# Patient Record
Sex: MALE | Race: WHITE
[De-identification: names, ages, dates, MRNs, and addresses within clinical notes are randomized per-mention and may not be internally consistent; named-entity substitution may affect disease eponyms.]

---

## 2017-03-11 ENCOUNTER — HOSPITAL ENCOUNTER (INPATIENT)
Dept: HOSPITAL 74 - JER | Age: 82
LOS: 4 days | Discharge: NURSING, REHAB OR LONG-TERM CARE | DRG: 558 | End: 2017-03-15
Attending: INTERNAL MEDICINE | Admitting: INTERNAL MEDICINE
Payer: COMMERCIAL

## 2017-03-11 VITALS — BODY MASS INDEX: 40.8 KG/M2

## 2017-03-11 DIAGNOSIS — M62.82: Primary | ICD-10-CM

## 2017-03-11 DIAGNOSIS — I48.91: ICD-10-CM

## 2017-03-11 DIAGNOSIS — I11.0: ICD-10-CM

## 2017-03-11 DIAGNOSIS — C61: ICD-10-CM

## 2017-03-11 DIAGNOSIS — N40.0: ICD-10-CM

## 2017-03-11 DIAGNOSIS — M16.12: ICD-10-CM

## 2017-03-11 DIAGNOSIS — R60.0: ICD-10-CM

## 2017-03-11 DIAGNOSIS — Z79.01: ICD-10-CM

## 2017-03-11 DIAGNOSIS — I50.30: ICD-10-CM

## 2017-03-11 DIAGNOSIS — Z91.81: ICD-10-CM

## 2017-03-11 DIAGNOSIS — R79.89: ICD-10-CM

## 2017-03-11 DIAGNOSIS — Z95.0: ICD-10-CM

## 2017-03-11 DIAGNOSIS — K21.9: ICD-10-CM

## 2017-03-11 DIAGNOSIS — E66.01: ICD-10-CM

## 2017-03-11 DIAGNOSIS — Z71.3: ICD-10-CM

## 2017-03-11 DIAGNOSIS — K76.0: ICD-10-CM

## 2017-03-11 DIAGNOSIS — R74.0: ICD-10-CM

## 2017-03-11 LAB
ALBUMIN SERPL-MCNC: 3.3 G/DL (ref 3.4–5)
ALP SERPL-CCNC: 89 U/L (ref 45–117)
ALT SERPL-CCNC: 57 U/L (ref 12–78)
ANION GAP SERPL CALC-SCNC: 10 MMOL/L (ref 8–16)
APPEARANCE UR: CLEAR
AST SERPL-CCNC: 184 U/L (ref 15–37)
BACTERIA #/AREA URNS HPF: (no result) /HPF
BASOPHILS # BLD: 1.3 % (ref 0–2)
BILIRUB SERPL-MCNC: 1.4 MG/DL (ref 0.2–1)
BILIRUB UR STRIP.AUTO-MCNC: NEGATIVE MG/DL
CALCIUM SERPL-MCNC: 8.8 MG/DL (ref 8.5–10.1)
CO2 SERPL-SCNC: 26 MMOL/L (ref 21–32)
COLOR UR: YELLOW
CREAT SERPL-MCNC: 0.9 MG/DL (ref 0.7–1.3)
DEPRECATED RDW RBC AUTO: 14.2 % (ref 11.9–15.9)
EOSINOPHIL # BLD: 2.3 % (ref 0–4.5)
GLUCOSE SERPL-MCNC: 103 MG/DL (ref 74–106)
HYALINE CASTS URNS QL MICRO: 1 /LPF
INR BLD: 1.2 (ref 0.82–1.09)
KETONES UR QL STRIP: (no result)
LEUKOCYTE ESTERASE UR QL STRIP.AUTO: (no result)
MCH RBC QN AUTO: 29.1 PG (ref 25.7–33.7)
MCHC RBC AUTO-ENTMCNC: 32.6 G/DL (ref 32–35.9)
MCV RBC: 89.1 FL (ref 80–96)
MUCOUS THREADS URNS QL MICRO: (no result)
NEUTROPHILS # BLD: 66.2 % (ref 42.8–82.8)
NITRITE UR QL STRIP: NEGATIVE
PH UR: 5 [PH] (ref 5–8)
PLATELET # BLD AUTO: 249 K/MM3 (ref 134–434)
PMV BLD: 7.7 FL (ref 7.5–11.1)
PROT SERPL-MCNC: 5.8 G/DL (ref 6.4–8.2)
PROT UR QL STRIP: NEGATIVE
PROT UR QL STRIP: NEGATIVE
PT PNL PPP: 13.2 SEC (ref 9.98–11.88)
RBC # BLD AUTO: 1 /HPF (ref 0–3)
RBC # UR STRIP: NEGATIVE /UL
SP GR UR: 1.02 (ref 1–1.03)
TROPONIN I SERPL-MCNC: 0.07 NG/ML (ref 0–0.05)
UROBILINOGEN UR STRIP-MCNC: NEGATIVE E.U./DL (ref 0.2–1)
WBC # BLD AUTO: 9.4 K/MM3 (ref 4–10)
WBC # UR AUTO: 3 /HPF (ref 3–5)

## 2017-03-11 PROCEDURE — G0378 HOSPITAL OBSERVATION PER HR: HCPCS

## 2017-03-11 RX ADMIN — SODIUM CHLORIDE SCH MLS/HR: 9 INJECTION, SOLUTION INTRAVENOUS at 17:39

## 2017-03-11 NOTE — PDOC
History of Present Illness





- General


History Source: Patient


Exam Limitations: No Limitations





- History of Present Illness


Initial Comments: 





03/11/17 10:52


The patient is an 81 year old male with a significant past medical history of 

hypertension and implantable pacemaker (for symptomatic bradycardia), who 

presents to the ER via EMS with progressively worsening left hip pain for six 

months. He states the pain is localized to the left hip and when he is walking 

it worsens it significantly. Patient relies on Tylenol 4 for the pain. He 

states he is taking 1-2 pills every 4-6 hours, and sometimes the pain does not 

go away with medication. Patient reports he has had difficulty ambulating for 

the past two days secondary to the pain. As per wife, patient was frozen 

yesterday and again because of the pain and needed assistance to lower himself 

from a standing position. Pt states that while his pain has been worsening, 

this has never happened before. Patient also reports he has had increased 

swelling in both legs, worse than usual, but notes that his lasix was cut down 

from two pills per day to one pill per day due to him feeling dizzy. Patient 

states his next orthopedic appointment is in April.





Denies back pain, headache, neck pain, fever/chills.


Denies chest pain, shortness of breath, diaphoresis


Denies nausea, vomiting, diarrhea


Denies blurry vision, paresthesia





<Shahida Escudero - Last Filed: 03/11/17 11:49>





<Justin Lara - Last Filed: 03/11/17 15:56>





- General


Chief Complaint: Pain


Stated Complaint: FALL


Time Seen by Provider: 03/11/17 08:48





Past History





<Shahida Escudero - Last Filed: 03/11/17 11:49>





- Past Medical History


Cardiac Disorders: Yes


CHF: Yes


GI Disorders: Yes (GERD.)


HTN: Yes


Hypercholesterolemia: Yes


Other medical history: BAD LT HIP (WAITING FOR REPLACMENT).





- Surgical History


Orthopedic Surgery: Yes (balateral knee sx 1965)





- Psycho/Social/Smoking Cessation Hx


Anxiety: No


Suicidal Ideation: No


Smoking History: Former smoker


Have you smoked in the past 12 months: No


If you are a former smoker, when did you quit?: 1970


Information on smoking cessation initiated: No


Hx Alcohol Use: No


Drug/Substance Use Hx: No


Substance Use Type: None





<Justin Lara - Last Filed: 03/11/17 15:56>





- Past Medical History


Allergies/Adverse Reactions: 


 Allergies











Allergy/AdvReac Type Severity Reaction Status Date / Time


 


No Known Allergies Allergy   Verified 11/06/16 07:40











Home Medications: 


Ambulatory Orders





Acetaminophen W/ Codeine #3 [Tylenol # 3 -] 1 tab PO PRN PRN 03/11/17 


Furosemide [Lasix -] 40 mg PO DAILY 03/11/17 


Losartan Potassium 25 mg PO DAILY 03/11/17 


Metoprolol Succinate [Toprol Xl] 50 mg PO DAILY 03/11/17 


Omeprazole 20 mg PO DAILY 03/11/17 


Oxycodone HCl/Acetaminophen [Percocet 5-325 mg Tablet] 1 tab PO PRN PRN 03/11/ 17 


Rivaroxaban [Xarelto -] 20 mg PO DAILY 03/11/17 


Tamsulosin HCl [Flomax] 0.4 mg PO DAILY 03/11/17 











**Review of Systems





- Review of Systems


Able to Perform ROS?: Yes


Comments:: 





03/11/17 10:53


CONSTITUTIONAL: 


No reported: Fever, Chills, Diaphoresis, Generalized Weakness, Malaise, Loss of 

Appetite 


HEENT: 


No reported: Rhinorrhea, Nasal Congestion, Throat Pain, Throat Swelling, 

Difficulty Swallowing, Mouth Swelling, Ear Pain, Eye Pain, Visual Changes 


CARDIOVASCULAR: 


No reported: Chest Pain, Syncope, Palpitations, Irregular Heart Rate, 

Lightheadedness, Peripheral Edema 


RESPIRATORY: 


No reported: Cough, Shortness of Breath, SOB with Exertion, Orthopnea, Wheezing

, Stridor, Hemoptysis 


GASTROINTESTINAL: 


No reported: Abdominal pain, Abdominal Distension, Nausea, Vomiting, Diarrhea, 

Constipation, Melena, Hematochezia 


GENITOURINARY: 


No reported: Dysuria, Frequency, Urgency, Hesitancy, Flank Pain, Genital Pain 


MUSCULOSKELETAL: Reported: Left hip pain, weakness in left leg, swelling in 

both legs


No reported: Myalgia, Arthralgia, Joint Swelling, Back pain, Neck Pain 


SKIN: 


No reported: Rash, Itching, Pallor 


HEMEATOLOGIC/IMMUNOLOGIC: 


No reported: Easy Bleeding, Easy Bruising, Lymphadenopathy, Frequent infections 


ENDOCRINE: 


No reported: Unexplained Weight Gain, Unexplained Weight Loss, Heat Intolerance

, Cold Intolerance 


NEUROLOGIC: 


No reported: Headache, Focal Weakness, Paresthesias, Vertigo, Lightheadedness, 

Unsteady Gait, Seizure, Mental Status Changes, Incontinence 


PSYCHIATRIC: 


No reported: Anxiety, Depression





<Uts,Shahida - Last Filed: 03/11/17 11:49>





*Physical Exam





- Vital Signs


 Last Vital Signs











Temp Pulse Resp BP Pulse Ox


 


 98.2 F   69   18   117/57   95 


 


 03/11/17 08:47  03/11/17 08:47  03/11/17 08:47  03/11/17 08:47  03/11/17 08:47














- Physical Exam


Comments: 





03/11/17 10:54


"GENERAL: The patient is awake, alert, and fully oriented, Nontoxic - in no 

acute distress, Obese.


HEAD: Normocephalic, atraumatic.


EYES: extraocular movements intact, sclera anicteric, conjunctiva clear.


ENT: Normal voice,  Moist mucous membranes.


NECK: Normal range of motion, supple


LUNGS: Breath sounds equal, clear to auscultation bilaterally.  No wheezes, no 

rhonchi, no rales.


HEART: PM in L chest, Regular rate and rhythm, normal S1 and S2 without murmur, 

rub or gallop.


ABDOMEN: Soft, nontender, normoactive bowel sounds.  No guarding, no rebound.  

. No CVA tenderness


EXTREMITIES: No significant discomfort on passive hip flexion/extension, +3 

pitting edema b/l (L>R) with mild erythgema over anterior foot w/o any warmth, 

large callous on the medial 1st metatarsal w/o any warmth, superficial skin 

avulsion on L heel without any active bleeding/erythema. Skin dibrided.


NEUROLOGICAL: No facial assymetry, Normal speech, moving all 4 extremities 

spontaneously and symmetrically, sensation intact throught to gross touch 


PSYCH: Normal mood, normal affect.


SKIN: Warm, Dry, normal turgor,"





<Uts,Shahida - Last Filed: 03/11/17 11:49>





- Vital Signs


 Last Vital Signs











Temp Pulse Resp BP Pulse Ox


 


 98.2 F   69   18   117/57   95 


 


 03/11/17 08:47  03/11/17 08:47  03/11/17 08:47  03/11/17 08:47  03/11/17 08:47














<Justin Lara - Last Filed: 03/11/17 15:56>





**Heart Score/ECG Review





- ECG Impressions


Comment:: 





03/11/17 10:55


Twelve-lead EKG was performed and reviewed by me. 


Ventricular rate paced rhythm


Rate of 69


Appropriate discordance, no signs of ischemia via Sgarbossas criteria





<Justin Lara - Last Filed: 03/11/17 15:56>





ED Treatment Course





- RADIOLOGY


Radiograph Interpretation: 





03/11/17 11:49


Hip XR impression reported by Dr. Prasad Brooke: Extensive left hip 

degenerative changes. 


Chest XR impression reported by Dr. Prasad Brooke: Large heart. Pacemaker. 

Right line removed. Mild congestive changes.


US/Duplex impression reported by Dr. Linares: No DVT identified in either leg. 





<Uts,Shahida - Last Filed: 03/11/17 11:49>





- LABORATORY


CBC & Chemistry Diagram: 


 03/11/17 11:37





 03/11/17 11:37





- RADIOLOGY


Radiology Studies Ordered: 














 Category Date Time Status


 


 CHEST X-RAY PORTABLE* [RAD] [facility]  03/11/17 09:51 Ordered


 


 HIP & PELVIS-LEFT [RAD] [facility]  03/11/17 09:51 Ordered














<Justin Lara - Last Filed: 03/11/17 15:56>





Medical Decision Making





- Medical Decision Making


03/11/17 09:59


81y F hx of symptomatic bradycardia, htn, ?chf on xeralto (unclera reason), 

presents with L hip pain - pt states he has been pending hip surgery but has 

been delayed for various reasons, but the pain has not been controlled with his 

typical medications.  Denies any numbness/tingling, recent falls or trauma. Pt 

did say that due to the pain he sometimes freezes up due to the pain.  pt also 

complaining of leg swelling that is worsening, was on lasix but decresaed due 

to feeling dizzy.  No associated cp, sob, dizziness, headacke, back pain, 

diarrrhea, dysuria. 





will ck labs to r/o renal failure, 


cxr to r/o congestion/chf


duplex to r/o dvt


will give morphine for pain contro


will obtain xray to evaluate for expected degenerative disease





A portion of this note was documented by scribe services under my direction. I 

have reviewed the details of the note, within reason, and agree with the 

documentation with the following case summary and management plan written by me





03/11/17 14:40


pts labs reviewed


noted for elevation of ck = will hydrate


pts hip/pelvis reveals significant degerative disease


pt still in pain after multiple doses of pain meds - will admit for observation 

for pain mangaement. and hydration for his rhabdomyolysis











03/11/17 14:54


case dw dr. robb


agreed with observation for hydration and further pain control


[facility] for med surg





Case discussed in detail with admitting physician including history, physical 

exam and ancillary studies.


Admitting physician has assumed care for the patient, will follow all pending 

diagnostics and will complete the evaluation and treatment. 








<Justin Lara - Last Filed: 03/11/17 15:56>





*DC/Admit/Observation/Transfer





- Attestations


Scribe Attestion: 





03/11/17 10:54





Documentation prepared by Shahida Escudero, acting as medical scribe for Justin Lara MD.





<Shahida Escudero - Last Filed: 03/11/17 11:49>





- Discharge Dispostion


Admit: Yes





<Justin Lara - Last Filed: 03/11/17 15:56>


Diagnosis at time of Disposition: 


 Hip pain, left





Rhabdomyolysis


Qualifiers:


 Rhabdomyolysis type: non-traumatic Qualified Code(s): M62.82 - Rhabdomyolysis





- Discharge Dispostion


Condition at time of disposition: Guarded





- Referrals


Referrals: 


STAFF,NOT ON [Primary Care Provider] -

## 2017-03-11 NOTE — HP
CHIEF COMPLAINT: Worsening Left hip pain 





PCP:Shanel 


Cardiologist Dr. Calvert in the [facility] 





HISTORY OF PRESENT ILLNESS:


81M with PMH of HTN HLD Possible CHF Morbid Obesity GERD fatty liver 

osteoarthritis Prostate Ca (being observed) presents to the ED with a chief 

complaint of worsening left hip pain. Per the patient he had had this pain for 

about 5 years. Lately the hip hads been bothering him so much that yesterday he 

"froze" from the pain while trying to ambulate. Per the patient he has had many 

attempts at trying to have a hip replacement but one time case cancelled due to 

uncontrolled hypertension then next time it was cancelled he says due to failed 

attempt at placing a needle (it is possible they couldn't get the spinal 

anesthesia) and he states he also had a heart attack in september. While 

looking over his chart in november he had a dual chamber pace maker inserted 

for heart block and symptomatic bradycardia and patient may have been referring 

to that event. Patient was also recently started on Xarelto by Dr. Calvert his 

cardiologist and patient is unable to tell me why. He denies nausea vomiting 

fevers chills chest pain or shortness of breath. he denies dysuria or hematuria 

he denies orthopnea. He does endorse having increased lower extremity edema 

especially since he recently had his lasix cut back to 40 daily from BID 

because it was making him dizzy. In the Ed he was found to have slightly 

elevated troponins at 0.7 and elevated CK. Being admitted for uncontrolled pain 

and rhabdomyolysis. 





ER course was notable for:


(1)Labs 


(2)CXR EKG 


(3)Pain Control IVF 





Recent Travel:Denies 





PAST MEDICAL HISTORY:As above 





PAST SURGICAL HISTORY: B/L knee surgery in 1965 





Social History:


Smoking:Fromer smoker quit in the 1970's 


Alcohol:Denies


Drugs: Denies





Family History:


Allergies





No Known Allergies Allergy (Verified 11/06/16 07:40)


 








HOME MEDICATIONS:


 Home Medications











 Medication  Instructions  Recorded


 


Acetaminophen W/ Codeine #3 1 tab PO PRN PRN 03/11/17





[Tylenol # 3 -]  


 


Furosemide [Lasix -] 40 mg PO DAILY 03/11/17


 


Losartan Potassium 25 mg PO DAILY 03/11/17


 


Metoprolol Succinate [Toprol Xl] 50 mg PO DAILY 03/11/17


 


Omeprazole 20 mg PO DAILY 03/11/17


 


Oxycodone HCl/Acetaminophen 1 tab PO PRN PRN 03/11/17





[Percocet 5-325 mg Tablet]  


 


Rivaroxaban [Xarelto -] 20 mg PO DAILY 03/11/17


 


Tamsulosin HCl [Flomax] 0.8 mg PO DAILY 03/11/17








REVIEW OF SYSTEMS


CONSTITUTIONAL: 


Absent:  fever, chills, diaphoresis, generalized weakness, malaise, loss of 

appetite, weight change


HEENT: 


Absent:  rhinorrhea, nasal congestion, throat pain, throat swelling, difficulty 

swallowing, mouth swelling, ear pain, eye pain, visual changes


CARDIOVASCULAR: 


Absent: chest pain, syncope, palpitations, irregular heart rate, lightheadedness

, Positive: peripheral edema


RESPIRATORY: 


Absent: cough, shortness of breath, dyspnea with exertion, orthopnea, wheezing, 

stridor, hemoptysis


GASTROINTESTINAL:


Absent: abdominal pain, abdominal distension, nausea, vomiting, diarrhea, 

constipation, melena, hematochezia


GENITOURINARY: 


Absent: dysuria, frequency, urgency, hesitancy, hematuria, flank pain, genital 

pain


MUSCULOSKELETAL: 


Absent: myalgia, arthralgia, joint swelling, back pain, neck pain Positive: +

left hip pain


SKIN: 


Absent: rash, itching, pallor


HEMATOLOGIC/IMMUNOLOGIC: 


Absent: easy bleeding, easy bruising, lymphadenopathy, frequent infections


ENDOCRINE:


Absent: unexplained weight gain, unexplained weight loss, heat intolerance, 

cold intolerance


NEUROLOGIC: 


Absent: headache, focal weakness or paresthesias, dizziness, unsteady gait, 

seizure, mental status changes, bladder or bowel incontinence


PSYCHIATRIC: 


Absent: anxiety, depression, suicidal or homicidal ideation, hallucinations.








PHYSICAL EXAMINATION


 Vital Signs - 24 hr











  03/11/17 03/11/17





  08:47 15:10


 


Temperature 98.2 F 


 


Pulse Rate 69 


 


Pulse Rate [  68





Apical]  


 


Respiratory 18 19





Rate  


 


Blood Pressure 117/57 


 


Blood Pressure  120/78





[Left Arm]  


 


O2 Sat by Pulse 95 95





Oximetry (%)  











GENERAL: Awake, alert, and fully oriented. tired appearing but arousable 


HEAD: Normal with no signs of trauma.


EYES: Pupils equal, round and reactive to light, extraocular movements intact


EARS, NOSE, THROAT: Moist mucous membranes.


NECK: Normal range of motion, supple 


LUNGS: Breath sounds equal, clear to auscultation bilaterally


HEART: Regular rate and rhythm, normal S1 and S2 without murmur


ABDOMEN: Soft, obese, nontender, not distended


MUSCULOSKELETAL: decreased range of motion at left hip from pain otherwise 

other joints are WNL no CVA tenderness.


LOWER EXTREMITIES: 1+ pitting edema of bilateral lower extremities 


NEUROLOGICAL:  Cranial nerves II-XII grossly intact. Normal speech.


SKIN: chronic venous stasis changes of bilateral lower estremities. Calluses of 

bilateral feet 


 Laboratory Results - last 24 hr











  03/11/17 03/11/17 03/11/17





  11:37 11:37 11:37


 


WBC  9.4  


 


RBC  4.90  


 


Hgb  14.3  


 


Hct  43.7  


 


MCV  89.1  


 


MCHC  32.6  


 


RDW  14.2  


 


Plt Count  249  D  


 


MPV  7.7  


 


Neutrophils %  66.2  


 


Lymphocytes %  18.1  


 


Monocytes %  12.1 H  


 


Eosinophils %  2.3  


 


Basophils %  1.3  


 


INR   1.20 H 


 


Sodium    137


 


Potassium    4.3


 


Chloride    101


 


Carbon Dioxide    26  D


 


Anion Gap    10


 


BUN    19 H D


 


Creatinine    0.9  D


 


Creat Clearance w eGFR    > 60


 


Random Glucose    103


 


Calcium    8.8


 


Total Bilirubin    1.4 H


 


AST    184 H D


 


ALT    57  D


 


Alkaline Phosphatase    89


 


Creatine Kinase    3542 H D


 


Creatine Kinase Index    0.8


 


CK-MB (CK-2)    27.212 H


 


CK-MB (CK-2) Rel Index   


 


Troponin I    0.07 H D


 


Total Protein    5.8 L


 


Albumin    3.3 L


 


Urine Color   


 


Urine Appearance   


 


Urine pH   


 


Ur Specific Gravity   


 


Urine Protein   


 


Urine Glucose (UA)   


 


Urine Ketones   


 


Urine Blood   


 


Urine Nitrite   


 


Urine Bilirubin   


 


Urine Urobilinogen   


 


Ur Leukocyte Esterase   


 


Urine RBC   


 


Urine WBC   


 


Ur Epithelial Cells   


 


Urine Bacteria   


 


Hyaline Casts   


 


Urine Mucus   














  03/11/17 03/11/17





  11:37 15:45


 


WBC  


 


RBC  


 


Hgb  


 


Hct  


 


MCV  


 


MCHC  


 


RDW  


 


Plt Count  


 


MPV  


 


Neutrophils %  


 


Lymphocytes %  


 


Monocytes %  


 


Eosinophils %  


 


Basophils %  


 


INR  


 


Sodium  


 


Potassium  


 


Chloride  


 


Carbon Dioxide  


 


Anion Gap  


 


BUN  


 


Creatinine  


 


Creat Clearance w eGFR  


 


Random Glucose  


 


Calcium  


 


Total Bilirubin  


 


AST  


 


ALT  


 


Alkaline Phosphatase  


 


Creatine Kinase  


 


Creatine Kinase Index  


 


CK-MB (CK-2)  


 


CK-MB (CK-2) Rel Index  Cancelled 


 


Troponin I  


 


Total Protein  


 


Albumin  


 


Urine Color   Yellow


 


Urine Appearance   Clear


 


Urine pH   5.0


 


Ur Specific Gravity   1.016


 


Urine Protein   Negative


 


Urine Glucose (UA)   Negative


 


Urine Ketones   1+ H


 


Urine Blood   Negative


 


Urine Nitrite   Negative


 


Urine Bilirubin   Negative


 


Urine Urobilinogen   Negative


 


Ur Leukocyte Esterase   Trace H D


 


Urine RBC   1


 


Urine WBC   3


 


Ur Epithelial Cells   Rare


 


Urine Bacteria   Rare


 


Hyaline Casts   1


 


Urine Mucus   Rare








CXR: reviewed-cardiomegaly pulmonary vascular congestion  


Echo from 11/2016 reviewed 


Left hip XR reviewed shows severe Osteoarthritis 


EKG: ventricular paced rhythm  


Duplex US: no DVT 





ASSESSMENT/PLAN:


81M with multiple medical problems presents to the ED for uncontrolled left hip 

pain found to be in rhabdomyolysis.





Rhabdomyolysis: unsure of the etiology patient was on a statin but has not been 

taking lipitor


Admit to inpatient Med/Surg floors


Vitals signs per protocol


NS @ 83ml/hr for gentle hydration


Trend CPK


Monitor Renal function 





Uncontrolled Left Hip Pain: secondary to osteoarthritis


Patient needs a Hip replacement and needs to be medically optimized 


has appointment for next month for with orthopedic surgeon 





Troponinemia: unlikely patient is having ACS 


trend troponins to document peak 


will consider Cardiology consult 





HTN:


Restart home meds:


Lasix 40mg po daily


Cozaar 25mg po daily 


Metoprolol 50mg XL po Daily 





CHF: unsure if patient does have a diagnosis of CHF


Will send a BNP


Will continue home dose lasix and titrate up PRN


will consider cardiology consult 


Will need to reach cardiologist Dr. Calvert in the [facility]-Office closed at this time 


patient is also onXarelto- will restart however patient unsure why he is being 

anticoagulated and will need to follow up with cardiologist Dr. Calvert 





HLD: 


outpt follow up


no statins at this time at patient is in rhabdomyolysis 





BPH/Prostate Ca:details unclear patient unsure


Restart home dose flomax 0.8mg daily 





GERD:


Protonix PO daily 





FEN:


NS @ 83ml/hr for 2 bags total


no electrolyte issues


low sodium diet





PPx:


on Xarelto 


Protonix


Ambulate as tolerated





patient seen and case discussed with Attending Dr. Cano 














Visit type





- Emergency Visit


Emergency Visit: Yes


ED Registration Date: 03/11/17


Care time: The patient presented to the Emergency Department on the above date 

and was hospitalized for further evaluation of their emergent condition.





- New Patient


This patient is new to me today: Yes


Date on this admission: 03/11/17





- Critical Care


Critical Care patient: No

## 2017-03-11 NOTE — PN
Teaching Attending Note


Name of Resident: Nasim Sanches


ATTENDING PHYSICIAN STATEMENT





I saw and evaluated the patient.


I reviewed the resident's note and discussed the case with the resident.


I agree with the resident's findings and plan as documented.








SUBJECTIVE: This is an 81-year-old man who comes to the ER complaining of pain 

in his left hip. He has osteoarthritis of the left hip and has been having pain 

for about 5 years. Hip replacement is planned. He says that yesterday and today

, while walking, he became "frozen". Initially he said that he fell when this 

happened, but later stated that he did not fall. CK was found to be 3542.








OBJECTIVE:


 Vital Signs











 Period  Temp  Pulse  Resp  BP Sys/Ross  Pulse Ox


 


 Last 24 Hr  98.2 F  68-69  18-19  117-120/57-78  95-95








HEART: S1 S2, RRR, (+) 2/6 systolic murmur


LUNGS: Clear


ABDOMEN: Obese, soft, non-tender, normal BS


EXTREMITIES: 2+ edema








ASSESSMENT AND PLAN:


This is an 81-year-old man with a history of left hip OA, HTN, chronic 

diastolic HF, hyperlipidemia, BPH, GERD, pacemaker who presented to the ER with 

severe left hip pain.





1. Rhabdomyolysis


   - Patient initially stated he fell today but later stated he has not fallen 

since last year


   - IV fluid


   - Monitor CK





2. Left hip pain secondary to osteoarthritis


   - Hip replacement is planned


   - Oxycodone as needed


   - Physical therapy





3. Elevated troponin


   - Monitor on telemetry


   - Serial troponins





4. Hypertension


   - Continue Lasix, Cozaar, Toprol XL  





5. Chronic diastolic heart failure


   - Continue Lasix, Cozaar, Toprol XL





6. Hyperlipidemia 





7. BPH


   - Continue Flomax 





8. GERD


   - Continue Prilosec





9. Patient is on Xarelto and does not know why. Will contact his cardiologist 

for further information.

## 2017-03-12 LAB
ALBUMIN SERPL-MCNC: 3.2 G/DL (ref 3.4–5)
ALP SERPL-CCNC: 78 U/L (ref 45–117)
ALT SERPL-CCNC: 57 U/L (ref 12–78)
ANION GAP SERPL CALC-SCNC: 9 MMOL/L (ref 8–16)
AST SERPL-CCNC: 149 U/L (ref 15–37)
BILIRUB SERPL-MCNC: 1.4 MG/DL (ref 0.2–1)
CALCIUM SERPL-MCNC: 8.9 MG/DL (ref 8.5–10.1)
CO2 SERPL-SCNC: 29 MMOL/L (ref 21–32)
CREAT SERPL-MCNC: 0.8 MG/DL (ref 0.7–1.3)
DEPRECATED RDW RBC AUTO: 14.3 % (ref 11.9–15.9)
GLUCOSE SERPL-MCNC: 103 MG/DL (ref 74–106)
MCH RBC QN AUTO: 29.3 PG (ref 25.7–33.7)
MCHC RBC AUTO-ENTMCNC: 33.1 G/DL (ref 32–35.9)
MCV RBC: 88.7 FL (ref 80–96)
PLATELET # BLD AUTO: 259 K/MM3 (ref 134–434)
PMV BLD: 7.5 FL (ref 7.5–11.1)
PROT SERPL-MCNC: 5.6 G/DL (ref 6.4–8.2)
TROPONIN I SERPL-MCNC: 0.05 NG/ML (ref 0–0.05)
WBC # BLD AUTO: 8.3 K/MM3 (ref 4–10)

## 2017-03-12 RX ADMIN — NYSTATIN CREAM SCH APPLIC: 100000 CREAM TOPICAL at 22:02

## 2017-03-12 NOTE — PN
Progress Note (short form)





- Note


Progress Note: 


Subjective:





no fever ro chills, L hip pain .  no CP or SOB , no apalpitations 





Objective:








Vital Signs:


 Last Vital Signs











Temp Pulse Resp BP Pulse Ox


 


 98.4 F   76   18   142/89   97 


 


 03/12/17 09:00  03/12/17 09:00  03/12/17 09:00  03/12/17 09:00  03/12/17 09:00











Physical Exam:


NAD , AAOx3


CV: RRR, 3/6 SM at [facility], and 3/6 DM at [facility]. no JVD 


Lungs : R base crackles 


Ext : trace edema 


Abd : obese ,soft, NT, ND , NL BS 











Labs:


 Laboratory Results - last 24 hr











  03/11/17 03/11/17 03/11/17





  11:37 11:37 11:37


 


WBC  9.4  


 


RBC  4.90  


 


Hgb  14.3  


 


Hct  43.7  


 


MCV  89.1  


 


MCHC  32.6  


 


RDW  14.2  


 


Plt Count  249  D  


 


MPV  7.7  


 


Neutrophils %  66.2  


 


Lymphocytes %  18.1  


 


Monocytes %  12.1 H  


 


Eosinophils %  2.3  


 


Basophils %  1.3  


 


INR   1.20 H 


 


Sodium    137


 


Potassium    4.3


 


Chloride    101


 


Carbon Dioxide    26  D


 


Anion Gap    10


 


BUN    19 H D


 


Creatinine    0.9  D


 


Creat Clearance w eGFR    > 60


 


Random Glucose    103


 


Calcium    8.8


 


Total Bilirubin    1.4 H


 


AST    184 H D


 


ALT    57  D


 


Alkaline Phosphatase    89


 


Creatine Kinase    3542 H D


 


Creatine Kinase Index    0.8


 


CK-MB (CK-2)    27.212 H


 


CK-MB (CK-2) Rel Index   


 


Troponin I    0.07 H D


 


B-Natriuretic Peptide   


 


Total Protein    5.8 L


 


Albumin    3.3 L


 


Urine Color   


 


Urine Appearance   


 


Urine pH   


 


Ur Specific Gravity   


 


Urine Protein   


 


Urine Glucose (UA)   


 


Urine Ketones   


 


Urine Blood   


 


Urine Nitrite   


 


Urine Bilirubin   


 


Urine Urobilinogen   


 


Ur Leukocyte Esterase   


 


Urine RBC   


 


Urine WBC   


 


Ur Epithelial Cells   


 


Urine Bacteria   


 


Hyaline Casts   


 


Urine Mucus   














  03/11/17 03/11/17 03/11/17





  11:37 15:45 18:30


 


WBC   


 


RBC   


 


Hgb   


 


Hct   


 


MCV   


 


MCHC   


 


RDW   


 


Plt Count   


 


MPV   


 


Neutrophils %   


 


Lymphocytes %   


 


Monocytes %   


 


Eosinophils %   


 


Basophils %   


 


INR   


 


Sodium   


 


Potassium   


 


Chloride   


 


Carbon Dioxide   


 


Anion Gap   


 


BUN   


 


Creatinine   


 


Creat Clearance w eGFR   


 


Random Glucose   


 


Calcium   


 


Total Bilirubin   


 


AST   


 


ALT   


 


Alkaline Phosphatase   


 


Creatine Kinase   


 


Creatine Kinase Index   


 


CK-MB (CK-2)   


 


CK-MB (CK-2) Rel Index  Cancelled  


 


Troponin I    0.06 H


 


B-Natriuretic Peptide   


 


Total Protein   


 


Albumin   


 


Urine Color   Yellow 


 


Urine Appearance   Clear 


 


Urine pH   5.0 


 


Ur Specific Gravity   1.016 


 


Urine Protein   Negative 


 


Urine Glucose (UA)   Negative 


 


Urine Ketones   1+ H 


 


Urine Blood   Negative 


 


Urine Nitrite   Negative 


 


Urine Bilirubin   Negative 


 


Urine Urobilinogen   Negative 


 


Ur Leukocyte Esterase   Trace H D 


 


Urine RBC   1 


 


Urine WBC   3 


 


Ur Epithelial Cells   Rare 


 


Urine Bacteria   Rare 


 


Hyaline Casts   1 


 


Urine Mucus   Rare 














  03/12/17 03/12/17 03/12/17





  06:00 06:00 06:00


 


WBC   


 


RBC   


 


Hgb   


 


Hct   


 


MCV   


 


MCHC   


 


RDW   


 


Plt Count   


 


MPV   


 


Neutrophils %   


 


Lymphocytes %   


 


Monocytes %   


 


Eosinophils %   


 


Basophils %   


 


INR   


 


Sodium   


 


Potassium   


 


Chloride   


 


Carbon Dioxide   


 


Anion Gap   


 


BUN   


 


Creatinine   


 


Creat Clearance w eGFR   


 


Random Glucose   


 


Calcium   


 


Total Bilirubin   


 


AST   


 


ALT   


 


Alkaline Phosphatase   


 


Creatine Kinase  2028 H D   2041 H


 


Creatine Kinase Index  0.6   0.5


 


CK-MB (CK-2)  11.302 H   10.695 H


 


CK-MB (CK-2) Rel Index   


 


Troponin I    0.05


 


B-Natriuretic Peptide   1604.81 H 


 


Total Protein   


 


Albumin   


 


Urine Color   


 


Urine Appearance   


 


Urine pH   


 


Ur Specific Gravity   


 


Urine Protein   


 


Urine Glucose (UA)   


 


Urine Ketones   


 


Urine Blood   


 


Urine Nitrite   


 


Urine Bilirubin   


 


Urine Urobilinogen   


 


Ur Leukocyte Esterase   


 


Urine RBC   


 


Urine WBC   


 


Ur Epithelial Cells   


 


Urine Bacteria   


 


Hyaline Casts   


 


Urine Mucus   














  03/12/17 03/12/17 03/12/17





  06:00 06:00 06:10


 


WBC    8.3


 


RBC    4.72


 


Hgb    13.9


 


Hct    41.9


 


MCV    88.7


 


MCHC    33.1


 


RDW    14.3


 


Plt Count    259


 


MPV    7.5


 


Neutrophils %   


 


Lymphocytes %   


 


Monocytes %   


 


Eosinophils %   


 


Basophils %   


 


INR   


 


Sodium   


 


Potassium   


 


Chloride   


 


Carbon Dioxide   


 


Anion Gap   


 


BUN   


 


Creatinine   


 


Creat Clearance w eGFR   


 


Random Glucose   


 


Calcium   


 


Total Bilirubin   


 


AST   


 


ALT   


 


Alkaline Phosphatase   


 


Creatine Kinase   


 


Creatine Kinase Index   


 


CK-MB (CK-2)   


 


CK-MB (CK-2) Rel Index  Cancelled  Cancelled 


 


Troponin I   


 


B-Natriuretic Peptide   


 


Total Protein   


 


Albumin   


 


Urine Color   


 


Urine Appearance   


 


Urine pH   


 


Ur Specific Gravity   


 


Urine Protein   


 


Urine Glucose (UA)   


 


Urine Ketones   


 


Urine Blood   


 


Urine Nitrite   


 


Urine Bilirubin   


 


Urine Urobilinogen   


 


Ur Leukocyte Esterase   


 


Urine RBC   


 


Urine WBC   


 


Ur Epithelial Cells   


 


Urine Bacteria   


 


Hyaline Casts   


 


Urine Mucus   














  03/12/17 03/12/17





  06:10 08:00


 


WBC  


 


RBC  


 


Hgb  


 


Hct  


 


MCV  


 


MCHC  


 


RDW  


 


Plt Count  


 


MPV  


 


Neutrophils %  


 


Lymphocytes %  


 


Monocytes %  


 


Eosinophils %  


 


Basophils %  


 


INR  


 


Sodium  141 


 


Potassium  4.3 


 


Chloride  103 


 


Carbon Dioxide  29 


 


Anion Gap  9 


 


BUN  15  D 


 


Creatinine  0.8 


 


Creat Clearance w eGFR  > 60 


 


Random Glucose  103 


 


Calcium  8.9 


 


Total Bilirubin  1.4 H 


 


AST  149 H 


 


ALT  57 


 


Alkaline Phosphatase  78 


 


Creatine Kinase   Cancelled


 


Creatine Kinase Index  


 


CK-MB (CK-2)  


 


CK-MB (CK-2) Rel Index  


 


Troponin I  


 


B-Natriuretic Peptide  


 


Total Protein  5.6 L 


 


Albumin  3.2 L 


 


Urine Color  


 


Urine Appearance  


 


Urine pH  


 


Ur Specific Gravity  


 


Urine Protein  


 


Urine Glucose (UA)  


 


Urine Ketones  


 


Urine Blood  


 


Urine Nitrite  


 


Urine Bilirubin  


 


Urine Urobilinogen  


 


Ur Leukocyte Esterase  


 


Urine RBC  


 


Urine WBC  


 


Ur Epithelial Cells  


 


Urine Bacteria  


 


Hyaline Casts  


 


Urine Mucus  














Imaging:


HIp xray reviewed. 





Assessment/Plan:





81  year-old man with a PMH of HTN, morbid obesity, nephrolithiasis , 

diverticultitis, osteoarthritis, fatty liver disease, BPH,  prostate cancer , 

and h/o Heart block s/p pace maker 11/16 . Admitted for fall , and L hip pain . 

he was found to have Rhabdo 





1-  Rhabdomyolysis:  CPK improved . no muscle pain , and no renal failure 





- cont hydration carefully due to h/o CHF 


- repeat CPK today to determine about hydration 


- hold lasix while hydrating 





2- Mechanical falls , due to hip pain 





- dc morphine in anticipatin for dc tomorrow 


-increase frequency of oxycodone 


- add tramadole 


- avoid NSAIDs in setting of Rhabdo and h/o Heart failure to protect kidneys 





3- h/o D CHF: last echo 11/16 with nl EF 





- currently doesnto seem to be  i decompensated heart failure 


- caution with IVF while holding lasix 


- will resume lasix in 1-2 days 


- cont BB , ARB 





4- On xarelto,  thinks he has irregular Rhythm . will cont 





5- minimal troponin leak. paced rhythm o n EKG, no cp or any indication of ACS 

. 


- no further w/u 





6- transaminitis : due to liver fatty infiltration and possibly chronic 

congestion with heart failure 


-lTS were elevated in past. 


-  monitor 





7- PT eval . dc planning for tomorrow ,he [refers to go home with PT 











Visit type





- Emergency Visit


Emergency Visit: Yes


ED Registration Date: 03/11/17


Care time: The patient presented to the Emergency Department on the above date 

and was hospitalized for further evaluation of their emergent condition.





- New Patient


This patient is new to me today: Yes


Date on this admission: 03/12/17





- Critical Care


Critical Care patient: No

## 2017-03-13 LAB
ALBUMIN SERPL-MCNC: 2.9 G/DL (ref 3.4–5)
ALP SERPL-CCNC: 77 U/L (ref 45–117)
ALT SERPL-CCNC: 48 U/L (ref 12–78)
ANION GAP SERPL CALC-SCNC: 6 MMOL/L (ref 8–16)
AST SERPL-CCNC: 82 U/L (ref 15–37)
BILIRUB CONJ SERPL-MCNC: 0.5 MG/DL (ref 0–0.2)
BILIRUB SERPL-MCNC: 1.5 MG/DL (ref 0.2–1)
CALCIUM SERPL-MCNC: 8.6 MG/DL (ref 8.5–10.1)
CO2 SERPL-SCNC: 30 MMOL/L (ref 21–32)
CREAT SERPL-MCNC: 0.8 MG/DL (ref 0.7–1.3)
GLUCOSE SERPL-MCNC: 113 MG/DL (ref 74–106)
PROT SERPL-MCNC: 5.4 G/DL (ref 6.4–8.2)

## 2017-03-13 RX ADMIN — SODIUM CHLORIDE SCH MLS/HR: 9 INJECTION, SOLUTION INTRAVENOUS at 05:39

## 2017-03-13 RX ADMIN — METOPROLOL SUCCINATE SCH MG: 50 TABLET, EXTENDED RELEASE ORAL at 10:15

## 2017-03-13 RX ADMIN — NYSTATIN CREAM SCH APPLIC: 100000 CREAM TOPICAL at 10:15

## 2017-03-13 RX ADMIN — NYSTATIN CREAM SCH APPLIC: 100000 CREAM TOPICAL at 23:48

## 2017-03-13 RX ADMIN — PANTOPRAZOLE SODIUM SCH MG: 40 TABLET, DELAYED RELEASE ORAL at 10:15

## 2017-03-13 RX ADMIN — RIVAROXABAN SCH MG: 20 TABLET, FILM COATED ORAL at 10:14

## 2017-03-13 RX ADMIN — TAMSULOSIN HYDROCHLORIDE SCH MG: 0.4 CAPSULE ORAL at 10:15

## 2017-03-13 RX ADMIN — LOSARTAN POTASSIUM SCH MG: 25 TABLET, FILM COATED ORAL at 10:15

## 2017-03-13 NOTE — PN
Physical Exam: 


SUBJECTIVE: Patient seen and examined at bedside


complains to left hip pain 








OBJECTIVE:





 Vital Signs











 Period  Temp  Pulse  Resp  BP Sys/Ross  Pulse Ox


 


 Last 24 Hr  97.8 F-98.5 F  69-72  20-20  100-156/60-87  91














GENERAL: Awake, alert, and fully oriented. tired appearing but arousable 


HEAD: Normal with no signs of trauma.


EYES: Pupils equal, round and reactive to light, extraocular movements intact


EARS, NOSE, THROAT: Moist mucous membranes.


NECK: Normal range of motion, supple 


LUNGS: Breath sounds equal, clear to auscultation bilaterally


HEART: Regular rate and rhythm, normal S1 and S2 without murmur


ABDOMEN: Soft, obese, nontender, not distended


MUSCULOSKELETAL: decreased range of motion at left hip from pain otherwise 

other joints are WNL no CVA tenderness.


LOWER EXTREMITIES: 1+ pitting edema of bilateral lower extremities 


NEUROLOGICAL:  Cranial nerves II-XII grossly intact. Normal speech.


SKIN: chronic venous stasis changes of bilateral lower estremities. Calluses of 

bilateral feet 














 Laboratory Results - last 24 hr











  03/12/17 03/12/17 03/13/17





  13:41 13:41 05:55


 


Sodium    142


 


Potassium    4.5


 


Chloride    106


 


Carbon Dioxide    30


 


Anion Gap    6 L


 


BUN    13


 


Creatinine    0.8


 


Random Glucose    113 H


 


Calcium    8.6


 


Total Bilirubin   


 


Direct Bilirubin   


 


AST   


 


ALT   


 


Alkaline Phosphatase   


 


Creatine Kinase    681 H D


 


Creatine Kinase Index  0.6   0.5


 


CK-MB (CK-2)  8.201 H   3.669 H


 


CK-MB (CK-2) Rel Index   Cancelled 


 


Total Protein   


 


Albumin   














  03/13/17 03/13/17





  05:55 05:55


 


Sodium  


 


Potassium  


 


Chloride  


 


Carbon Dioxide  


 


Anion Gap  


 


BUN  


 


Creatinine  


 


Random Glucose  


 


Calcium  


 


Total Bilirubin  1.5 H 


 


Direct Bilirubin  0.5 H 


 


AST  82 H D 


 


ALT  48 


 


Alkaline Phosphatase  77 


 


Creatine Kinase  


 


Creatine Kinase Index  


 


CK-MB (CK-2)  


 


CK-MB (CK-2) Rel Index   Cancelled


 


Total Protein  5.4 L 


 


Albumin  2.9 L 








Active Medications











Generic Name Dose Route Start Last Admin





  Trade Name Freq  PRN Reason Stop Dose Admin


 


Furosemide  40 mg 03/14/17 10:00  





  Lasix -  PO   





  DAILY KELVIN   


 


Losartan Potassium  25 mg 03/13/17 10:00 03/13/17 10:15





  Cozaar -  PO   25 mg





  DAILY KELVIN   Administration


 


Metoprolol Succinate  50 mg 03/13/17 10:00 03/13/17 10:15





  Toprol Xl -  PO   50 mg





  DAILY KELVIN   Administration


 


Nystatin  1 applic 03/12/17 22:00 03/13/17 10:15





  Mycostatin Cream -  TP   1 applic





  BID KELVIN   Administration


 


Ondansetron HCl  8 mg 03/12/17 19:33  





  Zofran Injection  IVPB   





  Q8H PRN   





  NAUSEA   


 


Oxycodone HCl  5 mg 03/12/17 12:35 03/13/17 05:36





  Roxicodone -  PO   5 mg





  Q4H PRN   Administration





  PAIN   


 


Pantoprazole Sodium  40 mg 03/13/17 10:00 03/13/17 10:15





  Protonix -  PO   40 mg





  DAILY KELVIN   Administration


 


Rivaroxaban  20 mg 03/13/17 10:00 03/13/17 10:14





  Xarelto -  PO   20 mg





  DAILY KELVIN   Administration


 


Tamsulosin HCl  0.8 mg 03/13/17 10:00 03/13/17 10:15





  Flomax -  PO   0.8 mg





  DAILY KELVIN   Administration


 


Tramadol HCl  50 mg 03/12/17 12:34 03/13/17 07:55





  Ultram -  PO   50 mg





  Q6H PRN   Administration





  PAIN   











ASSESSMENT/PLAN:


81M with multiple medical problems presents to the ED for uncontrolled left hip 

pain found to be in rhabdomyolysis.





Rhabdomyolysis: unsure of the etiology patient was on a statin but has not been 

taking lipitor


Stop IVF 


CPK significantly decreased 


improved 





Uncontrolled Left Hip Pain: secondary to osteoarthritis history of mechanical 

fall


Patient needs a Hip replacement and needs to be medically optimized 


has surgery scheduled for next month for with orthopedic surgeon 


Pain control with oxycodone and ultram 


physical therapy 





Troponinemia: unlikely patient is having ACS 


no further work up


outpatient follow up with cardiologist 





HTN:


continue home meds:


Cozaar 25mg po daily 


Metoprolol 50mg XL po Daily 


restart Lasix 40mg po daily starting tomorrow patient states he takes 40mg po 

q48h at home will give it to him daily while inpatient as he recieved IVF for 

the rhabdomyolysis 





CHF:


restart lasix tomorrow  


continue Xarelto  





History of heart block:


has PPM


outpatient follow up 





HLD: 


outpt follow up 





transaminitis:


likely from chronic liver congestion from CHF vs fatty liver infiltration


LFTs improving 


no further work up 





BPH/Prostate Ca:details unclear and patient unsure


Restart home dose flomax 0.8mg daily 





GERD:


Protonix PO daily 





FEN:


stop IVF


no electrolyte issues


low sodium diet





PPx:


on Xarelto 


Protonix


PT consult->subacute rehab discharge planning 





patient seen and case discussed with Attending Dr. Frank 














Visit type





- Emergency Visit


Emergency Visit: Yes


ED Registration Date: 03/11/17


Care time: The patient presented to the Emergency Department on the above date 

and was hospitalized for further evaluation of their emergent condition.





- New Patient


This patient is new to me today: No





- Critical Care


Critical Care patient: No

## 2017-03-13 NOTE — PN
Teaching Attending Note


Name of Resident: Nasim Sanches


ATTENDING PHYSICIAN STATEMENT





I saw and evaluated the patient.


I reviewed the resident's note and discussed the case with the resident.


I agree with the resident's findings and plan as documented.








SUBJECTIVE:


no fever  or chills , no abd pain. has L hip pain . 








OBJECTIVE:


NAD 


CV  ; RRR. no JVD 


Lungs; CTAB 


Ext: no edema 





ASSESSMENT AND PLAN:








81  year-old man with a PMH of HTN, morbid obesity, nephrolithiasis , 

diverticultitis, osteoarthritis, fatty liver disease, BPH,  prostate cancer , 

and h/o Heart block s/p pace maker 11/16 . Admitted for fall , and L hip pain . 

he was found to have Rhabdo 





1-  Rhabdomyolysis:  CPK improved. no muscle pain , and no renal failure 





- dc IVF . no need to repeat CPK 





2- Mechanical falls , due to hip  OA  





- cont pain control


- PT 





3- H/o D CHF: last echo 11/16 with nl EF 





- resume lasix tomorrow 


- cont BB , ARB 





4- cont xarelto. likely he has A fib 





5- Minimal troponin leak. paced rhythm on EKG, no cp or any indication of ACS . 


- no further w/u 





6- Transaminitis : due to liver fatty infiltration and possibly chronic 

congestion with heart failure 


-  monitor 





plan for dc to rehab. CM notified

## 2017-03-14 RX ADMIN — NYSTATIN CREAM SCH APPLIC: 100000 CREAM TOPICAL at 12:17

## 2017-03-14 RX ADMIN — NYSTATIN CREAM SCH APPLIC: 100000 CREAM TOPICAL at 23:16

## 2017-03-14 RX ADMIN — PANTOPRAZOLE SODIUM SCH MG: 40 TABLET, DELAYED RELEASE ORAL at 09:29

## 2017-03-14 RX ADMIN — FUROSEMIDE SCH MG: 40 TABLET ORAL at 09:28

## 2017-03-14 RX ADMIN — LOSARTAN POTASSIUM SCH MG: 25 TABLET, FILM COATED ORAL at 09:29

## 2017-03-14 RX ADMIN — TAMSULOSIN HYDROCHLORIDE SCH MG: 0.4 CAPSULE ORAL at 09:28

## 2017-03-14 RX ADMIN — METOPROLOL SUCCINATE SCH MG: 50 TABLET, EXTENDED RELEASE ORAL at 09:29

## 2017-03-14 RX ADMIN — RIVAROXABAN SCH MG: 20 TABLET, FILM COATED ORAL at 09:29

## 2017-03-14 NOTE — PN
Physical Exam: 


SUBJECTIVE: Patient seen and examined at bedside


cpmplains of left hip pain otherwise feels well 








OBJECTIVE:





 Vital Signs











 Period  Temp  Pulse  Resp  BP Sys/Ross  Pulse Ox


 


 Last 24 Hr  97.9 F-98.4 F  69-85  20-24  /43-83  94-96











GENERAL: Awake, alert, and fully oriented. tired appearing but arousable 


HEAD: Normal with no signs of trauma.


EYES: Pupils equal, round and reactive to light, extraocular movements intact


EARS, NOSE, THROAT: Moist mucous membranes.


NECK: Normal range of motion, supple 


LUNGS: Breath sounds equal, clear to auscultation bilaterally


HEART: Regular rate and rhythm, normal S1 and S2 without murmur


ABDOMEN: Soft, obese, nontender, not distended


MUSCULOSKELETAL: decreased range of motion at left hip from pain otherwise 

other joints are WNL no CVA tenderness.


LOWER EXTREMITIES: 1+ pitting edema of bilateral lower extremities Left leg it 

larger than right leg and this is chronic per patient 


NEUROLOGICAL:  Cranial nerves II-XII grossly intact. Normal speech.


SKIN: chronic venous stasis changes of bilateral lower extremities. Calluses of 

bilateral feet 

















Active Medications











Generic Name Dose Route Start Last Admin





  Trade Name Freq  PRN Reason Stop Dose Admin


 


Furosemide  40 mg 03/14/17 10:00 03/14/17 09:28





  Lasix -  PO   40 mg





  DAILY KELVIN   Administration


 


Losartan Potassium  25 mg 03/13/17 10:00 03/14/17 09:29





  Cozaar -  PO   25 mg





  DAILY KELVIN   Administration


 


Metoprolol Succinate  50 mg 03/13/17 10:00 03/14/17 09:29





  Toprol Xl -  PO   50 mg





  DAILY KELVIN   Administration


 


Nystatin  1 applic 03/12/17 22:00 03/14/17 12:17





  Mycostatin Cream -  TP   1 applic





  BID KELVIN   Administration


 


Ondansetron HCl  8 mg 03/12/17 19:33  





  Zofran Injection  IVPB   





  Q8H PRN   





  NAUSEA   


 


Oxycodone HCl  5 mg 03/12/17 12:35 03/14/17 09:27





  Roxicodone -  PO   5 mg





  Q4H PRN   Administration





  PAIN   


 


Pantoprazole Sodium  40 mg 03/13/17 10:00 03/14/17 09:29





  Protonix -  PO   40 mg





  DAILY KELVIN   Administration


 


Rivaroxaban  20 mg 03/13/17 10:00 03/14/17 09:29





  Xarelto -  PO   20 mg





  DAILY KELVIN   Administration


 


Tamsulosin HCl  0.8 mg 03/13/17 10:00 03/14/17 09:28





  Flomax -  PO   0.8 mg





  DAILY KELVIN   Administration


 


Tramadol HCl  50 mg 03/12/17 12:34 03/14/17 12:54





  Ultram -  PO   50 mg





  Q6H PRN   Administration





  PAIN   











ASSESSMENT/PLAN:


81M with multiple medical problems presents to the ED for uncontrolled left hip 

pain found to be in rhabdomyolysis.





Rhabdomyolysis:resolved- unsure of the etiology patient was on a statin but has 

not been taking lipitor


resolved 





Uncontrolled Left Hip Pain: secondary to osteoarthritis history of mechanical 

fall


Patient needs a Hip replacement and needs to be medically optimized 


has surgery scheduled for next month for with orthopedic surgeon Dr. Wilmer Puri 


Pain control with oxycodone and ultram 


physical therapy 





Troponinemia: unlikely patient is having ACS 


no further work up


outpatient follow up with cardiologist Dr. Ronnie Esposito 





HTN:


continue home meds:


Cozaar 25mg po daily 


Metoprolol 50mg XL po Daily 


lasix 40mg PO daily 





CHF:


lasix 40mg PO daily 





possible Afib:


continue Xarelto  





History of heart block:


has PPM


outpatient follow up 





HLD: 


outpt follow up 





transaminitis:


likely from chronic liver congestion from CHF vs fatty liver infiltration


no further work up 





BPH/Prostate Ca:details unclear and patient unsure


continue flomax 0.8mg daily 





GERD:


Protonix PO daily 





FEN:


stop IVF


no electrolyte issues


low sodium diet





PPx:


on Xarelto 


Protonix


PT consult->subacute rehab discharge planning 





For discharge to subacute rehab facility tomorrow morning 





patient seen and case discussed with Attending Dr. Frank 








Visit type





- Emergency Visit


Emergency Visit: Yes


ED Registration Date: 03/11/17


Care time: The patient presented to the Emergency Department on the above date 

and was hospitalized for further evaluation of their emergent condition.





- New Patient


This patient is new to me today: No





- Critical Care


Critical Care patient: No

## 2017-03-14 NOTE — PN
Teaching Attending Note


Name of Resident: Nasim Sanches


ATTENDING PHYSICIAN STATEMENT





I saw and evaluated the patient.


I reviewed the resident's note and discussed the case with the resident.


I agree with the resident's findings and plan as documented.








SUBJECTIVE:


no fever or chills . L hip pain is better , he could walk with PT with support 

today 





OBJECTIVE:


NAD 


CV ; RRR. no JVD 


Lungs; CTAB 


Ext: no edema 


L leg circumference > R , but this is chronic per pt 





ASSESSMENT AND PLAN:








81  year-old man with a PMH of HTN, morbid obesity, nephrolithiasis , 

diverticultitis, osteoarthritis, fatty liver disease, BPH,  prostate cancer , 

and h/o Heart block s/p pace maker 11/16 . Admitted for fall , and L hip pain . 

he was found to have Rhabdo 





1-  Rhabdomyolysis: resolved . 





2- Mechanical falls , due to hip  OA  





- cont pain control ,oxy and tramadol


- PT 





3- H/o D CHF: last echo 11/16 with nl EF 





- resume lasix 


- cont BB, ARB 





4- cont xarelto. likely he has A fib 





5- Minimal troponin leak. paced rhythm on EKG, no cp or any indication of ACS . 


- no further w/u 





6- Transaminitis : due to liver fatty infiltration and possibly chronic 

congestion with heart failure 


-  monitor 








Medically ready for dc. Rehab bed is pending

## 2017-03-15 VITALS — DIASTOLIC BLOOD PRESSURE: 69 MMHG | HEART RATE: 71 BPM | SYSTOLIC BLOOD PRESSURE: 142 MMHG | TEMPERATURE: 97.5 F

## 2017-03-15 RX ADMIN — LOSARTAN POTASSIUM SCH MG: 25 TABLET, FILM COATED ORAL at 10:26

## 2017-03-15 RX ADMIN — METOPROLOL SUCCINATE SCH MG: 50 TABLET, EXTENDED RELEASE ORAL at 10:27

## 2017-03-15 RX ADMIN — TAMSULOSIN HYDROCHLORIDE SCH MG: 0.4 CAPSULE ORAL at 10:26

## 2017-03-15 RX ADMIN — NYSTATIN CREAM SCH APPLIC: 100000 CREAM TOPICAL at 10:27

## 2017-03-15 RX ADMIN — FUROSEMIDE SCH MG: 40 TABLET ORAL at 10:26

## 2017-03-15 RX ADMIN — RIVAROXABAN SCH MG: 20 TABLET, FILM COATED ORAL at 10:27

## 2017-03-15 RX ADMIN — PANTOPRAZOLE SODIUM SCH MG: 40 TABLET, DELAYED RELEASE ORAL at 10:27

## 2017-03-15 NOTE — DS
Physical Exam: 


SUBJECTIVE: Patient seen and examined








OBJECTIVE:





 Vital Signs











 Period  Temp  Pulse  Resp  BP Sys/Ross  Pulse Ox


 


 Last 24 Hr  97.9 F-98.3 F  69-70  18-24  151-159/72-87  97








PHYSICAL EXAM


GENERAL: Awake, alert, and fully oriented


HEAD: Normal with no signs of trauma.


EYES: Pupils equal, round and reactive to light, extraocular movements intact


EARS, NOSE, THROAT: Moist mucous membranes.


NECK: Normal range of motion, supple 


LUNGS: Breath sounds equal, clear to auscultation bilaterally


HEART: Regular rate and rhythm, normal S1 and S2 without murmur


ABDOMEN: Soft, obese, nontender, not distended


MUSCULOSKELETAL: decreased range of motion at left hip from pain otherwise 

other joints are WNL no CVA tenderness.


LOWER EXTREMITIES: 1+ pitting edema of bilateral lower extremities Left leg it 

larger than right leg and this is chronic per patient 


NEUROLOGICAL:  Cranial nerves II-XII grossly intact. Normal speech.


SKIN: chronic venous stasis changes of bilateral lower extremities. Calluses of 

bilateral feet 





LABS





HOSPITAL COURSE:





Date of Admission:03/11/17





Date of Discharge: 03/15/17








81M history of HTN and osteoarthritis of the left hip presented to the hospital 

with acute on chronic left hip pain and patient was debilitated and could not 

ambulate becaue of the pain. He had attempts at surgery but they were always 

cancelled because of medical issues. On admission he was found to be in 

rhabdomyolysis and was hydrated and evetually this resolved. Patient seen by PT 

and was recommended to go to a SNF for rehab. He is stable for discharge. Will 

follow up with Dr. Esposito and Dr. Puri for cardiology and orthopedics 


Minutes to complete discharge: 35





Discharge Summary


Reason For Visit: L HIP PAIN, RHABDOMYOLYSIS


Current Active Problems





Rhabdomyolysis (Acute) 


Hip pain, left (Chronic) 


Hypertension (Chronic) 


Morbid obesity (Chronic) 


Osteoarthritis (Chronic) 








Condition: Improved





- Instructions


Diet, Activity, Other Instructions: 


eat a low sodium low fat diet 


you need to follow up with your primary care doctor 


if you do not have one you can come see me Dr. Sanches in the office on thursdays 

from 9-12 call the  and see if the office accepts your insurance and 

schedule an appointment with Dr. Sanches the office number is [phone]


You need to follow up with Dr. Wilmer Puri the orthopedic surgeon for your hip 

replacement as this is the definitive treatment that will alleviate your pain 


You need to follow up with Dr. Ronnie Esposito your cardiologist 


if your symtpoms worsen go to the nearest emergency room  


Referrals: 


STAFF,NOT ON [Primary Care Provider] - 2 Weeks


Disposition: SKILLED NURSING FACILITY





- Home Medications


Comprehensive Discharge Medication List: 


Ambulatory Orders





Furosemide [Lasix -] 40 mg PO Q48H 03/11/17 


Losartan Potassium 25 mg PO DAILY 03/11/17 


Metoprolol Succinate [Toprol Xl] 50 mg PO DAILY 03/11/17 


Omeprazole 20 mg PO DAILY 03/11/17 


Oxycodone HCl/Acetaminophen [Percocet 5-325 mg Tablet] 1 tab PO PRN PRN 03/11/ 17 


Rivaroxaban [Xarelto -] 20 mg PO DAILY 03/11/17 


Tamsulosin HCl [Flomax] 0.8 mg PO DAILY 03/11/17 


Sennosides [Senna -] 2 tab PO HS  tablet 03/15/17 








This patient is new to me today: No


Emergency Visit: Yes


ED Registration Date: 03/11/17


Care time: The patient presented to the Emergency Department on the above date 

and was hospitalized for further evaluation of their emergent condition.


Critical Care patient: No





- Discharge Referral


Referred to [facility] No

## 2017-03-15 NOTE — EKG
Test Reason : 

Blood Pressure : ***/*** mmHG

Vent. Rate : 069 BPM     Atrial Rate : 068 BPM

   P-R Int : 000 ms          QRS Dur : 206 ms

    QT Int : 490 ms       P-R-T Axes : 000 -67 099 degrees

   QTc Int : 525 ms

 

Ventricular-paced rhythm

ABNORMAL ECG

WHEN COMPARED WITH ECG OF 08-NOV-2016 11:00,

NO SIGNIFICANT CHANGE WAS FOUND

Confirmed by SAMINA VALLEJO MD ([id number]) on 3/15/2017 1:25:28 PM

 

Referred By:             Confirmed By:SAMINA VALLEJO MD

## 2017-03-15 NOTE — PN
Teaching Attending Note


Name of Resident: aNsim Sanches


ATTENDING PHYSICIAN STATEMENT





I saw and evaluated the patient.


I reviewed the resident's note and discussed the case with the resident.


I agree with the resident's findings and plan as documented.








 Vital Signs











Temperature  97.5 F L  03/15/17 10:00


 


Pulse Rate  71   03/15/17 10:00


 


Respiratory Rate  20   03/15/17 10:00


 


Blood Pressure  142/69   03/15/17 10:00


 


O2 Sat by Pulse Oximetry (%)  94 L  03/15/17 09:00








 CBCD











WBC  8.3 K/mm3 (4.0-10.0)   03/12/17  06:10    


 


RBC  4.72 M/mm3 (4.00-5.60)   03/12/17  06:10    


 


Hgb  13.9 GM/dL (11.7-16.9)   03/12/17  06:10    


 


Hct  41.9 % (35.4-49)   03/12/17  06:10    


 


MCV  88.7 fl (80-96)   03/12/17  06:10    


 


MCHC  33.1 g/dl (32.0-35.9)   03/12/17  06:10    


 


RDW  14.3 % (11.9-15.9)   03/12/17  06:10    


 


Plt Count  259 K/MM3 (134-434)   03/12/17  06:10    


 


MPV  7.5 fl (7.5-11.1)   03/12/17  06:10    








 CMP











Sodium  142 mmol/L (136-145)   03/13/17  05:55    


 


Potassium  4.5 mmol/L (3.5-5.1)   03/13/17  05:55    


 


Chloride  106 mmol/L ()   03/13/17  05:55    


 


Carbon Dioxide  30 mmol/L (21-32)   03/13/17  05:55    


 


Anion Gap  6  (8-16)  L  03/13/17  05:55    


 


BUN  13 mg/dL (7-18)   03/13/17  05:55    


 


Creatinine  0.8 mg/dL (0.7-1.3)   03/13/17  05:55    


 


Creat Clearance w eGFR  > 60  (>60)   03/12/17  06:10    


 


Random Glucose  113 mg/dL ()  H  03/13/17  05:55    


 


Calcium  8.6 mg/dL (8.5-10.1)   03/13/17  05:55    


 


Total Bilirubin  1.5 mg/dL (0.2-1.0)  H  03/13/17  05:55    


 


AST  82 U/L (15-37)  H D 03/13/17  05:55    


 


ALT  48 U/L (12-78)   03/13/17  05:55    


 


Alkaline Phosphatase  77 U/L ()   03/13/17  05:55    


 


Total Protein  5.4 g/dl (6.4-8.2)  L  03/13/17  05:55    


 


Albumin  2.9 g/dl (3.4-5.0)  L  03/13/17  05:55    








 CARDIAC ENZYMES











Creatine Kinase  681 IU/L ()  H D 03/13/17  05:55    


 


Troponin I  0.05 ng/ml (0.00-0.05)   03/12/17  06:00    








 Home Medications











 Medication  Instructions  Recorded


 


Furosemide [Lasix -] 40 mg PO Q48H 03/11/17


 


Losartan Potassium 25 mg PO DAILY 03/11/17


 


Metoprolol Succinate [Toprol Xl] 50 mg PO DAILY 03/11/17


 


Omeprazole 20 mg PO DAILY 03/11/17


 


Oxycodone HCl/Acetaminophen 1 tab PO PRN PRN 03/11/17





[Percocet 5-325 mg Tablet]  


 


Rivaroxaban [Xarelto -] 20 mg PO DAILY 03/11/17


 


Tamsulosin HCl [Flomax] 0.8 mg PO DAILY 03/11/17


 


Sennosides [Senna -] 2 tab PO HS  tablet 03/15/17








 Laboratory Tests











  03/11/17 03/12/17 03/12/17





  11:37 06:00 06:00


 


Total Bilirubin  1.4 H  


 


Direct Bilirubin   


 


AST  184 H D  


 


ALT  57  D  


 


Creatine Kinase  3542 H D  2028 H D  2041 H














  03/12/17 03/12/17 03/13/17





  06:10 13:41 05:55


 


Total Bilirubin  1.4 H  


 


Direct Bilirubin   


 


AST  149 H  


 


ALT   


 


Creatine Kinase   1356 H D  681 H D














  03/13/17





  05:55


 


Total Bilirubin  1.5 H


 


Direct Bilirubin  0.5 H


 


AST  82 H D


 


ALT 


 


Creatine Kinase 

















ASSESSMENT AND PLAN:


Patient is a 81  year-old man with  PMHx of HTN, morbid obesity, 

nephrolithiasis , diverticultitis, osteoarthritis, fatty liver disease, BPH,  

prostate cancer , and 


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 Admitted for fall , and L hip pain . he 

was found to have Rhabdo 





# S/p Acute  Rhabdomyolysis  resolved post IVF  . 





# s/p  Mechanical falls  cont pain control on percocet , PT 





# H/o Diastolic CHF: last echo 11/16 with nl EF , on  lasix continue ,  cont 

Toprol and Losartan





# Hx of Afib cont xarelto. 





# s/p Acute Transaminitis improving  








Patient is going to rehab.

## 2017-05-27 ENCOUNTER — HOSPITAL ENCOUNTER (INPATIENT)
Dept: HOSPITAL 74 - JER | Age: 82
LOS: 12 days | Discharge: NURSING, REHAB OR LONG-TERM CARE | DRG: 690 | End: 2017-06-08
Attending: INTERNAL MEDICINE | Admitting: EMERGENCY MEDICINE
Payer: COMMERCIAL

## 2017-05-27 VITALS — BODY MASS INDEX: 41.3 KG/M2

## 2017-05-27 DIAGNOSIS — I11.0: ICD-10-CM

## 2017-05-27 DIAGNOSIS — I89.0: ICD-10-CM

## 2017-05-27 DIAGNOSIS — N13.30: ICD-10-CM

## 2017-05-27 DIAGNOSIS — B96.4: ICD-10-CM

## 2017-05-27 DIAGNOSIS — I50.32: ICD-10-CM

## 2017-05-27 DIAGNOSIS — M16.12: ICD-10-CM

## 2017-05-27 DIAGNOSIS — Z79.01: ICD-10-CM

## 2017-05-27 DIAGNOSIS — L03.115: ICD-10-CM

## 2017-05-27 DIAGNOSIS — R33.9: ICD-10-CM

## 2017-05-27 DIAGNOSIS — R60.0: ICD-10-CM

## 2017-05-27 DIAGNOSIS — R31.0: ICD-10-CM

## 2017-05-27 DIAGNOSIS — Z95.0: ICD-10-CM

## 2017-05-27 DIAGNOSIS — N39.0: Primary | ICD-10-CM

## 2017-05-27 DIAGNOSIS — M77.32: ICD-10-CM

## 2017-05-27 DIAGNOSIS — N40.1: ICD-10-CM

## 2017-05-27 DIAGNOSIS — D62: ICD-10-CM

## 2017-05-27 DIAGNOSIS — Z71.3: ICD-10-CM

## 2017-05-27 DIAGNOSIS — N17.9: ICD-10-CM

## 2017-05-27 DIAGNOSIS — E66.01: ICD-10-CM

## 2017-05-27 DIAGNOSIS — C61: ICD-10-CM

## 2017-05-27 DIAGNOSIS — E78.00: ICD-10-CM

## 2017-05-27 DIAGNOSIS — L03.116: ICD-10-CM

## 2017-05-27 DIAGNOSIS — L50.0: ICD-10-CM

## 2017-05-27 DIAGNOSIS — R78.81: ICD-10-CM

## 2017-05-27 DIAGNOSIS — T36.1X5A: ICD-10-CM

## 2017-05-27 DIAGNOSIS — I87.8: ICD-10-CM

## 2017-05-27 DIAGNOSIS — K21.9: ICD-10-CM

## 2017-05-27 DIAGNOSIS — I48.91: ICD-10-CM

## 2017-05-27 DIAGNOSIS — M17.0: ICD-10-CM

## 2017-05-27 DIAGNOSIS — N13.8: ICD-10-CM

## 2017-05-27 LAB
ALBUMIN SERPL-MCNC: 3.3 G/DL (ref 3.4–5)
ALP SERPL-CCNC: 120 U/L (ref 45–117)
ALT SERPL-CCNC: 14 U/L (ref 12–78)
ANION GAP SERPL CALC-SCNC: 13 MMOL/L (ref 8–16)
APPEARANCE UR: (no result)
AST SERPL-CCNC: 13 U/L (ref 15–37)
BACTERIA #/AREA URNS HPF: (no result) /HPF
BASOPHILS # BLD: 0.9 % (ref 0–2)
BILIRUB SERPL-MCNC: 0.4 MG/DL (ref 0.2–1)
BILIRUB UR STRIP.AUTO-MCNC: NEGATIVE MG/DL
CALCIUM SERPL-MCNC: 8.8 MG/DL (ref 8.5–10.1)
CO2 SERPL-SCNC: 28 MMOL/L (ref 21–32)
COCKROFT - GAULT: 54.86
COLOR UR: (no result)
CREAT SERPL-MCNC: 2.1 MG/DL (ref 0.7–1.3)
DEPRECATED RDW RBC AUTO: 16.2 % (ref 11.9–15.9)
EOSINOPHIL # BLD: 4.2 % (ref 0–4.5)
GLUCOSE SERPL-MCNC: 106 MG/DL (ref 74–106)
HYALINE CASTS URNS QL MICRO: 1 /LPF
KETONES UR QL STRIP: NEGATIVE
LEUKOCYTE ESTERASE UR QL STRIP.AUTO: (no result)
MCH RBC QN AUTO: 27.6 PG (ref 25.7–33.7)
MCHC RBC AUTO-ENTMCNC: 32.2 G/DL (ref 32–35.9)
MCV RBC: 85.6 FL (ref 80–96)
NEUTROPHILS # BLD: 60.2 % (ref 42.8–82.8)
NITRITE UR QL STRIP: NEGATIVE
PH UR: 8 [PH] (ref 5–8)
PLATELET # BLD AUTO: 333 K/MM3 (ref 134–434)
PMV BLD: 7.6 FL (ref 7.5–11.1)
PROT SERPL-MCNC: 6.5 G/DL (ref 6.4–8.2)
PROT UR QL STRIP: NEGATIVE
PROT UR QL STRIP: NEGATIVE
RBC # BLD AUTO: 13 /HPF (ref 0–3)
RBC # UR STRIP: (no result) /UL
UROBILINOGEN UR STRIP-MCNC: NEGATIVE E.U./DL (ref 0.2–1)
WBC # BLD AUTO: 10.3 K/MM3 (ref 4–10)
WBC # UR AUTO: 249 /HPF (ref 3–5)

## 2017-05-27 RX ADMIN — SENNOSIDES SCH: 8.6 TABLET, FILM COATED ORAL at 22:13

## 2017-05-27 NOTE — HP
CHIEF COMPLAINT: Lower ext swelling and redness 





PCP: Dr. Ugarte





HISTORY OF PRESENT ILLNESS:


81 year old male from [facility] with significant pmh of hypertension, CHF, 

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

, nephrolithiasis, prostate CA recently discharged from [facility]  where he was 

admit for rhabdomyolysis in 3/2017 and left Hip pain now present to ED brought 

in by ambulance for bilateral lower edema and redness. The symptoms started 3 

weeks ago with swelling of b/l lower ext and it has worsening despite being on 

Lasix 40mg PO qd. Pt also complained of shaking chills but no fever, no chest 

pain, palpitation or shortness of breath, no dyspnea on exertion, no orthopnea. 

Pt complained of dysuria, urgency, frequency and occasional incontinence but no 

hematuria. Pt complained of chronic low back pain and left hip pain. Pt said he 

supposed to have left hip replacement surgery.  





ER course was notable for:


(1) WBC 10, Cr 2.1


(2) CXR, US lower ext 


(3) clindamycin 900mg IV 





Recent Travel:





PAST MEDICAL HISTORY:


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 

left hip arthritis, BPH, nephrolithiasis, diverticulitis, osteoarthritis, 

nephrolithiasis, fatty liver disease,  prostate cancer





PAST SURGICAL HISTORY:


 bilateral knee surgeries





Social History:


Smoking: former smoker quit in 1970


Alcohol: Denies  


Drugs: emily 





Family History:


non-contributory 





Allergies





No Known Allergies Allergy (Verified 11/06/16 07:40)


 








HOME MEDICATIONS:


 Home Medications











 Medication  Instructions  Recorded


 


Furosemide [Lasix -] 40 mg PO Q48H 03/11/17


 


Losartan Potassium 25 mg PO DAILY 03/11/17


 


Metoprolol Succinate [Toprol Xl] 50 mg PO DAILY 03/11/17


 


Omeprazole 20 mg PO DAILY 03/11/17


 


Oxycodone HCl/Acetaminophen 1 tab PO PRN PRN 03/11/17





[Percocet 5-325 mg Tablet]  


 


Rivaroxaban [Xarelto -] 20 mg PO DAILY 03/11/17


 


Tamsulosin HCl [Flomax] 0.8 mg PO DAILY 03/11/17


 


Sennosides [Senna -] 2 tab PO HS  tablet 03/15/17








REVIEW OF SYSTEMS


CONSTITUTIONAL: 


Absent:  fever, chills, diaphoresis, generalized weakness, malaise, loss of 

appetite, weight change


HEENT: 


Absent:  rhinorrhea, nasal congestion, throat pain, throat swelling, difficulty 

swallowing, mouth swelling, ear pain, eye pain, visual changes


CARDIOVASCULAR: 


Absent: chest pain, syncope, palpitations, irregular heart rate, lightheadedness

, peripheral edema


RESPIRATORY: 


Absent: cough, shortness of breath, dyspnea with exertion, orthopnea, wheezing, 

stridor, hemoptysis


GASTROINTESTINAL:


Absent: abdominal pain, abdominal distension, nausea, vomiting, diarrhea, 

constipation, melena, hematochezia


GENITOURINARY: dysuria,


Absent:  frequency, urgency, hesitancy, hematuria, flank pain, genital pain


MUSCULOSKELETAL:  back pain


Absent: myalgia, arthralgia, joint swelling, back pain, neck pain


SKIN: lower ext swelling and redness 


Absent: rash, itching, pallor


HEMATOLOGIC/IMMUNOLOGIC: 


Absent: easy bleeding, easy bruising, lymphadenopathy, frequent infections


ENDOCRINE:


Absent: unexplained weight gain, unexplained weight loss, heat intolerance, 

cold intolerance


NEUROLOGIC: 


Absent: headache, focal weakness or paresthesias, dizziness, unsteady gait, 

seizure, mental status changes, bladder or bowel incontinence


PSYCHIATRIC: anxiety


Absent: depression, suicidal or homicidal ideation, hallucinations.








PHYSICAL EXAMINATION





GENERAL: Awake, alert, and fully oriented, in no acute distress.


HEAD: Normal with no signs of trauma.


EYES: Pupils equal, round and reactive to light, extraocular movements intact, 

sclera anicteric, conjunctiva clear. No lid lag.


EARS, NOSE, THROAT: Ears normal, nares patent, oropharynx clear without 

exudates. Moist mucous membranes.


NECK: Normal range of motion, supple without lymphadenopathy, JVD, or masses.


LUNGS: Breath sounds equal, clear to auscultation bilaterally. No wheezes, and 

no crackles. No accessory muscle use.


HEART: Regular rate and rhythm, normal S1 and S2 with systolic 2/6 murmur, rub 

or gallop.


ABDOMEN: Soft, nontender, not distended, normoactive bowel sounds, no guarding, 

no rebound, no masses.  No hepatomegaly or  splenomegaly. 


MUSCULOSKELETAL: Normal range of motion at all joints. No bony deformities or 

tenderness. No CVA tenderness.


UPPER EXTREMITIES: 2+ pulses, warm, well-perfused. No cyanosis. No clubbing. No 

peripheral edema.


LOWER EXTREMITIES: 1+ pulses, warm, well-perfused. Mild erythema in b/l lower 

ext, tenderness, 2+ edema up to midtibia


NEUROLOGICAL:  Cranial nerves II-XII intact. Normal speech. gait not observed


PSYCHIATRIC: Cooperative. Good eye contact. Appropriate mood and affect.


SKIN: Warm, dry, normal turgor, no rashes or lesions noted, normal capillary 

refill. B/l lower ext swelling, 2+ edema, and mild redness, tenderness, no 

warmth. 


 CBC, BMP





 05/27/17 17:35 





 05/27/17 17:35 








ASSESSMENT/PLAN:


81 year old male from [facility] with b/l lower ext swelling and redness. 





b/l Lower ext cellulitis


Lower ext edema likely from venous stasis, less likely from CHF  


does not meet SIRS and QSOFA criteria 


received Clindamycin 900mg in ED 


start Clindamycin IV 600mg q8h 


US b/l lower ext 


f/u blood culture 


Elevated lower ext 


consider ID consult 





AGUILA 


Pt with h/o BPH and nephrolithiasis may be post obstructive 


Could also be pre-renal 


urine creatine 


urine electrolytes 


calculate FENA 


US renal 


US bladder


intake and output 


daily weight 


avoid nephrotoxins


consider renal consult 





Dysuria from UTI 


UA 


urine culture


blood culture 


consider ceftriaxone 1gm IV daily if positive 


consider ID consult 





H/o diastolic CHF 


not in exacerbation 


No sob, no Mckinley, no orthopnea


last echo showed normal EF, Normal LV size and function 


resume Lasix PO





BPH 


Resume Flomax 0.8mg po daily 





H/o AFIB 


On Xarelto 


ON Toprol XL





Hypertension


resume Toprol XL 





DVT prophylaxis : Xarelto 





FEN 


Fluid: NS at 75ml/h for 24 hours only 


Electrolytes: chemistry in am 


Nutrition: Low NA diet 





Disposition: admit to [facility] 





Visit type





- Emergency Visit


Emergency Visit: Yes


ED Registration Date: 05/27/17


Care time: The patient presented to the Emergency Department on the above date 

and was hospitalized for further evaluation of their emergent condition.





- New Patient


This patient is new to me today: Yes


Date on this admission: 05/28/17





- Critical Care


Critical Care patient: No

## 2017-05-27 NOTE — PDOC
History of Present Illness





- General


History Source: Patient, Family


Exam Limitations: No Limitations





- History of Present Illness


Initial Comments: 


05/27/17 17:57


The patient is an 81 year old male with a significant past medical history of 

hypertension, CHF, pacemaker (for bradycardia), Afib on Xarelto, left hip 

arthritis, recently admitted to the hospital and rehab facility for 

rhabdomyolysis (since March 15th), sent from rehab facility by ambulance to the 

Emergency Department with swelling and pain to the legs bilaterally. The patient

s wife reports that the patient was seen in the ED in March for left hip pain 

and was diagnosed with rhabdomyolysis. She reports that he was sent to rehab 

for the left hip and was scheduled for left hip surgery after rehab. She admits 

that the patients legs have become more swollen over the past weeks. The 

patient admits that his legs, ankles, and feet are very swollen bilaterally. He 

admits that his feet are red bilaterally. He reports pain with the swelling. 

The patients wife admits that he was on Lasix when entering rehab. The patient 

states that he was ambulating in rehab with a walker. 





The patient denies fever, or chills. Patient denies nausea, vomiting, and 

diarrhea. Patient denies chest pain, palpitations, and shortness of breath. 

Patient denies dysuria, or urinary retention. 





Past Medical Hx: diverticulitis, osteoarthritis, nephrolithiasis, fatty liver 

disease, BPH, prostate cancer


Surgical Hx: bilateral knee surgeries





PCP: Dr. Ugarte


Cardiologist: Dr. Esposito


Orthopedic: Dr. Puri





<Radha Higgins - Last Filed: 05/27/17 18:29>





<Jade Baker - Last Filed: 05/27/17 18:33>





- General


Stated Complaint: FLUIDS IN LEGS


Time Seen by Provider: 05/27/17 16:28





Past History





<Radha Higgins - Last Filed: 05/27/17 18:29>





- Past Medical History


Anemia: No


Asthma: No


Cancer: No


Cardiac Disorders: Yes


CVA: No


COPD: No


CHF: Yes


Dementia: No


Diabetes: No


GI Disorders: Yes


 Disorders: No


HTN: Yes


Hypercholesterolemia: Yes


Liver Disease: No


Seizures: No


Thyroid Disease: No


Other medical history: Severe Arthritis





- Surgical History


Abdominal Surgery: No


Appendectomy: No


Cardiac Surgery: Yes


Cholecystectomy: No


Lung Surgery: No


Neurologic Surgery: No


Orthopedic Surgery: Yes (balateral knee sx 1965)





- Immunization History


Immunization Up to Date: Yes





- Psycho/Social/Smoking Cessation Hx


Anxiety: No


Suicidal Ideation: No


Smoking History: Unknown if ever smoked


Have you smoked in the past 12 months: No


If you are a former smoker, when did you quit?: 1970


Information on smoking cessation initiated: No


Hx Alcohol Use: No


Drug/Substance Use Hx: No


Substance Use Type: None





<Jade Baker - Last Filed: 05/27/17 18:33>





- Past Medical History


Allergies/Adverse Reactions: 


 Allergies











Allergy/AdvReac Type Severity Reaction Status Date / Time


 


No Known Allergies Allergy   Verified 11/06/16 07:40











Home Medications: 


Ambulatory Orders





Furosemide [Lasix -] 40 mg PO Q48H 03/11/17 


Losartan Potassium 25 mg PO DAILY 03/11/17 


Metoprolol Succinate [Toprol Xl] 50 mg PO DAILY 03/11/17 


Omeprazole 20 mg PO DAILY 03/11/17 


Oxycodone HCl/Acetaminophen [Percocet 5-325 mg Tablet] 1 tab PO PRN PRN 03/11/ 17 


Rivaroxaban [Xarelto -] 20 mg PO DAILY 03/11/17 


Tamsulosin HCl [Flomax] 0.8 mg PO DAILY 03/11/17 


Sennosides [Senna -] 2 tab PO HS  tablet 03/15/17 











**Review of Systems





- Review of Systems


Able to Perform ROS?: Yes


Comments:: 


05/27/17 17:58


CONSTITUTIONAL: 


Absent: fever, chills, diaphoresis, generalized weakness, malaise, loss of 

appetite


HEENT: 


Absent: rhinorrhea, nasal congestion, throat pain, throat swelling, difficulty 

swallowing, mouth swelling, ear pain, eye pain, visual Changes


CARDIOVASCULAR: 


Absent: chest pain, syncope, palpitations, irregular heart rate, lightheadedness

, peripheral edema


RESPIRATORY: 


Absent: cough, shortness of breath, dyspnea with exertion, orthopnea, wheezing, 

stridor, hemoptysis


GASTROINTESTINAL:


Absent: abdominal pain, abdominal distension, nausea, vomiting, diarrhea, 

constipation, melena, hematochezia


GENITOURINARY: 


Absent: dysuria, frequency, urgency, hesitancy, hematuria, flank pain, genital 

pain


MUSCULOSKELETAL:


Present: + swelling and pain to the lower extremities, + redness to swollen feet

, + left hip pain (chronic)


Absent: myalgia


SKIN: 


Absent: rash, itching, pallor


HEMATOLOGIC/IMMUNOLOGIC: 


Absent: easy bleeding, easy bruising, lymphadenopathy, frequent infections


ENDOCRINE:


Absent: unexplained weight gain, unexplained weight loss, heat intolerance, 

cold intolerance


NEUROLOGIC: 


Absent: headache, focal weakness or paresthesias, dizziness, unsteady gait, 

seizure, mental status changes, bladder or bowel incontinence


PSYCHIATRIC: 


Absent: anxiety, depression, suicidal or homicidal ideation, hallucinations.








<Radha Higgins - Last Filed: 05/27/17 18:29>





*Physical Exam





- Vital Signs


 Last Vital Signs











Temp Pulse Resp BP Pulse Ox


 


 98.2 F   71   22   127/60   100 


 


 05/27/17 17:03  05/27/17 17:03  05/27/17 17:03  05/27/17 17:03  05/27/17 17:03














- Physical Exam


Comments: 


05/27/17 18:27


GENERAL:


Well developed, well nourished. Awake and alert. In no acute distress.


HEENT:


Normocephalic, atraumatic. PERRLA, EOMI. No conjunctival pallor. Sclera are non-

icteric. Moist mucous membranes. Oropharynx is clear.


NECK: Supple. Full ROM. No JVD. Carotid pulses 2+ and symmetric, without 

bruits. No thyromegaly. No lymphadenopathy.


CARDIOVASCULAR:


Regular rate and rhythm. No murmurs, rubs, or gallops. Distal pulses are 2+ and 

symmetric. 


PULMONARY: 


No evidence of respiratory distress. Lungs clear to auscultation bilaterally. 

No wheezing, rales or rhonchi.


ABDOMINAL:


Soft. Non-tender. Non-distended. No rebound or guarding. No organomegaly. 

Normoactive bowel sounds. 


MUSCULOSKELETAL 


Normal range of motion at all joints. No bony deformities or tenderness. No CVA 

tenderness.


EXTREMITIES: 


Bilateral pitting edema to legs bilaterally, erythema to mid shin, and 

blistering on left foot. No cyanosis. No clubbing.


SKIN: 


Warm and dry. Normal capillary refill. No rashes. No jaundice. 


NEUROLOGICAL: 


Alert, awake, appropriate. Cranial nerves 2-12 intact. No deficits to light 

touch and temperature in face, upper extremities and lower extremities. No 

motor deficits in the in face, upper extremities and lower extremities. 

Normoreflexic in the upper and lower extremities. Normal speech. Toes are 

downgoing bilaterally. 


PSYCHIATRIC: 


Cooperative. Good eye contact. Appropriate mood and affect.








<Radha Higgins - Last Filed: 05/27/17 18:29>





- Vital Signs


 Last Vital Signs











Temp Pulse Resp BP Pulse Ox


 


 98.2 F   71   22   127/60   100 


 


 05/27/17 17:03  05/27/17 17:03  05/27/17 17:03  05/27/17 17:03  05/27/17 17:03














<Jade Baker - Last Filed: 05/27/17 18:33>





**Heart Score/ECG Review


  ** #1


General ECG Interpretation: Normal Rate (paced EKG, wide QRS TWI I, AVL)


Compared to previous ECG there are: No significant change





<Jade Baker - Last Filed: 05/27/17 18:33>





ED Treatment Course





- LABORATORY


CBC & Chemistry Diagram: 


 05/27/17 17:35





 05/27/17 17:35





- ADDITIONAL ORDERS


Additional order review: 


 











  05/27/17





  17:35


 


RBC  4.20


 


MCV  85.6


 


MCHC  32.2


 


RDW  16.2 H D


 


MPV  7.6


 


Neutrophils %  60.2


 


Lymphocytes %  25.8  D


 


Monocytes %  8.9


 


Eosinophils %  4.2  D


 


Basophils %  0.9














<Radha Higgins - Last Filed: 05/27/17 18:29>





- LABORATORY


CBC & Chemistry Diagram: 


 05/27/17 17:35





 05/27/17 17:35





- ADDITIONAL ORDERS


Additional order review: 


 











  05/27/17





  17:35


 


RBC  4.20


 


MCV  85.6


 


MCHC  32.2


 


RDW  16.2 H D


 


MPV  7.6


 


Neutrophils %  60.2


 


Lymphocytes %  25.8  D


 


Monocytes %  8.9


 


Eosinophils %  4.2  D


 


Basophils %  0.9














- RADIOLOGY


Radiology Studies Ordered: 














 Category Date Time Status


 


 CHEST X-RAY PORTABLE* [RAD] Stat Radiology  05/27/17 17:40 Ordered


 


 DUPLEX VASCUL US-2LEGS [US] Stat Ultrasound  05/27/17 16:37 Ordered














<Jade Baker - Last Filed: 05/27/17 18:33>





Medical Decision Making





- Medical Decision Making





05/27/17 17:54


80 yo male with h/o HTN pacemaker chf Afib on xarelto, with chronic left hip 

pain from osteoarthritis recently admitted for rhabdmyolysis. here today from 

rehab with worsening leg swelling. no f/c no sob. no chest pain. leg swelling 

worse last week. now has erythema and redness to both feet. is ambulating with 

assistance and walker at rehab. no ho prior dvt or pe. 





on exam awake, alert lung clear bilaterally. heart RRR no mr/g/b abd soft NT. 

ext bilat pitting edema. bilat leg erythema to mid shines, weeping on left 

foot.  2+ dp/ pt pulses.





differential: dvt, edema from chf, renal failure, recurrent rhabdo, cellulitis, 

plan cpk labs cbc doppler lower ext ekg cxr likely admit for iv antiobiotics





<Jade Baker - Last Filed: 05/27/17 18:33>





*DC/Admit/Observation/Transfer





- Attestations


Scribe Attestion: 


05/27/17 18:01


Documentation prepared by Radha Higgins, acting as medical scribe for Jade Baker MD.





<Radha Higgins - Last Filed: 05/27/17 18:29>





- Discharge Dispostion


Admit: Yes





<Jade Baker - Last Filed: 05/27/17 18:33>


Diagnosis at time of Disposition: 


 Cellulitis

## 2017-05-27 NOTE — PN
<Sidney Fiore - Last Filed: 05/27/17 19:54>





Teaching Attending Note


Name of Resident: Mello Augustine





ATTENDING PHYSICIAN STATEMENT





I saw and evaluated the patient.


I reviewed the resident's note and discussed the case with the resident.


I agree with the resident's findings and plan as documented.








SUBJECTIVE:








OBJECTIVE:








ASSESSMENT AND PLAN:








<Narciso Torres - Last Filed: 05/27/17 22:21>





Teaching Attending Note


Name of Resident: Mello Augustine


ATTENDING PHYSICIAN STATEMENT





I saw and evaluated the patient.


I reviewed the resident's note and discussed the case with the resident.


I agree with the resident's findings and plan as documented.





 


SUBJECTIVE:





81 year old male, with past medical history of hypertension, CHF, pacemaker (

for bradycardia), renal stones, Afib on Xarelto, left hip arthritis, bilateral 

knee surgery and recent rhabdmyolysis (3/15/2017) for which he was admitted and 

sent to rehab at [facility]. He presents to the ED with bilateral 

lower extremity swelling, pain and redness over the past few weeks. He notes 

that the left lower extremity is worse than the right lower extremity. He also 

notes that this lower extremity swelling is double the size of his regular 

size. He reports that while at rehab, he was on 40 mg of Lasix and notes that 

currently he is able to ambulate with a walker. He reported a small cut to his 

3rd left toe after he bumped it against an object. 





He denies chest pain, shortness of breath, headache and dizziness. He denies 

any prior history of PE or DVT. He denies any current breathing complications. 

Denies fever, chills, nausea, vomit or diarrhea. 


 


OBJECTIVE:


GENERAL: (+)Obese. Awake, alert, and fully oriented, in no acute distress


HEENT: Atraumatic. PERRLA, EOMI. Moist mucosa. No JVD


LUNGS: No distress, speaks full sentences, clear to auscultation bilaterally 


HEART: Regular rate and rhythm, normal S1 and S2, no murmurs, rubs or gallops, 

peripheral pulses normal and equal bilaterally. 


ABDOMEN: Soft, nontender, normoactive bowel sounds.  No guarding, no rebound.  

No masses


EXTREMITIES: (+) Bilateral 2+ pitting edema up to mid shins. Slight erythema 

feet and ankles, left more than right. No tenderness to palpation. Normal range 

of motion. No clubbing or cyanosis. 


NEUROLOGICAL: Cranial nerves II through XII grossly intact.  Normal speech, no 

focal sensorimotor deficits 


SKIN: Warm, Dry, normal turgor, no rashes or lesions noted.





 


 CBCD











WBC  10.3 K/mm3 (4.0-10.0)  H  05/27/17  17:35    


 


RBC  4.20 M/mm3 (4.00-5.60)   05/27/17  17:35    


 


Hgb  11.6 GM/dL (11.7-16.9)  L D 05/27/17  17:35    


 


Hct  35.9 % (35.4-49)   05/27/17  17:35    


 


MCV  85.6 fl (80-96)   05/27/17  17:35    


 


MCHC  32.2 g/dl (32.0-35.9)   05/27/17  17:35    


 


RDW  16.2 % (11.9-15.9)  H D 05/27/17  17:35    


 


Plt Count  333 K/MM3 (134-434)  D 05/27/17  17:35    


 


MPV  7.6 fl (7.5-11.1)   05/27/17  17:35    








 CMP











Sodium  141 mmol/L (136-145)   05/27/17  17:35    


 


Potassium  4.0 mmol/L (3.5-5.1)   05/27/17  17:35    


 


Chloride  100 mmol/L ()   05/27/17  17:35    


 


Carbon Dioxide  28 mmol/L (21-32)   05/27/17  17:35    


 


Anion Gap  13  (8-16)   05/27/17  17:35    


 


BUN  33 mg/dL (7-18)  H D 05/27/17  17:35    


 


Creatinine  2.1 mg/dL (0.7-1.3)  H D 05/27/17  17:35    


 


Creat Clearance w eGFR  30.46  (>60)   05/27/17  17:35    


 


Calcium  8.8 mg/dL (8.5-10.1)   05/27/17  17:35    


 


Total Bilirubin  0.4 mg/dL (0.2-1.0)  D 05/27/17  17:35    


 


AST  13 U/L (15-37)  L D 05/27/17  17:35    


 


ALT  14 U/L (12-78)  D 05/27/17  17:35    


 


Alkaline Phosphatase  120 U/L ()  H D 05/27/17  17:35    


 


Total Protein  6.5 g/dl (6.4-8.2)  D 05/27/17  17:35    


 


Albumin  3.3 g/dl (3.4-5.0)  L  05/27/17  17:35    














 


ASSESSMENT AND PLAN 





1. Lower extremity edema with dsuperimpiosed cellulites. Edema is likely 

secondary to venous stasis and less likely to CHF exacerbation as patient has 

clear lungs and does not have shortness of breath, or decreased excersie 

tolerance or chest pain. Cellulites is mild and no signs of sepsis are evident. 

DVT was ruled out by duplex. 


- Send blood cultures x2 


- Clindomyacin 600 mg IV Q 8hrs.


- Bilateral lower extremity evalation


- Consider compressive elastic stockings 


- Resume normal Lasix dose. 





2. AGUILA, unknown etiology, possibly pre-renal but should rule out post 

obstructive given history of BPH and should evaluate for renal stones given 

prior history. 


- Gentle hydration with IV fluids 24 hours.


- Renal ultrasound


- Bladder ultrasound


- Urine Analysis 


- Urine lytes + creatine


- Avoid nephrotoxic medications


- Daily weight and Is & Os





3. BPH


- Resume Flomax 





4. Chronic medical problems


- Resume medications 


- Low sodium diet 





DVT propholasixe 


- Already on Xarelto 








Documentation prepared by Narciso Torres, acting as medical scribe for Sidney Fiore MD.

## 2017-05-28 LAB
ANION GAP SERPL CALC-SCNC: 10 MMOL/L (ref 8–16)
CALCIUM SERPL-MCNC: 8.9 MG/DL (ref 8.5–10.1)
CO2 SERPL-SCNC: 27 MMOL/L (ref 21–32)
COCKROFT - GAULT: 58.96
CREAT SERPL-MCNC: 2 MG/DL (ref 0.7–1.3)
DEPRECATED RDW RBC AUTO: 16.2 % (ref 11.9–15.9)
GLUCOSE SERPL-MCNC: 121 MG/DL (ref 74–106)
MCH RBC QN AUTO: 27.8 PG (ref 25.7–33.7)
MCHC RBC AUTO-ENTMCNC: 32.5 G/DL (ref 32–35.9)
MCV RBC: 85.7 FL (ref 80–96)
PLATELET # BLD AUTO: 302 K/MM3 (ref 134–434)
PMV BLD: 7.8 FL (ref 7.5–11.1)
SODIUM UR-SCNC: 42 MMOL/L
SP GR UR: 1.01 (ref 1–1.02)
WBC # BLD AUTO: 11.7 K/MM3 (ref 4–10)

## 2017-05-28 RX ADMIN — SODIUM CHLORIDE SCH MLS/HR: 9 INJECTION, SOLUTION INTRAVENOUS at 16:50

## 2017-05-28 RX ADMIN — TAMSULOSIN HYDROCHLORIDE SCH MG: 0.4 CAPSULE ORAL at 08:43

## 2017-05-28 RX ADMIN — METOPROLOL SUCCINATE SCH MG: 50 TABLET, EXTENDED RELEASE ORAL at 12:27

## 2017-05-28 RX ADMIN — RIVAROXABAN SCH MG: 20 TABLET, FILM COATED ORAL at 10:36

## 2017-05-28 RX ADMIN — SODIUM CHLORIDE SCH MLS/HR: 9 INJECTION, SOLUTION INTRAVENOUS at 00:44

## 2017-05-28 RX ADMIN — SENNOSIDES SCH: 8.6 TABLET, FILM COATED ORAL at 22:02

## 2017-05-28 RX ADMIN — CEFTRIAXONE SODIUM SCH GM: 2 INJECTION, POWDER, FOR SOLUTION INTRAMUSCULAR; INTRAVENOUS at 14:48

## 2017-05-28 RX ADMIN — FUROSEMIDE SCH MG: 40 TABLET ORAL at 12:27

## 2017-05-28 RX ADMIN — PANTOPRAZOLE SODIUM SCH MG: 20 TABLET, DELAYED RELEASE ORAL at 10:36

## 2017-05-28 NOTE — PN
Physical Exam: 


SUBJECTIVE: Patient seen and examined. He denies pain, chills. 








OBJECTIVE:





 Vital Signs











 Period  Temp  Pulse  Resp  BP Sys/Ross  Pulse Ox


 


 Last 24 Hr  98.3 F-98.7 F  67-83  20-22  100-152/46-75  99











GENERAL: The patient is awake, alert, and fully oriented, in no acute distress.


LUNGS: Breath sounds equal, clear to auscultation bilaterally, no wheezes, no 

crackles, no accessory muscle use. 


HEART: Regular rate and rhythm, S1, S2, (+) 2/6 systolic murmur.


ABDOMEN: Obese, soft, nontender, nondistended, normoactive bowel sounds, no 

guarding, no rebound, no hepatosplenomegaly, no masses.


EXTREMITIES: 3+ edema with slight erythema. 








 Laboratory Results - last 24 hr











  05/27/17 05/27/17 05/27/17





  23:00 23:00 23:00


 


WBC   


 


RBC   


 


Hgb   


 


Hct   


 


MCV   


 


MCHC   


 


RDW   


 


Plt Count   


 


MPV   


 


Sodium   


 


Potassium   


 


Chloride   


 


Carbon Dioxide   


 


Anion Gap   


 


BUN   


 


Creatinine   


 


Random Glucose   


 


Calcium   


 


Urine Color   Ltyellow 


 


Urine Appearance   Slcloudy 


 


Urine pH   8.0  D 


 


Urine Protein   Negative 


 


Urine Glucose (UA)   Negative 


 


Urine Ketones   Negative 


 


Urine Blood   1+ H 


 


Urine Nitrite   Negative 


 


Urine Bilirubin   Negative 


 


Urine Urobilinogen   Negative 


 


Ur Leukocyte Esterase   3+ H D 


 


Urine RBC   13 


 


Urine WBC   249 


 


Ur Epithelial Cells   Rare 


 


Urine Bacteria   Rare 


 


Hyaline Casts   1 


 


Ur Random Sodium    42


 


Ur Random Potassium    26.6


 


Ur Random Chloride    48


 


Ur Random Urea Nitrogn   


 


Urine Creatinine  37.4  














  05/27/17 05/28/17 05/28/17





  23:00 08:40 08:40


 


WBC   11.7 H 


 


RBC   4.05 


 


Hgb   11.3 L 


 


Hct   34.7 L 


 


MCV   85.7 


 


MCHC   32.5 


 


RDW   16.2 H 


 


Plt Count   302 


 


MPV   7.8 


 


Sodium    140


 


Potassium    3.8


 


Chloride    103


 


Carbon Dioxide    27


 


Anion Gap    10


 


BUN    36 H


 


Creatinine    2.0 H


 


Random Glucose    121 H


 


Calcium    8.9


 


Urine Color   


 


Urine Appearance   


 


Urine pH   


 


Urine Protein   


 


Urine Glucose (UA)   


 


Urine Ketones   


 


Urine Blood   


 


Urine Nitrite   


 


Urine Bilirubin   


 


Urine Urobilinogen   


 


Ur Leukocyte Esterase   


 


Urine RBC   


 


Urine WBC   


 


Ur Epithelial Cells   


 


Urine Bacteria   


 


Hyaline Casts   


 


Ur Random Sodium   


 


Ur Random Potassium   


 


Ur Random Chloride   


 


Ur Random Urea Nitrogn  273  


 


Urine Creatinine   








Active Medications











Generic Name Dose Route Start Last Admin





  Trade Name Freq  PRN Reason Stop Dose Admin


 


Acetaminophen  650 mg 05/27/17 20:11  





  Tylenol -  PO   





  Q4H PRN   





  FEVER OR PAIN   


 


Ceftriaxone Sodium  2 gm 05/28/17 15:00 05/28/17 14:48





  Rocephin 2gm Ivpb (Pre-Docked)  IVPB   2 gm





  DAILY KELVIN   Administration


 


Furosemide  40 mg 05/28/17 10:00 05/28/17 12:27





  Lasix -  PO   40 mg





  DAILY KELVIN   Administration


 


Sodium Chloride  1,000 mls @ 75 mls/hr 05/27/17 22:00 05/28/17 00:44





  Normal Saline -  IV 05/28/17 21:53  75 mls/hr





  ASDIR KELVIN   Administration


 


Losartan Potassium  25 mg 05/28/17 10:00 05/28/17 12:28





  Cozaar -  PO   25 mg





  DAILY KELVIN   Administration


 


Metoprolol Succinate  50 mg 05/28/17 10:00 05/28/17 12:27





  Toprol Xl -  PO   50 mg





  DAILY KELVIN   Administration


 


Oxycodone HCl  5 mg 05/27/17 20:11 05/28/17 05:42





  Roxicodone -  PO   5 mg





  Q4H PRN   Administration





  PAIN   


 


Pantoprazole Sodium  20 mg 05/28/17 10:00 05/28/17 10:36





  Protonix -  PO   20 mg





  DAILY KELVIN   Administration


 


Rivaroxaban  20 mg 05/28/17 10:00 05/28/17 10:36





  Xarelto -  PO   20 mg





  DAILY KELVIN   Administration


 


Senna  2 tab 05/27/17 22:00 05/27/17 22:13





  Senna -  PO   Not Given





  [facility]   


 


Tamsulosin HCl  0.8 mg 05/28/17 08:30 05/28/17 08:43





  Flomax -  PO   0.8 mg





  DAILY@0830 KELVIN   Administration











ASSESSMENT/PLAN:


This is an 81-year-old man from [place] with a history of left hip OA, HTN, 

chronic diastolic HF, hyperlipidemia, BPH, GERD, pacemaker who presented to the 

ER with redness and swelling of his legs.





1. Gram negative bacteremia with possible UTI, possible bilateral leg cellulitis


   - Ceftriaxone started


   - Follow up blood and urine cultures





2. Acute kidney injury


   - FENa and FEUrea consistent with intrinsic renal disease


   - Renal/bladder US pending


   - Hold Cozaar


   - Monitor creatinine


   - Nephrology consult





3. Probable chronic venous insufficiency of both legs


   - Continue Lasix for leg edema


   - Venous dopplers pending





4. Chronic diastolic heart failure 


   - Stable


   - Continue Lasix





5. BPH 


   - Continue Flomax 





6. Possible history of atrial fibrillation 


   - Continue Toprol XL, Xarelto





7. Symptomatic bradycardia


   - Has pacemaker





8. Hypertension


   - Continue Toprol XL


   - Hold Cozaar secondary to AGUILA





9. Hyperlipidemia





10. GERD


   - Continue Protonix








Visit type





- Emergency Visit


Emergency Visit: Yes


ED Registration Date: 05/27/17


Care time: The patient presented to the Emergency Department on the above date 

and was hospitalized for further evaluation of their emergent condition.





- New Patient


This patient is new to me today: Yes


Date on this admission: 05/28/17





- Critical Care


Critical Care patient: No





- Discharge Referral


Referred to [facility] No

## 2017-05-28 NOTE — PN
Progress Note, Physician


Chief Complaint: 


ID








ALert oriented eating his breakfast with great enthusiasm !!








NO fever chills





- Current Medication List


Current Medications: 


Active Medications





Acetaminophen (Tylenol -)  650 mg PO Q4H PRN


   PRN Reason: FEVER OR PAIN


Furosemide (Lasix -)  40 mg PO DAILY [facility]


Clindamycin Phosphate (Cleocin 600 Mg Premix Ivpb -)  50 mls @ 100 mls/hr IVPB 

Q8H-IV [facility]


   Last Admin: 05/28/17 01:18 Dose:  100 mls/hr


Sodium Chloride (Normal Saline -)  1,000 mls @ 75 mls/hr IV ASDIR [facility]


   Stop: 05/28/17 21:53


   Last Admin: 05/28/17 00:44 Dose:  75 mls/hr


Ceftriaxone Sodium (Rocephin 1gm Ivpb (Pre-Docked))  50 mls @ 100 mls/hr IVPB 

DAILY [facility]


   Last Admin: 05/28/17 00:29 Dose:  100 mls/hr


Losartan Potassium (Cozaar -)  25 mg PO DAILY [facility]


Metoprolol Succinate (Toprol Xl -)  50 mg PO DAILY [facility]


Oxycodone HCl (Roxicodone -)  5 mg PO Q4H PRN


   PRN Reason: PAIN


   Last Admin: 05/28/17 05:42 Dose:  5 mg


Pantoprazole Sodium (Protonix -)  20 mg PO DAILY [facility]


Rivaroxaban (Xarelto -)  20 mg PO DAILY [facility]


Senna (Senna -)  2 tab PO HS [facility]


   Last Admin: 05/27/17 22:13 Dose:  Not Given


Tamsulosin HCl (Flomax -)  0.8 mg PO DAILY@0830 [facility]











- Objective


Vital Signs: 


 Vital Signs











Temperature  98.3 F   05/28/17 06:00


 


Pulse Rate  70   05/28/17 06:00


 


Respiratory Rate  22   05/28/17 06:00


 


Blood Pressure  125/46   05/28/17 06:00


 


O2 Sat by Pulse Oximetry (%)  99   05/28/17 00:11











Constitutional: Yes: Obese


Cardiovascular: Yes: S1, S2


Respiratory: Yes: WNL, Regular, CTA Bilaterally


Edema: Yes (4+)





Problem List





- Problems


(1) Lymphedema


Code(s): I89.0 - LYMPHEDEMA, NOT ELSEWHERE CLASSIFIED





(2) Cellulitis and abscess of left leg


Code(s): L03.116 - CELLULITIS OF LEFT LOWER LIMB


L02.416 - CUTANEOUS ABSCESS OF LEFT LOWER LIMB








Assessment/Plan


Microbiology








 Laboratory Tests











  05/27/17 05/27/17 05/27/17





  17:35 17:35 23:00


 


WBC  10.3 H  


 


RBC  4.20  


 


Plt Count  333  D  


 


BUN   33 H D 


 


Creatinine   2.1 H D 


 


Ur Leukocyte Esterase    3+ H D


 


Urine RBC    13


 


Urine WBC    249








Assessment  Severe lymphedema LE Not sure cellulitis May have UTI incidental





Plan                Cefazolin 1 gram q 8 H  Elevate legs !!





Alirio RUST

## 2017-05-29 LAB
ANION GAP SERPL CALC-SCNC: 11 MMOL/L (ref 8–16)
BASOPHILS # BLD: 0.7 % (ref 0–2)
CALCIUM SERPL-MCNC: 8.8 MG/DL (ref 8.5–10.1)
CO2 SERPL-SCNC: 27 MMOL/L (ref 21–32)
COCKROFT - GAULT: 70.44
CREAT SERPL-MCNC: 1.7 MG/DL (ref 0.7–1.3)
DEPRECATED RDW RBC AUTO: 16.1 % (ref 11.9–15.9)
EOSINOPHIL # BLD: 3.5 % (ref 0–4.5)
GLUCOSE SERPL-MCNC: 107 MG/DL (ref 74–106)
MCH RBC QN AUTO: 28.2 PG (ref 25.7–33.7)
MCHC RBC AUTO-ENTMCNC: 32.8 G/DL (ref 32–35.9)
MCV RBC: 86.1 FL (ref 80–96)
NEUTROPHILS # BLD: 68.9 % (ref 42.8–82.8)
PLATELET # BLD AUTO: 288 K/MM3 (ref 134–434)
PMV BLD: 7.8 FL (ref 7.5–11.1)
WBC # BLD AUTO: 11 K/MM3 (ref 4–10)

## 2017-05-29 RX ADMIN — PANTOPRAZOLE SODIUM SCH MG: 20 TABLET, DELAYED RELEASE ORAL at 10:21

## 2017-05-29 RX ADMIN — TAMSULOSIN HYDROCHLORIDE SCH MG: 0.4 CAPSULE ORAL at 10:21

## 2017-05-29 RX ADMIN — FUROSEMIDE SCH MG: 40 TABLET ORAL at 10:21

## 2017-05-29 RX ADMIN — METOPROLOL SUCCINATE SCH MG: 50 TABLET, EXTENDED RELEASE ORAL at 10:21

## 2017-05-29 RX ADMIN — CEFTRIAXONE SODIUM SCH GM: 2 INJECTION, POWDER, FOR SOLUTION INTRAMUSCULAR; INTRAVENOUS at 10:21

## 2017-05-29 RX ADMIN — RIVAROXABAN SCH MG: 20 TABLET, FILM COATED ORAL at 10:21

## 2017-05-29 NOTE — EKG
Test Reason : 

Blood Pressure : ***/*** mmHG

Vent. Rate : 069 BPM     Atrial Rate : 068 BPM

   P-R Int : 000 ms          QRS Dur : 168 ms

    QT Int : 472 ms       P-R-T Axes : 000 -53 117 degrees

   QTc Int : 505 ms

 

*** POOR DATA QUALITY, INTERPRETATION MAY BE ADVERSELY AFFECTED

Ventricular-paced rhythm

ABNORMAL ECG

WHEN COMPARED WITH ECG OF 11-MAR-2017 08:55,

NO SIGNIFICANT CHANGE WAS FOUND

Confirmed by SALBADOR RUST, ARIELLE ([id number]) on 5/29/2017 9:53:50 AM

 

Referred By:             Confirmed By:ARIELLE SIU MD

## 2017-05-29 NOTE — PN
Physical Exam: 


SUBJECTIVE: Patient seen and examined. He has urinary incontinence. Last night, 

he was found to have 1000 cc of urine in his bladder by US. He has been 

refusing Berry.








OBJECTIVE:





 Vital Signs











 Period  Temp  Pulse  Resp  BP Sys/Ross  Pulse Ox


 


 Last 24 Hr  97.7 F-98.5 F  70-83  18-22  115-129/54-75  96











GENERAL: The patient is awake, alert, and fully oriented, in no acute distress.


LUNGS: Breath sounds equal, clear to auscultation bilaterally, no wheezes, no 

crackles, no accessory muscle use. 


HEART: Regular rate and rhythm, S1, S2, (+) 2/6 systolic murmur.


ABDOMEN: Obese, soft, nontender, nondistended, normoactive bowel sounds, no 

guarding, no rebound, no hepatosplenomegaly, no masses.


EXTREMITIES: 3+ edema with erythema of dorsum of left foot.














 Laboratory Results - last 24 hr











  05/27/17 05/29/17 05/29/17





  23:00 07:10 07:10


 


WBC   11.0 H 


 


RBC   3.93 L 


 


Hgb   11.1 L 


 


Hct   33.8 L 


 


MCV   86.1 


 


MCHC   32.8 


 


RDW   16.1 H 


 


Plt Count   288 


 


MPV   7.8 


 


Neutrophils %   68.9 


 


Lymphocytes %   16.5  D 


 


Monocytes %   10.4 H 


 


Eosinophils %   3.5 


 


Basophils %   0.7 


 


Sodium    140


 


Potassium    4.1


 


Chloride    102


 


Carbon Dioxide    27


 


Anion Gap    11


 


BUN    30 H


 


Creatinine    1.7 H


 


Random Glucose    107 H


 


Calcium    8.8


 


Ur Specific Gravity  1.010  








Active Medications











Generic Name Dose Route Start Last Admin





  Trade Name Dominickq  PRN Reason Stop Dose Admin


 


Acetaminophen  650 mg 05/27/17 20:11  





  Tylenol -  PO   





  Q4H PRN   





  FEVER OR PAIN   


 


Ceftriaxone Sodium  2 gm 05/28/17 15:00 05/28/17 14:48





  Rocephin 2gm Ivpb (Pre-Docked)  IVPB   2 gm





  DAILY KELVIN   Administration


 


Furosemide  40 mg 05/28/17 10:00 05/28/17 12:27





  Lasix -  PO   40 mg





  DAILY KELVIN   Administration


 


Metoprolol Succinate  50 mg 05/28/17 10:00 05/28/17 12:27





  Toprol Xl -  PO   50 mg





  DAILY KELVIN   Administration


 


Oxycodone HCl  5 mg 05/27/17 20:11 05/28/17 21:58





  Roxicodone -  PO   5 mg





  Q4H PRN   Administration





  PAIN   


 


Pantoprazole Sodium  20 mg 05/28/17 10:00 05/28/17 10:36





  Protonix -  PO   20 mg





  DAILY KELVIN   Administration


 


Rivaroxaban  20 mg 05/28/17 10:00 05/28/17 10:36





  Xarelto -  PO   20 mg





  DAILY KELVIN   Administration


 


Senna  2 tab 05/27/17 22:00 05/28/17 22:02





  Senna -  PO   Not Given





  HS KELVIN   


 


Tamsulosin HCl  0.8 mg 05/28/17 08:30 05/28/17 08:43





  Flomax -  PO   0.8 mg





  DAILY@0830 KELVIN   Administration











ASSESSMENT/PLAN:


This is an 81-year-old man from [place] with a history of left hip OA, HTN, 

chronic diastolic HF, hyperlipidemia, BPH, GERD, pacemaker who presented to the 

ER with redness and swelling of his legs.





1. Gram negative bacteremia secondary to UTI, possible left foot cellulitis


   - Continue Ceftriaxone


   - Follow up blood and urine cultures


2. Acute kidney injury secondary to bladder obstruction from BPH with urinary 

retention


   - FENa and FEUrea consistent with intrinsic renal disease


   - Renal/bladder shows mild to moderate bilateral hydronephrosis, urinary 

retention 1000 cc


   - Hold Cozaar


   - Continue Flomax


   - Refusing Berry


   - Creatinine improving


   - Nephrology, urology consults


3. Probable chronic venous insufficiency of both legs


   - Continue Lasix for leg edema


   - Venous dopplers negative for DVT


4. Chronic diastolic heart failure 


   - Stable


   - Continue Lasix


5. Possible history of atrial fibrillation 


   - Continue Toprol XL, Xarelto


6. Symptomatic bradycardia


   - Has pacemaker


7. Hypertension


   - Continue Toprol XL


   - Hold Cozaar secondary to AGUILA


8. Hyperlipidemia


9. GERD


   - Continue Protonix





Visit type





- Emergency Visit


Emergency Visit: Yes


ED Registration Date: 05/27/17


Care time: The patient presented to the Emergency Department on the above date 

and was hospitalized for further evaluation of their emergent condition.





- New Patient


This patient is new to me today: No





- Critical Care


Critical Care patient: No





- Discharge Referral


Referred to [facility] No

## 2017-05-29 NOTE — CON.NEP
Consult


Consult Specialty:: Nephrology


Reason for Consultation:: renal insufficiency





- History of Present Illness


Chief Complaint: fluid overeload


History of Present Illness: 


81 year old male from [facility] with significant pmh of hypertension, CHF, 

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

, nephrolithiasis, prostate CA recently discharged from [facility]  where he was 

admit for rhabdomyolysis in 3/2017 and left Hip pain now present to ED brought 

in by ambulance for bilateral lower edema and redness.  He has renal 

insufficiency but has no history of this.  He has edema which has been 

increasing.  Has some difficulty urinating.  Had a UTI before.  Says he was 

told he needed a catheter but had a problem in past and didnt want one.





- History Source


History Provided By: Patient, Medical Record


Limitations to Obtaining History: No Limitations





- Past Medical History


Cardio/Vascular: Yes: AFIB, CHF, HTN, Other (pacemaker)


Renal/: Yes: Renal Calculi





- Past Surgical History


Past Surgical History: Yes: Permanent Pacemaker





- Alcohol/Substance Use


Hx Alcohol Use: No





- Smoking History


Smoking history: Former smoker


Have you smoked in the past 12 months: No


If you are a former smoker, when did you quit?: 1970





Home Medications





- Allergies


Allergies/Adverse Reactions: 


 Allergies











Allergy/AdvReac Type Severity Reaction Status Date / Time


 


No Known Allergies Allergy   Verified 11/06/16 07:40














- Home Medications


Home Medications: 


Ambulatory Orders





Furosemide [Lasix -] 40 mg PO Q48H 03/11/17 


Losartan Potassium 25 mg PO DAILY 03/11/17 


Metoprolol Succinate [Toprol Xl] 50 mg PO DAILY 03/11/17 


Omeprazole 20 mg PO DAILY 03/11/17 


Oxycodone HCl/Acetaminophen [Percocet 5-325 mg Tablet] 1 tab PO PRN PRN 03/11/ 17 


Rivaroxaban [Xarelto -] 20 mg PO DAILY 03/11/17 


Tamsulosin HCl [Flomax] 0.8 mg PO DAILY 03/11/17 


Sennosides [Senna -] 2 tab PO HS  tablet 03/15/17 











Review of Systems





- Review of Systems


Constitutional: reports: No Symptoms


Eyes: reports: No Symptoms


HENT: reports: No Symptoms


Neck: reports: No Symptoms


Cardiovascular: reports: Edema


Respiratory: reports: No Symptoms


Gastrointestinal: reports: No Symptoms


Genitourinary: reports: Dysuria, Urgency


Breasts: reports: No Symptoms Reported


Musculoskeletal: reports: No Symptoms


Integumentary: reports: No Symptoms


Neurological: reports: No Symptoms


Endocrine: reports: No Symptoms


Hematology/Lymphatic: reports: No Symptoms


Psychiatric: reports: No Symptoms





Nephrology Consult





- Height


Height: 6 ft 2 in





- Weight


Weight: 322 lb 3.2 oz





- BMI


Body Mass Index (BMI): 41.3





- Lab Results


CBC,BMP: 


 CBC, BMP





 05/29/17 07:10 





 05/29/17 07:10 








Anion Gap: 


 Anion Gap











Anion Gap  11  (8-16)   05/29/17  07:10    














- Imaging


Ultrasound: Report Reviewed (1000 cc  in bladder moderate hydro)





- Physical Examination


Vital Signs: 


 Vital Signs











Temperature  97.7 F   05/29/17 06:00


 


Pulse Rate  70   05/29/17 06:00


 


Respiratory Rate  18   05/29/17 06:00


 


Blood Pressure  129/56   05/29/17 06:00


 


O2 Sat by Pulse Oximetry (%)  96   05/28/17 22:00











Constitutional: Yes: Well Nourished, No Distress


Eyes: Yes: WNL, Conjunctiva Clear


HENT: Yes: WNL, Atraumatic, Normocephalic


Neck: Yes: Supple, Trachea Midline


Cardiovascular: Yes: Pulse Irregular


Respiratory: Yes: Diminished


Gastrointestinal: Yes: Normal Bowel Sounds


Renal/: No: CVA Tenderness - Left, CVA Tenderness - Right


Musculoskeletal: Yes: WNL


Edema: LLE: 3+, RLE: 3+


Neurological: Yes: Alert, Oriented


Psychiatric: Yes: Alert, Oriented





Assessment/Plan


IMPRESSION


AGUILA due to retention which is also likely causing his fluid overload


has no proteinuria








PLAN


needs a catheter.  Can ask urology to insert if its a difficult insertion


keep on flomax


would not give urecholine since he may have a mechanical obstruction at the 

bladder neck level


add finasteride








MV

## 2017-05-29 NOTE — PN
Progress Note (short form)





- Note


Progress Note: 


has dysuria


no fevers, chills at snf


chronic leg swellling for last 3 weeks


urinary retention on sono- refusing gerber- aware may worsen infection





 Vital Signs











 Period  Temp  Pulse  Resp  BP Sys/Ross  Pulse Ox


 


 Last 24 Hr  97.7 F-98.5 F  70-83  18-22  115-129/54-75  96








cor-rrr


llungs clear


abd soft,nt


ext bilateral TKR


+EDEMA





 CBC, BMP





 05/29/17 07:10 





 05/29/17 07:10 





 Microbiology





05/27/17 17:35   Blood - Peripheral Venous   Blood Culture - Preliminary


                            Pending Organism


05/27/17 17:35   Blood - Peripheral Venous   Blood Culture - Preliminary


                            Pending Organism








urine culture pending





a/p


gram negative bacteremia- pyuria- suspect urinary source


urology consulted, has hydronephrosis and would benefit from gerber- refusing


continue ceftriaxone





bilateral lower extremity swelling





history PPM





h

## 2017-05-30 LAB
ANION GAP SERPL CALC-SCNC: 9 MMOL/L (ref 8–16)
BASOPHILS # BLD: 1 % (ref 0–2)
CALCIUM SERPL-MCNC: 9.2 MG/DL (ref 8.5–10.1)
CO2 SERPL-SCNC: 28 MMOL/L (ref 21–32)
COCKROFT - GAULT: 75.1
CREAT SERPL-MCNC: 1.6 MG/DL (ref 0.7–1.3)
DEPRECATED RDW RBC AUTO: 16 % (ref 11.9–15.9)
EOSINOPHIL # BLD: 5.4 % (ref 0–4.5)
GLUCOSE SERPL-MCNC: 92 MG/DL (ref 74–106)
MCH RBC QN AUTO: 28.6 PG (ref 25.7–33.7)
MCHC RBC AUTO-ENTMCNC: 33.4 G/DL (ref 32–35.9)
MCV RBC: 85.6 FL (ref 80–96)
NEUTROPHILS # BLD: 57.6 % (ref 42.8–82.8)
PLATELET # BLD AUTO: 287 K/MM3 (ref 134–434)
PMV BLD: 7.8 FL (ref 7.5–11.1)
WBC # BLD AUTO: 8.6 K/MM3 (ref 4–10)

## 2017-05-30 PROCEDURE — 0T9B70Z DRAINAGE OF BLADDER WITH DRAINAGE DEVICE, VIA NATURAL OR ARTIFICIAL OPENING: ICD-10-PCS

## 2017-05-30 RX ADMIN — SENNOSIDES SCH TAB: 8.6 TABLET, FILM COATED ORAL at 22:27

## 2017-05-30 RX ADMIN — PANTOPRAZOLE SODIUM SCH MG: 20 TABLET, DELAYED RELEASE ORAL at 09:30

## 2017-05-30 RX ADMIN — FUROSEMIDE SCH MG: 40 TABLET ORAL at 09:30

## 2017-05-30 RX ADMIN — SENNOSIDES SCH: 8.6 TABLET, FILM COATED ORAL at 00:40

## 2017-05-30 RX ADMIN — TAMSULOSIN HYDROCHLORIDE SCH MG: 0.4 CAPSULE ORAL at 09:31

## 2017-05-30 RX ADMIN — RIVAROXABAN SCH MG: 20 TABLET, FILM COATED ORAL at 09:31

## 2017-05-30 RX ADMIN — METOPROLOL SUCCINATE SCH MG: 50 TABLET, EXTENDED RELEASE ORAL at 09:30

## 2017-05-30 RX ADMIN — CEFTRIAXONE SODIUM SCH GM: 2 INJECTION, POWDER, FOR SOLUTION INTRAMUSCULAR; INTRAVENOUS at 09:27

## 2017-05-30 NOTE — PN
Progress Note, Physician


History of Present Illness: 


Pt seen and examined at bedside. He is awake and alert. He complains of lower 

extremity edema. 





- Current Medication List


Current Medications: 


Active Medications





Acetaminophen (Tylenol -)  650 mg PO Q4H PRN


   PRN Reason: FEVER OR PAIN


Ceftriaxone Sodium (Rocephin 2gm Ivpb (Pre-Docked))  2 gm IVPB DAILY [facility]


   Last Admin: 05/30/17 09:27 Dose:  2 gm


Furosemide (Lasix -)  40 mg PO DAILY [facility]


   Last Admin: 05/30/17 09:30 Dose:  40 mg


Metoprolol Succinate (Toprol Xl -)  50 mg PO DAILY [facility]


   Last Admin: 05/30/17 09:30 Dose:  50 mg


Oxycodone HCl (Roxicodone -)  5 mg PO Q4H PRN


   PRN Reason: PAIN


   Last Admin: 05/30/17 09:23 Dose:  5 mg


Pantoprazole Sodium (Protonix -)  20 mg PO DAILY [facility]


   Last Admin: 05/30/17 09:30 Dose:  20 mg


Rivaroxaban (Xarelto -)  20 mg PO DAILY [facility]


   Last Admin: 05/30/17 09:31 Dose:  20 mg


Senna (Senna -)  2 tab PO HS [facility]


   Last Admin: 05/30/17 00:40 Dose:  Not Given


Tamsulosin HCl (Flomax -)  0.8 mg PO DAILY@0830 [facility]


   Last Admin: 05/30/17 09:31 Dose:  0.8 mg











- Objective


Vital Signs: 


 Vital Signs











Temperature  97.4 F L  05/30/17 06:01


 


Pulse Rate  72   05/30/17 10:00


 


Respiratory Rate  18   05/30/17 10:00


 


Blood Pressure  112/47   05/30/17 10:00


 


O2 Sat by Pulse Oximetry (%)  99   05/30/17 09:00











Constitutional: Yes: Calm


Eyes: Yes: Conjunctiva Clear


HENT: Yes: Atraumatic


Neck: Yes: Supple


Cardiovascular: Yes: S1, S2


Respiratory: Yes: CTA Bilaterally


Gastrointestinal: Yes: Soft, Abdomen, Obese


Genitourinary: Yes: WNL


Musculoskeletal: Yes: WNL


Edema: Yes


Edema: LLE: 3+, RLE: 3+


Neurological: Yes: Oriented


Psychiatric: Yes: Oriented


Labs: 


 CBC, BMP





 05/30/17 07:00 





 05/30/17 07:00 











- ....Imaging


Ultrasound: Report Reviewed





Problem List





- Problems


(1) Hypertension


Code(s): I10 - ESSENTIAL (PRIMARY) HYPERTENSION   





(2) Morbid obesity


Code(s): E66.01 - MORBID (SEVERE) OBESITY DUE TO EXCESS CALORIES   





(3) CHF (congestive heart failure)


Code(s): I50.9 - HEART FAILURE, UNSPECIFIED   





(4) AGUILA (acute kidney injury)


Code(s): N17.9 - ACUTE KIDNEY FAILURE, UNSPECIFIED





(5) Obstructive uropathy


Code(s): N13.9 - OBSTRUCTIVE AND REFLUX UROPATHY, UNSPECIFIED





(6) Hydronephrosis


Code(s): N13.30 - UNSPECIFIED HYDRONEPHROSIS   








Assessment/Plan


 Current Medications











Generic Name Dose Route Start Last Admin





  Trade Name Freq  PRN Reason Stop Dose Admin


 


Acetaminophen  650 mg 05/27/17 20:11  





  Tylenol -  PO   





  Q4H PRN   





  FEVER OR PAIN   


 


Ceftriaxone Sodium  2 gm 05/28/17 15:00 05/30/17 09:27





  Rocephin 2gm Ivpb (Pre-Docked)  IVPB   2 gm





  DAILY KELVIN   Administration


 


Furosemide  40 mg 05/28/17 10:00 05/30/17 09:30





  Lasix -  PO   40 mg





  DAILY KELVIN   Administration


 


Metoprolol Succinate  50 mg 05/28/17 10:00 05/30/17 09:30





  Toprol Xl -  PO   50 mg





  DAILY KELVIN   Administration


 


Oxycodone HCl  5 mg 05/27/17 20:11 05/30/17 09:23





  Roxicodone -  PO   5 mg





  Q4H PRN   Administration





  PAIN   


 


Pantoprazole Sodium  20 mg 05/28/17 10:00 05/30/17 09:30





  Protonix -  PO   20 mg





  DAILY KELVIN   Administration


 


Rivaroxaban  20 mg 05/28/17 10:00 05/30/17 09:31





  Xarelto -  PO   20 mg





  DAILY KELVIN   Administration


 


Senna  2 tab 05/27/17 22:00 05/30/17 00:40





  Senna -  PO   Not Given





  HS KELVIN   


 


Tamsulosin HCl  0.8 mg 05/28/17 08:30 05/30/17 09:31





  Flomax -  PO   0.8 mg





  DAILY@0830 KELVIN   Administration








Impression


1. AGUILA


2. hydrophepnrosis


3. obstructive uropathy


4. fluid overload


5. HTN


6. UTI





Plan


- urology evaluation for hydro


- place gerber catheter


- will not increase diuretics until the obstruction is resolved


- cont flomax


- case discussed with pt and family

## 2017-05-30 NOTE — PN
Teaching Attending Note


Name of Resident: Manuel Raines


ATTENDING PHYSICIAN STATEMENT





I saw and evaluated the patient.


I reviewed the resident's note and discussed the case with the resident.


I agree with the resident's findings and plan as documented.








SUBJECTIVE:


nad





OBJECTIVE:


 Vital Signs











 Period  Temp  Pulse  Resp  BP Sys/Ross  Pulse Ox


 


 Last 24 Hr  97.4 F-98.2 F  69-73  18-18  100-132/47-70  








cor-rrr


lungs clear


ext +3 pitting edema 





05/27/17 17:35   Blood - Peripheral Venous   Blood Culture - Final


                            Proteus Mirabilis


05/27/17 17:35   Blood - Peripheral Venous   Blood Culture - Final


                            Proteus Mirabilis


05/27/17 23:00   Urine - Urine Clean Catch   Urine Culture - Final


                            Proteus Mirabilis


05/29/17 11:21   Blood - Peripheral Venous   Blood Culture - Preliminary


                            NO GROWTH OBTAINED AFTER 24 HOURS, INCUBATION TO 

CONTINUE


                            FOR 4 DAYS.


05/29/17 11:15   Blood - Peripheral Venous   Blood Culture - Preliminary


                            NO GROWTH OBTAINED AFTER 24 HOURS, INCUBATION TO 

CONTINUE


                            FOR 4 DAYS.











ASSESSMENT AND PLAN:


proteus bacteremia


proteus uti


urinary retention 


awaiting urology for gerber


continue ceftriaxone

## 2017-05-30 NOTE — PN
Physical Exam: 


SUBJECTIVE: Patient seen and examined


80 y/o male admitted for progressively increasing swelling of b/o lower limbs 

despite on lasix 40 in morning and 20 in evening. 


In hospital found to have urinary retention and uti. 


Patient sates he was diagnosed with prostate enlargement couple of years ago.


states has increase in frequency of micturation, burning micturation, denies 

blood in urin, denies feeling of incomplete emptiness. 


states burning has improved since he is in hospital. 


denies fever and chills.











OBJECTIVE:





 Vital Signs











 Period  Temp  Pulse  Resp  BP Sys/Ross  Pulse Ox


 


 Last 24 Hr  97.4 F-98.2 F  69-73  18-18  100-132/47-70  











GENERAL: The patient is awake, alert, and fully oriented, in no acute distress.


HEAD: Normal with no signs of trauma.


NECK: Trachea midline, full range of motion, supple. 


LUNGS: Breath sounds equal, clear to auscultation bilaterally, no wheezes, no 

crackles, 


HEART:s1s2 n


ABDOMEN: Soft, nontender, nondistended, normoactive bowel sounds, no guarding, 

no rebound, 


EXTREMITIES: PE 3+, no erythema, non tender, temp equal in both legs 











 Laboratory Results - last 24 hr











  05/30/17 05/30/17





  07:00 07:00


 


WBC  8.6 


 


RBC  3.84 L 


 


Hgb  11.0 L 


 


Hct  32.9 L 


 


MCV  85.6 


 


MCHC  33.4 


 


RDW  16.0 H 


 


Plt Count  287 


 


MPV  7.8 


 


Neutrophils %  57.6 


 


Lymphocytes %  26.1  D 


 


Monocytes %  9.9 


 


Eosinophils %  5.4 H 


 


Basophils %  1.0 


 


Sodium   139


 


Potassium   4.1


 


Chloride   102


 


Carbon Dioxide   28


 


Anion Gap   9


 


BUN   29 H


 


Creatinine   1.6 H


 


Random Glucose   92


 


Calcium   9.2








Active Medications











Generic Name Dose Route Start Last Admin





  Trade Name Freq  PRN Reason Stop Dose Admin


 


Acetaminophen  650 mg 05/27/17 20:11  





  Tylenol -  PO   





  Q4H PRN   





  FEVER OR PAIN   


 


Ceftriaxone Sodium  2 gm 05/28/17 15:00 05/30/17 09:27





  Rocephin 2gm Ivpb (Pre-Docked)  IVPB   2 gm





  DAILY KELVIN   Administration


 


Furosemide  40 mg 05/28/17 10:00 05/30/17 09:30





  Lasix -  PO   40 mg





  DAILY KELVIN   Administration


 


Metoprolol Succinate  50 mg 05/28/17 10:00 05/30/17 09:30





  Toprol Xl -  PO   50 mg





  DAILY KELVIN   Administration


 


Oxycodone HCl  5 mg 05/27/17 20:11 05/30/17 09:23





  Roxicodone -  PO   5 mg





  Q4H PRN   Administration





  PAIN   


 


Pantoprazole Sodium  20 mg 05/28/17 10:00 05/30/17 09:30





  Protonix -  PO   20 mg





  DAILY KELVIN   Administration


 


Rivaroxaban  20 mg 05/28/17 10:00 05/30/17 09:31





  Xarelto -  PO   20 mg





  DAILY KELVIN   Administration


 


Senna  2 tab 05/27/17 22:00 05/30/17 00:40





  Senna -  PO   Not Given





  HS KELVIN   


 


Tamsulosin HCl  0.8 mg 05/28/17 08:30 05/30/17 09:31





  Flomax -  PO   0.8 mg





  DAILY@0830 KELVIN   Administration








 Microbiology





05/27/17 17:35   Blood - Peripheral Venous   Blood Culture - Final


                            Proteus Mirabilis


05/27/17 17:35   Blood - Peripheral Venous   Blood Culture - Final


                            Proteus Mirabilis


05/27/17 23:00   Urine - Urine Clean Catch   Urine Culture - Final


                            Proteus Mirabilis


05/29/17 11:21   Blood - Peripheral Venous   Blood Culture - Preliminary


                            NO GROWTH OBTAINED AFTER 24 HOURS, INCUBATION TO 

CONTINUE


                            FOR 4 DAYS.


05/29/17 11:15   Blood - Peripheral Venous   Blood Culture - Preliminary


                            NO GROWTH OBTAINED AFTER 24 HOURS, INCUBATION TO 

CONTINUE


                            FOR 4 DAYS.








ASSESSMENT/PLAN:


80 y/o male admitted for progressively increasing swelling of b/o lower limbs 

despite on lasix 40 in morning and 20 in evening. Found to have urinar retention

, uti and b/l hydronephrosis. 


gram negative bacteremia by proteus mirabilis


urine culture : proteus mirabilis 


patient is afebrile and wbc normal. 





Plan 


continue with ceftriaxone


would benefit from gerber. 


urology consult pending 


 








Visit type





- Emergency Visit


Emergency Visit: Yes


ED Registration Date: 05/27/17


Care time: The patient presented to the Emergency Department on the above date 

and was hospitalized for further evaluation of their emergent condition.





- New Patient


This patient is new to me today: Yes


Date on this admission: 05/30/17





- Critical Care


Critical Care patient: No

## 2017-05-30 NOTE — CONSULT
Consult





- Past Medical History


Cardio/Vascular: Yes: AFIB, CHF, HTN, Other (pacemaker)


Renal/: Yes: Renal Calculi





- Past Surgical History


Past Surgical History: Yes: Permanent Pacemaker





- Alcohol/Substance Use


Hx Alcohol Use: No





- Smoking History


Smoking history: Former smoker


Have you smoked in the past 12 months: No


If you are a former smoker, when did you quit?: 1970





Home Medications





- Allergies


Allergies/Adverse Reactions: 


 Allergies











Allergy/AdvReac Type Severity Reaction Status Date / Time


 


No Known Allergies Allergy   Verified 11/06/16 07:40














- Home Medications


Home Medications: 


Ambulatory Orders





Furosemide [Lasix -] 40 mg PO Q48H 03/11/17 


Losartan Potassium 25 mg PO DAILY 03/11/17 


Metoprolol Succinate [Toprol Xl] 50 mg PO DAILY 03/11/17 


Omeprazole 20 mg PO DAILY 03/11/17 


Oxycodone HCl/Acetaminophen [Percocet 5-325 mg Tablet] 1 tab PO PRN PRN 03/11/ 17 


Rivaroxaban [Xarelto -] 20 mg PO DAILY 03/11/17 


Tamsulosin HCl [Flomax] 0.8 mg PO DAILY 03/11/17 


Sennosides [Senna -] 2 tab PO HS  tablet 03/15/17 











Physical Exam


Vital Signs: 


 Vital Signs











Temperature  97.4 F L  05/30/17 06:01


 


Pulse Rate  72   05/30/17 10:00


 


Respiratory Rate  18   05/30/17 10:00


 


Blood Pressure  112/47   05/30/17 10:00


 


O2 Sat by Pulse Oximetry (%)  99   05/30/17 09:00











Labs: 


 CBC, BMP





 05/30/17 07:00 





 05/30/17 07:00 











Assessment/Plan


Vascular Surgery 





The patient is an 81 year old male with a significant past medical history of 

hypertension, CHF, pacemaker (for bradycardia), Afib on Xarelto, left hip 

arthritis, recently admitted to the hospital and rehab facility for 

rhabdomyolysis (since March 15th), sent from rehab facility by ambulance to the 

Emergency Department with swelling and pain to the legs bilaterally. The patient

s wife reports that the patient was seen in the ED in March for left hip pain 

and was diagnosed with rhabdomyolysis. She reports that he was sent to rehab 

for the left hip and was scheduled for left hip surgery after rehab. She admits 

that the patients legs have become more swollen over the past weeks. The 

patient admits that his legs, ankles, and feet are very swollen bilaterally. He 

admits that his feet are red bilaterally. He reports pain with the swelling. 

The patients wife admits that he was on Lasix when entering rehab. The patient 

states that he was ambulating in rehab with a walker. 





The patient denies fever, or chills. Patient denies nausea, vomiting, and 

diarrhea. Patient denies chest pain, palpitations, and shortness of breath. 

Patient denies dysuria, or urinary retention. 





Past Medical Hx: diverticulitis, osteoarthritis, nephrolithiasis, fatty liver 

disease, BPH, prostate cancer


Surgical Hx: bilateral knee surgeries





PE


Head - NC/AT


Lung - CTA


Heart - RRR


abd - soft,nt,nd


ext - warm, pink. Palpable pulses. 


Pitting edema. 





A/P


Bilateral lower extremity pitting edema probably secondary to CHF. 


Pt might have signs of lymphedema considering the swelling crosses the ankle 

joint. 


Main choice of treatment would be compression with ace bandages from the foot 

to the 


knees, including the foot.  Leg elevation if possible. 





Dionicio Keith DO

## 2017-05-30 NOTE — PN
Teaching Attending Note


Name of Resident: Sammy Paniagua


ATTENDING PHYSICIAN STATEMENT





I saw and evaluated the patient.


I reviewed the resident's note and discussed the case with the resident.


I agree with the resident's findings and plan as documented.








SUBJECTIVE:continues to have LLE pain, improved with elevation but then causes 

hip pain. denies Cp, SOB,fever, chills, N/V/C/D, no hematuria or straining on 

urination








OBJECTIVE:





 Last Vital Signs











Temp Pulse Resp BP Pulse Ox


 


 97.4 F L  72   18   112/47   99 


 


 05/30/17 06:01  05/30/17 10:00  05/30/17 10:00  05/30/17 10:00  05/30/17 09:00








 Intake & Output











 05/27/17 05/28/17 05/29/17 05/30/17





 23:59 23:59 23:59 23:59


 


Intake Total 625 2950 2020 120


 


Output Total 350 1400 2000 500


 


Balance 275 1550 20 -380


 


Weight 310 lb 317 lb 6.4 oz 322 lb 3.2 oz 323 lb 4.8 oz








General NAD


CV S1 S2 RRR no murmur/rub/gallop


Lungs CTA B/L no wheezing/rales/rhonchi


Abdomen soft NT/ND obese


Extremities non pitting edema RLE, 3+ pitting edema LLE no warmth or erythema 

or oozing or tenderness





ASSESSMENT AND PLAN:


80yo M with PMH left hip OA, HTN, chronic diastolic HF, hyperlipidemia, BPH, 

GERD, pacemaker who presented to the ER with redness and swelling of his legs.


1. Gram negative bacteremia secondary to UTI- afebrile. UCx and BCx pending 

organism. suspected urinary as source. on Ceftriaxone day 3. repeat BCx sent 

yesterday and pending. ID on board. 


2. AKIwith B/L hydro- due to obstruction- pt refusing gerber. repeat bladder 

scan this AM with 200cc residual. will continue to do daily bladder scans to 

assess. kidney function cont to imrpove. cont flomax/finasteride. nephro and 

urology on board. avoid nephrotoxic agents


3. Probable chronic venous insufficiency of both legs- concern for cellulitis 

on presentation. does not appear cellulitic on my exam however has been on abx. 

doppler negative for DVT. cont lasix. will need vascular surgeon evaluation. 

elevate extremity


4. Chronic diastolic heart failure- no signs of voluyme overload


5. Possible history of atrial fibrillation - Continue Toprol XL, Xarelto


6. 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


7. Hypertension-controlled. losartan on hold. Continue Toprol XL


8. Hyperlipidemia


9. DVT ppx- on xarelto

## 2017-05-30 NOTE — CONS
DATE OF CONSULTATION:

 

DATE OF DICTATION:  05/28/2017

 

This is an 81-year-old [facility] patient admitted now for severe

bilateral lower extremity edema.  The patient has morbid obesity, weighing over 
300

pounds, as well as a history of heart failure, hypertension, permanent pacemaker
,

atrial fibrillation, arthritis, nephrolithiasis, and prostate surgery.  He had 
had an

episode of rhabdomyolysis in March 2017.  He presents now for severe, painful 
lower

extremity edema.  He has no fever or chills and I am asked to see him for 
treatment

of cellulitis.  He was already given a dose of vancomycin and, in addition,

clindamycin.  He has no fever, chills, or other systemic complaints and, at the 
time

that I saw him, he was sitting up in a wheelchair, eating his breakfast, and

appearing quite comfortable.

 

PAST MEDICAL HISTORY:  As noted above.

 

MEDICATIONS:  Lasix, losartan, metoprolol, omeprazole, Xarelto, Flomax.

 

ALLERGIES:  None known.

 

SOCIAL HISTORY:  Former smoker.  No alcohol history.  Nursing home resident.

 

FAMILY HISTORY:  Noncontributory.

 

REVIEW OF SYSTEMS:  All systems reviewed and noncontributory.

 

PHYSICAL EXAMINATION:

Vital Signs:  He was a heavyset male, weighing 317 pounds.  Temperature 98.3, 
pulse

70, blood pressure 125/46, respirations 22.

Lungs:  Clear to P and A.

Heart:  S1, S2, regular rhythm.  No murmur

Abdomen:  Obese, soft, nontender, positive bowel sounds.

Extremities:  Four-plus peripheral edema bilaterally.

 

White count 10.3, hemoglobin 11.6, platelets 333.  BUN 33, creatinine 2.1.  
Alkaline

phosphatase 120, albumin 3.3.  Urinalysis:  Three-plus leukocyte esterase, 13 
RBCs,

250 WBCs.

 

ASSESSMENT:  An 81-year-old male with morbid obesity and congestive heart 
failure

presents with severe anasarca of the lower extremities.  No obvious cellulitis,

fever, chills, or other systemic complaints.  WBC normal.  Incident finding of 
UA

with many bacteria and will be treated for urinary tract infection pending c/s/
  He does not appear acutely ill.

 

We will treat him with cefazolin, more for the possible urinary tract infection 
than

cellulitis.  Blood and urine cultures have been sent.  Encouraged to elevate the

legs, which, apparently, he refuses to do.

 

 

MINERVA HOLGUIN M.D.

 

ROLY/[id number]

DD: 05/28/2017 08:59

DT: 05/28/2017 10:34

Job #:  [id number]

SHAHIDA

## 2017-05-30 NOTE — PN
Physical Exam: 


SUBJECTIVE: 








Patient stated that he's urinating fine, although some pain on urination. Legs 

are still swollen, pain in the LLE and can't bear weight, RLE swelling has 

improved and able to bear weight. Denies fever, chills, sob, chest pain, abd 

pain, n/v, focal weakness, hematuria, or dark stool.








OBJECTIVE:





 Vital Signs











 Period  Temp  Pulse  Resp  BP Sys/Ross  Pulse Ox


 


 Last 24 Hr  97.4 F-98.2 F  69-73  18-18  100-132/47-70  











GENERAL: AAOx3, in no acute distress.


NECK:  supple without lymphadenopathy, JVD, or masses.


LUNGS: CTAB


HEART: RRR, normal S1 and S2 with systolic murmur


ABDOMEN: Soft, nontender, not distended, normoactive bowel sounds, no guarding, 

no rebound, no masses.    


EXTREMITIES: Mild erythema in LLE, particularly toes, b/l 2+ edema up to thighs

, no warmth but tender to touch


NEUROLOGICAL:  Cranial nerves II-XII intact. Normal speech. gait not observed








 CBCD











WBC  8.6 K/mm3 (4.0-10.0)   05/30/17  07:00    


 


RBC  3.84 M/mm3 (4.00-5.60)  L  05/30/17  07:00    


 


Hgb  11.0 GM/dL (11.7-16.9)  L  05/30/17  07:00    


 


Hct  32.9 % (35.4-49)  L  05/30/17  07:00    


 


MCV  85.6 fl (80-96)   05/30/17  07:00    


 


MCHC  33.4 g/dl (32.0-35.9)   05/30/17  07:00    


 


RDW  16.0 % (11.9-15.9)  H  05/30/17  07:00    


 


Plt Count  287 K/MM3 (134-434)   05/30/17  07:00    


 


MPV  7.8 fl (7.5-11.1)   05/30/17  07:00    








 CMP











Sodium  139 mmol/L (136-145)   05/30/17  07:00    


 


Potassium  4.1 mmol/L (3.5-5.1)   05/30/17  07:00    


 


Chloride  102 mmol/L ()   05/30/17  07:00    


 


Carbon Dioxide  28 mmol/L (21-32)   05/30/17  07:00    


 


Anion Gap  9  (8-16)   05/30/17  07:00    


 


BUN  29 mg/dL (7-18)  H  05/30/17  07:00    


 


Creatinine  1.6 mg/dL (0.7-1.3)  H  05/30/17  07:00    


 


Creat Clearance w eGFR  30.46  (>60)   05/27/17  17:35    


 


Calcium  9.2 mg/dL (8.5-10.1)   05/30/17  07:00    


 


Total Bilirubin  0.4 mg/dL (0.2-1.0)  D 05/27/17  17:35    


 


AST  13 U/L (15-37)  L D 05/27/17  17:35    


 


ALT  14 U/L (12-78)  D 05/27/17  17:35    


 


Alkaline Phosphatase  120 U/L ()  H D 05/27/17  17:35    


 


Total Protein  6.5 g/dl (6.4-8.2)  D 05/27/17  17:35    


 


Albumin  3.3 g/dl (3.4-5.0)  L  05/27/17  17:35    








 Intake & Output











 05/27/17 05/28/17 05/29/17 05/30/17





 23:59 23:59 23:59 23:59


 


Intake Total 625 2950 2020 220


 


Output Total 350 1400 2000 1925


 


Balance 275 1550 20 -1705


 


Weight 140.614 kg 143.97 kg 146.147 kg 146.646 kg











Active Medications











Generic Name Dose Route Start Last Admin





  Trade Name Dominickq  PRN Reason Stop Dose Admin


 


Acetaminophen  650 mg 05/27/17 20:11  





  Tylenol -  PO   





  Q4H PRN   





  FEVER OR PAIN   


 


Ceftriaxone Sodium  2 gm 05/28/17 15:00 05/30/17 09:27





  Rocephin 2gm Ivpb (Pre-Docked)  IVPB   2 gm





  DAILY KELVIN   Administration


 


Furosemide  40 mg 05/28/17 10:00 05/30/17 09:30





  Lasix -  PO   40 mg





  DAILY KELVIN   Administration


 


Metoprolol Succinate  50 mg 05/28/17 10:00 05/30/17 09:30





  Toprol Xl -  PO   50 mg





  DAILY KELVIN   Administration


 


Oxycodone HCl  5 mg 05/27/17 20:11 05/30/17 18:31





  Roxicodone -  PO   5 mg





  Q4H PRN   Administration





  PAIN   


 


Pantoprazole Sodium  20 mg 05/28/17 10:00 05/30/17 09:30





  Protonix -  PO   20 mg





  DAILY KELVIN   Administration


 


Rivaroxaban  20 mg 05/28/17 10:00 05/30/17 09:31





  Xarelto -  PO   20 mg





  DAILY KELVIN   Administration


 


Senna  2 tab 05/27/17 22:00 05/30/17 00:40





  Senna -  PO   Not Given





  HS KELVIN   


 


Tamsulosin HCl  0.8 mg 05/28/17 08:30 05/30/17 09:31





  Flomax -  PO   0.8 mg





  DAILY@0830 KELVIN   Administration








 Microbiology











 05/27/17 17:35 Blood Culture - Final





 Blood - Peripheral Venous    Proteus Mirabilis


 


 05/27/17 17:35 Blood Culture - Final





 Blood - Peripheral Venous    Proteus Mirabilis


 


 05/27/17 23:00 Urine Culture - Final





 Urine - Urine Clean Catch    Proteus Mirabilis


 


 05/29/17 11:21 Blood Culture - Preliminary





 Blood - Peripheral Venous    NO GROWTH OBTAINED AFTER 24 HOURS, INCUBATION TO 

CONTINUE





    FOR 4 DAYS.


 


 05/29/17 11:15 Blood Culture - Preliminary





 Blood - Peripheral Venous    NO GROWTH OBTAINED AFTER 24 HOURS, INCUBATION TO 

CONTINUE





    FOR 4 DAYS.








IMAGING:





Renal U/S on 5/30: Moderate bilateral renal hydronephrosis, right more than 

left with slight worsening in the degree of right renal hydronephrosis since 

the prior exam 





CXR on 3/27: Since 3/11/2017, the congestive changes have diminished. There is 

still a large heart with pacemaker and degenerative findings as well as old 

trauma involving the left AC joint and clavicle. Correlation recommended 





LE doppler on 3/27:  no DVT





ASSESSMENT/PLAN:





80 yo M admitted to med-surg for worsening b/l lower ext swelling.





Gram negative bacteremia 2/2 complicated UTI


   - Complicated: male sex; obstructive uropathy from BPH


   - Afrebile, resolving leukocytosis, stable vitals


   - BX and UX grew proteus mirabilis


      * repeat BX negative


      * f/u UX


   - On ceftriaxone day 3





Hydronephrosis, R > L side


   - Worsening on U/S


   - Cr cont. to improve


   - Coude catheter inserted by urology


   - Maintain gerber and monitor I/O


   - Increased flomax to 0.8mg PO daily and d/c finasteride





Lower extremity edema, bilateral


   - Combination of lymphedema (extends above knees) and pitting edema 2/2 dCHF


   - Compression bandages


   - Leg elevation as tolerated





CHF, diastolic


   - Stable


   - EF 71% (2016)


   - No sign of volume overload


   - Cont. lasix 40mg PO daily





h/o A-fib


   - rate controlled


   - Cont. toprol XL and xarelto





Arrhythmia s/p dual chamber pacemaker


   - Last interragated 2 months ago


   - EKG shows ventricular paced rhythm 





GERD


   - Cont. PO protonix 20mg





Hip pain, chronic


   - Cont. roxicodone





HTN


   - Controlled


   - Cozaar held due to AGUILA





FEN


   - IVF not indicated


   - Normal lytes, Cont. to monitor 


   - Sodium controlled diet





Prophylaxis


   - DVT: on xarelto


   - GI: on PO protonix  





Disposition


   - Discharge planning





Code status


   - Full code








Visit type





- Emergency Visit


Emergency Visit: No





- New Patient


This patient is new to me today: Yes


Date on this admission: 05/30/17





- Critical Care


Critical Care patient: No





- Discharge Referral


Referred to [facility] No

## 2017-05-30 NOTE — CON.GU
Consult


Consult Specialty:: Urology


Reason for Consultation:: Urinary retention; urosepsis





- History of Present Illness


Chief Complaint: Bilateral severe lower leg edema. Urinary retention with 

bilateral mild to moderate hydronephrosis.





- History Source


History Provided By: Patient


Limitations to Obtaining History: No Limitations





- Past Medical History


Cardio/Vascular: Yes: AFIB, CHF, HTN, Other (pacemaker)


Renal/: Yes: BPH, Renal Calculi





- Past Surgical History


Past Surgical History: Yes: Joint Replacement (knee), Permanent Pacemaker, 

Prostatectomy





- Alcohol/Substance Use


Hx Alcohol Use: No


History of Substance Use: reports: None





- Smoking History


Smoking history: Former smoker


Have you smoked in the past 12 months: No


If you are a former smoker, when did you quit?: 1970





Home Medications





- Allergies


Allergies/Adverse Reactions: 


 Allergies











Allergy/AdvReac Type Severity Reaction Status Date / Time


 


No Known Allergies Allergy   Verified 11/06/16 07:40














- Home Medications


Home Medications: 


Ambulatory Orders





Furosemide [Lasix -] 40 mg PO Q48H 03/11/17 


Losartan Potassium 25 mg PO DAILY 03/11/17 


Metoprolol Succinate [Toprol Xl] 50 mg PO DAILY 03/11/17 


Omeprazole 20 mg PO DAILY 03/11/17 


Oxycodone HCl/Acetaminophen [Percocet 5-325 mg Tablet] 1 tab PO PRN PRN 03/11/ 17 


Rivaroxaban [Xarelto -] 20 mg PO DAILY 03/11/17 


Tamsulosin HCl [Flomax] 0.8 mg PO DAILY 03/11/17 


Sennosides [Senna -] 2 tab PO HS  tablet 03/15/17 











Family Disease History





- Family Disease History


Family History: Unremarkable





Review of Systems





- Review of Systems


Constitutional: reports: No Symptoms


Eyes: reports: No Symptoms


HENT: reports: No Symptoms


Neck: reports: Swollen Glands


Cardiovascular: reports: Shortness of Breath


Gastrointestinal: reports: No Symptoms


Genitourinary: reports: Frequency


Breasts: reports: No Symptoms Reported


Musculoskeletal: reports: Extremity Pain, Joint Pain, Joint Swelling


Hematology/Lymphatic: reports: Other (leg edema)


Psychiatric: reports: No Symptoms, Hallucinations





Physical Exam-


Vital Signs: 


 Vital Signs











Temperature  97.4 F L  05/30/17 06:01


 


Pulse Rate  72   05/30/17 10:00


 


Respiratory Rate  18   05/30/17 10:00


 


Blood Pressure  112/47   05/30/17 10:00


 


O2 Sat by Pulse Oximetry (%)  99   05/30/17 09:00











Constitutional: Yes: Well Nourished


Eyes: Yes: WNL


HENT: Yes: WNL


Neck: Yes: WNL


Cardiovascular: Yes: WNL


Respiratory: Yes: WNL


Gastrointestinal: Yes: WNL


Renal/: Yes: Bladder Distention


Pelvis: Yes: Bladder Palpable


Testicles: Yes: WNL


Scrotum: Yes: WNL


Penis: Yes: WNL


Prostate Exam: Yes: Deferred


Integumentary: Yes: WNL


Labs: 


 CBC, BMP





 05/30/17 07:00 





 05/30/17 07:00 











Imaging





- Results


Chest X-ray: Report Reviewed


Ultrasound: Report Reviewed





Problem List





- Problems


(1) Urinary retention


Code(s): R33.9 - RETENTION OF URINE, UNSPECIFIED





(2) Urinary retention due to benign prostatic hyperplasia


Code(s): N40.1 - BENIGN PROSTATIC HYPERPLASIA WITH LOWER URINARY TRACT SYMP


R33.8 - OTHER RETENTION OF URINE








Assessment/Plan


16 fr coude catheter inserted withouit difficulty.


Bladder drained>1000cc of clear yellow urien.


Maintain foely to gravity drainage to improve output monitoring with increased 

diuresis.


Increase flomax to 0.8 mg po daily.

## 2017-05-31 LAB
ANION GAP SERPL CALC-SCNC: 11 MMOL/L (ref 8–16)
CALCIUM SERPL-MCNC: 9.2 MG/DL (ref 8.5–10.1)
CO2 SERPL-SCNC: 28 MMOL/L (ref 21–32)
COCKROFT - GAULT: 84.71
CREAT SERPL-MCNC: 1.4 MG/DL (ref 0.7–1.3)
DEPRECATED RDW RBC AUTO: 15.8 % (ref 11.9–15.9)
DEPRECATED RDW RBC AUTO: 15.9 % (ref 11.9–15.9)
GLUCOSE SERPL-MCNC: 122 MG/DL (ref 74–106)
MCH RBC QN AUTO: 28 PG (ref 25.7–33.7)
MCH RBC QN AUTO: 28.8 PG (ref 25.7–33.7)
MCHC RBC AUTO-ENTMCNC: 32.8 G/DL (ref 32–35.9)
MCHC RBC AUTO-ENTMCNC: 33.5 G/DL (ref 32–35.9)
MCV RBC: 85.4 FL (ref 80–96)
MCV RBC: 85.9 FL (ref 80–96)
PLATELET # BLD AUTO: 298 K/MM3 (ref 134–434)
PLATELET # BLD AUTO: 308 K/MM3 (ref 134–434)
PMV BLD: 7.6 FL (ref 7.5–11.1)
PMV BLD: 7.9 FL (ref 7.5–11.1)
WBC # BLD AUTO: 10.1 K/MM3 (ref 4–10)
WBC # BLD AUTO: 10.6 K/MM3 (ref 4–10)

## 2017-05-31 RX ADMIN — CEFTRIAXONE SODIUM SCH GM: 2 INJECTION, POWDER, FOR SOLUTION INTRAMUSCULAR; INTRAVENOUS at 10:20

## 2017-05-31 RX ADMIN — PANTOPRAZOLE SODIUM SCH MG: 20 TABLET, DELAYED RELEASE ORAL at 10:19

## 2017-05-31 RX ADMIN — SENNOSIDES SCH TAB: 8.6 TABLET, FILM COATED ORAL at 21:37

## 2017-05-31 RX ADMIN — FUROSEMIDE SCH MG: 40 TABLET ORAL at 10:19

## 2017-05-31 RX ADMIN — TAMSULOSIN HYDROCHLORIDE SCH MG: 0.4 CAPSULE ORAL at 08:37

## 2017-05-31 RX ADMIN — RIVAROXABAN SCH: 20 TABLET, FILM COATED ORAL at 16:09

## 2017-05-31 RX ADMIN — METOPROLOL SUCCINATE SCH MG: 50 TABLET, EXTENDED RELEASE ORAL at 10:19

## 2017-05-31 NOTE — PN
Physical Exam: 


SUBJECTIVE: 





Patient seen and examined at bedside. He stated he's feeling better, especially 

the R foot in that it's pain free and able to better bear weight. His L foot is 

still in pain and unable to bear weight. Slight pain in the penis which he 

thinks it's from trauma from insertion. He said he's urinating blood into the 

gerber bag since cathether was inserted yesterday. No fever, chills, n/v, chest 

pain, sob, dizziness, headache.








OBJECTIVE:





 Vital Signs











 Period  Temp  Pulse  Resp  BP Sys/Ross  Pulse Ox


 


 Last 24 Hr  97.4 F-97.8 F  69-72  18-20  124-146/57-81  











GENERAL: AAOx3, in no acute distress.


NECK:  supple without lymphadenopathy, JVD, or masses.


LUNGS: CTAB


HEART: RRR, normal S1 and S2 with systolic murmur


ABDOMEN: Soft, nontender, not distended, normoactive bowel sounds, no guarding, 

no rebound, no masses.    


EXTREMITIES: ACE banages wrapped b/l legs


NEUROLOGICAL:  Cranial nerves II-XII intact. Normal speech. gait not observed


: gerber contains 300c gross bright red bloody urine





 CBCD











WBC  10.1 K/mm3 (4.0-10.0)  H  05/31/17  08:50    


 


RBC  4.01 M/mm3 (4.00-5.60)   05/31/17  08:50    


 


Hgb  11.5 GM/dL (11.7-16.9)  L  05/31/17  08:50    


 


Hct  34.5 % (35.4-49)  L  05/31/17  08:50    


 


MCV  85.9 fl (80-96)   05/31/17  08:50    


 


MCHC  33.5 g/dl (32.0-35.9)   05/31/17  08:50    


 


RDW  15.9 % (11.9-15.9)   05/31/17  08:50    


 


Plt Count  298 K/MM3 (134-434)   05/31/17  08:50    


 


MPV  7.6 fl (7.5-11.1)   05/31/17  08:50    








 CMP











Sodium  139 mmol/L (136-145)   05/31/17  08:50    


 


Potassium  3.9 mmol/L (3.5-5.1)   05/31/17  08:50    


 


Chloride  100 mmol/L ()   05/31/17  08:50    


 


Carbon Dioxide  28 mmol/L (21-32)   05/31/17  08:50    


 


Anion Gap  11  (8-16)   05/31/17  08:50    


 


BUN  24 mg/dL (7-18)  H  05/31/17  08:50    


 


Creatinine  1.4 mg/dL (0.7-1.3)  H  05/31/17  08:50    


 


Creat Clearance w eGFR  30.46  (>60)   05/27/17  17:35    


 


Calcium  9.2 mg/dL (8.5-10.1)   05/31/17  08:50    


 


Total Bilirubin  0.4 mg/dL (0.2-1.0)  D 05/27/17  17:35    


 


AST  13 U/L (15-37)  L D 05/27/17  17:35    


 


ALT  14 U/L (12-78)  D 05/27/17  17:35    


 


Alkaline Phosphatase  120 U/L ()  H D 05/27/17  17:35    


 


Total Protein  6.5 g/dl (6.4-8.2)  D 05/27/17  17:35    


 


Albumin  3.3 g/dl (3.4-5.0)  L  05/27/17  17:35    








 Intake & Output











 05/28/17 05/29/17 05/30/17 05/31/17





 23:59 23:59 23:59 23:59


 


Intake Total 2950 2020 460 910


 


Output Total 1400 2000 3225 1400


 


Balance 1876 27 -1391 -330


 


Weight 143.97 kg 146.147 kg 146.646 kg 144.741 kg











Active Medications











Generic Name Dose Route Start Last Admin





  Trade Name Freq  PRN Reason Stop Dose Admin


 


Acetaminophen  650 mg 05/27/17 20:11  





  Tylenol -  PO   





  Q4H PRN   





  FEVER OR PAIN   


 


Ceftriaxone Sodium  2 gm 05/28/17 15:00 05/31/17 10:20





  Rocephin 2gm Ivpb (Pre-Docked)  IVPB   2 gm





  DAILY KELVIN   Administration


 


Furosemide  40 mg 05/28/17 10:00 05/31/17 10:19





  Lasix -  PO   40 mg





  DAILY KELVIN   Administration


 


Metoprolol Succinate  50 mg 05/28/17 10:00 05/31/17 10:19





  Toprol Xl -  PO   50 mg





  DAILY KELVIN   Administration


 


Oxycodone HCl  5 mg 05/27/17 20:11 05/31/17 06:41





  Roxicodone -  PO   5 mg





  Q4H PRN   Administration





  PAIN   


 


Pantoprazole Sodium  20 mg 05/28/17 10:00 05/31/17 10:19





  Protonix -  PO   20 mg





  DAILY KELVIN   Administration


 


Rivaroxaban  20 mg 05/28/17 10:00 05/30/17 09:31





  Xarelto -  PO   20 mg





  DAILY KELVIN   Administration


 


Senna  2 tab 05/27/17 22:00 05/30/17 22:27





  Senna -  PO   2 tab





  HS KELVIN   Administration


 


Tamsulosin HCl  0.8 mg 05/28/17 08:30 05/31/17 08:37





  Flomax -  PO   0.8 mg





  DAILY@0830 KELVIN   Administration








Microbiology











 05/29/17 11:21 Blood Culture - Preliminary





 Blood - Peripheral Venous    NO GROWTH OBTAINED AFTER 48 HOURS, INCUBATION TO 

CONTINUE





    FOR 3 DAYS.


 


 05/29/17 11:15 Blood Culture - Preliminary





 Blood - Peripheral Venous    NO GROWTH OBTAINED AFTER 48 HOURS, INCUBATION TO 

CONTINUE





    FOR 3 DAYS.


 


 05/27/17 17:35 Blood Culture - Final





 Blood - Peripheral Venous    Proteus Mirabilis


 


 05/27/17 17:35 Blood Culture - Final





 Blood - Peripheral Venous    Proteus Mirabilis


 


 05/27/17 23:00 Urine Culture - Final





 Urine - Urine Clean Catch    Proteus Mirabilis








IMAGING:





Renal U/S on 5/30: Moderate bilateral renal hydronephrosis, right more than 

left with slight worsening in the degree of right renal hydronephrosis since 

the prior exam 





CXR on 3/27: Since 3/11/2017, the congestive changes have diminished. There is 

still a large heart with pacemaker and degenerative findings as well as old 

trauma involving the left AC joint and clavicle. Correlation recommended 





LE doppler on 3/27:  no DVT





ASSESSMENT/PLAN:





82 yo M admitted to med-surg for worsening b/l lower ext swelling.





Gross hematuria, s/p coude catheter insertion


   - Re-consult urology


   - Hold xarelto





Hydronephrosis, R > L side


   - Worsening on U/S


   - Cr cont. to improve


   - Maintain gerbre and monitor I/O


   - Cont. flomax to 0.8mg PO daily 





Gram negative bacteremia 2/2 complicated UTI


   - Complicated: male sex; obstructive uropathy from BPH


   - Afrebile, mild leukocytosis, stable vitals


   - BX and UX grew proteus mirabilis


      * repeat BX negative


      * f/u UX


   - On ceftriaxone day 4





Lower extremity edema, bilateral


   - Improving


   - Combination of lymphedema (extends above knees) and pitting edema 2/2 dCHF


   - Compression bandages


   - Leg elevation as tolerated





CHF, diastolic


   - Stable


   - EF 71% (2016)


   - No sign of volume overload


   - Cont. lasix 40mg PO daily





h/o A-fib


   - rate controlled


   - Cont. toprol XL and xarelto held





Arrhythmia s/p dual chamber pacemaker


   - Last interrogated 2 months ago


   - EKG shows ventricular paced rhythm 





GERD


   - Cont. PO protonix 20mg





Hip pain, chronic


   - Cont. roxicodone





HTN


   - Controlled


   - Cozaar held due to AGUILA





FEN


   - IVF not indicated


   - Normal lytes, Cont. to monitor 


   - Sodium controlled diet





Prophylaxis


   - DVT: hold xarelto due to hematuria


   - GI: on PO protonix  





Disposition


   - Discharge planning





Code status


   - Full code





Visit type





- Emergency Visit


Emergency Visit: No





- New Patient


This patient is new to me today: No





- Critical Care


Critical Care patient: No

## 2017-05-31 NOTE — PN
Physical Exam: 


SUBJECTIVE: Patient seen and examined


80 y/o male admitted for progressively increasing swelling of b/l lower limbs 

despite on lasix 40 in morning and 20 in evening. 


In hospital found to have urinary retention and uti. 


Patient sates he was diagnosed with prostate enlargement couple of years ago.





Patient was cathetrized yesterday, post cathetrization patient is  having a 

hematuria 


Patient also reports mild pain in lower abdomen. Denies fever and chills. 


afebrile, wbc 10.1








OBJECTIVE:





 Vital Signs











 Period  Temp  Pulse  Resp  BP Sys/Ross  Pulse Ox


 


 Last 24 Hr  97.4 F-97.8 F  69-72  18-20  112-146/47-81  97











GENERAL: The patient is awake, alert, and fully oriented, in no acute distress.


HEAD: Normal with no signs of trauma.


NECK: Trachea midline, full range of motion, supple. 


LUNGS: Breath sounds equal, clear to auscultation bilaterally, no wheezes, no 

crackles, 


HEART:s1s2 n


ABDOMEN: Soft, nontender, nondistended, no guarding,no rigidity. 


EXTREMITIES: PE 3+, no erythema, non tender, temp equal in both legs 




















 Laboratory Results - last 24 hr











  05/31/17 05/31/17





  08:50 08:50


 


WBC   10.1 H


 


RBC   4.01


 


Hgb   11.5 L


 


Hct   34.5 L


 


MCV   85.9


 


MCHC   33.5


 


RDW   15.9


 


Plt Count   298


 


MPV   7.6


 


Sodium  139 


 


Potassium  3.9 


 


Chloride  100 


 


Carbon Dioxide  28 


 


Anion Gap  11 


 


BUN  24 H 


 


Creatinine  1.4 H 


 


Random Glucose  122 H D 


 


Calcium  9.2 








Active Medications











Generic Name Dose Route Start Last Admin





  Trade Name Rossana  PRN Reason Stop Dose Admin


 


Acetaminophen  650 mg 05/27/17 20:11  





  Tylenol -  PO   





  Q4H PRN   





  FEVER OR PAIN   


 


Ceftriaxone Sodium  2 gm 05/28/17 15:00 05/30/17 09:27





  Rocephin 2gm Ivpb (Pre-Docked)  IVPB   2 gm





  DAILY KELVIN   Administration


 


Furosemide  40 mg 05/28/17 10:00 05/30/17 09:30





  Lasix -  PO   40 mg





  DAILY KELVIN   Administration


 


Metoprolol Succinate  50 mg 05/28/17 10:00 05/30/17 09:30





  Toprol Xl -  PO   50 mg





  DAILY KELVIN   Administration


 


Oxycodone HCl  5 mg 05/27/17 20:11 05/31/17 06:41





  Roxicodone -  PO   5 mg





  Q4H PRN   Administration





  PAIN   


 


Pantoprazole Sodium  20 mg 05/28/17 10:00 05/30/17 09:30





  Protonix -  PO   20 mg





  DAILY KELVIN   Administration


 


Rivaroxaban  20 mg 05/28/17 10:00 05/30/17 09:31





  Xarelto -  PO   20 mg





  DAILY KELVIN   Administration


 


Senna  2 tab 05/27/17 22:00 05/30/17 22:27





  Senna -  PO   2 tab





  HS KELVIN   Administration


 


Tamsulosin HCl  0.8 mg 05/28/17 08:30 05/31/17 08:37





  Flomax -  PO   0.8 mg





  DAILY@0830 KELVIN   Administration








 Microbiology





05/27/17 17:35   Blood - Peripheral Venous   Blood Culture - Final


                            Proteus Mirabilis


05/27/17 17:35   Blood - Peripheral Venous   Blood Culture - Final


                            Proteus Mirabilis


05/27/17 23:00   Urine - Urine Clean Catch   Urine Culture - Final


                            Proteus Mirabilis


05/29/17 11:21   Blood - Peripheral Venous   Blood Culture - Preliminary


                            NO GROWTH OBTAINED AFTER 24 HOURS, INCUBATION TO 

CONTINUE


                            FOR 4 DAYS.


05/29/17 11:15   Blood - Peripheral Venous   Blood Culture - Preliminary


                            NO GROWTH OBTAINED AFTER 24 HOURS, INCUBATION TO 

CONTINUE


                            FOR 4 DAYS.








ASSESSMENT/PLAN: 80 y/o male admitted for progressively increasing swelling of b

/o lower limbs despite on lasix 40 in morning and 20 in evening. Found to have 

urinar retention, uti and b/l hydronephrosis. 


gram negative bacteremia by proteus mirabilis


urine culture : proteus mirabilis 


patient is afebrile and wbc 10.1


Hematuria: patient is on xarelto 20mg daily . 


creatnine improving. 





Plan 


continue with ceftriaxone. IV therapy for at least 7 days than will change to 

po 











Visit type





- Emergency Visit


Emergency Visit: Yes


ED Registration Date: 05/27/17


Care time: The patient presented to the Emergency Department on the above date 

and was hospitalized for further evaluation of their emergent condition.





- New Patient


This patient is new to me today: No





- Critical Care


Critical Care patient: No

## 2017-05-31 NOTE — PN
Teaching Attending Note


Name of Resident: Manuel Raines


ATTENDING PHYSICIAN STATEMENT





I saw and evaluated the patient.


I reviewed the resident's note and discussed the case with the resident.


I agree with the resident's findings and plan as documented.








SUBJECTIVE:Ceftriaxone








OBJECTIVE:








ASSESSMENT AND PLAN:


 Selected Entries











  05/31/17





  08:20


 


Temperature 97.6 F


 


Pulse Rate 69


 


Respiratory 18





Rate 


 


Blood Pressure 124/81








Exam LE anasarca 4+ bilaterally


Microbiology





05/27/17 23:00   Urine - Urine Clean Catch   Urine Culture - Final


                              Proteus Mirabilis


05/27/17 17:35   Blood - Peripheral Venous   Blood Culture - Final


                              Proteus Mirabilis


05/27/17 17:35   Blood - Peripheral Venous   Blood Culture - Final


                              Proteus Mirabilis





 Laboratory Tests











  05/31/17





  08:50


 


WBC  10.1 H


 


Hgb  11.5 L


 


Hct  34.5 L


 


Plt Count  298








Assessment  Proteus bacteremia resistant to quinolone and Bactrim Urinary 

retension





Plan                 Continue current therapy at least 7 days IVPB then oral


Alirio RUST





Problem List





- Problems


(1) Lymphedema


Code(s): I89.0 - LYMPHEDEMA, NOT ELSEWHERE CLASSIFIED





(2) Cellulitis and abscess of left leg


Code(s): L03.116 - CELLULITIS OF LEFT LOWER LIMB


L02.416 - CUTANEOUS ABSCESS OF LEFT LOWER LIMB

## 2017-05-31 NOTE — PN
Progress Note (short form)





- Note


Progress Note: 


Urology Follow up:


Pt developed gross hematuria following insertion of Berry yesterday.


AVSS afebrile.


Catheter irrigated multiple times and is draining effectively.


Urine is tea colored no clots.


C/O mild 


 CBC











WBC  10.1 K/mm3 (4.0-10.0)  H  05/31/17  08:50    


 


RBC  4.01 M/mm3 (4.00-5.60)   05/31/17  08:50    


 


Hgb  11.5 GM/dL (11.7-16.9)  L  05/31/17  08:50    


 


Hct  34.5 % (35.4-49)  L  05/31/17  08:50    


 


MCV  85.9 fl (80-96)   05/31/17  08:50    


 


MCHC  33.5 g/dl (32.0-35.9)   05/31/17  08:50    


 


RDW  15.9 % (11.9-15.9)   05/31/17  08:50    


 


Plt Count  298 K/MM3 (134-434)   05/31/17  08:50    


 


MPV  7.6 fl (7.5-11.1)   05/31/17  08:50    


 


Neutrophils %  57.6 % (42.8-82.8)   05/30/17  07:00    


 


Lymphocytes %  26.1 % (8-40)  D 05/30/17  07:00    


 


Monocytes %  9.9 % (3.8-10.2)   05/30/17  07:00    


 


Eosinophils %  5.4 % (0-4.5)  H  05/30/17  07:00    


 


Basophils %  1.0 % (0-2.0)   05/30/17  07:00    








supra pubic discomfort.





Continue to manually irrigate Berry prn.


Labs stable.





Hold anticoagulation if possible until the hematuria resolves.





 CBCD











WBC  10.1 K/mm3 (4.0-10.0)  H  05/31/17  08:50    


 


RBC  4.01 M/mm3 (4.00-5.60)   05/31/17  08:50    


 


Hgb  11.5 GM/dL (11.7-16.9)  L  05/31/17  08:50    


 


Hct  34.5 % (35.4-49)  L  05/31/17  08:50    


 


MCV  85.9 fl (80-96)   05/31/17  08:50    


 


MCHC  33.5 g/dl (32.0-35.9)   05/31/17  08:50    


 


RDW  15.9 % (11.9-15.9)   05/31/17  08:50    


 


Plt Count  298 K/MM3 (134-434)   05/31/17  08:50    


 


MPV  7.6 fl (7.5-11.1)   05/31/17  08:50    








 CMP











Sodium  139 mmol/L (136-145)   05/31/17  08:50    


 


Potassium  3.9 mmol/L (3.5-5.1)   05/31/17  08:50    


 


Chloride  100 mmol/L ()   05/31/17  08:50    


 


Carbon Dioxide  28 mmol/L (21-32)   05/31/17  08:50    


 


Anion Gap  11  (8-16)   05/31/17  08:50    


 


BUN  24 mg/dL (7-18)  H  05/31/17  08:50    


 


Creatinine  1.4 mg/dL (0.7-1.3)  H  05/31/17  08:50    


 


Creat Clearance w eGFR  30.46  (>60)   05/27/17  17:35    


 


Calcium  9.2 mg/dL (8.5-10.1)   05/31/17  08:50    


 


Total Bilirubin  0.4 mg/dL (0.2-1.0)  D 05/27/17  17:35    


 


AST  13 U/L (15-37)  L D 05/27/17  17:35    


 


ALT  14 U/L (12-78)  D 05/27/17  17:35    


 


Alkaline Phosphatase  120 U/L ()  H D 05/27/17  17:35    


 


Total Protein  6.5 g/dl (6.4-8.2)  D 05/27/17  17:35    


 


Albumin  3.3 g/dl (3.4-5.0)  L  05/27/17  17:35    














Problem List





- Problems


(1) Urinary retention


Code(s): R33.9 - RETENTION OF URINE, UNSPECIFIED





(2) Urinary retention due to benign prostatic hyperplasia


Code(s): N40.1 - BENIGN PROSTATIC HYPERPLASIA WITH LOWER URINARY TRACT SYMP


R33.8 - OTHER RETENTION OF URINE

## 2017-05-31 NOTE — PN
Progress Note, Physician


History of Present Illness: 


Pt seen and examined at bedside. He is awake and alert. He had the gerber 

placed. He has blood in the urine. He feels his lower extremity edema is 

improved. 





- Current Medication List


Current Medications: 


Active Medications





Acetaminophen (Tylenol -)  650 mg PO Q4H PRN


   PRN Reason: FEVER OR PAIN


Ceftriaxone Sodium (Rocephin 2gm Ivpb (Pre-Docked))  2 gm IVPB DAILY [facility]


   Last Admin: 05/31/17 10:20 Dose:  2 gm


Furosemide (Lasix -)  40 mg PO DAILY [facility]


   Last Admin: 05/31/17 10:19 Dose:  40 mg


Metoprolol Succinate (Toprol Xl -)  50 mg PO DAILY [facility]


   Last Admin: 05/31/17 10:19 Dose:  50 mg


Oxycodone HCl (Roxicodone -)  5 mg PO Q4H PRN


   PRN Reason: PAIN


   Last Admin: 05/31/17 06:41 Dose:  5 mg


Pantoprazole Sodium (Protonix -)  20 mg PO DAILY [facility]


   Last Admin: 05/31/17 10:19 Dose:  20 mg


Rivaroxaban (Xarelto -)  20 mg PO DAILY [facility]


   Last Admin: 05/30/17 09:31 Dose:  20 mg


Senna (Senna -)  2 tab PO HS [facility]


   Last Admin: 05/30/17 22:27 Dose:  2 tab


Tamsulosin HCl (Flomax -)  0.8 mg PO DAILY@0830 [facility]


   Last Admin: 05/31/17 08:37 Dose:  0.8 mg











- Objective


Vital Signs: 


 Vital Signs











Temperature  97.6 F   05/31/17 08:20


 


Pulse Rate  69   05/31/17 08:20


 


Respiratory Rate  18   05/31/17 08:20


 


Blood Pressure  140/80   05/31/17 10:00


 


O2 Sat by Pulse Oximetry (%)  100   05/31/17 09:00











Constitutional: Yes: Calm


Eyes: Yes: Conjunctiva Clear


HENT: Yes: Atraumatic


Cardiovascular: Yes: S1, S2


Respiratory: Yes: CTA Bilaterally


Gastrointestinal: Yes: Soft, Abdomen, Obese


Genitourinary: Yes: Gerber Present, Hematuria


Musculoskeletal: Yes: WNL


Edema: Yes


Edema: LLE: 3+, RLE: 3+


Neurological: Yes: Oriented


Psychiatric: Yes: Oriented


Labs: 


 CBC, BMP





 05/31/17 08:50 





 05/31/17 08:50 











Problem List





- Problems


(1) Hypertension


Code(s): I10 - ESSENTIAL (PRIMARY) HYPERTENSION   





(2) Morbid obesity


Code(s): E66.01 - MORBID (SEVERE) OBESITY DUE TO EXCESS CALORIES   





(3) CHF (congestive heart failure)


Code(s): I50.9 - HEART FAILURE, UNSPECIFIED   





(4) AGUILA (acute kidney injury)


Code(s): N17.9 - ACUTE KIDNEY FAILURE, UNSPECIFIED





(5) Obstructive uropathy


Code(s): N13.9 - OBSTRUCTIVE AND REFLUX UROPATHY, UNSPECIFIED





(6) Hydronephrosis


Code(s): N13.30 - UNSPECIFIED HYDRONEPHROSIS   








Assessment/Plan


 Current Medications











Generic Name Dose Route Start Last Admin





  Trade Name Freq  PRN Reason Stop Dose Admin


 


Acetaminophen  650 mg 05/27/17 20:11  





  Tylenol -  PO   





  Q4H PRN   





  FEVER OR PAIN   


 


Ceftriaxone Sodium  2 gm 05/28/17 15:00 05/31/17 10:20





  Rocephin 2gm Ivpb (Pre-Docked)  IVPB   2 gm





  DAILY KELVIN   Administration


 


Furosemide  40 mg 05/28/17 10:00 05/31/17 10:19





  Lasix -  PO   40 mg





  DAILY KELVIN   Administration


 


Metoprolol Succinate  50 mg 05/28/17 10:00 05/31/17 10:19





  Toprol Xl -  PO   50 mg





  DAILY KELVIN   Administration


 


Oxycodone HCl  5 mg 05/27/17 20:11 05/31/17 06:41





  Roxicodone -  PO   5 mg





  Q4H PRN   Administration





  PAIN   


 


Pantoprazole Sodium  20 mg 05/28/17 10:00 05/31/17 10:19





  Protonix -  PO   20 mg





  DAILY KELVIN   Administration


 


Rivaroxaban  20 mg 05/28/17 10:00 05/30/17 09:31





  Xarelto -  PO   20 mg





  DAILY KELVIN   Administration


 


Senna  2 tab 05/27/17 22:00 05/30/17 22:27





  Senna -  PO   2 tab





  HS KELVIN   Administration


 


Tamsulosin HCl  0.8 mg 05/28/17 08:30 05/31/17 08:37





  Flomax -  PO   0.8 mg





  DAILY@0830 KELVIN   Administration











Impression


1. AGUILA


2. hydrophepnrosis


3. obstructive uropathy


4. fluid overload


5. HTN


6. UTI





Plan


- renal function is improving


- cont with PO lasix


- repeat labs in am


- monitor urine


- flush gerber if urine output decreased


- urology follow up


- will not increase diuretics until the obstruction is resolved


- cont flomax

## 2017-05-31 NOTE — PN
Teaching Attending Note


Name of Resident: Sammy Paniagua


ATTENDING PHYSICIAN STATEMENT





I saw and evaluated the patient.


I reviewed the resident's note and discussed the case with the resident.


I agree with the resident's findings and plan as documented.








SUBJECTIVE:c/o penile discomfort and gross hematuria. denies CP, SOB,fever, 

chills, N/V/C/D








OBJECTIVE:





 Last Vital Signs











Temp Pulse Resp BP Pulse Ox


 


 97.6 F   69   18   124/81   97 


 


 05/31/17 08:20  05/31/17 08:20  05/31/17 08:20  05/31/17 08:20  05/30/17 21:00








General NAD





Abdomen soft NT/ND obese


Extremities ace bandaged B/L LE





ASSESSMENT AND PLAN:


82yo M with PMH left hip OA, HTN, chronic diastolic HF, hyperlipidemia, BPH, 

GERD, pacemaker who presented to the ER with redness and swelling of his legs.


1. Gram negative bacteremia secondary to UTI- afebrile. Cx +proteus with 

sensitivity to Ceftriaxone. repeat BCx NGTD and UCx pending. ID on board. 

planned for total of 7 days IV abx at this time. Ceftriaxone day 5. f/uc Cx. 


2. AGUILA with B/L hydro- due to obstruction- Cr improved. gerber placed last night 

and as per pt with no difficulty. was flushed multiple times last night with 

resolution of hematuria but now currently gross hematuria.possibly induced from 

anticoagulation. Hgb remaining stable. repeat this afternoon. Urology aware and 

will come to re-evaluate. may need CBI. cont flomax/finasteride. nephro and 

urology on board. avoid nephrotoxic agents


3. Probable chronic venous insufficiency of both legs- evaluated by vascular 

surgery. ace bandages and elevation if possible. will consider increasing lasix 

but will wait until re-evaluation by urology. 


4. Chronic diastolic heart failure-stable. 


5. Possible history of atrial fibrillation - Continue Toprol XL, Xarelto


6. Symptomatic bradycardia-s/p PPM


7. Hypertension-controlled. losartan on hold. Continue Toprol XL


8. Hyperlipidemia


9. DVT ppx- on xarelto

## 2017-06-01 LAB
ANION GAP SERPL CALC-SCNC: 8 MMOL/L (ref 8–16)
CALCIUM SERPL-MCNC: 9.3 MG/DL (ref 8.5–10.1)
CO2 SERPL-SCNC: 29 MMOL/L (ref 21–32)
COCKROFT - GAULT: 92.09
CREAT SERPL-MCNC: 1.3 MG/DL (ref 0.7–1.3)
DEPRECATED RDW RBC AUTO: 15.8 % (ref 11.9–15.9)
GLUCOSE SERPL-MCNC: 105 MG/DL (ref 74–106)
MCH RBC QN AUTO: 28.8 PG (ref 25.7–33.7)
MCHC RBC AUTO-ENTMCNC: 33.3 G/DL (ref 32–35.9)
MCV RBC: 86.5 FL (ref 80–96)
PLATELET # BLD AUTO: 277 K/MM3 (ref 134–434)
PMV BLD: 7.8 FL (ref 7.5–11.1)
WBC # BLD AUTO: 10.4 K/MM3 (ref 4–10)

## 2017-06-01 RX ADMIN — METOPROLOL SUCCINATE SCH MG: 50 TABLET, EXTENDED RELEASE ORAL at 09:52

## 2017-06-01 RX ADMIN — FUROSEMIDE SCH MG: 40 TABLET ORAL at 09:52

## 2017-06-01 RX ADMIN — FUROSEMIDE SCH MG: 10 INJECTION, SOLUTION INTRAVENOUS at 15:05

## 2017-06-01 RX ADMIN — CEFTRIAXONE SODIUM SCH GM: 2 INJECTION, POWDER, FOR SOLUTION INTRAMUSCULAR; INTRAVENOUS at 09:53

## 2017-06-01 RX ADMIN — TAMSULOSIN HYDROCHLORIDE SCH MG: 0.4 CAPSULE ORAL at 08:51

## 2017-06-01 RX ADMIN — SENNOSIDES SCH TAB: 8.6 TABLET, FILM COATED ORAL at 22:24

## 2017-06-01 RX ADMIN — PANTOPRAZOLE SODIUM SCH MG: 20 TABLET, DELAYED RELEASE ORAL at 09:52

## 2017-06-01 NOTE — PN
Progress Note (short form)





- Note


Progress Note: 


ID Teaching attending note





Ceftriaxone for bacteremia





Hematuria


 Selected Entries











  06/01/17





  06:54


 


Temperature 98.3 F


 


Pulse Rate 69


 


Respiratory 18





Rate 


 


Blood Pressure 145/53








Tenderness testicles not swollen


Microbiology





05/27/17 23:00   Urine - Urine Clean Catch   Urine Culture - Final


                              Proteus Mirabilis


05/27/17 17:35   Blood - Peripheral Venous   Blood Culture - Final


                              Proteus Mirabilis


05/27/17 17:35   Blood - Peripheral Venous   Blood Culture - Final


                              Proteus Mirabilis





 Laboratory Tests











  06/01/17 06/01/17





  06:05 06:05


 


WBC   10.4 H


 


Hgb   10.8 L


 


Hct   32.5 L


 


Plt Count   277


 


BUN  23 H 


 


Creatinine  1.3 








Assessment


Urinary retension


Proteus bacteremia





Plan As outlined in Dr Raines's note regarding antibiotic management


Alirio RUST





Problem List





- Problems


(1) Lymphedema


Code(s): I89.0 - LYMPHEDEMA, NOT ELSEWHERE CLASSIFIED





(2) Cellulitis and abscess of left leg


Code(s): L03.116 - CELLULITIS OF LEFT LOWER LIMB


L02.416 - CUTANEOUS ABSCESS OF LEFT LOWER LIMB

## 2017-06-01 NOTE — PN
Physical Exam: 


SUBJECTIVE: Patient seen and examined





Patient was sitting comfortably in bed. 


Denies fever and chills. 


Haematuria persist but urine getting  clear since yesterday. 


Patient states that he feels pain in his testicle and also burning sensation at 

tip of his penis. 








OBJECTIVE:





 Vital Signs











 Period  Temp  Pulse  Resp  BP Sys/Ross  Pulse Ox


 


 Last 24 Hr  97.5 F-98.3 F  69-71  18-20  107-145/50-80  99











GENERAL: The patient is awake, alert, and fully oriented, in no acute distress.


HEAD: Normal with no signs of trauma.


NECK:, full range of motion, supple. 


LUNGS: Breath sounds equal, clear to auscultation bilaterally, no wheezes, no 

crackles, 


HEART:s1s2 n


ABDOMEN: Soft, nontender, nondistended, no guarding,no rigidity. mild erythema 

present on scrotal skin. 


EXTREMITIES: PE 3+, no erythema, non tender, temp equal in both legs 

















 Laboratory Results - last 24 hr











  05/31/17 05/31/17 06/01/17





  08:50 17:00 06:05


 


WBC   10.6 H 


 


RBC   3.88 L 


 


Hgb   10.9 L 


 


Hct   33.2 L 


 


MCV   85.4 


 


MCHC   32.8 


 


RDW   15.8 


 


Plt Count   308 


 


MPV   7.9 


 


Sodium  139   138


 


Potassium  3.9   3.9


 


Chloride  100   101


 


Carbon Dioxide  28   29


 


Anion Gap  11   8


 


BUN  24 H   23 H


 


Creatinine  1.4 H   1.3


 


Random Glucose  122 H D   105


 


Calcium  9.2   9.3














  06/01/17





  06:05


 


WBC  10.4 H


 


RBC  3.76 L


 


Hgb  10.8 L


 


Hct  32.5 L


 


MCV  86.5


 


MCHC  33.3


 


RDW  15.8


 


Plt Count  277


 


MPV  7.8


 


Sodium 


 


Potassium 


 


Chloride 


 


Carbon Dioxide 


 


Anion Gap 


 


BUN 


 


Creatinine 


 


Random Glucose 


 


Calcium 








Active Medications











Generic Name Dose Route Start Last Admin





  Trade Name Freq  PRN Reason Stop Dose Admin


 


Acetaminophen  650 mg 05/27/17 20:11  





  Tylenol -  PO   





  Q4H PRN   





  FEVER OR PAIN   


 


Acetaminophen  325 mg 06/01/17 09:23  





  Tylenol -  PO   





  Q6H PRN   





  PAIN   


 


Ceftriaxone Sodium  2 gm 05/28/17 15:00 05/31/17 10:20





  Rocephin 2gm Ivpb (Pre-Docked)  IVPB   2 gm





  DAILY KELVIN   Administration


 


Furosemide  40 mg 05/28/17 10:00 05/31/17 10:19





  Lasix -  PO   40 mg





  DAILY KELVIN   Administration


 


Metoprolol Succinate  50 mg 05/28/17 10:00 05/31/17 10:19





  Toprol Xl -  PO   50 mg





  DAILY KELVIN   Administration


 


Oxycodone HCl  5 mg 06/01/17 09:23  





  Roxicodone -  PO   





  Q6H PRN   





  PAIN   


 


Pantoprazole Sodium  20 mg 05/28/17 10:00 05/31/17 10:19





  Protonix -  PO   20 mg





  DAILY KELVIN   Administration


 


Senna  2 tab 05/27/17 22:00 05/31/17 21:37





  Senna -  PO   2 tab





  HS KELVIN   Administration


 


Tamsulosin HCl  0.8 mg 05/28/17 08:30 06/01/17 08:51





  Flomax -  PO   0.8 mg





  DAILY@0830 KELVIN   Administration








 Microbiology





05/27/17 17:35   Blood - Peripheral Venous   Blood Culture - Final


                            Proteus Mirabilis


05/29/17 11:21   Blood - Peripheral Venous   Blood Culture - Preliminary


                            NO GROWTH OBTAINED AFTER 48 HOURS, INCUBATION TO 

CONTINUE


                            FOR 3 DAYS.


05/29/17 11:15   Blood - Peripheral Venous   Blood Culture - Preliminary


                            NO GROWTH OBTAINED AFTER 48 HOURS, INCUBATION TO 

CONTINUE


                            FOR 3 DAYS.


05/27/17 17:35   Blood - Peripheral Venous   Blood Culture - Final


                            Proteus Mirabilis


05/27/17 23:00   Urine - Urine Clean Catch   Urine Culture - Final


                            Proteus Mirabilis








ASSESSMENT/PLAN:





Gram negative bacteremia by proteus mirabilis


UTI  by proteus mirabilis. 


Urine retention 


Hematuria 


Chadd 


? epididimo orchitis. 





Plan 


continue with antibiotic  ceftriaxone ( day 6). IV therapy for at least 7 days 

than will change to po 











Visit type





- Emergency Visit


Emergency Visit: Yes


ED Registration Date: 05/27/17


Care time: The patient presented to the Emergency Department on the above date 

and was hospitalized for further evaluation of their emergent condition.





- New Patient


This patient is new to me today: No





- Critical Care


Critical Care patient: No

## 2017-06-01 NOTE — PN
Teaching Attending Note


Name of Resident: Sammy Paniagua


ATTENDING PHYSICIAN STATEMENT





I saw and evaluated the patient.


I reviewed the resident's note and discussed the case with the resident.


I agree with the resident's findings and plan as documented.








SUBJECTIVE:states he continues to have some discomfort since gerber placement, 

exacerbated when drinking liquids. denies CP, SOB,fever, chills, N/V/C/D


hematuria has improved. tea colored at present time.





OBJECTIVE:





 Last Vital Signs











Temp Pulse Resp BP Pulse Ox


 


 98.3 F   69   18   145/53   99 


 


 06/01/17 06:54  06/01/17 06:54  06/01/17 06:54  06/01/17 06:54  05/31/17 21:00








 Intake & Output











 05/29/17 05/30/17 05/31/17 06/01/17





 23:59 23:59 23:59 23:59


 


Intake Total 2020 460 2610 240


 


Output Total 2000 3225 3000 750


 


Balance 20 -2765 -390 -510


 


Weight 322 lb 3.2 oz 323 lb 4.8 oz 319 lb 1.6 oz 322 lb 1.6 oz








General NAD


Abdomen soft NT/ND obese


Extremities ace bandaged B/L LE





ASSESSMENT AND PLAN:


80yo M with PMH left hip OA, HTN, chronic diastolic HF, hyperlipidemia, BPH, 

GERD, pacemaker who presented to the ER with redness and swelling of his legs.


1. Gram negative bacteremia secondary to UTI- afebrile. Cx +proteus with 

sensitivity to Ceftriaxone. repeat BCx NGTD and UCx negative. ID on board. 

planned for total of 7 days IV abx at this time. Ceftriaxone day 6. f/u Cx. 


2. AGUILA with B/L hydro- due to obstruction- Cr continues to improve. will start 

more aggressive diuresis with gerber in place. hematuria has improved and 

lightly tea colored at this time. cont to monitor output. Hgb now stable. will 

likely require to maintain gerber for some time and further workup as outpatient 

by urology. cont flomax. nephro and urology on board. avoid nephrotoxic agents


3. Probable chronic venous insufficiency of both legs- evaluated by vascular 

surgery. ace bandages and elevation if possible.  increase lasix. 


4. Acute blood loss anemia- due to hematuria. Hgb dropped in the evening due to 

gross hematuria. now stable. no indication for txn at this time. hematuria 

resolving. trend hgb


5. Possible history of atrial fibrillation - Continue Toprol XL. will cont to 

hold xarelto in setting of bleeding. once resolves will re-start


6. Symptomatic bradycardia-s/p PPM


7. Hypertension-controlled. losartan on hold. Continue Toprol XL


8. Hyperlipidemia


9. DVT ppx- on hold due to hematuria

## 2017-06-01 NOTE — PN
Progress Note, Physician


History of Present Illness: 


Pt seen and examined at bedside. He is awake and alert. He complains of lower 

extremity edema. 





- Current Medication List


Current Medications: 


Active Medications





Acetaminophen (Tylenol -)  650 mg PO Q4H PRN


   PRN Reason: FEVER OR PAIN


Acetaminophen (Tylenol -)  325 mg PO Q6H PRN


   PRN Reason: PAIN


Ceftriaxone Sodium (Rocephin 2gm Ivpb (Pre-Docked))  2 gm IVPB DAILY [facility]


   Last Admin: 06/01/17 09:53 Dose:  2 gm


Furosemide (Lasix Injection -)  40 mg IVPUSH BID@0600,1400 [facility]


Metoprolol Succinate (Toprol Xl -)  50 mg PO DAILY [facility]


   Last Admin: 06/01/17 09:52 Dose:  50 mg


Oxycodone HCl (Roxicodone -)  5 mg PO Q6H PRN


   PRN Reason: PAIN


Pantoprazole Sodium (Protonix -)  20 mg PO DAILY [facility]


   Last Admin: 06/01/17 09:52 Dose:  20 mg


Senna (Senna -)  2 tab PO HS [facility]


   Last Admin: 05/31/17 21:37 Dose:  2 tab


Tamsulosin HCl (Flomax -)  0.8 mg PO DAILY@0830 [facility]


   Last Admin: 06/01/17 08:51 Dose:  0.8 mg











- Objective


Vital Signs: 


 Vital Signs











Temperature  98.3 F   06/01/17 06:54


 


Pulse Rate  69   06/01/17 06:54


 


Respiratory Rate  18   06/01/17 06:54


 


Blood Pressure  145/53   06/01/17 06:54


 


O2 Sat by Pulse Oximetry (%)  99   05/31/17 21:00











Constitutional: Yes: Calm


Eyes: Yes: Conjunctiva Clear


HENT: Yes: Atraumatic


Neck: Yes: Supple


Cardiovascular: Yes: S1, S2


Respiratory: Yes: CTA Bilaterally


Gastrointestinal: Yes: Soft, Abdomen, Obese


Genitourinary: Yes: Gerber Present, Hematuria


Edema: Yes


Edema: LLE: 3+, RLE: 3+


Neurological: Yes: Oriented


Psychiatric: Yes: Oriented


Labs: 


 CBC, BMP





 06/01/17 06:05 





 06/01/17 06:05 











Problem List





- Problems


(1) Hypertension


Code(s): I10 - ESSENTIAL (PRIMARY) HYPERTENSION   





(2) Morbid obesity


Code(s): E66.01 - MORBID (SEVERE) OBESITY DUE TO EXCESS CALORIES   





(3) CHF (congestive heart failure)


Code(s): I50.9 - HEART FAILURE, UNSPECIFIED   





(4) AGUILA (acute kidney injury)


Code(s): N17.9 - ACUTE KIDNEY FAILURE, UNSPECIFIED





(5) Obstructive uropathy


Code(s): N13.9 - OBSTRUCTIVE AND REFLUX UROPATHY, UNSPECIFIED





(6) Hydronephrosis


Code(s): N13.30 - UNSPECIFIED HYDRONEPHROSIS   








Assessment/Plan


 Current Medications











Generic Name Dose Route Start Last Admin





  Trade Name Freq  PRN Reason Stop Dose Admin


 


Acetaminophen  650 mg 05/27/17 20:11  





  Tylenol -  PO   





  Q4H PRN   





  FEVER OR PAIN   


 


Acetaminophen  325 mg 06/01/17 09:23  





  Tylenol -  PO   





  Q6H PRN   





  PAIN   


 


Ceftriaxone Sodium  2 gm 05/28/17 15:00 06/01/17 09:53





  Rocephin 2gm Ivpb (Pre-Docked)  IVPB   2 gm





  DAILY KELVIN   Administration


 


Furosemide  40 mg 06/01/17 14:00  





  Lasix Injection -  IVPUSH   





  BID@0600,1400 KELVIN   


 


Metoprolol Succinate  50 mg 05/28/17 10:00 06/01/17 09:52





  Toprol Xl -  PO   50 mg





  DAILY KELVIN   Administration


 


Oxycodone HCl  5 mg 06/01/17 09:23  





  Roxicodone -  PO   





  Q6H PRN   





  PAIN   


 


Pantoprazole Sodium  20 mg 05/28/17 10:00 06/01/17 09:52





  Protonix -  PO   20 mg





  DAILY KELVIN   Administration


 


Senna  2 tab 05/27/17 22:00 05/31/17 21:37





  Senna -  PO   2 tab





  HS KELVIN   Administration


 


Tamsulosin HCl  0.8 mg 05/28/17 08:30 06/01/17 08:51





  Flomax -  PO   0.8 mg





  DAILY@0830 KELVIN   Administration














Impression


1. AGUILA


2. hydrophepnrosis


3. obstructive uropathy


4. fluid overload


5. HTN


6. UTI





Plan


- renal function continues to improve


- changed lasix to IV today, will get a dose at 2 pm


- repeat labs in am


- monitor urine


- flush gerber if urine output decreased


- cont flomax


Dr Teran

## 2017-06-01 NOTE — PN
Physical Exam: 


SUBJECTIVE: 





Patient seen and examined at bedside. He stated he didn't sleep well last night 

because of L foot pain and pain at gerber site. Nurse irrigated the gerber 4 

times. No fever, chills, n/v, chest pain, sob, dizziness, headache.








OBJECTIVE:





 Vital Signs











 Period  Temp  Pulse  Resp  BP Sys/Ross  Pulse Ox


 


 Last 24 Hr  97.5 F-98.3 F  69-71  18-20  107-145/50-57  99











GENERAL: AAOx3, in no acute distress.


NECK:  supple without lymphadenopathy, JVD, or masses.


LUNGS: CTAB


HEART: RRR, normal S1 and S2 with systolic murmur


ABDOMEN: Soft, nontender, not distended, normoactive bowel sounds, no guarding, 

no rebound, no masses.    


EXTREMITIES: ACE banages wrapped b/l legs


NEUROLOGICAL:  Cranial nerves II-XII intact. Normal speech. gait not observed


: gerber contains 100c madiha color urine











 CBCD











WBC  10.4 K/mm3 (4.0-10.0)  H  06/01/17  06:05    


 


RBC  3.76 M/mm3 (4.00-5.60)  L  06/01/17  06:05    


 


Hgb  10.8 GM/dL (11.7-16.9)  L  06/01/17  06:05    


 


Hct  32.5 % (35.4-49)  L  06/01/17  06:05    


 


MCV  86.5 fl (80-96)   06/01/17  06:05    


 


MCHC  33.3 g/dl (32.0-35.9)   06/01/17  06:05    


 


RDW  15.8 % (11.9-15.9)   06/01/17  06:05    


 


Plt Count  277 K/MM3 (134-434)   06/01/17  06:05    


 


MPV  7.8 fl (7.5-11.1)   06/01/17  06:05    








 CMP











Sodium  138 mmol/L (136-145)   06/01/17  06:05    


 


Potassium  3.9 mmol/L (3.5-5.1)   06/01/17  06:05    


 


Chloride  101 mmol/L ()   06/01/17  06:05    


 


Carbon Dioxide  29 mmol/L (21-32)   06/01/17  06:05    


 


Anion Gap  8  (8-16)   06/01/17  06:05    


 


BUN  23 mg/dL (7-18)  H  06/01/17  06:05    


 


Creatinine  1.3 mg/dL (0.7-1.3)   06/01/17  06:05    


 


Creat Clearance w eGFR  30.46  (>60)   05/27/17  17:35    


 


Calcium  9.3 mg/dL (8.5-10.1)   06/01/17  06:05    


 


Total Bilirubin  0.4 mg/dL (0.2-1.0)  D 05/27/17  17:35    


 


AST  13 U/L (15-37)  L D 05/27/17  17:35    


 


ALT  14 U/L (12-78)  D 05/27/17  17:35    


 


Alkaline Phosphatase  120 U/L ()  H D 05/27/17  17:35    


 


Total Protein  6.5 g/dl (6.4-8.2)  D 05/27/17  17:35    


 


Albumin  3.3 g/dl (3.4-5.0)  L  05/27/17  17:35    








 Intake & Output











 05/29/17 05/30/17 05/31/17 06/01/17





 23:59 23:59 23:59 23:59


 


Intake Total 2020 166 2610 240


 


Output Total 2000 2197 9037 706


 


Balance 69 -8801 -029 -965


 


Weight 146.147 kg 146.646 kg 144.741 kg 146.102 kg











Active Medications











Generic Name Dose Route Start Last Admin





  Trade Name Freq  PRN Reason Stop Dose Admin


 


Acetaminophen  650 mg 05/27/17 20:11  





  Tylenol -  PO   





  Q4H PRN   





  FEVER OR PAIN   


 


Acetaminophen  325 mg 06/01/17 09:23  





  Tylenol -  PO   





  Q6H PRN   





  PAIN   


 


Ceftriaxone Sodium  2 gm 05/28/17 15:00 06/01/17 09:53





  Rocephin 2gm Ivpb (Pre-Docked)  IVPB   2 gm





  DAILY KELVIN   Administration


 


Furosemide  40 mg 06/01/17 14:00  





  Lasix Injection -  IVPUSH   





  BID@0600,1400 KELVIN   


 


Metoprolol Succinate  50 mg 05/28/17 10:00 06/01/17 09:52





  Toprol Xl -  PO   50 mg





  DAILY KELVIN   Administration


 


Oxycodone HCl  5 mg 06/01/17 09:23  





  Roxicodone -  PO   





  Q6H PRN   





  PAIN   


 


Pantoprazole Sodium  20 mg 05/28/17 10:00 06/01/17 09:52





  Protonix -  PO   20 mg





  DAILY KELVIN   Administration


 


Senna  2 tab 05/27/17 22:00 05/31/17 21:37





  Senna -  PO   2 tab





  HS KELVIN   Administration


 


Tamsulosin HCl  0.8 mg 05/28/17 08:30 06/01/17 08:51





  Flomax -  PO   0.8 mg





  DAILY@0830 KELVIN   Administration








Microbiology











 05/29/17 11:21 Blood Culture - Preliminary





 Blood - Peripheral Venous    NO GROWTH OBTAINED AFTER 72 HOURS, INCUBATION TO 

CONTINUE





    FOR 2 DAYS.


 


 05/29/17 11:15 Blood Culture - Preliminary





 Blood - Peripheral Venous    NO GROWTH OBTAINED AFTER 72 HOURS, INCUBATION TO 

CONTINUE





    FOR 2 DAYS.


 


 05/30/17 14:45 Urine Culture - Final





 Urine - Urine Clean Catch 


 


 05/27/17 17:35 Blood Culture - Final





 Blood - Peripheral Venous    Proteus Mirabilis











IMAGING:





Renal U/S on 5/30: Moderate bilateral renal hydronephrosis, right more than 

left with slight worsening in the degree of right renal hydronephrosis since 

the prior exam 





CXR on 3/27: Since 3/11/2017, the congestive changes have diminished. There is 

still a large heart with pacemaker and degenerative findings as well as old 

trauma involving the left AC joint and clavicle. Correlation recommended 





LE doppler on 3/27:  no DVT





ASSESSMENT/PLAN:





82 yo M admitted to med-surg for worsening b/l lower ext swelling.





Gross hematuria, s/p coude catheter insertion


   - Cont. gerber irrigation


   - Hold xarelto





Hydronephrosis, R > L side


   - Cr normalized


   - Maintain gerber and monitor I/O


   - Cont. flomax to 0.8mg PO daily 





Gram negative bacteremia 2/2 complicated UTI


   - Complicated: male sex; obstructive uropathy from BPH


   - Afrebile, mild leukocytosis, stable vitals


   - BX and UX grew proteus mirabilis


      * repeat BX and UX negative


   - On ceftriaxone day 5


      * Switch to PO after day 7





Lower extremity edema, bilateral


   - Improving


   - Combination of lymphedema (extends above knees) and pitting edema 2/2 dCHF


   - Compression bandages


   - Leg elevation as tolerated


      * pt refuses it due to hip pain





CHF, diastolic


   - Stable


   - EF 71% (2016)


   - Transitioned PO lasix 40mg to IV BID





h/o A-fib


   - rate controlled


   - Cont. toprol XL and xarelto held





Arrhythmia s/p dual chamber pacemaker


   - Last interrogated 2 months ago


   - EKG shows ventricular paced rhythm 





GERD


   - Cont. PO protonix 20mg





Hip pain, chronic


   - Cont. roxicodone





HTN


   - Controlled


   - Cozaar held due to AGUILA





FEN


   - IVF not indicated


   - Normal lytes, Cont. to monitor 


   - Sodium controlled diet





Prophylaxis


   - DVT: hold xarelto due to hematuria


   - GI: on PO protonix  





Disposition


   - Discharge planning





Code status


   - Full code





Visit type





- Emergency Visit


Emergency Visit: No





- New Patient


This patient is new to me today: No





- Critical Care


Critical Care patient: No

## 2017-06-02 LAB
ANION GAP SERPL CALC-SCNC: 9 MMOL/L (ref 8–16)
CALCIUM SERPL-MCNC: 9.1 MG/DL (ref 8.5–10.1)
CO2 SERPL-SCNC: 30 MMOL/L (ref 21–32)
COCKROFT - GAULT: 79.44
CREAT SERPL-MCNC: 1.5 MG/DL (ref 0.7–1.3)
DEPRECATED RDW RBC AUTO: 16.1 % (ref 11.9–15.9)
GLUCOSE SERPL-MCNC: 101 MG/DL (ref 74–106)
MCH RBC QN AUTO: 28 PG (ref 25.7–33.7)
MCHC RBC AUTO-ENTMCNC: 32.5 G/DL (ref 32–35.9)
MCV RBC: 86.3 FL (ref 80–96)
PLATELET # BLD AUTO: 260 K/MM3 (ref 134–434)
PMV BLD: 7.6 FL (ref 7.5–11.1)
WBC # BLD AUTO: 9.1 K/MM3 (ref 4–10)

## 2017-06-02 RX ADMIN — METOPROLOL SUCCINATE SCH MG: 50 TABLET, EXTENDED RELEASE ORAL at 10:27

## 2017-06-02 RX ADMIN — FUROSEMIDE SCH MG: 10 INJECTION, SOLUTION INTRAVENOUS at 14:59

## 2017-06-02 RX ADMIN — TAMSULOSIN HYDROCHLORIDE SCH MG: 0.4 CAPSULE ORAL at 09:00

## 2017-06-02 RX ADMIN — ACETAMINOPHEN PRN MG: 325 TABLET ORAL at 06:14

## 2017-06-02 RX ADMIN — ACETAMINOPHEN PRN MG: 325 TABLET ORAL at 00:39

## 2017-06-02 RX ADMIN — CEFTRIAXONE SODIUM SCH GM: 2 INJECTION, POWDER, FOR SOLUTION INTRAMUSCULAR; INTRAVENOUS at 10:27

## 2017-06-02 RX ADMIN — PANTOPRAZOLE SODIUM SCH MG: 20 TABLET, DELAYED RELEASE ORAL at 10:26

## 2017-06-02 RX ADMIN — SENNOSIDES SCH TAB: 8.6 TABLET, FILM COATED ORAL at 22:26

## 2017-06-02 RX ADMIN — FUROSEMIDE SCH MG: 10 INJECTION, SOLUTION INTRAVENOUS at 06:07

## 2017-06-02 RX ADMIN — ACETAMINOPHEN PRN MG: 325 TABLET ORAL at 22:26

## 2017-06-02 NOTE — PN
Progress Note, Physician


History of Present Illness: 


Pt seen and examined at bedside. he is awake and alert. He denies shortness of 

breath. 





- Current Medication List


Current Medications: 


Active Medications





Acetaminophen (Tylenol -)  650 mg PO Q4H PRN


   PRN Reason: FEVER OR PAIN


Acetaminophen (Tylenol -)  325 mg PO Q6H PRN


   PRN Reason: PAIN


   Last Admin: 06/02/17 06:14 Dose:  325 mg


Ceftriaxone Sodium (Rocephin 2gm Ivpb (Pre-Docked))  2 gm IVPB DAILY [facility]


   Last Admin: 06/02/17 10:27 Dose:  2 gm


Furosemide (Lasix Injection -)  40 mg IVPUSH BID@0600,1400 [facility]


   Last Admin: 06/02/17 14:59 Dose:  40 mg


Metoprolol Succinate (Toprol Xl -)  50 mg PO DAILY [facility]


   Last Admin: 06/02/17 10:27 Dose:  50 mg


Oxycodone HCl (Roxicodone -)  5 mg PO Q6H PRN


   PRN Reason: PAIN


   Last Admin: 06/02/17 06:14 Dose:  5 mg


Pantoprazole Sodium (Protonix -)  20 mg PO DAILY [facility]


   Last Admin: 06/02/17 10:26 Dose:  20 mg


Senna (Senna -)  2 tab PO HS [facility]


   Last Admin: 06/01/17 22:24 Dose:  2 tab


Tamsulosin HCl (Flomax -)  0.8 mg PO DAILY@0830 [facility]


   Last Admin: 06/02/17 09:00 Dose:  0.8 mg











- Objective


Vital Signs: 


 Vital Signs











Temperature  97.9 F   06/02/17 06:00


 


Pulse Rate  70   06/02/17 06:00


 


Respiratory Rate  18   06/02/17 06:00


 


Blood Pressure  149/54   06/02/17 06:00


 


O2 Sat by Pulse Oximetry (%)  96   06/01/17 21:00











Constitutional: Yes: Calm


Eyes: Yes: Conjunctiva Clear


HENT: Yes: Atraumatic


Neck: Yes: Supple


Cardiovascular: Yes: S1, S2


Respiratory: Yes: CTA Bilaterally


Gastrointestinal: Yes: Soft, Abdomen, Obese


Genitourinary: Yes: Gerber Present


Musculoskeletal: Yes: Muscle Weakness


Edema: Yes


Neurological: Yes: Oriented


Psychiatric: Yes: Oriented


Labs: 


 CBC, BMP





 06/02/17 09:35 





 06/02/17 09:35 











Problem List





- Problems


(1) Hypertension


Code(s): I10 - ESSENTIAL (PRIMARY) HYPERTENSION   





(2) Morbid obesity


Code(s): E66.01 - MORBID (SEVERE) OBESITY DUE TO EXCESS CALORIES   





(3) CHF (congestive heart failure)


Code(s): I50.9 - HEART FAILURE, UNSPECIFIED   





(4) AGUILA (acute kidney injury)


Code(s): N17.9 - ACUTE KIDNEY FAILURE, UNSPECIFIED





(5) Obstructive uropathy


Code(s): N13.9 - OBSTRUCTIVE AND REFLUX UROPATHY, UNSPECIFIED





(6) Hydronephrosis


Code(s): N13.30 - UNSPECIFIED HYDRONEPHROSIS   








Assessment/Plan


 Current Medications











Generic Name Dose Route Start Last Admin





  Trade Name Freq  PRN Reason Stop Dose Admin


 


Acetaminophen  650 mg 05/27/17 20:11  





  Tylenol -  PO   





  Q4H PRN   





  FEVER OR PAIN   


 


Acetaminophen  325 mg 06/01/17 09:23 06/02/17 06:14





  Tylenol -  PO   325 mg





  Q6H PRN   Administration





  PAIN   


 


Ceftriaxone Sodium  2 gm 05/28/17 15:00 06/02/17 10:27





  Rocephin 2gm Ivpb (Pre-Docked)  IVPB   2 gm





  DAILY KELVIN   Administration


 


Furosemide  40 mg 06/01/17 14:00 06/02/17 14:59





  Lasix Injection -  IVPUSH   40 mg





  BID@0600,1400 KELVIN   Administration


 


Metoprolol Succinate  50 mg 05/28/17 10:00 06/02/17 10:27





  Toprol Xl -  PO   50 mg





  DAILY KELVIN   Administration


 


Oxycodone HCl  5 mg 06/01/17 09:23 06/02/17 06:14





  Roxicodone -  PO   5 mg





  Q6H PRN   Administration





  PAIN   


 


Pantoprazole Sodium  20 mg 05/28/17 10:00 06/02/17 10:26





  Protonix -  PO   20 mg





  DAILY KELVIN   Administration


 


Senna  2 tab 05/27/17 22:00 06/01/17 22:24





  Senna -  PO   2 tab





  HS KELVIN   Administration


 


Tamsulosin HCl  0.8 mg 05/28/17 08:30 06/02/17 09:00





  Flomax -  PO   0.8 mg





  DAILY@0830 KELVIN   Administration














Impression


1. AGUILA


2. hydrophepnrosis


3. obstructive uropathy


4. fluid overload


5. HTN


6. UTI





Plan


- labs reviewed, crt is starting to rise


- start lasix 40 po twice per day from tomorrow


- check labs daily


- gerber is in place and urine is clearing up


- cont flomax


Dr Teran

## 2017-06-02 NOTE — PN
Progress Note (short form)





- Note


Progress Note: 


ID








Ceftriaxone continues for GNB bacteremia


 Selected Entries











  06/02/17





  06:00


 


Temperature 97.9 F


 


Pulse Rate 70


 


Respiratory 18





Rate 


 


Blood Pressure 149/54








 gerber





Ext Anasarca dressing bilaterally


Microbiology





05/27/17 23:00   Urine - Urine Clean Catch   Urine Culture - Final


                              Proteus Mirabilis


05/27/17 17:35   Blood - Peripheral Venous   Blood Culture - Final


                              Proteus Mirabilis


05/27/17 17:35   Blood - Peripheral Venous   Blood Culture - Final


                              Proteus Mirabilis





 Laboratory Tests











  06/02/17 06/02/17





  09:35 09:35


 


WBC  9.1 


 


Hgb  10.3 L 


 


Plt Count  260 


 


BUN   24 H


 


Creatinine   1.5 H








Assessment Urinary retension with obstruction and complicating Klebiella 

bacteremia





Plan                Tomorrow could switch to po Keflex or wait until monday  

while on IV therapy





Alirio RUST 





Problem List





- Problems


(1) Lymphedema


Code(s): I89.0 - LYMPHEDEMA, NOT ELSEWHERE CLASSIFIED





(2) Cellulitis and abscess of left leg


Code(s): L03.116 - CELLULITIS OF LEFT LOWER LIMB


L02.416 - CUTANEOUS ABSCESS OF LEFT LOWER LIMB

## 2017-06-02 NOTE — PN
Physical Exam: 


SUBJECTIVE: 





Patient seen and examined at bedside. He stated L foot pain still persists but 

pain at gerber site has improved. Nurse irrigated the gerber 2 times. No fever, 

chills, n/v, chest pain, sob, dizziness, headache.








OBJECTIVE:





 Vital Signs











 Period  Temp  Pulse  Resp  BP Sys/Ross  Pulse Ox


 


 Last 24 Hr  97.9 F-98.7 F  69-75  18-18  122-149/53-59  96








GENERAL: AAOx3, in no acute distress.


NECK:  supple without lymphadenopathy, JVD, or masses.


LUNGS: CTAB


HEART: RRR, normal S1 and S2 with systolic murmur


ABDOMEN: Soft, nontender, not distended, normoactive bowel sounds, no guarding, 

no rebound, no masses.    


EXTREMITIES: ACE banages wrapped b/l legs


NEUROLOGICAL:  Cranial nerves II-XII intact. Normal speech. gait not observed


: gerber contains 300c diluted bloody urine but much less grossly 








 CBC, BMP





 06/02/17 09:35 





 06/02/17 09:35 





 Intake & Output











 05/30/17 05/31/17 06/01/17 06/02/17





 23:59 23:59 23:59 23:59


 


Intake Total 460 2610 240 


 


Output Total 3225 3000 3100 550


 


Balance -6687 -343 -2085 -550


 


Weight 146.646 kg 144.741 kg 146.102 kg 145.422 kg














                  Active Medications











Generic Name Dose Route Start Last Admin





  Trade Name Freq  PRN Reason Stop Dose Admin


 


Acetaminophen  650 mg 05/27/17 20:11  





  Tylenol -  PO   





  Q4H PRN   





  FEVER OR PAIN   


 


Acetaminophen  325 mg 06/01/17 09:23 06/02/17 06:14





  Tylenol -  PO   325 mg





  Q6H PRN   Administration





  PAIN   


 


Ceftriaxone Sodium  2 gm 05/28/17 15:00 06/02/17 10:27





  Rocephin 2gm Ivpb (Pre-Docked)  IVPB   2 gm





  DAILY KELVIN   Administration


 


Furosemide  40 mg 06/01/17 14:00 06/02/17 06:07





  Lasix Injection -  IVPUSH   40 mg





  BID@0600,1400 KELVIN   Administration


 


Metoprolol Succinate  50 mg 05/28/17 10:00 06/02/17 10:27





  Toprol Xl -  PO   50 mg





  DAILY KELVIN   Administration


 


Oxycodone HCl  5 mg 06/01/17 09:23 06/02/17 06:14





  Roxicodone -  PO   5 mg





  Q6H PRN   Administration





  PAIN   


 


Pantoprazole Sodium  20 mg 05/28/17 10:00 06/02/17 10:26





  Protonix -  PO   20 mg





  DAILY KELVIN   Administration


 


Senna  2 tab 05/27/17 22:00 06/01/17 22:24





  Senna -  PO   2 tab





  HS KELVIN   Administration


 


Tamsulosin HCl  0.8 mg 05/28/17 08:30 06/02/17 09:00





  Flomax -  PO   0.8 mg





  DAILY@0830 KELVIN   Administration








 Microbiology











 05/29/17 11:21 Blood Culture - Preliminary





 Blood - Peripheral Venous    NO GROWTH OBTAINED AFTER 96 HOURS, INCUBATION TO 

CONTINUE





    FOR 1 DAYS.


 


 05/29/17 11:15 Blood Culture - Preliminary





 Blood - Peripheral Venous    NO GROWTH OBTAINED AFTER 96 HOURS, INCUBATION TO 

CONTINUE





    FOR 1 DAYS.


 


 05/30/17 14:45 Urine Culture - Final





 Urine - Urine Clean Catch 











IMAGING:





Renal U/S on 5/30: Moderate bilateral renal hydronephrosis, right more than 

left with slight worsening in the degree of right renal hydronephrosis since 

the prior exam 





CXR on 3/27: Since 3/11/2017, the congestive changes have diminished. There is 

still a large heart with pacemaker and degenerative findings as well as old 

trauma involving the left AC joint and clavicle. Correlation recommended 





LE doppler on 3/27:  no DVT





ASSESSMENT/PLAN:





80 yo M admitted to med-surg for worsening b/l lower ext swelling.





Gross hematuria, s/p coude catheter insertion


   - Improving


   - Cont. gerber irrigation


   - Hold xarelto





Hydronephrosis, R > L side


   - Cr worse today likely 2/2 lasix


   - Maintain gerber and monitor I/O


   - Cont. flomax to 0.8mg PO daily 





Gram negative bacteremia 2/2 complicated UTI


   - Complicated: male sex; obstructive uropathy from BPH


   - Afrebile, leukocytosis resolved, stable vitals


   - Repeat BX and UX negative


   - On ceftriaxone day 6


      * Switch to PO after day 7





Lower extremity edema, bilateral


   - Improving


   - Combination of lymphedema (extends above knees) and pitting edema 2/2 dCHF


   - Compression bandages


   - Leg elevation as tolerated





CHF, diastolic


   - Stable


   - EF 71% (2016)


   - Transitioned PO lasix 40mg to IV BID





h/o A-fib


   - rate controlled


   - Cont. toprol XL and xarelto held





Arrhythmia s/p dual chamber pacemaker


   - Last interrogated 2 months ago


   - EKG shows ventricular paced rhythm 





GERD


   - Cont. PO protonix 20mg





Hip pain, chronic


   - Cont. roxicodone





HTN


   - Controlled


   - Cozaar held due to AGUILA





FEN


   - IVF not indicated


   - Normal lytes, Cont. to monitor 


   - Sodium controlled diet





Prophylaxis


   - DVT: hold xarelto due to hematuria


   - GI: on PO protonix  





Disposition


   - Discharge planning tomorrow





Code status


   - Full code





Visit type





- Emergency Visit


Emergency Visit: No





- New Patient


This patient is new to me today: No





- Critical Care


Critical Care patient: No

## 2017-06-02 NOTE — PN
Teaching Attending Note


Name of Resident: Sammy Paniagua


ATTENDING PHYSICIAN STATEMENT





I saw and evaluated the patient.


I reviewed the resident's note and discussed the case with the resident.


I agree with the resident's findings and plan as documented.








SUBJECTIVE:currently asymptomatic. hematuria has cleared. denies CP, SOB,fever, 

chills, N/V/C/D








OBJECTIVE:


 Last Vital Signs











Temp Pulse Resp BP Pulse Ox


 


 97.9 F   70   18   149/54   96 


 


 06/02/17 06:00  06/02/17 06:00  06/02/17 06:00  06/02/17 06:00  06/01/17 21:00








 Intake & Output











 05/30/17 05/31/17 06/01/17 06/02/17





 23:59 23:59 23:59 23:59


 


Intake Total 460 2610 240 


 


Output Total 3225 3000 3100 550


 


Balance -3174 -390 -2860 -550


 


Weight 323 lb 4.8 oz 319 lb 1.6 oz 322 lb 1.6 oz 320 lb 9.6 oz











General NAD


Abdomen soft NT/ND obese


Extremities ace bandaged B/L LE 2+ pitting edema





ASSESSMENT AND PLAN:


82yo M with PMH left hip OA, HTN, chronic diastolic HF, hyperlipidemia, BPH, 

GERD, pacemaker who presented to the ER with redness and swelling of his legs.


1. Gram negative bacteremia secondary to UTI- afebrile. Cx +proteus with 

sensitivity to Ceftriaxone. repeat BCx NGTD and UCx negative. ID on board. on 

Ceftriaxone day 7. can switch to po tomorrow. f/u Cx. 


2. AGUILA with B/L hydro- due to obstruction-diuresed 3L. uptrend in cr likely due 

to lasix. will convert to po tomorrow. will likely require gerber to evaluated 

with urology as outpatient. cont flomax. nephro and urology on board. avoid 

nephrotoxic agents


3. Probable chronic venous insufficiency of both legs- evaluated by vascular 

surgery. ace bandages and elevation if possible.  cautious diuresis with kidney 

function. 


4. Acute blood loss anemia- due to hematuria. now resolved. Hgb stable. trend 

hgb


5. Possible history of atrial fibrillation - Continue Toprol XL. will re-start 

xarelto


6. Symptomatic bradycardia-s/p PPM


7. Hypertension-controlled. losartan on hold. Continue Toprol XL


8. Hyperlipidemia


9. DVT ppx- xarelto

## 2017-06-03 RX ADMIN — RIVAROXABAN SCH MG: 20 TABLET, FILM COATED ORAL at 09:08

## 2017-06-03 RX ADMIN — METOPROLOL SUCCINATE SCH MG: 50 TABLET, EXTENDED RELEASE ORAL at 09:08

## 2017-06-03 RX ADMIN — PANTOPRAZOLE SODIUM SCH MG: 20 TABLET, DELAYED RELEASE ORAL at 09:08

## 2017-06-03 RX ADMIN — FUROSEMIDE SCH MG: 40 TABLET ORAL at 13:20

## 2017-06-03 RX ADMIN — FUROSEMIDE SCH MG: 40 TABLET ORAL at 06:12

## 2017-06-03 RX ADMIN — SENNOSIDES SCH TAB: 8.6 TABLET, FILM COATED ORAL at 21:25

## 2017-06-03 RX ADMIN — TAMSULOSIN HYDROCHLORIDE SCH MG: 0.4 CAPSULE ORAL at 09:07

## 2017-06-03 RX ADMIN — CEFTRIAXONE SODIUM SCH GM: 2 INJECTION, POWDER, FOR SOLUTION INTRAMUSCULAR; INTRAVENOUS at 09:08

## 2017-06-03 NOTE — PROC
Procedure Note


Procedure: 


Urology:


Pt awake alert and sitting in chair.


Urine flow clear yellow.


Lower legs appear slight less edematous.


Continue gerber drianage and 2 flomax per day.


Consider trial to void once pt able to stand/ambulate.

## 2017-06-03 NOTE — PN
Progress Note, Physician


History of Present Illness: 


Renal f/U


Pt is OOB in chair and in no distress


Today's labs still to be drawn


Lasix was changed to PO BID


He denies any c/o chest pain or dyspnea 


No orthopnea either


Berry in place 





- Current Medication List


Current Medications: 


Active Medications





Acetaminophen (Tylenol -)  650 mg PO Q4H PRN


   PRN Reason: FEVER OR PAIN


Acetaminophen (Tylenol -)  325 mg PO Q6H PRN


   PRN Reason: PAIN


   Last Admin: 06/02/17 22:26 Dose:  325 mg


Ceftriaxone Sodium (Rocephin 2gm Ivpb (Pre-Docked))  2 gm IVPB DAILY [facility]


   Last Admin: 06/03/17 09:08 Dose:  2 gm


Furosemide (Lasix -)  40 mg PO BID@0600,1400 [facility]


   Last Admin: 06/03/17 06:12 Dose:  40 mg


Metoprolol Succinate (Toprol Xl -)  50 mg PO DAILY [facility]


   Last Admin: 06/03/17 09:08 Dose:  50 mg


Oxycodone HCl (Roxicodone -)  5 mg PO Q6H PRN


   PRN Reason: PAIN


   Last Admin: 06/02/17 22:26 Dose:  5 mg


Pantoprazole Sodium (Protonix -)  20 mg PO DAILY [facility]


   Last Admin: 06/03/17 09:08 Dose:  20 mg


Rivaroxaban (Xarelto -)  20 mg PO DAILY [facility]


   Last Admin: 06/03/17 09:08 Dose:  20 mg


Senna (Senna -)  2 tab PO HS [facility]


   Last Admin: 06/02/17 22:26 Dose:  2 tab


Tamsulosin HCl (Flomax -)  0.8 mg PO DAILY@0830 [facility]


   Last Admin: 06/03/17 09:07 Dose:  0.8 mg











- Objective


Vital Signs: 


 Vital Signs











Temperature  98.4 F   06/03/17 06:00


 


Pulse Rate  69   06/03/17 06:00


 


Respiratory Rate  18   06/03/17 06:00


 


Blood Pressure  137/73   06/03/17 06:00


 


O2 Sat by Pulse Oximetry (%)  95   06/02/17 21:00











Constitutional: Yes: No Distress


Cardiovascular: Yes: S1, S2


Respiratory: Yes: CTA Bilaterally


Gastrointestinal: Yes: Soft.  No: Tenderness, Rebound


Edema: Yes (Bilateral LE wraps )


Neurological: Yes: Alert, Oriented


Labs: 


 CBC, BMP





 06/02/17 09:35 





 06/02/17 09:35 











Assessment/Plan


Impression


1. AGUILA


2. Obstructive uropathy with B/L hydronephrosis


3. UTI


4. Fluid overload


5. HTN








Plan


Monitor the azotemia on the oral lasix


Berry to drainage


Flomax 


Rpt labs in am 


Dr Vila

## 2017-06-03 NOTE — PN
Progress Note (short form)





- Note


Progress Note: 


c/o LLE pain. states he does not need labs drawn daily. denies CP, SOB,fever, 

chills, N/V/C/D. hematuria resolved. 





 Current Medications











Generic Name Dose Route Start Last Admin





  Trade Name Freq  PRN Reason Stop Dose Admin


 


Acetaminophen  650 mg 05/27/17 20:11  





  Tylenol -  PO   





  Q4H PRN   





  FEVER OR PAIN   


 


Acetaminophen  325 mg 06/01/17 09:23 06/02/17 22:26





  Tylenol -  PO   325 mg





  Q6H PRN   Administration





  PAIN   


 


Ceftriaxone Sodium  2 gm 05/28/17 15:00 06/03/17 09:08





  Rocephin 2gm Ivpb (Pre-Docked)  IVPB   2 gm





  DAILY KELVIN   Administration


 


Furosemide  40 mg 06/03/17 06:00 06/03/17 06:12





  Lasix -  PO   40 mg





  BID@0600,1400 KELVIN   Administration


 


Metoprolol Succinate  50 mg 05/28/17 10:00 06/03/17 09:08





  Toprol Xl -  PO   50 mg





  DAILY KELVIN   Administration


 


Oxycodone HCl  5 mg 06/01/17 09:23 06/02/17 22:26





  Roxicodone -  PO   5 mg





  Q6H PRN   Administration





  PAIN   


 


Pantoprazole Sodium  20 mg 05/28/17 10:00 06/03/17 09:08





  Protonix -  PO   20 mg





  DAILY KELVIN   Administration


 


Rivaroxaban  20 mg 06/03/17 10:00 06/03/17 09:08





  Xarelto -  PO   20 mg





  DAILY KELVIN   Administration


 


Senna  2 tab 05/27/17 22:00 06/02/17 22:26





  Senna -  PO   2 tab





  HS KELVIN   Administration


 


Tamsulosin HCl  0.8 mg 05/28/17 08:30 06/03/17 09:07





  Flomax -  PO   0.8 mg





  DAILY@0830 KELVIN   Administration








 Last Vital Signs











Temp Pulse Resp BP Pulse Ox


 


 98.4 F   69   18   137/73   95 


 


 06/03/17 06:00  06/03/17 06:00  06/03/17 06:00  06/03/17 06:00  06/02/17 21:00








 Intake & Output











 05/31/17 06/01/17 06/02/17 06/03/17





 23:59 23:59 23:59 23:59


 


Intake Total 2610 240 550 


 


Output Total 3000 3100 3250 650


 


Balance -390 -2860 -2700 -650


 


Weight 319 lb 1.6 oz 322 lb 1.6 oz 320 lb 9.6 oz 321 lb 11.2 oz








General NAD


Abdomen soft NT/ND obese


Extremities ace bandaged B/L LE 2+ pitting edema





ASSESSMENT AND PLAN:


80yo M with PMH left hip OA, HTN, chronic diastolic HF, hyperlipidemia, BPH, 

GERD, pacemaker who presented to the ER with redness and swelling of his legs.


1. Gram negative bacteremia secondary to UTI- afebrile. Cx +proteus with 

sensitivity to Ceftriaxone. repeat BCx NGTD and UCx negative. ID on board. on 

Ceftriaxone day 8.  will switch to po on discharge


2. AGUILA with B/L hydro- due to obstruction-diuresed 3L. cont to have large 

volume diuresis. lasix switched to po due to increased Cr. pt refused labs 

today. encouraged need to monitor electrolytes and kidney function. states 

maybe he will allow tomorrow. maintain gerber. will likely require gerber to 

evaluated with urology as outpatient. cont flomax. nephro and urology on board. 

avoid nephrotoxic agents


3. Probable chronic venous insufficiency of both legs- evaluated by vascular 

surgery. only modest improvement in pedal edema since administration of lasix. 

will release bandages for a few hours and then re-place.  cautious diuresis 

with kidney function. 


4. Acute blood loss anemia- due to hematuria. now resolved. Hgb stable. trend 

hgb


5. Possible history of atrial fibrillation - Continue Toprol XL. xarelto re-

started with no recurrent hematuria.


6. Symptomatic bradycardia-s/p PPM


7. Hypertension-controlled. losartan on hold. Continue Toprol XL


8. Hyperlipidemia


9. DVT ppx- xarelto





Visit type





- Emergency Visit


Emergency Visit: Yes


ED Registration Date: 05/27/17


Care time: The patient presented to the Emergency Department on the above date 

and was hospitalized for further evaluation of their emergent condition.





- New Patient


This patient is new to me today: No





- Critical Care


Critical Care patient: No





- Discharge Referral


Referred to [facility] No

## 2017-06-04 LAB
ANION GAP SERPL CALC-SCNC: 12 MMOL/L (ref 8–16)
BASOPHILS # BLD: 0.9 % (ref 0–2)
CALCIUM SERPL-MCNC: 8.9 MG/DL (ref 8.5–10.1)
CO2 SERPL-SCNC: 27 MMOL/L (ref 21–32)
COCKROFT - GAULT: 100.35
CREAT SERPL-MCNC: 1.2 MG/DL (ref 0.7–1.3)
DEPRECATED RDW RBC AUTO: 16.1 % (ref 11.9–15.9)
EOSINOPHIL # BLD: 7.6 % (ref 0–4.5)
GLUCOSE SERPL-MCNC: 100 MG/DL (ref 74–106)
MCH RBC QN AUTO: 28.5 PG (ref 25.7–33.7)
MCHC RBC AUTO-ENTMCNC: 33.1 G/DL (ref 32–35.9)
MCV RBC: 86.2 FL (ref 80–96)
NEUTROPHILS # BLD: 61.1 % (ref 42.8–82.8)
PLATELET # BLD AUTO: 301 K/MM3 (ref 134–434)
PMV BLD: 7.8 FL (ref 7.5–11.1)
WBC # BLD AUTO: 10.6 K/MM3 (ref 4–10)

## 2017-06-04 RX ADMIN — FUROSEMIDE SCH MG: 40 TABLET ORAL at 14:10

## 2017-06-04 RX ADMIN — CEFTRIAXONE SODIUM SCH GM: 2 INJECTION, POWDER, FOR SOLUTION INTRAMUSCULAR; INTRAVENOUS at 11:01

## 2017-06-04 RX ADMIN — TAMSULOSIN HYDROCHLORIDE SCH MG: 0.4 CAPSULE ORAL at 08:13

## 2017-06-04 RX ADMIN — RIVAROXABAN SCH MG: 20 TABLET, FILM COATED ORAL at 11:01

## 2017-06-04 RX ADMIN — METOPROLOL SUCCINATE SCH MG: 50 TABLET, EXTENDED RELEASE ORAL at 11:01

## 2017-06-04 RX ADMIN — ACETAMINOPHEN PRN MG: 325 TABLET ORAL at 14:16

## 2017-06-04 RX ADMIN — SENNOSIDES SCH TAB: 8.6 TABLET, FILM COATED ORAL at 21:39

## 2017-06-04 RX ADMIN — FUROSEMIDE SCH MG: 40 TABLET ORAL at 06:52

## 2017-06-04 RX ADMIN — PANTOPRAZOLE SODIUM SCH MG: 20 TABLET, DELAYED RELEASE ORAL at 11:01

## 2017-06-04 NOTE — PN
Teaching Attending Note


Name of Resident: Sammy Paniagua


ATTENDING PHYSICIAN STATEMENT





I saw and evaluated the patient.


I reviewed the resident's note and discussed the case with the resident.


I agree with the resident's findings and plan as documented.








SUBJECTIVE:currently asymptomatic. denies CP, SOB,fever, chills, N/V/C/D








OBJECTIVE:


 Last Vital Signs











Temp Pulse Resp BP Pulse Ox


 


 98.8 F   71   18   136/58   99 


 


 06/04/17 06:00  06/04/17 06:00  06/04/17 06:00  06/04/17 06:00  06/03/17 20:53








General NAD


Abdomen soft NT/ND obese


Extremities ace bandaged B/L LE 2+ pitting edema





ASSESSMENT AND PLAN:


82yo M with PMH left hip OA, HTN, chronic diastolic HF, hyperlipidemia, BPH, 

GERD, pacemaker who presented to the ER with redness and swelling of his legs.


1. Gram negative bacteremia secondary to UTI- afebrile. Cx +proteus with 

sensitivity to Ceftriaxone. repeat BCx NGTD and UCx negative. ID on board. on 

Ceftriaxone day 9.  will switch to po on discharge


2. AGUILA with B/L hydro- due to obstruction-diuresed 2.5L. cont to have large 

volume diuresis on po lasix. kidney function improved. will need to maintain 

gerber on discharge. cont flomax. nephro and urology on board. avoid nephrotoxic 

agents


3. Probable chronic venous insufficiency of both legs- evaluated by vascular 

surgery. only modest improvement in pedal edema since administration of lasix. 

will release bandages for a few hours and then re-place.  cautious diuresis 

with kidney function. 


4. Acute blood loss anemia- due to hematuria. now resolved. Hgb stable. trend 

hgb


5. Possible history of atrial fibrillation - Continue Toprol XL. xarelto re-

started with no recurrent hematuria.


6. Symptomatic bradycardia-s/p PPM


7. Hypertension-controlled. losartan on hold. Continue Toprol XL


8. Hyperlipidemia


9. DVT ppx- xarelto


10. pt is only able to stand at present time. will have PT eval. may benefit 

from NADYA

## 2017-06-04 NOTE — PN
Physical Exam: 


SUBJECTIVE: 





Patient seen and examined at bedside. He stated L foot pain cont. to bother 

him. Urination has improved significantly. No fever, chills, n/v, chest pain, 

sob, dizziness, headache.








OBJECTIVE:





 Vital Signs











 Period  Temp  Pulse  Resp  BP Sys/Ross  Pulse Ox


 


 Last 24 Hr  96.5 F-98.8 F  68-72  18-20  116-136/50-86  99











GENERAL: AAOx3, in no acute distress.


NECK:  supple without lymphadenopathy, JVD, or masses.


LUNGS: CTAB


HEART: RRR, normal S1 and S2 with systolic murmur


ABDOMEN: Soft, nontender, not distended, normoactive bowel sounds, no guarding, 

no rebound, no masses.    


EXTREMITIES: ACE banages wrapped b/l legs


NEUROLOGICAL:  Cranial nerves II-XII intact. Normal speech. gait not observed


: gerber contains 300c yellow urine  











 CBCD











WBC  10.6 K/mm3 (4.0-10.0)  H  06/04/17  06:30    


 


RBC  3.67 M/mm3 (4.00-5.60)  L  06/04/17  06:30    


 


Hgb  10.5 GM/dL (11.7-16.9)  L  06/04/17  06:30    


 


Hct  31.6 % (35.4-49)  L  06/04/17  06:30    


 


MCV  86.2 fl (80-96)   06/04/17  06:30    


 


MCHC  33.1 g/dl (32.0-35.9)   06/04/17  06:30    


 


RDW  16.1 % (11.9-15.9)  H  06/04/17  06:30    


 


Plt Count  301 K/MM3 (134-434)   06/04/17  06:30    


 


MPV  7.8 fl (7.5-11.1)   06/04/17  06:30    








 CMP











Sodium  139 mmol/L (136-145)   06/04/17  06:30    


 


Potassium  3.8 mmol/L (3.5-5.1)   06/04/17  06:30    


 


Chloride  100 mmol/L ()   06/04/17  06:30    


 


Carbon Dioxide  27 mmol/L (21-32)   06/04/17  06:30    


 


Anion Gap  12  (8-16)   06/04/17  06:30    


 


BUN  21 mg/dL (7-18)  H  06/04/17  06:30    


 


Creatinine  1.2 mg/dL (0.7-1.3)   06/04/17  06:30    


 


Creat Clearance w eGFR  30.46  (>60)   05/27/17  17:35    


 


Calcium  8.9 mg/dL (8.5-10.1)   06/04/17  06:30    


 


Total Bilirubin  0.4 mg/dL (0.2-1.0)  D 05/27/17  17:35    


 


AST  13 U/L (15-37)  L D 05/27/17  17:35    


 


ALT  14 U/L (12-78)  D 05/27/17  17:35    


 


Alkaline Phosphatase  120 U/L ()  H D 05/27/17  17:35    


 


Total Protein  6.5 g/dl (6.4-8.2)  D 05/27/17  17:35    


 


Albumin  3.3 g/dl (3.4-5.0)  L  05/27/17  17:35    








 Intake & Output











 06/01/17 06/02/17 06/03/17 06/04/17





 23:59 23:59 23:59 23:59


 


Intake Total  


 


Output Total 3100 3250 2550 400


 


Balance -2860 -2700 -1210 -400


 


Weight 146.102 kg 145.422 kg 145.921 kg 146.964 kg











Active Medications











Generic Name Dose Route Start Last Admin





  Trade Name Freq  PRN Reason Stop Dose Admin


 


Acetaminophen  650 mg 05/27/17 20:11  





  Tylenol -  PO   





  Q4H PRN   





  FEVER OR PAIN   


 


Acetaminophen  325 mg 06/01/17 09:23 06/02/17 22:26





  Tylenol -  PO   325 mg





  Q6H PRN   Administration





  PAIN   


 


Ceftriaxone Sodium  2 gm 05/28/17 15:00 06/03/17 09:08





  Rocephin 2gm Ivpb (Pre-Docked)  IVPB   2 gm





  DAILY KELVIN   Administration


 


Furosemide  40 mg 06/03/17 06:00 06/04/17 06:52





  Lasix -  PO   40 mg





  BID@0600,1400 KELVIN   Administration


 


Metoprolol Succinate  50 mg 05/28/17 10:00 06/03/17 09:08





  Toprol Xl -  PO   50 mg





  DAILY KELVIN   Administration


 


Oxycodone HCl  5 mg 06/01/17 09:23 06/03/17 22:57





  Roxicodone -  PO   5 mg





  Q6H PRN   Administration





  PAIN   


 


Pantoprazole Sodium  20 mg 05/28/17 10:00 06/03/17 09:08





  Protonix -  PO   20 mg





  DAILY KELVIN   Administration


 


Rivaroxaban  20 mg 06/03/17 10:00 06/03/17 09:08





  Xarelto -  PO   20 mg





  DAILY KELVIN   Administration


 


Senna  2 tab 05/27/17 22:00 06/03/17 21:25





  Senna -  PO   2 tab





  HS KELVIN   Administration


 


Tamsulosin HCl  0.8 mg 05/28/17 08:30 06/04/17 08:13





  Flomax -  PO   0.8 mg





  DAILY@0830 KELVIN   Administration








IMAGING:





Renal U/S on 5/30: Moderate bilateral renal hydronephrosis, right more than 

left with slight worsening in the degree of right renal hydronephrosis since 

the prior exam 





CXR on 3/27: Since 3/11/2017, the congestive changes have diminished. There is 

still a large heart with pacemaker and degenerative findings as well as old 

trauma involving the left AC joint and clavicle. Correlation recommended 





LE doppler on 3/27:  no DVT





ASSESSMENT/PLAN:





80 yo M admitted to med-surg for worsening b/l lower ext swelling.





Gross hematuria, s/p coude catheter insertion


   - Resolved


   - Cont. gerber





Hydronephrosis, R > L side


   - Cr normalized


   - Maintain gerber and monitor I/O


   - Cont. flomax to 0.8mg PO daily 





Gram negative bacteremia 2/2 complicated UTI


   - Complicated: male sex; obstructive uropathy from BPH


   - Afrebile, leukocytosis resolved, stable vitals


   - Repeat BX and UX negative


   - Completed rocephin 7 day course


   - Switch to PO keflex tomorrow





Lower extremity edema, bilateral


   - Improving


   - Combination of lymphedema (extends above knees) and pitting edema 2/2 dCHF


   - Compression bandages


   - Leg elevation as tolerated





CHF, diastolic


   - Stable


   - EF 71% (2016)


   - Cont. PO lasix 40mg BID





h/o A-fib


   - rate controlled


   - Cont. toprol XL and xarelto resumed





Arrhythmia s/p dual chamber pacemaker


   - Last interrogated 2 months ago


   - EKG shows ventricular paced rhythm 





GERD


   - Cont. PO protonix 20mg





Hip pain, chronic


   - Cont. roxicodone





HTN


   - Controlled


   - Cozaar held due to AGUILA





FEN


   - IVF not indicated


   - Normal lytes, Cont. to monitor 


   - Sodium controlled diet





Prophylaxis


   - DVT: on xarelto


   - GI: on PO protonix  





Disposition


   - Discharge tomorrow to subacute rehab





Code status


   - Full code








Visit type





- Emergency Visit


Emergency Visit: No





- New Patient


This patient is new to me today: No





- Critical Care


Critical Care patient: No

## 2017-06-04 NOTE — PN
Progress Note, Physician


History of Present Illness: 


Renal f/U


Pt is OOB in chair and in no distress


Azotemia improving 


He remains without any c/o chest pain , dyspnea or othopnea 


Gerber in place 





- Current Medication List


Current Medications: 


Active Medications





Acetaminophen (Tylenol -)  650 mg PO Q4H PRN


   PRN Reason: FEVER OR PAIN


Acetaminophen (Tylenol -)  325 mg PO Q6H PRN


   PRN Reason: PAIN


   Last Admin: 06/02/17 22:26 Dose:  325 mg


Cephalexin HCl (Keflex -)  500 mg PO BID [facility]


Furosemide (Lasix -)  40 mg PO BID@0600,1400 [facility]


   Last Admin: 06/04/17 06:52 Dose:  40 mg


Metoprolol Succinate (Toprol Xl -)  50 mg PO DAILY [facility]


   Last Admin: 06/04/17 11:01 Dose:  50 mg


Oxycodone HCl (Roxicodone -)  5 mg PO Q6H PRN


   PRN Reason: PAIN


   Last Admin: 06/03/17 22:57 Dose:  5 mg


Pantoprazole Sodium (Protonix -)  20 mg PO DAILY [facility]


   Last Admin: 06/04/17 11:01 Dose:  20 mg


Rivaroxaban (Xarelto -)  20 mg PO DAILY [facility]


   Last Admin: 06/04/17 11:01 Dose:  20 mg


Senna (Senna -)  2 tab PO HS [facility]


   Last Admin: 06/03/17 21:25 Dose:  2 tab


Tamsulosin HCl (Flomax -)  0.8 mg PO DAILY@0830 [facility]


   Last Admin: 06/04/17 08:13 Dose:  0.8 mg











- Objective


Vital Signs: 


 Vital Signs











Temperature  98.8 F   06/04/17 06:00


 


Pulse Rate  71   06/04/17 06:00


 


Respiratory Rate  18   06/04/17 06:00


 


Blood Pressure  136/58   06/04/17 06:00


 


O2 Sat by Pulse Oximetry (%)  99   06/03/17 20:53











Constitutional: Yes: No Distress


Cardiovascular: Yes: S1, S2


Respiratory: Yes: CTA Bilaterally


Gastrointestinal: Yes: Soft.  No: Tenderness, Rebound


Edema: Yes (Up to thighs )


Labs: 


 CBC, BMP





 06/04/17 06:30 





 06/04/17 06:30 











Assessment/Plan


Impression


1. AGUILA improving with gerber and use of lasix 


2. Obstructive uropathy with B/L hydronephrosis


3. UTI


4. Fluid overload


5. HTN








Plan


Gerber to drainage


Flomax 


Rpt labs in am 


Dr Vila

## 2017-06-05 LAB
ANION GAP SERPL CALC-SCNC: 9 MMOL/L (ref 8–16)
BASOPHILS # BLD: 0.9 % (ref 0–2)
CALCIUM SERPL-MCNC: 8.9 MG/DL (ref 8.5–10.1)
CO2 SERPL-SCNC: 31 MMOL/L (ref 21–32)
COCKROFT - GAULT: 92.63
CREAT SERPL-MCNC: 1.3 MG/DL (ref 0.7–1.3)
DEPRECATED RDW RBC AUTO: 15.9 % (ref 11.9–15.9)
EOSINOPHIL # BLD: 7 % (ref 0–4.5)
GLUCOSE SERPL-MCNC: 105 MG/DL (ref 74–106)
MCH RBC QN AUTO: 29 PG (ref 25.7–33.7)
MCHC RBC AUTO-ENTMCNC: 33.6 G/DL (ref 32–35.9)
MCV RBC: 86.2 FL (ref 80–96)
NEUTROPHILS # BLD: 62 % (ref 42.8–82.8)
PLATELET # BLD AUTO: 297 K/MM3 (ref 134–434)
PMV BLD: 7.4 FL (ref 7.5–11.1)
WBC # BLD AUTO: 9.7 K/MM3 (ref 4–10)

## 2017-06-05 RX ADMIN — RIVAROXABAN SCH MG: 20 TABLET, FILM COATED ORAL at 10:02

## 2017-06-05 RX ADMIN — METOPROLOL SUCCINATE SCH MG: 50 TABLET, EXTENDED RELEASE ORAL at 10:02

## 2017-06-05 RX ADMIN — SENNOSIDES SCH TAB: 8.6 TABLET, FILM COATED ORAL at 22:48

## 2017-06-05 RX ADMIN — TAMSULOSIN HYDROCHLORIDE SCH MG: 0.4 CAPSULE ORAL at 09:07

## 2017-06-05 RX ADMIN — ACETAMINOPHEN PRN MG: 325 TABLET ORAL at 22:49

## 2017-06-05 RX ADMIN — CEPHALEXIN SCH MG: 500 CAPSULE ORAL at 22:48

## 2017-06-05 RX ADMIN — CEPHALEXIN SCH MG: 500 CAPSULE ORAL at 10:02

## 2017-06-05 RX ADMIN — FUROSEMIDE SCH MG: 40 TABLET ORAL at 05:49

## 2017-06-05 RX ADMIN — FUROSEMIDE SCH MG: 40 TABLET ORAL at 15:00

## 2017-06-05 RX ADMIN — PANTOPRAZOLE SODIUM SCH MG: 20 TABLET, DELAYED RELEASE ORAL at 10:02

## 2017-06-05 NOTE — PN
Physical Exam: 


SUBJECTIVE: 





Patient seen and examined at bedside. He said he feels better today. Though his 

L heel still bothers him. No fever, chills, n/v, chest pain, sob, dizziness, 

headache.





OBJECTIVE:





 Vital Signs











 Period  Temp  Pulse  Resp  BP Sys/Ross  Pulse Ox


 


 Last 24 Hr  97.6 F-98.3 F  68-70  18-22  122-148/51-80  99











GENERAL: AAOx3, in no acute distress.


NECK:  supple without lymphadenopathy, JVD, or masses.


LUNGS: CTAB


HEART: RRR, normal S1 and S2 with systolic murmur


ABDOMEN: Soft, nontender, not distended, normoactive bowel sounds, no guarding, 

no rebound, no masses.    


EXTREMITIES: ACE banages wrapped b/l legs


NEUROLOGICAL:  Cranial nerves II-XII intact. Normal speech. gait not observed


: gerber contains 300c yellow urine  














 CBCD











WBC  9.7 K/mm3 (4.0-10.0)   06/05/17  06:40    


 


RBC  3.66 M/mm3 (4.00-5.60)  L  06/05/17  06:40    


 


Hgb  10.6 GM/dL (11.7-16.9)  L  06/05/17  06:40    


 


Hct  31.6 % (35.4-49)  L  06/05/17  06:40    


 


MCV  86.2 fl (80-96)   06/05/17  06:40    


 


MCHC  33.6 g/dl (32.0-35.9)   06/05/17  06:40    


 


RDW  15.9 % (11.9-15.9)   06/05/17  06:40    


 


Plt Count  297 K/MM3 (134-434)   06/05/17  06:40    


 


MPV  7.4 fl (7.5-11.1)  L  06/05/17  06:40    








 CMP











Sodium  139 mmol/L (136-145)   06/05/17  06:40    


 


Potassium  4.2 mmol/L (3.5-5.1)   06/05/17  06:40    


 


Chloride  99 mmol/L ()   06/05/17  06:40    


 


Carbon Dioxide  31 mmol/L (21-32)   06/05/17  06:40    


 


Anion Gap  9  (8-16)   06/05/17  06:40    


 


BUN  20 mg/dL (7-18)  H  06/05/17  06:40    


 


Creatinine  1.3 mg/dL (0.7-1.3)   06/05/17  06:40    


 


Creat Clearance w eGFR  30.46  (>60)   05/27/17  17:35    


 


Calcium  8.9 mg/dL (8.5-10.1)   06/05/17  06:40    


 


Total Bilirubin  0.4 mg/dL (0.2-1.0)  D 05/27/17  17:35    


 


AST  13 U/L (15-37)  L D 05/27/17  17:35    


 


ALT  14 U/L (12-78)  D 05/27/17  17:35    


 


Alkaline Phosphatase  120 U/L ()  H D 05/27/17  17:35    


 


Total Protein  6.5 g/dl (6.4-8.2)  D 05/27/17  17:35    


 


Albumin  3.3 g/dl (3.4-5.0)  L  05/27/17  17:35    








 Intake & Output











 06/02/17 06/03/17 06/04/17 06/05/17





 23:59 23:59 23:59 23:59


 


Intake Total 550 1340 1120 180


 


Output Total 3250 2550 1550 


 


Balance -2700 -1210 -430 180


 


Weight 145.422 kg 145.921 kg 146.964 kg 











Active Medications











Generic Name Dose Route Start Last Admin





  Trade Name Freq  PRN Reason Stop Dose Admin


 


Acetaminophen  650 mg 05/27/17 20:11  





  Tylenol -  PO   





  Q4H PRN   





  FEVER OR PAIN   


 


Acetaminophen  325 mg 06/01/17 09:23 06/04/17 14:16





  Tylenol -  PO   325 mg





  Q6H PRN   Administration





  PAIN   


 


Cephalexin HCl  500 mg 06/05/17 10:00 06/05/17 10:02





  Keflex -  PO   500 mg





  BID KELVIN   Administration


 


Furosemide  40 mg 06/03/17 06:00 06/05/17 05:49





  Lasix -  PO   40 mg





  BID@0600,1400 KELVIN   Administration


 


Metoprolol Succinate  50 mg 05/28/17 10:00 06/05/17 10:02





  Toprol Xl -  PO   50 mg





  DAILY KELVIN   Administration


 


Oxycodone HCl  5 mg 06/01/17 09:23 06/05/17 10:14





  Roxicodone -  PO   5 mg





  Q6H PRN   Administration





  PAIN   


 


Pantoprazole Sodium  20 mg 05/28/17 10:00 06/05/17 10:02





  Protonix -  PO   20 mg





  DAILY KELVIN   Administration


 


Rivaroxaban  20 mg 06/03/17 10:00 06/05/17 10:02





  Xarelto -  PO   20 mg





  DAILY KELVIN   Administration


 


Senna  2 tab 05/27/17 22:00 06/04/17 21:39





  Senna -  PO   2 tab





  HS KELVIN   Administration


 


Tamsulosin HCl  0.8 mg 05/28/17 08:30 06/05/17 09:07





  Flomax -  PO   0.8 mg





  DAILY@0830 KELVIN   Administration





IMAGING:





Renal U/S on 5/30: Moderate bilateral renal hydronephrosis, right more than 

left with slight worsening in the degree of right renal hydronephrosis since 

the prior exam 





CXR on 3/27: Since 3/11/2017, the congestive changes have diminished. There is 

still a large heart with pacemaker and degenerative findings as well as old 

trauma involving the left AC joint and clavicle. Correlation recommended 





LE doppler on 3/27:  no DVT





ASSESSMENT/PLAN:





80 yo M admitted to med-surg for worsening b/l lower ext swelling.





Lower extremity edema, bilateral


   - Improving


   - Combination of lymphedema (extends above knees) and pitting edema 2/2 dCHF


   - Cont. compression bandages


   - Leg elevation as tolerated


   - f/u on X-ray of L heel





Gram negative bacteremia 2/2 complicated UTI


   - Complicated: male sex; obstructive uropathy from BPH


   - Afrebile, leukocytosis resolved, stable vitals


   - Repeat BX and UX negative


   - Completed rocephin 7 day course


   - Keflex 500mg BID x 2 weeks (day 1)





Gross hematuria, s/p coude catheter insertion


   - Resolved


   - Cont. gerber





Hydronephrosis, R > L side


   - Cr normalized


   - Maintain gerber and monitor I/O


   - Cont. flomax to 0.8mg PO daily 





CHF, diastolic


   - Stable


   - EF 71% (2016)


   - Cont. PO lasix 40mg BID





h/o A-fib


   - rate controlled


   - Cont. toprol XL and xarelto resumed





Arrhythmia s/p dual chamber pacemaker


   - Last interrogated 2 months ago


   - EKG shows ventricular paced rhythm 





GERD


   - Cont. PO protonix 20mg





Hip pain, chronic


   - Cont. roxicodone





HTN


   - Controlled


   - Cozaar held due to AGUILA





FEN


   - IVF not indicated


   - Normal lytes, Cont. to monitor 


   - Sodium controlled diet





Prophylaxis


   - DVT: on xarelto


   - GI: on PO protonix  





Disposition


   - Social issues that delaying the discharge: 1. patient has exhausted his 

medicare days at rehab therefore he can't be discharged to subacute rehab 

unless he pays out of his own pocket which not likely going to happen; 2. 

patient's girlfriend insists on waiting for patient's conditions to fully 

resolve. She's explained at length that patient can't stay in hospital 

indefinitely after his acute condition has resolved.





Code status


   - Full code





Visit type





- Emergency Visit


Emergency Visit: No





- New Patient


This patient is new to me today: No





- Critical Care


Critical Care patient: No

## 2017-06-05 NOTE — PN
Progress Note, Physician


History of Present Illness: 


Pt seen and examined at bedside. He is awake and alert. He denies shortness of 

breath. 





- Current Medication List


Current Medications: 


Active Medications





Acetaminophen (Tylenol -)  650 mg PO Q4H PRN


   PRN Reason: FEVER OR PAIN


Acetaminophen (Tylenol -)  325 mg PO Q6H PRN


   PRN Reason: PAIN


   Last Admin: 06/04/17 14:16 Dose:  325 mg


Cephalexin HCl (Keflex -)  500 mg PO BID [facility]


   Last Admin: 06/05/17 10:02 Dose:  500 mg


Furosemide (Lasix -)  40 mg PO BID@0600,1400 [facility]


   Last Admin: 06/05/17 05:49 Dose:  40 mg


Metoprolol Succinate (Toprol Xl -)  50 mg PO DAILY [facility]


   Last Admin: 06/05/17 10:02 Dose:  50 mg


Oxycodone HCl (Roxicodone -)  5 mg PO Q6H PRN


   PRN Reason: PAIN


   Last Admin: 06/05/17 10:14 Dose:  5 mg


Pantoprazole Sodium (Protonix -)  20 mg PO DAILY [facility]


   Last Admin: 06/05/17 10:02 Dose:  20 mg


Rivaroxaban (Xarelto -)  20 mg PO DAILY [facility]


   Last Admin: 06/05/17 10:02 Dose:  20 mg


Senna (Senna -)  2 tab PO HS [facility]


   Last Admin: 06/04/17 21:39 Dose:  2 tab


Tamsulosin HCl (Flomax -)  0.8 mg PO DAILY@0830 [facility]


   Last Admin: 06/05/17 09:07 Dose:  0.8 mg











- Objective


Vital Signs: 


 Vital Signs











Temperature  97.6 F   06/05/17 06:00


 


Pulse Rate  70   06/05/17 06:00


 


Respiratory Rate  22   06/05/17 06:00


 


Blood Pressure  148/66   06/05/17 06:00


 


O2 Sat by Pulse Oximetry (%)  99   06/04/17 21:00











Constitutional: Yes: Calm


Eyes: Yes: Conjunctiva Clear


HENT: Yes: Atraumatic


Neck: Yes: Supple


Cardiovascular: Yes: S1, S2


Respiratory: Yes: CTA Bilaterally


Gastrointestinal: Yes: Soft, Abdomen, Obese


Genitourinary: Yes: Berry Present


Edema: Yes


Edema: LLE: 2+, RLE: 2+


Neurological: Yes: Oriented


Psychiatric: Yes: Oriented


Labs: 


 CBC, BMP





 06/05/17 06:40 





 06/05/17 06:40 











Problem List





- Problems


(1) Hypertension


Code(s): I10 - ESSENTIAL (PRIMARY) HYPERTENSION   





(2) Morbid obesity


Code(s): E66.01 - MORBID (SEVERE) OBESITY DUE TO EXCESS CALORIES   





(3) CHF (congestive heart failure)


Code(s): I50.9 - HEART FAILURE, UNSPECIFIED   





(4) AGUILA (acute kidney injury)


Code(s): N17.9 - ACUTE KIDNEY FAILURE, UNSPECIFIED





(5) Obstructive uropathy


Code(s): N13.9 - OBSTRUCTIVE AND REFLUX UROPATHY, UNSPECIFIED





(6) Hydronephrosis


Code(s): N13.30 - UNSPECIFIED HYDRONEPHROSIS   








Assessment/Plan


 Current Medications











Generic Name Dose Route Start Last Admin





  Trade Name Freq  PRN Reason Stop Dose Admin


 


Acetaminophen  650 mg 05/27/17 20:11  





  Tylenol -  PO   





  Q4H PRN   





  FEVER OR PAIN   


 


Acetaminophen  325 mg 06/01/17 09:23 06/04/17 14:16





  Tylenol -  PO   325 mg





  Q6H PRN   Administration





  PAIN   


 


Cephalexin HCl  500 mg 06/05/17 10:00 06/05/17 10:02





  Keflex -  PO   500 mg





  BID KELVIN   Administration


 


Furosemide  40 mg 06/03/17 06:00 06/05/17 05:49





  Lasix -  PO   40 mg





  BID@0600,1400 KELVIN   Administration


 


Metoprolol Succinate  50 mg 05/28/17 10:00 06/05/17 10:02





  Toprol Xl -  PO   50 mg





  DAILY KELVIN   Administration


 


Oxycodone HCl  5 mg 06/01/17 09:23 06/05/17 10:14





  Roxicodone -  PO   5 mg





  Q6H PRN   Administration





  PAIN   


 


Pantoprazole Sodium  20 mg 05/28/17 10:00 06/05/17 10:02





  Protonix -  PO   20 mg





  DAILY KELVIN   Administration


 


Rivaroxaban  20 mg 06/03/17 10:00 06/05/17 10:02





  Xarelto -  PO   20 mg





  DAILY KELVIN   Administration


 


Senna  2 tab 05/27/17 22:00 06/04/17 21:39





  Senna -  PO   2 tab





  HS KELVIN   Administration


 


Tamsulosin HCl  0.8 mg 05/28/17 08:30 06/05/17 09:07





  Flomax -  PO   0.8 mg





  DAILY@0830 KELVIN   Administration














Impression


1. AGUILA


2. hydronephrosis


3. obstructive uropathy


4. fluid overload


5. HTN


6. UTI





Plan


- renal function is stabilizing


- cont with lasix


- discussed fluid restriction and low salt diet at length with pt and family


- monitor urine output


- can follow as outpt


- will need to have volume status evaluated in rehab


- cont pt/rehab


- cont flomax


Dr Teran

## 2017-06-05 NOTE — PN
Teaching Attending Note


Name of Resident: Sammy Paniagua


ATTENDING PHYSICIAN STATEMENT





I saw and evaluated the patient.


I reviewed the resident's note and discussed the case with the resident.


I agree with the resident's findings and plan as documented.








SUBJECTIVE:continues to have L heel pain. denies CP, SOB,fever, chills, N/V/C/D








OBJECTIVE:


 Last Vital Signs











Temp Pulse Resp BP Pulse Ox


 


 97.6 F   70   22   148/66   99 


 


 06/05/17 06:00  06/05/17 06:00  06/05/17 06:00  06/05/17 06:00  06/04/17 21:00








 Intake & Output











 06/02/17 06/03/17 06/04/17 06/05/17





 23:59 23:59 23:59 23:59


 


Intake Total 550 1340 1120 180


 


Output Total 3250 2550 1550 


 


Balance -2700 -1210 -430 180


 


Weight 320 lb 9.6 oz 321 lb 11.2 oz 324 lb 








General NAD


Abdomen soft NT/ND obese


Extremities ace bandaged B/L LE 2+ pitting edema, no point tenderness of the 

heel





ASSESSMENT AND PLAN:


80yo M with PMH left hip OA, HTN, chronic diastolic HF, hyperlipidemia, BPH, 

GERD, pacemaker who presented to the ER with redness and swelling of his legs.


1. Gram negative bacteremia secondary to UTI- afebrile. Cx +proteus with 

sensitivity to Ceftriaxone. repeat BCx NGTD and UCx negative. ID on board. 

completed 8 days of ceftriaxone and switched to keflex this AM. confirm with ID 

about duration of therapy. 


2. AGUILA with B/L hydro- due to obstruction-diuresed 1.5L. kidney function 

stable. will maintain gerber for now and can do voiding trials as 

outpatient.cont flomax. nephro and urology on board. avoid nephrotoxic agents


3. Probable chronic venous insufficiency of both legs- evaluated by vascular 

surgery. only modest improvement in pedal edema since administration of lasix. 

check L heel XR due to persistent pain. request re-evaluation by vascular. cont 

diuresis. 


4. Acute blood loss anemia- due to hematuria. now resolved. Hgb stable. 


5. Possible history of atrial fibrillation - Continue Toprol XL & xarelto 


6. Symptomatic bradycardia-s/p PPM


7. Hypertension-controlled. losartan on hold. can re-start once kidney function 

stablized. possibly on discharge. Continue Toprol XL


8. Hyperlipidemia


9. DVT ppx- xarelto


10. PT eval. will likely benefit from NADYA. spoke with girlfriend, Shital, 

present at bedside in detail regarding medical course and plan on discharge. 

expressed concern of leaving with gerber. encouraged that its common to leave 

with gerber in place and that it is needed at this time due to obstruction and 

needs to follow up with urology. informed her that discharge planning is for 

tomorrow.

## 2017-06-06 LAB
ANION GAP SERPL CALC-SCNC: 7 MMOL/L (ref 8–16)
CALCIUM SERPL-MCNC: 9.1 MG/DL (ref 8.5–10.1)
CO2 SERPL-SCNC: 34 MMOL/L (ref 21–32)
COCKROFT - GAULT: 109.48
CREAT SERPL-MCNC: 1.1 MG/DL (ref 0.7–1.3)
GLUCOSE SERPL-MCNC: 99 MG/DL (ref 74–106)

## 2017-06-06 RX ADMIN — METOPROLOL SUCCINATE SCH MG: 50 TABLET, EXTENDED RELEASE ORAL at 10:31

## 2017-06-06 RX ADMIN — FUROSEMIDE SCH MG: 40 TABLET ORAL at 15:02

## 2017-06-06 RX ADMIN — CEPHALEXIN SCH MG: 500 CAPSULE ORAL at 10:31

## 2017-06-06 RX ADMIN — SENNOSIDES SCH TAB: 8.6 TABLET, FILM COATED ORAL at 21:52

## 2017-06-06 RX ADMIN — TAMSULOSIN HYDROCHLORIDE SCH MG: 0.4 CAPSULE ORAL at 08:56

## 2017-06-06 RX ADMIN — CEPHALEXIN SCH MG: 500 CAPSULE ORAL at 21:52

## 2017-06-06 RX ADMIN — PANTOPRAZOLE SODIUM SCH MG: 20 TABLET, DELAYED RELEASE ORAL at 10:31

## 2017-06-06 RX ADMIN — RIVAROXABAN SCH MG: 20 TABLET, FILM COATED ORAL at 10:31

## 2017-06-06 RX ADMIN — FUROSEMIDE SCH MG: 40 TABLET ORAL at 06:00

## 2017-06-06 NOTE — PN
Physical Exam: 


SUBJECTIVE: 





Patient seen and examined at bedside. Stated that nurse noted rashes across his 

chest, lower back, neck and shoulder. No other new complaint except for b/l 

foot pain which has been his daily complaint.  No fever, chills, n/v, chest pain

, sob, dizziness, headache.








OBJECTIVE:





 Vital Signs











 Period  Temp  Pulse  Resp  BP Sys/Ross  Pulse Ox


 


 Last 24 Hr  97.6 F-98.8 F  70-77  18-20  125-136/48-60  99











GENERAL: AAOx3, in no acute distress.


NECK:  supple without lymphadenopathy, JVD, or masses.


LUNGS: CTAB


HEART: RRR, normal S1 and S2 with systolic murmur


ABDOMEN: Soft, nontender, not distended, normoactive bowel sounds, no guarding, 

no rebound, no masses.    


EXTREMITIES: ACE banages wrapped b/l legs


NEUROLOGICAL:  Cranial nerves II-XII intact. Normal speech. gait not observed


: gerber contains 100c yellow urine 


SKIN: 5 x 6 cm raised wheal on lower left back. urticaria in clusters across 

his L chest, and neck/shoulder areas. 








 CMP











Sodium  140 mmol/L (136-145)   06/06/17  06:20    


 


Potassium  3.9 mmol/L (3.5-5.1)   06/06/17  06:20    


 


Chloride  99 mmol/L ()   06/06/17  06:20    


 


Carbon Dioxide  34 mmol/L (21-32)  H  06/06/17  06:20    


 


Anion Gap  7  (8-16)  L  06/06/17  06:20    


 


BUN  23 mg/dL (7-18)  H  06/06/17  06:20    


 


Creatinine  1.1 mg/dL (0.7-1.3)   06/06/17  06:20    


 


Creat Clearance w eGFR  30.46  (>60)   05/27/17  17:35    


 


Calcium  9.1 mg/dL (8.5-10.1)   06/06/17  06:20    


 


Total Bilirubin  0.4 mg/dL (0.2-1.0)  D 05/27/17  17:35    


 


AST  13 U/L (15-37)  L D 05/27/17  17:35    


 


ALT  14 U/L (12-78)  D 05/27/17  17:35    


 


Alkaline Phosphatase  120 U/L ()  H D 05/27/17  17:35    


 


Total Protein  6.5 g/dl (6.4-8.2)  D 05/27/17  17:35    


 


Albumin  3.3 g/dl (3.4-5.0)  L  05/27/17  17:35    














 Intake & Output











 06/03/17 06/04/17 06/05/17 06/06/17





 23:59 23:59 23:59 23:59


 


Intake Total 1340 1120 420 


 


Output Total 2550 1550 1400 700


 


Balance -1210 -430 -980 -700


 


Weight 145.921 kg 146.964 kg  146.737 kg











Active Medications











Generic Name Dose Route Start Last Admin





  Trade Name Freq  PRN Reason Stop Dose Admin


 


Acetaminophen  650 mg 05/27/17 20:11  





  Tylenol -  PO   





  Q4H PRN   





  FEVER OR PAIN   


 


Acetaminophen  325 mg 06/01/17 09:23 06/05/17 22:49





  Tylenol -  PO   325 mg





  Q6H PRN   Administration





  PAIN   


 


Cephalexin HCl  500 mg 06/05/17 10:00 06/06/17 10:31





  Keflex -  PO   500 mg





  BID KELVIN   Administration


 


Diphenhydramine HCl  25 mg 06/06/17 14:18  





  Benadryl -  PO 06/06/17 14:19  





  ONCE ONE   


 


Furosemide  40 mg 06/03/17 06:00 06/06/17 06:00





  Lasix -  PO   40 mg





  BID@0600,1400 KELVIN   Administration


 


Metoprolol Succinate  50 mg 05/28/17 10:00 06/06/17 10:31





  Toprol Xl -  PO   50 mg





  DAILY KELVIN   Administration


 


Oxycodone HCl  5 mg 06/01/17 09:23 06/05/17 22:48





  Roxicodone -  PO   5 mg





  Q6H PRN   Administration





  PAIN   


 


Pantoprazole Sodium  20 mg 05/28/17 10:00 06/06/17 10:31





  Protonix -  PO   20 mg





  DAILY KELVIN   Administration


 


Rivaroxaban  20 mg 06/03/17 10:00 06/06/17 10:31





  Xarelto -  PO   20 mg





  DAILY KELVIN   Administration


 


Senna  2 tab 05/27/17 22:00 06/05/17 22:48





  Senna -  PO   2 tab





  HS KELVIN   Administration


 


Tamsulosin HCl  0.8 mg 05/28/17 08:30 06/06/17 08:56





  Flomax -  PO   0.8 mg





  DAILY@0830 KELVIN   Administration








IMAGING:





X-ray of L foot on 6/5: possible L 1st metatarsal gout, calcaneal spur





Renal U/S on 5/30: Moderate bilateral renal hydronephrosis, right more than 

left with slight worsening in the degree of right renal hydronephrosis since 

the prior exam 





CXR on 3/27: Since 3/11/2017, the congestive changes have diminished. There is 

still a large heart with pacemaker and degenerative findings as well as old 

trauma involving the left AC joint and clavicle. Correlation recommended 





LE doppler on 3/27:  no DVT





ASSESSMENT/PLAN:





80 yo M admitted to med-surg for worsening b/l lower ext swelling.





Urticaria


   - Suspected beta-lactam allergy


   - d/c keflex


   - Benadryl 25mg PO once and observe





L foot pain


   - Calcaneal spur and possible gout


   - f/u with podiatrist as outpatient





Lower extremity edema, bilateral


   - Improving


   - Combination of lymphedema (extends above knees) and pitting edema 2/2 dCHF


   - Cont. compression bandages


   - Leg elevation as tolerated





Gram negative bacteremia 2/2 complicated UTI


   - Complicated: male sex; obstructive uropathy from BPH


   - Afrebile, leukocytosis resolved, stable vitals


   - Repeat BX and UX negative


   - Completed rocephin 7 day course


   - Keflex d/c due to possible drug rash





Gross hematuria, s/p coude catheter insertion


   - Resolved


   - Cont. gerber





Hydronephrosis, R > L side


   - Cr normalized


   - Maintain gerber and monitor I/O


   - Cont. flomax to 0.8mg PO daily 





CHF, diastolic


   - Stable


   - EF 71% (2016)


   - Cont. PO lasix 40mg BID





h/o A-fib


   - rate controlled


   - Cont. toprol XL and xarelto 





Arrhythmia s/p dual chamber pacemaker


   - Last interrogated 2 months ago


   - EKG shows ventricular paced rhythm 





GERD


   - Cont. PO protonix 20mg





Hip pain, chronic


   - Cont. roxicodone





HTN


   - Controlled


   - Cozaar held due to AGUILA





FEN


   - IVF not indicated


   - Normal lytes, Cont. to monitor 


   - Sodium controlled diet





Prophylaxis


   - DVT: on xarelto


   - GI: on PO protonix  





Disposition


   - Discharge to [facility] held due to drug rash. If rash resolves, pt will be 

discharged tomorrow.





Code status


   - Full code








Visit type





- Emergency Visit


Emergency Visit: No





- New Patient


This patient is new to me today: No





- Critical Care


Critical Care patient: No

## 2017-06-06 NOTE — PN
Teaching Attending Note


Name of Resident: Sammy Paniagua


ATTENDING PHYSICIAN STATEMENT





I saw and evaluated the patient.


I reviewed the resident's note and discussed the case with the resident.


I agree with the resident's findings and plan as documented.








SUBJECTIVE: Patient complains of left heel pain.








OBJECTIVE:


 Vital Signs











 Period  Temp  Pulse  Resp  BP Sys/Ross  Pulse Ox


 


 Last 24 Hr  97.6 F-98.8 F  70-77  18-20  125-136/48-60  99








HEART: S1 S2, RRR


LUNGS: Clear


ABDOMEN: Obese, soft, non-tender, non-distended, normal BS


EXTREMITIES: Both legs wrapped, (+) edema of both feet








ASSESSMENT AND PLAN:


This is an 81-year-old man from [place] with a history of left hip OA, HTN, 

chronic diastolic HF, hyperlipidemia, BPH, GERD, pacemaker who presented to the 

ER with redness and swelling of his legs.





1. Proteus bacteremia secondary to UTI


   - Continue Keflex


   - Completed 8 days of Rocephin


2. Acute kidney injury secondary to obstructive uropathy from BPH with urinary 

retention and bilateral hydronephrosis


   - Renal function improved with Berry catheter


   - Continue Flomax


   - Cozaar discontinued


   - Maintain Berry


   - Outpatient urology follow-up


3. Probable chronic venous insufficiency with lymphedema of both legs


   - Continue Lasix for leg edema


   - Venous dopplers negative for DVT


4. Left calcaneus spur


5. Chronic diastolic heart failure 


   - Stable


   - Continue Lasix, fluid restriction


6. Possible history of atrial fibrillation 


   - Continue Toprol XL, Xarelto


7. Symptomatic bradycardia, history of pacemaker


8. Hypertension


   - Continue Toprol XL


   - Cozaar discontinued secondary to AGUILA


9. Hyperlipidemia


10. GERD


   - Continue Protonix


11. Ok for discharge back to [place] today

## 2017-06-06 NOTE — PN
Progress Note, Physician


History of Present Illness: 


Pt seen and examined at bedside. He denies shortness of breath. He was not 

compliant with fluid restriction last night. 





- Current Medication List


Current Medications: 


Active Medications





Acetaminophen (Tylenol -)  650 mg PO Q4H PRN


   PRN Reason: FEVER OR PAIN


Acetaminophen (Tylenol -)  325 mg PO Q6H PRN


   PRN Reason: PAIN


   Last Admin: 06/05/17 22:49 Dose:  325 mg


Cephalexin HCl (Keflex -)  500 mg PO BID [facility]


   Last Admin: 06/06/17 10:31 Dose:  500 mg


Furosemide (Lasix -)  40 mg PO BID@0600,1400 [facility]


   Last Admin: 06/06/17 06:00 Dose:  40 mg


Metoprolol Succinate (Toprol Xl -)  50 mg PO DAILY [facility]


   Last Admin: 06/06/17 10:31 Dose:  50 mg


Oxycodone HCl (Roxicodone -)  5 mg PO Q6H PRN


   PRN Reason: PAIN


   Last Admin: 06/05/17 22:48 Dose:  5 mg


Pantoprazole Sodium (Protonix -)  20 mg PO DAILY [facility]


   Last Admin: 06/06/17 10:31 Dose:  20 mg


Rivaroxaban (Xarelto -)  20 mg PO DAILY [facility]


   Last Admin: 06/06/17 10:31 Dose:  20 mg


Senna (Senna -)  2 tab PO HS [facility]


   Last Admin: 06/05/17 22:48 Dose:  2 tab


Tamsulosin HCl (Flomax -)  0.8 mg PO DAILY@0830 [facility]


   Last Admin: 06/06/17 08:56 Dose:  0.8 mg











- Objective


Vital Signs: 


 Vital Signs











Temperature  97.8 F   06/06/17 10:00


 


Pulse Rate  73   06/06/17 10:00


 


Respiratory Rate  20   06/06/17 10:00


 


Blood Pressure  130/52   06/06/17 10:00


 


O2 Sat by Pulse Oximetry (%)  99   06/05/17 21:00











Constitutional: Yes: Calm


Eyes: Yes: Conjunctiva Clear


HENT: Yes: Atraumatic


Neck: Yes: Supple


Cardiovascular: Yes: S1, S2


Respiratory: Yes: CTA Bilaterally


Gastrointestinal: Yes: Soft


Genitourinary: Yes: WNL


Musculoskeletal: Yes: WNL


Edema: Yes


Edema: LLE: 2+, RLE: 2+


Wound/Incision: Yes: Dressing Dry and Intact


Neurological: Yes: Oriented


Psychiatric: Yes: Oriented


Labs: 


 CBC, BMP





 06/05/17 06:40 





 06/06/17 06:20 











Problem List





- Problems


(1) Hypertension


Code(s): I10 - ESSENTIAL (PRIMARY) HYPERTENSION   





(2) Morbid obesity


Code(s): E66.01 - MORBID (SEVERE) OBESITY DUE TO EXCESS CALORIES   





(3) CHF (congestive heart failure)


Code(s): I50.9 - HEART FAILURE, UNSPECIFIED   





(4) AGUILA (acute kidney injury)


Code(s): N17.9 - ACUTE KIDNEY FAILURE, UNSPECIFIED





(5) Obstructive uropathy


Code(s): N13.9 - OBSTRUCTIVE AND REFLUX UROPATHY, UNSPECIFIED





(6) Hydronephrosis


Code(s): N13.30 - UNSPECIFIED HYDRONEPHROSIS   








Assessment/Plan


 Current Medications











Generic Name Dose Route Start Last Admin





  Trade Name Freq  PRN Reason Stop Dose Admin


 


Acetaminophen  650 mg 05/27/17 20:11  





  Tylenol -  PO   





  Q4H PRN   





  FEVER OR PAIN   


 


Acetaminophen  325 mg 06/01/17 09:23 06/05/17 22:49





  Tylenol -  PO   325 mg





  Q6H PRN   Administration





  PAIN   


 


Cephalexin HCl  500 mg 06/05/17 10:00 06/06/17 10:31





  Keflex -  PO   500 mg





  BID KELVIN   Administration


 


Furosemide  40 mg 06/03/17 06:00 06/06/17 06:00





  Lasix -  PO   40 mg





  BID@0600,1400 KELVIN   Administration


 


Metoprolol Succinate  50 mg 05/28/17 10:00 06/06/17 10:31





  Toprol Xl -  PO   50 mg





  DAILY KELVIN   Administration


 


Oxycodone HCl  5 mg 06/01/17 09:23 06/05/17 22:48





  Roxicodone -  PO   5 mg





  Q6H PRN   Administration





  PAIN   


 


Pantoprazole Sodium  20 mg 05/28/17 10:00 06/06/17 10:31





  Protonix -  PO   20 mg





  DAILY KELVIN   Administration


 


Rivaroxaban  20 mg 06/03/17 10:00 06/06/17 10:31





  Xarelto -  PO   20 mg





  DAILY KELVIN   Administration


 


Senna  2 tab 05/27/17 22:00 06/05/17 22:48





  Senna -  PO   2 tab





  HS KELVIN   Administration


 


Tamsulosin HCl  0.8 mg 05/28/17 08:30 06/06/17 08:56





  Flomax -  PO   0.8 mg





  DAILY@0830 KELVIN   Administration














Impression


1. AGUILA


2. hydronephrosis


3. obstructive uropathy


4. fluid overload


5. HTN


6. UTI





Plan


- cont with lasix


- cont with fluid restriction


- renal function is improving


- discussed diet again with pt and his wife


- monitor urine output


- can follow as outpt


- cont pt/rehab


- cont flomax


Dr Teran

## 2017-06-06 NOTE — PN
Progress Note (short form)





- Note


Progress Note: 


itchy raised macular rash


?hives on left buttock





day #10 antibiotics





 Vital Signs











 Period  Temp  Pulse  Resp  BP Sys/Ross  Pulse Ox


 


 Last 24 Hr  97.6 F-98.8 F  70-77  18-20  125-136/48-60  99








cor-rrr


lungs clear


abd soft,nt


ext bandaged +edema





skin- raised macular rash on back, neck upper arms, ?hives on left buttock





 CBC, BMP





 06/05/17 06:40 





 06/06/17 06:20 





 Microbiology





05/29/17 11:21   Blood - Peripheral Venous   Blood Culture - Final


                            NO GROWTH AFTER 5 DAYS INCUBATION


05/29/17 11:15   Blood - Peripheral Venous   Blood Culture - Final


                            NO GROWTH AFTER 5 DAYS INCUBATION


05/30/17 14:45   Urine - Urine Clean Catch   Urine Culture - Final


05/27/17 17:35   Blood - Peripheral Venous   Blood Culture - Final


                            Proteus Mirabilis


05/27/17 17:35   Blood - Peripheral Venous   Blood Culture - Final


                            Proteus Mirabilis


05/27/17 23:00   Urine - Urine Clean Catch   Urine Culture - Final


                            Proteus Mirabilis





a/p


suspect drug rash


day #10 antibiotics for proteus bacteremia secondary to UTI


suspect betalactam rash


suggest benadryl prn


d/c antibiotics


has completed 10 days





would observe overnight in hospital to make sure rash does not worsen


d/w Dr Paniagua

## 2017-06-07 RX ADMIN — TRIAMCINOLONE ACETONIDE SCH: 1 CREAM TOPICAL at 14:30

## 2017-06-07 RX ADMIN — PANTOPRAZOLE SODIUM SCH MG: 20 TABLET, DELAYED RELEASE ORAL at 09:51

## 2017-06-07 RX ADMIN — FUROSEMIDE SCH MG: 40 TABLET ORAL at 14:54

## 2017-06-07 RX ADMIN — RIVAROXABAN SCH MG: 20 TABLET, FILM COATED ORAL at 09:51

## 2017-06-07 RX ADMIN — TRIAMCINOLONE ACETONIDE SCH APPLIC: 1 CREAM TOPICAL at 22:23

## 2017-06-07 RX ADMIN — DIPHENHYDRAMINE HCL PRN MG: 25 CAPSULE ORAL at 10:49

## 2017-06-07 RX ADMIN — METOPROLOL SUCCINATE SCH MG: 50 TABLET, EXTENDED RELEASE ORAL at 09:51

## 2017-06-07 RX ADMIN — TAMSULOSIN HYDROCHLORIDE SCH MG: 0.4 CAPSULE ORAL at 08:31

## 2017-06-07 RX ADMIN — TRIAMCINOLONE ACETONIDE SCH APPLIC: 1 CREAM TOPICAL at 23:08

## 2017-06-07 RX ADMIN — HYDROCORTISONE SCH: 1 CREAM TOPICAL at 20:54

## 2017-06-07 RX ADMIN — FUROSEMIDE SCH MG: 40 TABLET ORAL at 06:34

## 2017-06-07 RX ADMIN — HYDROCORTISONE SCH APPLIC: 1 CREAM TOPICAL at 09:35

## 2017-06-07 RX ADMIN — SENNOSIDES SCH TAB: 8.6 TABLET, FILM COATED ORAL at 22:22

## 2017-06-07 NOTE — PN
Teaching Attending Note


Name of Resident: Sammy Paniagua


ATTENDING PHYSICIAN STATEMENT





I saw and evaluated the patient.


I reviewed the resident's note and discussed the case with the resident.


I agree with the resident's findings and plan as documented.








SUBJECTIVE: Patient has developed an itchy rash.








OBJECTIVE:


 Vital Signs











 Period  Temp  Pulse  Resp  BP Sys/Ross  Pulse Ox


 


 Last 24 Hr  97.4 F-98.4 F  69-70  20-20  125-145/52-72  98








HEART: S1 S2, RRR


LUNGS: Clear


ABDOMEN: Obese, soft, non-tender, non-distended, normal BS


EXTREMITIES: 2+ edema


SKIN: Urticaria on left lower back/upper buttock, both upper arms, upper left 

chest








ASSESSMENT AND PLAN:


This is an 81-year-old man from [place] with a history of left hip OA, HTN, 

chronic diastolic HF, hyperlipidemia, BPH, GERD, pacemaker who presented to the 

ER with redness and swelling of his legs.





1. Proteus bacteremia secondary to UTI


   - Keflex discontinued secondary to rash


   - Completed 8 days of Rocephin


2. Skin rash, possible drug reaction


   - Keflex discontinued


   - Change hydrocortisone to triamcinolone


   - Continue Benadryl as needed


3. Acute kidney injury secondary to obstructive uropathy from BPH with urinary 

retention and bilateral hydronephrosis


   - Renal function improved with Berry catheter


   - Continue Flomax


   - Cozaar discontinued


   - Maintain Berry


   - Outpatient urology follow-up


4. Probable chronic venous insufficiency with lymphedema of both legs


   - Continue Lasix for leg edema


   - Venous dopplers negative for DVT


5. Left calcaneus spur


6. Chronic diastolic heart failure 


   - Stable


   - Continue Lasix, fluid restriction


7. Possible history of atrial fibrillation 


   - Continue Toprol XL, Xarelto


8. Symptomatic bradycardia, history of pacemaker


9. Hypertension


   - Continue Toprol XL


   - Cozaar discontinued secondary to AGUILA


10. Hyperlipidemia


11. GERD


   - Continue Protonix

## 2017-06-07 NOTE — PN
Progress Note (short form)





- Note


Progress Note: 


icontinues to have rash- notes it has worsened


+pruritis





no fevers





 Vital Signs











 Period  Temp  Pulse  Resp  BP Sys/Ross  Pulse Ox


 


 Last 24 Hr  97.4 F-98.4 F  69-70  20-20  125-145/52-72  98








cor-rrr


lungs decreased bs at bases


abd soft, nt


ext +edema of legs and hands


bandages both legs





 CBC, BMP





 06/05/17 06:40 


7%eos


 06/06/17 06:20 








                            





a/p


suspect drug rash-eosinophilia, last dose cephlexin yesterday, benaryl/steroid 

cream, monitor





s/p day #10 antibiotics for proteus bacteremia secondary to UTI

## 2017-06-07 NOTE — PN
Progress Note, Physician


History of Present Illness: 


Pt seen and examined at bedside. He complains of a generalized rash that 

started last night. He denies shortness of breath. 





- Current Medication List


Current Medications: 


Active Medications





Acetaminophen (Tylenol -)  650 mg PO Q4H PRN


   PRN Reason: FEVER OR PAIN


Acetaminophen (Tylenol -)  325 mg PO Q6H PRN


   PRN Reason: PAIN


   Last Admin: 06/05/17 22:49 Dose:  325 mg


Diphenhydramine HCl (Benadryl -)  25 mg PO Q6H PRN


   PRN Reason: FOR ITCHING


   Last Admin: 06/07/17 10:49 Dose:  25 mg


Furosemide (Lasix -)  40 mg PO BID@0600,1400 [facility]


   Last Admin: 06/07/17 06:34 Dose:  40 mg


Hydrocortisone (Hytone 1% Cream -)  1 applic TP TID Count includes the [facility]


   Last Admin: 06/07/17 09:35 Dose:  1 applic


Metoprolol Succinate (Toprol Xl -)  50 mg PO DAILY [facility]


   Last Admin: 06/07/17 09:51 Dose:  50 mg


Oxycodone HCl (Roxicodone -)  5 mg PO Q6H PRN


   PRN Reason: PAIN


   Last Admin: 06/06/17 21:54 Dose:  5 mg


Pantoprazole Sodium (Protonix -)  20 mg PO DAILY [facility]


   Last Admin: 06/07/17 09:51 Dose:  20 mg


Rivaroxaban (Xarelto -)  20 mg PO DAILY [facility]


   Last Admin: 06/07/17 09:51 Dose:  20 mg


Senna (Senna -)  2 tab PO HS [facility]


   Last Admin: 06/06/17 21:52 Dose:  2 tab


Tamsulosin HCl (Flomax -)  0.8 mg PO DAILY@0830 [facility] includes [facility]


   Last Admin: 06/07/17 08:31 Dose:  0.8 mg











- Objective


Vital Signs: 


 Vital Signs











Temperature  97.4 F L  06/07/17 09:36


 


Pulse Rate  69   06/07/17 09:36


 


Respiratory Rate  20   06/07/17 09:36


 


Blood Pressure  125/56   06/07/17 09:36


 


O2 Sat by Pulse Oximetry (%)  98   06/06/17 21:00











Constitutional: Yes: Calm


Eyes: Yes: Conjunctiva Clear


HENT: Yes: Atraumatic


Cardiovascular: Yes: S1, S2


Respiratory: Yes: CTA Bilaterally


Gastrointestinal: Yes: Soft, Abdomen, Obese


Genitourinary: Yes: Gerber Present


Musculoskeletal: Yes: Muscle Weakness


Edema: Yes


Edema: LLE: 3+, RLE: 3+


Integumentary: Yes: Rash


Neurological: Yes: Oriented


Psychiatric: Yes: Oriented


Labs: 


 CBC, BMP





 06/05/17 06:40 





 06/06/17 06:20 











Problem List





- Problems


(1) Hypertension


Code(s): I10 - ESSENTIAL (PRIMARY) HYPERTENSION   





(2) Morbid obesity


Code(s): E66.01 - MORBID (SEVERE) OBESITY DUE TO EXCESS CALORIES   





(3) CHF (congestive heart failure)


Code(s): I50.9 - HEART FAILURE, UNSPECIFIED   





(4) AGUILA (acute kidney injury)


Code(s): N17.9 - ACUTE KIDNEY FAILURE, UNSPECIFIED





(5) Obstructive uropathy


Code(s): N13.9 - OBSTRUCTIVE AND REFLUX UROPATHY, UNSPECIFIED





(6) Hydronephrosis


Code(s): N13.30 - UNSPECIFIED HYDRONEPHROSIS   








Assessment/Plan


 Current Medications











Generic Name Dose Route Start Last Admin





  Trade Name Freq  PRN Reason Stop Dose Admin


 


Acetaminophen  650 mg 05/27/17 20:11  





  Tylenol -  PO   





  Q4H PRN   





  FEVER OR PAIN   


 


Acetaminophen  325 mg 06/01/17 09:23 06/05/17 22:49





  Tylenol -  PO   325 mg





  Q6H PRN   Administration





  PAIN   


 


Diphenhydramine HCl  25 mg 06/07/17 10:08 06/07/17 10:49





  Benadryl -  PO   25 mg





  Q6H PRN   Administration





  FOR ITCHING   


 


Furosemide  40 mg 06/03/17 06:00 06/07/17 06:34





  Lasix -  PO   40 mg





  BID@0600,1400 KELVIN   Administration


 


Hydrocortisone  1 applic 06/07/17 08:30 06/07/17 09:35





  Hytone 1% Cream -  TP   1 applic





  TID KELVIN   Administration


 


Metoprolol Succinate  50 mg 05/28/17 10:00 06/07/17 09:51





  Toprol Xl -  PO   50 mg





  DAILY KELVIN   Administration


 


Oxycodone HCl  5 mg 06/01/17 09:23 06/06/17 21:54





  Roxicodone -  PO   5 mg





  Q6H PRN   Administration





  PAIN   


 


Pantoprazole Sodium  20 mg 05/28/17 10:00 06/07/17 09:51





  Protonix -  PO   20 mg





  DAILY KELVIN   Administration


 


Rivaroxaban  20 mg 06/03/17 10:00 06/07/17 09:51





  Xarelto -  PO   20 mg





  DAILY KELVIN   Administration


 


Senna  2 tab 05/27/17 22:00 06/06/17 21:52





  Senna -  PO   2 tab





  [facility] KELVIN   Administration


 


Tamsulosin HCl  0.8 mg 05/28/17 08:30 06/07/17 08:31





  Flomax -  PO   0.8 mg





  DAILY@0830 KELVIN   Administration














Impression


1. AGUILA


2. hydronephrosis


3. obstructive uropathy


4. fluid overload


5. HTN


6. UTI


7. rash





Plan


- check bmp


- cont with diuretics


- fluids restrict and control sodium intake, discussed with pt


- will follow


- cont with flomax


- keep gerber in place until evaluated by urology





Dr Teran

## 2017-06-07 NOTE — PN
Physical Exam: 


SUBJECTIVE: 





Patient seen and examined at bedside. Stated that his rash is getting worse.  

No fever, chills, n/v, chest pain, sob, dizziness, headache.





OBJECTIVE:





 Vital Signs











 Period  Temp  Pulse  Resp  BP Sys/Ross  Pulse Ox


 


 Last 24 Hr  97.4 F-98.4 F  69-70  20-20  125-145/52-72  98











GENERAL: AAOx3, in no acute distress.


NECK:  supple without lymphadenopathy, JVD, or masses.


LUNGS: CTAB


HEART: RRR, normal S1 and S2 with systolic murmur


ABDOMEN: Soft, nontender, not distended, normoactive bowel sounds, no guarding, 

no rebound, no masses.    


EXTREMITIES: ACE banages wrapped b/l legs


NEUROLOGICAL:  Cranial nerves II-XII intact. Normal speech. gait not observed


: gerber contains 100c yellow urine 


SKIN: more pronounced urticaria in clusters across his L chest, and neck/

shoulder areas. 











Active Medications











Generic Name Dose Route Start Last Admin





  Trade Name Freq  PRN Reason Stop Dose Admin


 


Acetaminophen  650 mg 05/27/17 20:11  





  Tylenol -  PO   





  Q4H PRN   





  FEVER OR PAIN   


 


Acetaminophen  325 mg 06/01/17 09:23 06/05/17 22:49





  Tylenol -  PO   325 mg





  Q6H PRN   Administration





  PAIN   


 


Diphenhydramine HCl  25 mg 06/07/17 10:08 06/07/17 10:49





  Benadryl -  PO   25 mg





  Q6H PRN   Administration





  FOR ITCHING   


 


Furosemide  40 mg 06/03/17 06:00 06/07/17 06:34





  Lasix -  PO   40 mg





  BID@0600,1400 KELVIN   Administration


 


Metoprolol Succinate  50 mg 05/28/17 10:00 06/07/17 09:51





  Toprol Xl -  PO   50 mg





  DAILY KELVIN   Administration


 


Oxycodone HCl  5 mg 06/01/17 09:23 06/06/17 21:54





  Roxicodone -  PO   5 mg





  Q6H PRN   Administration





  PAIN   


 


Pantoprazole Sodium  20 mg 05/28/17 10:00 06/07/17 09:51





  Protonix -  PO   20 mg





  DAILY KELVIN   Administration


 


Rivaroxaban  20 mg 06/03/17 10:00 06/07/17 09:51





  Xarelto -  PO   20 mg





  DAILY KELVIN   Administration


 


Senna  2 tab 05/27/17 22:00 06/06/17 21:52





  Senna -  PO   2 tab





  HS KELVIN   Administration


 


Tamsulosin HCl  0.8 mg 05/28/17 08:30 06/07/17 08:31





  Flomax -  PO   0.8 mg





  DAILY@0830 KELVIN   Administration


 


Triamcinolone Acetonide  1 applic 06/07/17 14:00  





  Aristocort 0.1% Cream -  TP   





  QID KELVIN   








IMAGING:





X-ray of L foot on 6/5: possible L 1st metatarsal gout, calcaneal spur





Renal U/S on 5/30: Moderate bilateral renal hydronephrosis, right more than 

left with slight worsening in the degree of right renal hydronephrosis since 

the prior exam 





CXR on 3/27: Since 3/11/2017, the congestive changes have diminished. There is 

still a large heart with pacemaker and degenerative findings as well as old 

trauma involving the left AC joint and clavicle. Correlation recommended 





LE doppler on 3/27:  no DVT





ASSESSMENT/PLAN:





80 yo M admitted to med-surg for worsening b/l lower ext swelling.





Urticaria


   - Suspected beta-lactam allergy


   - Benadryl 25mg PO Q6H PRN


   - Changed hydrocortisone 1% to triamcinolone 0.1% TID 





L foot pain


   - Calcaneal spur and possible gout


   - Podiatry consult





Lower extremity edema, bilateral


   - Improving


   - Combination of lymphedema (extends above knees) and pitting edema 2/2 dCHF


   - Cont. compression bandages


   - Leg elevation as tolerated





Gram negative bacteremia 2/2 complicated UTI


   - Complicated: male sex; obstructive uropathy from BPH


   - Afrebile, leukocytosis resolved, stable vitals


   - Repeat BX and UX negative


   - Completed rocephin course


   - Keflex d/c due to possible drug rash





Gross hematuria, s/p coude catheter insertion


   - Resolved


   - Cont. gerber





Hydronephrosis, R > L side


   - Cr normalized


   - Maintain gerber and monitor I/O


   - Cont. flomax to 0.8mg PO daily 





CHF, diastolic


   - Stable


   - EF 71% (2016)


   - Cont. PO lasix 40mg BID





h/o A-fib


   - rate controlled


   - Cont. toprol XL and xarelto 





Arrhythmia s/p dual chamber pacemaker


   - Last interrogated 2 months ago


   - EKG shows ventricular paced rhythm 





GERD


   - Cont. PO protonix 20mg





Hip pain, chronic


   - Cont. roxicodone





HTN


   - Controlled


   - Cozaar held due to AGUILA





FEN


   - IVF not indicated


   - Normal lytes, Cont. to monitor 


   - Sodium controlled diet





Prophylaxis


   - DVT: on xarelto


   - GI: on PO protonix  





Disposition


   - Discharge to [facility] held due to drug rash. May discharge tomorrow on 

topical cream and benadryl if rash improves.





Code status


   - Full code








Visit type





- Emergency Visit


Emergency Visit: No





- New Patient


This patient is new to me today: No





- Critical Care


Critical Care patient: No

## 2017-06-07 NOTE — HOSP
Subjective





- Review of Symptoms


Events since last encounter: 


Paged regarding pt's rash.


Pt states he feels itchy and rash seems to be spreading at this time. He states 

rash started 3-4 days ago, began at his waist and is now on his waist, upper 

extremities, head, neck and upper back.


He denies any allergic reactions in the past.





PE:


Skin: Macular rash on waist, proximal upper extremities, L upper back, neck.





A/P:


D/C keflex


Benadryl 25 mg PO given, if no improvement will consider IV benadryl


hydrocortisone 1% cream to affected regions





Physical Examination


Vital Signs: 


 Vital Signs











Temperature  98.4 F   06/06/17 18:00


 


Pulse Rate  70   06/06/17 18:00


 


Respiratory Rate  20   06/06/17 18:00


 


Blood Pressure  141/52   06/06/17 18:00


 


O2 Sat by Pulse Oximetry (%)  98   06/06/17 09:00











Labs: 


 CBC, BMP





 06/05/17 06:40 





 06/06/17 06:20 











Visit type





- Emergency Visit


Emergency Visit: Yes


ED Registration Date: 05/27/17


Care time: The patient presented to the Emergency Department on the above date 

and was hospitalized for further evaluation of their emergent condition.





- New Patient


This patient is new to me today: Yes


Date on this admission: 06/12/17





- Critical Care


Critical Care patient: No

## 2017-06-08 VITALS — HEART RATE: 72 BPM | TEMPERATURE: 98 F | DIASTOLIC BLOOD PRESSURE: 56 MMHG | SYSTOLIC BLOOD PRESSURE: 121 MMHG

## 2017-06-08 LAB
ANION GAP SERPL CALC-SCNC: 11 MMOL/L (ref 8–16)
CALCIUM SERPL-MCNC: 9 MG/DL (ref 8.5–10.1)
CO2 SERPL-SCNC: 27 MMOL/L (ref 21–32)
COCKROFT - GAULT: 102.21
CREAT SERPL-MCNC: 1.2 MG/DL (ref 0.7–1.3)
GLUCOSE SERPL-MCNC: 93 MG/DL (ref 74–106)

## 2017-06-08 RX ADMIN — PANTOPRAZOLE SODIUM SCH MG: 20 TABLET, DELAYED RELEASE ORAL at 09:02

## 2017-06-08 RX ADMIN — TAMSULOSIN HYDROCHLORIDE SCH MG: 0.4 CAPSULE ORAL at 09:01

## 2017-06-08 RX ADMIN — TRIAMCINOLONE ACETONIDE SCH APPLIC: 1 CREAM TOPICAL at 09:02

## 2017-06-08 RX ADMIN — METOPROLOL SUCCINATE SCH MG: 50 TABLET, EXTENDED RELEASE ORAL at 09:02

## 2017-06-08 RX ADMIN — RIVAROXABAN SCH MG: 20 TABLET, FILM COATED ORAL at 09:02

## 2017-06-08 RX ADMIN — FUROSEMIDE SCH MG: 40 TABLET ORAL at 07:13

## 2017-06-08 RX ADMIN — DIPHENHYDRAMINE HCL PRN MG: 25 CAPSULE ORAL at 00:05

## 2017-06-08 RX ADMIN — DIPHENHYDRAMINE HCL PRN MG: 25 CAPSULE ORAL at 09:02

## 2017-06-08 NOTE — DS
Physical Exam: 


SUBJECTIVE: 





Patient seen and examined at bedside. Stated that his rash is itchy.  No other 

complaint. 








OBJECTIVE:





 Vital Signs











 Period  Temp  Pulse  Resp  BP Sys/Ross  Pulse Ox


 


 Last 24 Hr  97.4 F-98.9 F  69-85  20-20  121-140/48-59  96-96








PHYSICAL EXAM





GENERAL: AAOx3, in no acute distress.


NECK:  supple without lymphadenopathy, JVD, or masses.


LUNGS: CTAB


HEART: RRR, normal S1 and S2 with systolic murmur


ABDOMEN: Soft, nontender, not distended, normoactive bowel sounds, no guarding, 

no rebound, no masses.    


EXTREMITIES: ACE banages wrapped b/l legs


NEUROLOGICAL:  Cranial nerves II-XII intact. Normal speech. gait not observed


: gerber contains 100c yellow urine 


SKIN: salmon color rashes in clusters across his L chest, and neck/shoulder 

areas. 





LABS


 


 


 CMP











Sodium  139 mmol/L (136-145)   06/08/17  07:30    


 


Potassium  4.0 mmol/L (3.5-5.1)   06/08/17  07:30    


 


Chloride  101 mmol/L ()   06/08/17  07:30    


 


Carbon Dioxide  27 mmol/L (21-32)  D 06/08/17  07:30    


 


Anion Gap  11  (8-16)   06/08/17  07:30    


 


BUN  21 mg/dL (7-18)  H  06/08/17  07:30    


 


Creatinine  1.2 mg/dL (0.7-1.3)   06/08/17  07:30    


 


Creat Clearance w eGFR  30.46  (>60)   05/27/17  17:35    


 


Calcium  9.0 mg/dL (8.5-10.1)   06/08/17  07:30    


 


Total Bilirubin  0.4 mg/dL (0.2-1.0)  D 05/27/17  17:35    


 


AST  13 U/L (15-37)  L D 05/27/17  17:35    


 


ALT  14 U/L (12-78)  D 05/27/17  17:35    


 


Alkaline Phosphatase  120 U/L ()  H D 05/27/17  17:35    


 


Total Protein  6.5 g/dl (6.4-8.2)  D 05/27/17  17:35    


 


Albumin  3.3 g/dl (3.4-5.0)  L  05/27/17  17:35    














HOSPITAL COURSE:





Date of Admission:05/27/17





81 year old male from [facility] with significant pmh of hypertension, CHF, 

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

, nephrolithiasis, prostate CA recently discharged from [facility]  where he was 

admit for rhabdomyolysis in 3/2017 and left Hip pain admittedd to the hospital 

for bilateral lower edema and redness. It's a combination of lymphedema (

extends above knees) and pitting edema 2/2 dCHF. Vascular is consulted and he's 

treated compression bandages with leg elevation as tolerated. Patient was also 

found to have gram negative bacteremia secondary to complicated urinary tract 

infection with positive urine culture that grew proteous. Moreover, he's also 

found to have gross hematuria upon inserting gerber but it had resolved within 

days likely secondary to obstructive nephropathy, for which his flomax dose is 

increased to 0.8 mg daily. He also finished full course of rocephine and 

switched to PO keflex but he later developed drug induced rashes and now is on 

benadryl and triamcinolone cream. His rashes continue to improve and he's now 

in stable condition to be discharged back to nursing home. 





He's instructed to follow up with Dr. Riojas, Dr. Keith, Dr. Teran and 

Dr. Donaldson within 2 weeks. He will be discharged with gerber due to his chronic 

obstructive nephropathy and he will see Dr. Donaldson to see when it's appropriate 

to remove the gerber. 





Date of Discharge: 06/08/17











Minutes to complete discharge: 35





Discharge Summary


Reason For Visit: CELLULITIS


Current Active Problems





Hip pain, left (Chronic) 


Hypertension (Chronic) 


Lymphedema (Chronic) 


Morbid obesity (Chronic) 


Osteoarthritis (Chronic) 


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 








Condition: Stable





- Instructions


Diet, Activity, Other Instructions: 


Instruction for continuing care:





You were admitted to the hospital for urinary tract infection, bloody urine and 

obstructive nephropathy due to your enlarged prostate. You were also found to 

have lymphedema and both legs. After inserting the gerber catheter, the 

obstruction and bloody urine have resolved and now you are urinating normal. 

You need to apply ace bandages on both legs everyday, keep them elevated and 

try to walk as tolerated.





You need to see a podiatrist regarding the bone spur in your L heel. Continue 

to follow up with your kidney doctor, urologist, pulmonologist, cardiologist 

for your chronic underlying medication conditions.





Your lasix dose has changed to 40mg PO twice a day instead of once every 2 days.


Referrals: 


Max Ugarte MD [Non Staff, Medical] - 


Dionicio Keith MD [Staff Physician] - 


Marine Teran MD [Staff Physician] - 


Shad Bronson MD [Staff Physician] - 


Disposition: SKILLED NURSING FACILITY





- Home Medications


Comprehensive Discharge Medication List: 


Ambulatory Orders





Losartan Potassium 25 mg PO DAILY 03/11/17 


Metoprolol Succinate [Toprol Xl] 50 mg PO DAILY 03/11/17 


Omeprazole 20 mg PO DAILY 03/11/17 


Oxycodone HCl/Acetaminophen [Percocet 5-325 mg Tablet] 1 tab PO PRN PRN 03/11/ 17 


Rivaroxaban [Xarelto -] 20 mg PO DAILY 03/11/17 


Tamsulosin HCl [Flomax] 0.8 mg PO DAILY 03/11/17 


Sennosides [Senna -] 2 tab PO HS  tablet 03/15/17 


Cephalexin Monohydrate [Keflex -] 500 mg PO BID #26 tab 06/06/17 


Furosemide [Lasix -] 40 mg PO BID #0 tab 06/06/17 








This patient is new to me today: No


Emergency Visit: No


Critical Care patient: No





- Discharge Referral


Referred to [facility] No

## 2017-06-08 NOTE — PN
Teaching Attending Note


Name of Resident: Sammy Paniagua


ATTENDING PHYSICIAN STATEMENT





I saw and evaluated the patient.


I reviewed the resident's note and discussed the case with the resident.


I agree with the resident's findings and plan as documented.








SUBJECTIVE: Rash is improving. No new complaints.








OBJECTIVE:


 Vital Signs











 Period  Temp  Pulse  Resp  BP Sys/Ross  Pulse Ox


 


 Last 24 Hr  97.4 F-98.9 F  69-85  20-20  121-140/48-59  96-96








HEART: S1 S2, RRR


LUNGS: Clear


ABDOMEN: Obese, soft, non-tender, non-distended, normal BS


EXTREMITIES: 2+ edema


SKIN: Urticarial rash on bilateral arms, left upper chest/shoulder, left upper 

buttock/lower back and bilateral groin less raised and less erythematous








ASSESSMENT AND PLAN:


This is an 81-year-old man from [place] with a history of left hip OA, HTN, 

chronic diastolic HF, hyperlipidemia, BPH, GERD, pacemaker who presented to the 

ER with redness and swelling of his legs.





1. Proteus bacteremia secondary to UTI


   - Keflex discontinued secondary to rash


   - Completed 8 days of Rocephin


2. Skin rash, possible drug reaction


   - Keflex discontinued


   - Continue triamcinolone


   - Continue Benadryl as needed


3. Acute kidney injury secondary to obstructive uropathy from BPH with urinary 

retention and bilateral hydronephrosis


   - Renal function improved with Berry catheter


   - Continue Flomax


   - Cozaar discontinued


   - Maintain Berry


   - Outpatient urology follow-up


4. Probable chronic venous insufficiency with lymphedema of both legs


   - Continue Lasix for leg edema


   - Venous dopplers negative for DVT


5. Left calcaneus spur


6. Chronic diastolic heart failure 


   - Stable


   - Continue Lasix, fluid restriction


7. Possible history of atrial fibrillation 


   - Continue Toprol XL, Xarelto


8. Symptomatic bradycardia, history of pacemaker


9. Hypertension


   - Continue Toprol XL


   - Cozaar discontinued secondary to AGUILA


10. Hyperlipidemia


11. GERD


   - Continue Protonix


12. Ok for discharge to [place] today

## 2017-12-12 ENCOUNTER — HOSPITAL ENCOUNTER (INPATIENT)
Dept: HOSPITAL 74 - JER | Age: 82
LOS: 11 days | Discharge: NURSING, REHAB OR LONG-TERM CARE | DRG: 603 | End: 2017-12-23
Attending: FAMILY MEDICINE | Admitting: FAMILY MEDICINE
Payer: COMMERCIAL

## 2017-12-12 VITALS — BODY MASS INDEX: 35.2 KG/M2

## 2017-12-12 DIAGNOSIS — E66.01: ICD-10-CM

## 2017-12-12 DIAGNOSIS — B35.1: ICD-10-CM

## 2017-12-12 DIAGNOSIS — L97.919: ICD-10-CM

## 2017-12-12 DIAGNOSIS — I48.91: ICD-10-CM

## 2017-12-12 DIAGNOSIS — E78.5: ICD-10-CM

## 2017-12-12 DIAGNOSIS — I89.0: ICD-10-CM

## 2017-12-12 DIAGNOSIS — I50.9: ICD-10-CM

## 2017-12-12 DIAGNOSIS — L03.116: Primary | ICD-10-CM

## 2017-12-12 DIAGNOSIS — I11.0: ICD-10-CM

## 2017-12-12 DIAGNOSIS — Z87.891: ICD-10-CM

## 2017-12-12 LAB
ALBUMIN SERPL-MCNC: 3.5 G/DL (ref 3.4–5)
ALP SERPL-CCNC: 133 U/L (ref 45–117)
ALT SERPL-CCNC: 19 U/L (ref 12–78)
ANION GAP SERPL CALC-SCNC: 8 MMOL/L (ref 8–16)
AST SERPL-CCNC: 12 U/L (ref 15–37)
BASOPHILS # BLD: 0.9 % (ref 0–2)
BILIRUB SERPL-MCNC: 0.8 MG/DL (ref 0.2–1)
CALCIUM SERPL-MCNC: 9.1 MG/DL (ref 8.5–10.1)
CO2 SERPL-SCNC: 27 MMOL/L (ref 21–32)
CREAT SERPL-MCNC: 1.3 MG/DL (ref 0.7–1.3)
CRP SERPL-MCNC: 0.6 MG/DL (ref 0–0.3)
DEPRECATED RDW RBC AUTO: 15.3 % (ref 11.9–15.9)
EOSINOPHIL # BLD: 3.5 % (ref 0–4.5)
GLUCOSE SERPL-MCNC: 102 MG/DL (ref 74–106)
MCH RBC QN AUTO: 28.8 PG (ref 25.7–33.7)
MCHC RBC AUTO-ENTMCNC: 33.2 G/DL (ref 32–35.9)
MCV RBC: 86.9 FL (ref 80–96)
NEUTROPHILS # BLD: 65.6 % (ref 42.8–82.8)
PLATELET # BLD AUTO: 323 K/MM3 (ref 134–434)
PMV BLD: 7.3 FL (ref 7.5–11.1)
PROT SERPL-MCNC: 6.7 G/DL (ref 6.4–8.2)
WBC # BLD AUTO: 8.7 K/MM3 (ref 4–10)

## 2017-12-12 PROCEDURE — G0480 DRUG TEST DEF 1-7 CLASSES: HCPCS

## 2017-12-12 NOTE — PDOC
Attending Attestation





- Resident


Resident Name: Dinah So





- ED Attending Attestation


I have performed the following: I have examined & evaluated the patient, The 

case was reviewed & discussed with the resident, I agree w/resident's findings 

& plan, Exceptions are as noted





- HPI


HPI: 





12/12/17 16:19


82M with h/o HTN, CHF, afib on xarelto, PPM, prostate CA, rhabdo, presenting 

with LLE redness and pain. Pt states that it started as a small wound several 

months ago. He has been seeing a wound care nurse, who has been changing his 

dressings. However, over the past week or so, he developed severe redness, 

swelling, and pain around his wound. He notes clear drainage but no pus. No F/

C. No CP/SOB.





- Physicial Exam


PE: 





12/12/17 16:21


"GENERAL: Awake, alert, and fully oriented, in no acute distress


HEAD: No signs of trauma


EYES: PERRLA, EOMI, sclera anicteric, conjunctiva clear


ENT: Auricles normal inspection, hearing grossly normal, nares patent, 

oropharynx clear without exudates. Moist mucosa


NECK: Nontender, no stepoffs, Normal ROM, supple, no lymphadenopathy, JVD, or 

masses


LUNGS: Breath sounds equal, clear to auscultation bilaterally.  No wheezes, and 

no crackles


HEART: Regular rate and rhythm, normal S1 and S2, no murmurs, rubs or gallops


ABDOMEN: Soft, nontender, normoactive bowel sounds.  No guarding, no rebound.  

No masses


EXTREMITIES: 2+ PE BLE, RLE with 4cm shallow ulceration draining serosanguinous 

fluid, no crepitus, no fluctuance, erythema from foot to knee, LLE with 

punctate ulcer with mild surrounding erythema


NEUROLOGICAL: Cranial nerves II through XII intact. 5/5 strength and sensation 

in all extremities, Normal speech, normal gait


SKIN: Warm, Dry, normal turgor, no rashes or lesions noted.


"





- Medical Decision Making





12/12/17 16:26


82 M with likely infected ulcer.


- Labs, cultures


- Venous/arterial dopplers


- XR


- abx


- vascular consult


- Admit

## 2017-12-12 NOTE — PN
Progress Note (short form)





- Note


Progress Note: 





ID consult dictated


Cellulitis R LE


Cephalosporin allergy


Pending c/s, empiric vancomycin

## 2017-12-12 NOTE — PDOC
History of Present Illness





- General


Stated Complaint: WOUND CARE


Time Seen by Provider: 12/12/17 14:24


History Source: Patient





- History of Present Illness


Initial Comments: 





12/12/17 14:53


Patient is an 82 y.o. 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 

and Cellulitis who presents at the behest of his wound care NP for a concern of 

LLE cellulitis.  Patient initially developed LLE Stage 2 ulcer while @ [facility] 4-5 month previous.  Since discharge from [facility], 

patient has been recieving bi-weekly wound dressing changes.  Patient denies 

any fevers, chills, chest pain, shortness of breath.  





NKDA


Social: denies cigarettes (previously 1 ppd > 20 years), denies alcohol, denies 

recreational drugs


Surgical: Pacemaker placement (November 2016)











Past History





- Past Medical History


Allergies/Adverse Reactions: 


 Allergies











Allergy/AdvReac Type Severity Reaction Status Date / Time


 


cephalexin monohydrate Allergy Mild Hives Verified 12/12/17 14:55





[facility]     











Home Medications: 


Ambulatory Orders





Metoprolol Succinate [Toprol Xl] 50 mg PO DAILY 03/11/17 


Omeprazole 20 mg PO DAILY 03/11/17 


Oxycodone HCl/Acetaminophen [Percocet 5-325 mg Tablet] 1 tab PO PRN PRN 03/11/ 17 


Rivaroxaban [Xarelto -] 20 mg PO DAILY 03/11/17 


Tamsulosin HCl [Flomax] 0.8 mg PO DAILY 03/11/17 


Sennosides [Senna -] 2 tab PO HS  tablet 03/15/17 


Furosemide [Lasix -] 40 mg PO BID #0 tab 06/06/17 


Diphenhydramine HCl [Benadryl Capsule -] 25 mg PO Q6H PRN #0 tab 06/08/17 


Triamcinolone 0.1% Cream [Aristocort 0.1% Cream -] 1 applic TP QID  applic 06/08 /17 








Anemia: No


Asthma: No


Cancer: No


Cardiac Disorders: Yes


CVA: No


COPD: No


CHF: Yes


Dementia: No


Diabetes: No


GI Disorders: Yes


 Disorders: No


HTN: Yes


Hypercholesterolemia: Yes


Liver Disease: No


Seizures: No


Thyroid Disease: No





- Surgical History


Abdominal Surgery: No


Appendectomy: No


Cardiac Surgery: Yes


Cholecystectomy: No


Lung Surgery: No


Neurologic Surgery: No


Orthopedic Surgery: Yes (bilateral knee sx 1965)





- Immunization History


Immunization Up to Date: Yes





- Suicide/Smoking/Psychosocial Hx


Smoking History: Former smoker


Have you smoked in the past 12 months: No


If you are a former smoker, when did you quit?: 1970


Hx Alcohol Use: No


Drug/Substance Use Hx: No


Substance Use Type: None





**Review of Systems





- Review of Systems


Constitutional: No: Chills, Fever


Respiratory: No: Shortness of Breath


Cardiac (ROS): No: Chest Pain





*Physical Exam





- Physical Exam


General Appearance: Yes: Nourished, Obese


Neck: positive: Trachea midline, Supple


Respiratory/Chest: positive: Lungs Clear


Cardiovascular: positive: S1, S2, Edema (B/L LE edema (chronic))


Gastrointestinal/Abdominal: positive: Soft, Protuberent.  negative: Distended, 

Guarding, Rebound, Tenderness, Hernia, Mass


Extremity: positive: Other (Circumferential RLE erythema from foot to mid calf, 

Stage II RLE ulcer @ anterior RLE over tibial surface; R plantar surface white 

area between toes 1-5)


Neurologic: positive: Fully Oriented, Alert





ED Treatment Course





- LABORATORY


CBC & Chemistry Diagram: 


 12/13/17 06:00





 12/13/17 06:00





Medical Decision Making





- Medical Decision Making





12/12/17 16:56


Patient is an 82 y.o. male who presents with concern for LLE cellulitis.  On PE

, patient has area of possible non-vascularization (white) on R plantar surface 

of his foot as well as RLE erythema, warmth and Stage 2 ulcer with 

serosanginous discharge.


PLAN:


1. RLE Doppler, Arterial + Venous


2. CBC, CMP, ESR, CRP


3. Blood cultures


4. RLE XR to evaluate for possible osteomyelitis. 





Patient to be started on IV Vancomycin for broad spectrum coverage, ID  consult 

(Dr. Amezquita evaluated on previous admission) pending.  XR negative for 

osteomyelitis, shows soft tissue swelling c/w working diagnosis of cellulitis. 





12/12/17 17:14


CBC shows no leukocytosis.  Elevated CRP, ESR.  Case d/w Dr. Fabian - accepted 

for inpatient admission. 








*DC/Admit/Observation/Transfer


Diagnosis at time of Disposition: 


 Cellulitis








- Referrals





- Patient Instructions





- Post Discharge Activity

## 2017-12-13 LAB
ALBUMIN SERPL-MCNC: 3.2 G/DL (ref 3.4–5)
ALP SERPL-CCNC: 105 U/L (ref 45–117)
ALT SERPL-CCNC: 18 U/L (ref 12–78)
ANION GAP SERPL CALC-SCNC: 7 MMOL/L (ref 8–16)
AST SERPL-CCNC: 13 U/L (ref 15–37)
BASOPHILS # BLD: 0.8 % (ref 0–2)
BILIRUB SERPL-MCNC: 1.4 MG/DL (ref 0.2–1)
CALCIUM SERPL-MCNC: 8.6 MG/DL (ref 8.5–10.1)
CO2 SERPL-SCNC: 27 MMOL/L (ref 21–32)
CREAT SERPL-MCNC: 1.3 MG/DL (ref 0.7–1.3)
CRP SERPL-MCNC: 3 MG/DL (ref 0–0.3)
DEPRECATED RDW RBC AUTO: 15 % (ref 11.9–15.9)
EOSINOPHIL # BLD: 1.9 % (ref 0–4.5)
GLUCOSE SERPL-MCNC: 115 MG/DL (ref 74–106)
MAGNESIUM SERPL-MCNC: 2.3 MG/DL (ref 1.8–2.4)
MCH RBC QN AUTO: 28.6 PG (ref 25.7–33.7)
MCHC RBC AUTO-ENTMCNC: 32.8 G/DL (ref 32–35.9)
MCV RBC: 87.1 FL (ref 80–96)
NEUTROPHILS # BLD: 70.4 % (ref 42.8–82.8)
PLATELET # BLD AUTO: 314 K/MM3 (ref 134–434)
PMV BLD: 7.3 FL (ref 7.5–11.1)
PROT SERPL-MCNC: 5.9 G/DL (ref 6.4–8.2)
TSH SERPL-ACNC: 1.78 UIU/ML (ref 0.36–3.74)
WBC # BLD AUTO: 9.1 K/MM3 (ref 4–10)

## 2017-12-13 RX ADMIN — NYSTATIN SCH APPLIC: 100000 OINTMENT TOPICAL at 22:59

## 2017-12-13 RX ADMIN — VANCOMYCIN HYDROCHLORIDE SCH: 1 INJECTION, POWDER, LYOPHILIZED, FOR SOLUTION INTRAVENOUS at 21:09

## 2017-12-13 RX ADMIN — SENNOSIDES SCH: 8.6 TABLET, FILM COATED ORAL at 22:59

## 2017-12-13 RX ADMIN — PANTOPRAZOLE SODIUM SCH MG: 20 TABLET, DELAYED RELEASE ORAL at 09:21

## 2017-12-13 RX ADMIN — TAMSULOSIN HYDROCHLORIDE SCH MG: 0.4 CAPSULE ORAL at 09:21

## 2017-12-13 RX ADMIN — NYSTATIN SCH APPLIC: 100000 OINTMENT TOPICAL at 11:51

## 2017-12-13 RX ADMIN — SENNOSIDES SCH TAB: 8.6 TABLET, FILM COATED ORAL at 00:21

## 2017-12-13 RX ADMIN — VANCOMYCIN HYDROCHLORIDE SCH MLS/HR: 1 INJECTION, POWDER, LYOPHILIZED, FOR SOLUTION INTRAVENOUS at 07:23

## 2017-12-13 RX ADMIN — METOPROLOL SUCCINATE SCH MG: 50 TABLET, EXTENDED RELEASE ORAL at 09:21

## 2017-12-13 RX ADMIN — RIVAROXABAN SCH MG: 20 TABLET, FILM COATED ORAL at 09:21

## 2017-12-13 RX ADMIN — FUROSEMIDE SCH MG: 10 INJECTION, SOLUTION INTRAVENOUS at 13:33

## 2017-12-13 NOTE — PN
Progress Note (short form)





- Note


Progress Note: 





doing well


oob in chair


no fevers


no complaints





 Vital Signs











 Period  Temp  Pulse  Resp  BP Sys/Ross  Pulse Ox


 


 Last 24 Hr  96.9 F-98.5 F    18-20  /47-76  








cor-rrr


lungs clear


abd soft,nt


ext +erythema LLE to below knee


+serous drainage


open abrasion onthe dosum of left foot


+swelling








 CBC, BMP





 12/13/17 06:00 





 12/13/17 06:00 








duplex no dvt


cultures pending





 Current Medications





Diphenhydramine HCl (Benadryl -)  25 mg PO Q6H PRN


   PRN Reason: FOR ITCHING


Furosemide (Lasix Injection -)  40 mg IVPUSH BID@0600,1400 [facility]


Vancomycin HCl 1,250 mg/ (Dextrose)  250 mls @ 166.667 mls/hr IVPB Q12H [facility]


   Last Admin: 12/13/17 07:23 Dose:  166.667 mls/hr


Metoprolol Succinate (Toprol Xl -)  50 mg PO DAILY [facility]


   Last Admin: 12/13/17 09:21 Dose:  50 mg


Nystatin (Mycostatin Ointment -)  1 applic TP BID [facility]


   Last Admin: 12/13/17 11:51 Dose:  1 applic


Oxycodone HCl (Roxicodone -)  5 mg PO Q4H PRN


   PRN Reason: PAIN


   Last Admin: 12/13/17 11:12 Dose:  5 mg


Pantoprazole Sodium (Protonix -)  20 mg PO DAILY [facility]


   Last Admin: 12/13/17 09:21 Dose:  20 mg


Rivaroxaban (Xarelto -)  20 mg PO DAILY [facility]


   Last Admin: 12/13/17 09:21 Dose:  20 mg


Senna (Senna -)  2 tab PO HS [facility]


   Last Admin: 12/13/17 00:21 Dose:  2 tab


Tamsulosin HCl (Flomax -)  0.8 mg PO DAILY@0830 [facility]


   Last Admin: 12/13/17 09:21 Dose:  0.8 mg





a/p


cellulitis RLE


cephalosporin allergy


continue vancomycin


f/u


cultures


check trough

## 2017-12-13 NOTE — HP
Admitting History and Physical





- Past Medical History


Cardiovascular: Yes: AFIB, CHF, HTN, Other (pacemaker)


Renal/: Yes: BPH, Renal Calculi





- Past Surgical History


Past Surgical History: Yes: Joint Replacement (knee), Permanent Pacemaker, 

Prostatectomy





- Advance Directives


Advance Directives: Yes: Living Will, Health Care Proxy





- Smoking History


Smoking history: Former smoker


Have you smoked in the past 12 months: No


If you are a former smoker, when did you quit?: 1970





- Alcohol/Substance Use


Hx Alcohol Use: No


History of Substance Use: reports: None





Home Medications





- Allergies


Allergies/Adverse Reactions: 


 Allergies











Allergy/AdvReac Type Severity Reaction Status Date / Time


 


cephalexin monohydrate Allergy Mild Hives Verified 12/12/17 14:55





[From Keflex]     














- Home Medications


Home Medications: 


Ambulatory Orders





Metoprolol Succinate [Toprol Xl] 50 mg PO DAILY 03/11/17 


Omeprazole 20 mg PO DAILY 03/11/17 


Oxycodone HCl/Acetaminophen [Percocet 5-325 mg Tablet] 1 tab PO PRN PRN 03/11/ 17 


Rivaroxaban [Xarelto -] 20 mg PO DAILY 03/11/17 


Tamsulosin HCl [Flomax] 0.8 mg PO DAILY 03/11/17 


Sennosides [Senna -] 2 tab PO HS  tablet 03/15/17 


Furosemide [Lasix -] 40 mg PO BID #0 tab 06/06/17 


Diphenhydramine HCl [Benadryl Capsule -] 25 mg PO Q6H PRN #0 tab 06/08/17 


Triamcinolone 0.1% Cream [Aristocort 0.1% Cream -] 1 applic TP QID  applic 06/08 /17 











Physical Examination


Vital Signs: 


 Vital Signs











Temperature  97.6 F   12/13/17 06:00


 


Pulse Rate  87   12/13/17 06:00


 


Respiratory Rate  20   12/13/17 06:00


 


Blood Pressure  137/59   12/13/17 06:00


 


O2 Sat by Pulse Oximetry (%)  100   12/12/17 23:00











Cardiovascular: Yes: S1, S2


Respiratory: Yes: Regular, CTA Bilaterally


Gastrointestinal: Yes: Normal Bowel Sounds, Soft


Extremities: Yes: Erythema (RT)


Edema: Yes


Edema: LLE: 3+, RLE: 3+


Integumentary: Yes: Erythema, Rash, Venous Stasis Changes, Other (ULCER ON 

RIGHT LEG)


Labs: 


 CBC, BMP





 12/13/17 06:00 





 12/13/17 06:00 











Problem List





- Problems


(1) Cellulitis


Assessment/Plan: 


IV ABX


ID CONSULT--ON BOARD


Code(s): L03.90 - CELLULITIS, UNSPECIFIED   





(2) CHF (congestive heart failure)


Assessment/Plan: 


IV LASIX


ECHO


CARDIO


Code(s): I50.9 - HEART FAILURE, UNSPECIFIED   





(3) Hypertension


Assessment/Plan: 


MONITOR ON CURRENT MEDS


Code(s): I10 - ESSENTIAL (PRIMARY) HYPERTENSION   





(4) Lymphedema


Assessment/Plan: 


ELEVATION


Code(s): I89.0 - LYMPHEDEMA, NOT ELSEWHERE CLASSIFIED   





(5) Morbid obesity


Code(s): E66.01 - MORBID (SEVERE) OBESITY DUE TO EXCESS CALORIES

## 2017-12-13 NOTE — CON.CARD
Consult


Consult Specialty:: cardiology


Referred by:: Caren


Reason for Consultation:: Bilateral leg edema





- History of Present Illness


Chief Complaint: Leg swelling


History of Present Illness: 





The patient is an 83-year-old man, minimally ambulatory, we've a history of 

hypertension, hyperlipidemia, atrial fibrillation on Xarelto, prostate cancer, 

now presenting with lower extremity swelling and erythema, possibly cellulitis. 

The patient denies chest pains, shortness of breath, palpitations. Is currently 

quite comfortable.





- History Source


History Provided By: Patient, Family Member, Medical Record


Limitations to Obtaining History: No Limitations





- Past Medical History


Cardio/Vascular: Yes: AFIB, CHF, HTN, Other (pacemaker)


Renal/: Yes: BPH, Renal Calculi





- Past Surgical History


Past Surgical History: Yes: Joint Replacement (knee), Permanent Pacemaker, 

Prostatectomy





- Alcohol/Substance Use


Hx Alcohol Use: No


History of Substance Use: reports: None





- Smoking History


Smoking history: Former smoker


Have you smoked in the past 12 months: No


If you are a former smoker, when did you quit?: 1970





Home Medications





- Allergies


Allergies/Adverse Reactions: 


 Allergies











Allergy/AdvReac Type Severity Reaction Status Date / Time


 


cephalexin monohydrate Allergy Mild Hives Verified 12/12/17 14:55





[From Keflex]     














- Home Medications


Home Medications: 


Ambulatory Orders





Metoprolol Succinate [Toprol Xl] 50 mg PO DAILY 03/11/17 


Omeprazole 20 mg PO DAILY 03/11/17 


Oxycodone HCl/Acetaminophen [Percocet 5-325 mg Tablet] 1 tab PO PRN PRN 03/11/ 17 


Rivaroxaban [Xarelto -] 20 mg PO DAILY 03/11/17 


Tamsulosin HCl [Flomax] 0.8 mg PO DAILY 03/11/17 


Sennosides [Senna -] 2 tab PO HS  tablet 03/15/17 


Furosemide [Lasix -] 40 mg PO BID #0 tab 06/06/17 


Diphenhydramine HCl [Benadryl Capsule -] 25 mg PO Q6H PRN #0 tab 06/08/17 


Triamcinolone 0.1% Cream [Aristocort 0.1% Cream -] 1 applic TP QID  applic 06/08 /17 











Review of Systems





- Review of Systems


Constitutional: reports: No Symptoms


Eyes: reports: No Symptoms


HENT: reports: No Symptoms


Neck: reports: No Symptoms


Cardiovascular: reports: No Symptoms


Respiratory: reports: No Symptoms


Gastrointestinal: reports: No Symptoms


Genitourinary: reports: No Symptoms


Breasts: reports: No Symptoms Reported


Musculoskeletal: reports: No Symptoms


Integumentary: reports: No Symptoms


Neurological: reports: No Symptoms


Endocrine: reports: No Symptoms


Hematology/Lymphatic: reports: No Symptoms


Psychiatric: reports: No Symptoms


Vital Signs: 


 Vital Signs











Temperature  96.9 F L  12/13/17 10:00


 


Pulse Rate  83   12/13/17 10:00


 


Respiratory Rate  20   12/13/17 10:00


 


Blood Pressure  110/50   12/13/17 10:00


 


O2 Sat by Pulse Oximetry (%)  100   12/13/17 10:00











Constitutional: Yes: No Distress, Calm, Obese


Eyes: Yes: WNL, Conjunctiva Clear


HENT: Yes: WNL, Atraumatic, Normocephalic


Neck: Yes: WNL, Supple, Trachea Midline


Respiratory: Yes: WNL, Regular, CTA Bilaterally


Gastrointestinal: Yes: WNL, Normal Bowel Sounds, Soft


Renal/: Yes: WNL


Cardiovascular: Yes: WNL, Regular Rate and Rhythm


JVD: No


Carotid Bruit: No


PMI: Non-Displaced


Heart Sounds: Yes: S1, S2


Murmur: Yes: Systolic Murmur, Grade 2


Musculoskeletal: Yes: WNL


Extremities: Yes: Erythema, Other (Bilateral leg edema)


Edema: LLE: 1+, RLE: 1+


Peripheral Pulses WNL: Yes


Integumentary: Yes: WNL


Neurological: Yes: WNL





- Other Data


Labs, Other Data: 


 CBC, BMP





 12/13/17 06:00 





 12/13/17 06:00 





 Troponin, BNP











  12/12/17





  16:00


 


B-Natriuretic Peptide  1082.09 H








 Troponin, BNP











  12/12/17





  16:00


 


B-Natriuretic Peptide  1082.09 H














Assessment/Plan





82-year-old morbidly obese man, minimally ambulatory, now presenting with 

cellulitis of the legs and marked edema. There was no evidence of DVTs on 

Dopplers. There is no CHF. The patient is breathing comfortably. Denies chest 

pains.





Continue Xarelto as currently.


Continue Lasix 40 mg IV twice daily.


Salt and fluid restrictions.


Continue the other medications as currently.


Please arrange for an echocardiogram.


No need for further cardiac workup at this point.


The patient is stable.

## 2017-12-13 NOTE — CONS
DATE OF CONSULTATION:  12/12/2017

 

The patient is an 82-year-old male evaluated for cellulitis of the right lower

extremity.  The patient was admitted to the hospital with a 1-week history of

worsening pain, swelling, and erythema of the right lower extremity.  According to

the notes, he was recently at a skilled nursing facility where he developed a stage 2

ulceration of the right lower extremity.  It became progressively worse to the point

where he was followed in the wound care center.  Over the past 1 week, he has had

increasing pain, swelling, and erythema with clear discharge.  He was sent from the

wound care center to the emergency room with the diagnosis of cellulitis.  A Doppler

examination was performed and was negative for DVT. The patient denies any traumatic

injury to his right lower extremity.  Denies any associated fever or chills.

 

PAST MEDICAL HISTORY:  Positive for hypertension, congestive heart failure, atrial

fibrillation, BPH, prostate cancer, nephrolithiasis.  History of proteus urosepsis in

May of 2017.  

 

PAST SURGICAL HISTORY:  Status post permanent pacemaker, bilateral total knee

replacements.

 

ALLERGIES:  CEPHALEXIN (HIVES).

 

MEDICATIONS:  Toprol, omeprazole, Xarelto, Flomax, Lasix.

 

SOCIAL HISTORY:  Lives at home with his significant other.  Former smoker.  

 

REVIEW OF SYSTEMS:Neurologic:  No loss of consciousness, seizure activity, or focal

weakness.

Cardiac:  Negative for chest pain or palpitations.  

Respiratory:  Negative for cough or sputum production. 

Gastrointestinal:  Negative for vomiting or diarrhea.

Genitourinary:  Negative for urinary tract infection.  

 

LABORATORY DATA:  White count 8.7, hematocrit 39.0, platelet count 323, BUN 32,

creatinine 1.3, sedimentation rate of 13, C-reactive protein is 0.6.  Blood and wound

cultures are pending.  

 

PHYSICAL EXAMINATION:   

General:  He is awake and alert.  He is not acutely toxic appearing.  

Vital signs:  Temperature 97.4, blood pressure 111/6, pulse 108 and regular,

respirations 20 per minute.

HEENT:  Sclerae are anicteric.  

Heart:  S1, S2.  

Lungs:  Clear. 

Abdomen:  Obese, soft, nontender.  

Extremities:  Examination of the lower extremities shows bilateral lower extremity

edema 3+.  There are superficial ulcerations present over the right pretibial area. 

There is confluent erythema, warmth, and swelling extending from the foot to below

the knee.  No crepitus, fluctuance, or lymphangitic streaking.  

 

IMPRESSION:  

1.  Cellulitis of the right lower extremity.

2.  Nonhealing right lower extremity ulcer.

3.  CEPHALOSPORIN ALLERGY.

4.  Morbid obesity.

 

PLAN:  Await culture results.  Empiric antibiotic coverage against skin zaira in this

patient with CEPHALOSPORIN ALLERGY with vancomycin 1250 mg IV piggy back every 12

hours.  Elevation.  Analgesics with local wound care.  I will follow.  Thank  you for

the kind referral.

 

 

ELAYNE AMAYA M.D.

 

[id number]

DD: 12/13/2017 12:37

DT: 12/13/2017 18:51

Job #:  [id number]

## 2017-12-14 LAB
BASOPHILS # BLD: 0.8 % (ref 0–2)
DEPRECATED RDW RBC AUTO: 15.3 % (ref 11.9–15.9)
EOSINOPHIL # BLD: 6.3 % (ref 0–4.5)
MCH RBC QN AUTO: 28.6 PG (ref 25.7–33.7)
MCHC RBC AUTO-ENTMCNC: 32.6 G/DL (ref 32–35.9)
MCV RBC: 87.6 FL (ref 80–96)
NEUTROPHILS # BLD: 57.4 % (ref 42.8–82.8)
PLATELET # BLD AUTO: 302 K/MM3 (ref 134–434)
PMV BLD: 7.4 FL (ref 7.5–11.1)
WBC # BLD AUTO: 6.6 K/MM3 (ref 4–10)

## 2017-12-14 RX ADMIN — PANTOPRAZOLE SODIUM SCH MG: 20 TABLET, DELAYED RELEASE ORAL at 10:08

## 2017-12-14 RX ADMIN — NYSTATIN SCH: 100000 OINTMENT TOPICAL at 13:32

## 2017-12-14 RX ADMIN — RIVAROXABAN SCH MG: 20 TABLET, FILM COATED ORAL at 10:08

## 2017-12-14 RX ADMIN — METOPROLOL SUCCINATE SCH MG: 50 TABLET, EXTENDED RELEASE ORAL at 10:08

## 2017-12-14 RX ADMIN — FUROSEMIDE SCH MG: 10 INJECTION, SOLUTION INTRAVENOUS at 06:29

## 2017-12-14 RX ADMIN — VANCOMYCIN HYDROCHLORIDE SCH: 1 INJECTION, POWDER, LYOPHILIZED, FOR SOLUTION INTRAVENOUS at 06:11

## 2017-12-14 RX ADMIN — TAMSULOSIN HYDROCHLORIDE SCH MG: 0.4 CAPSULE ORAL at 10:07

## 2017-12-14 RX ADMIN — FUROSEMIDE SCH: 10 INJECTION, SOLUTION INTRAVENOUS at 18:37

## 2017-12-14 RX ADMIN — TIGECYCLINE SCH MLS/HR: 50 INJECTION, POWDER, LYOPHILIZED, FOR SOLUTION INTRAVENOUS at 21:47

## 2017-12-14 RX ADMIN — SENNOSIDES SCH TAB: 8.6 TABLET, FILM COATED ORAL at 21:47

## 2017-12-14 RX ADMIN — NYSTATIN SCH: 100000 OINTMENT TOPICAL at 21:27

## 2017-12-14 NOTE — PN
Progress Note, Physician


History of Present Illness: 





OOB in chair


No c/o leg pain


No fever/chills


Wound c/s GNR x2





- Current Medication List


Current Medications: 


Active Medications





Diphenhydramine HCl (Benadryl -)  25 mg PO Q6H PRN


   PRN Reason: FOR ITCHING


Furosemide (Lasix -)  60 mg PO BID@0600,1400 [facility]


Vancomycin HCl 1,250 mg/ (Dextrose)  250 mls @ 166.667 mls/hr IVPB Q12H [facility]


   Last Admin: 12/14/17 06:11 Dose:  Not Given


Metoprolol Succinate (Toprol Xl -)  50 mg PO DAILY [facility]


   Last Admin: 12/14/17 10:08 Dose:  50 mg


Nystatin (Mycostatin Ointment -)  1 applic TP BID [facility]


   Last Admin: 12/14/17 13:32 Dose:  Not Given


Oxycodone HCl (Roxicodone -)  5 mg PO Q4H PRN


   PRN Reason: PAIN


   Last Admin: 12/14/17 06:30 Dose:  5 mg


Pantoprazole Sodium (Protonix -)  20 mg PO DAILY [facility]


   Last Admin: 12/14/17 10:08 Dose:  20 mg


Rivaroxaban (Xarelto -)  20 mg PO DAILY [facility]


   Last Admin: 12/14/17 10:08 Dose:  20 mg


Senna (Senna -)  2 tab PO HS [facility]


   Last Admin: 12/13/17 22:59 Dose:  Not Given


Tamsulosin HCl (Flomax -)  0.8 mg PO DAILY@0830 [facility]


   Last Admin: 12/14/17 10:07 Dose:  0.8 mg











- Objective


Vital Signs: 


 Vital Signs











Temperature  97.4 F L  12/14/17 14:02


 


Pulse Rate  81   12/14/17 14:02


 


Respiratory Rate  18   12/14/17 14:02


 


Blood Pressure  96/52   12/14/17 14:02


 


O2 Sat by Pulse Oximetry (%)  100   12/13/17 21:00











Constitutional: Yes: No Distress, Obese


Eyes: Yes: Conjunctiva Clear


Cardiovascular: Yes: Regular Rate and Rhythm, S1, S2


Respiratory: Yes: CTA Bilaterally


Gastrointestinal: Yes: Normal Bowel Sounds, Soft.  No: Tenderness


Extremities: Yes: Other (decreased erythema R LE + serous weepage)


Edema: Yes


Edema: LLE: 3+, RLE: 3+


Labs: 


 CBC, BMP





 12/14/17 06:00 





 12/13/17 06:00 











Assessment/Plan


Cellulitis/ non-healing ulcer R LE


Cephalosporin allergy


+ Wound c/s GNR





Substitute Tygacil


Await wound c/s


Elevation

## 2017-12-14 NOTE — PN
Progress Note, Physician


Chief Complaint: 





Swelling of the legs


History of Present Illness: 





The patient is sitting comfortably in the chair. He offers no new complaints. 

Denies chest pains and shortness of breath.





- Current Medication List


Current Medications: 


Active Medications





Diphenhydramine HCl (Benadryl -)  25 mg PO Q6H PRN


   PRN Reason: FOR ITCHING


Furosemide (Lasix Injection -)  40 mg IVPUSH BID@0600,1400 [facility]


   Last Admin: 12/14/17 06:29 Dose:  40 mg


Vancomycin HCl 1,250 mg/ (Dextrose)  250 mls @ 166.667 mls/hr IVPB Q12H [facility]


   Last Admin: 12/14/17 06:11 Dose:  Not Given


Metoprolol Succinate (Toprol Xl -)  50 mg PO DAILY [facility]


   Last Admin: 12/14/17 10:08 Dose:  50 mg


Nystatin (Mycostatin Ointment -)  1 applic TP BID [facility]


   Last Admin: 12/14/17 13:32 Dose:  Not Given


Oxycodone HCl (Roxicodone -)  5 mg PO Q4H PRN


   PRN Reason: PAIN


   Last Admin: 12/14/17 06:30 Dose:  5 mg


Pantoprazole Sodium (Protonix -)  20 mg PO DAILY [facility]


   Last Admin: 12/14/17 10:08 Dose:  20 mg


Rivaroxaban (Xarelto -)  20 mg PO DAILY [facility]


   Last Admin: 12/14/17 10:08 Dose:  20 mg


Senna (Senna -)  2 tab PO HS [facility]


   Last Admin: 12/13/17 22:59 Dose:  Not Given


Tamsulosin HCl (Flomax -)  0.8 mg PO DAILY@0830 [facility]


   Last Admin: 12/14/17 10:07 Dose:  0.8 mg











- Objective


Vital Signs: 


 Vital Signs











Temperature  98 F   12/14/17 10:18


 


Pulse Rate  83   12/14/17 10:18


 


Respiratory Rate  18   12/14/17 10:18


 


Blood Pressure  125/76   12/14/17 10:18


 


O2 Sat by Pulse Oximetry (%)  100   12/13/17 21:00











Constitutional: Yes: No Distress, Calm, Obese


Eyes: Yes: WNL, Conjunctiva Clear


HENT: Yes: WNL, Atraumatic, Normocephalic


Neck: Yes: WNL, Supple, Trachea Midline


Cardiovascular: Yes: WNL, Pulse Irregular, S1, S2


Respiratory: Yes: WNL, Regular, CTA Bilaterally


Gastrointestinal: Yes: WNL, Normal Bowel Sounds, Soft


...Rectal Exam: Yes: Deferred


Genitourinary: Yes: WNL


Breast(s): Yes: WNL


Musculoskeletal: Yes: WNL


Extremities: Yes: Erythema, Other (Draped)


Edema: Yes


Edema: LLE: 1+, RLE: 1+


Peripheral Pulses WNL: Yes


Peripheral Pulses: Left Radial: 1+, Right Radial: 1+, Left Doralis Pedis: 1+, 

Right Dorsalis Pedis: 1+, Left Femoral: 1+, Right Femoral: 1+


Integumentary: Yes: WNL


Wound/Incision: Yes: Clean/Dry


Neurological: Yes: WNL, Alert, Oriented


Labs: 


 CBC, BMP





 12/14/17 06:00 





 12/13/17 06:00 











Assessment/Plan





The patient continues to recover quite well. He is comfortable and offers no 

new complaints. Denies chest pains and shortness of breath.





The echocardiogram showed that both ventricles are functioning normally. Both 

atria are dilated. There is mild aortic valve stenosis. No other clinically 

important findings noted on that study.





Please switch Lasix to 60 mg by mouth twice daily.


Continue the other medications as currently.


Low-sodium diet.


May stop telemetry.


There is no need for further cardiac workup nor testing at this point.


Please do not hesitate to call us PRN

## 2017-12-14 NOTE — EKG
Test Reason : 

Blood Pressure : ***/*** mmHG

Vent. Rate : 075 BPM     Atrial Rate : 091 BPM

   P-R Int : 000 ms          QRS Dur : 138 ms

    QT Int : 418 ms       P-R-T Axes : 000 -70 090 degrees

   QTc Int : 466 ms

 

ventricular-paced complexes AND WITH OCCASIONAL PREMATURE VENTRICULAR

COMPLEXES

LEFT AXIS DEVIATION

RIGHT BUNDLE BRANCH BLOCK

ABNORMAL ECG

 

Confirmed by CHINO RUST, MENG ([id number]) on 12/14/2017 11:37:09 AM

 

Referred By: PHUC SHARPE DR           Confirmed By:MENG MAGANA MD

## 2017-12-14 NOTE — PN
Progress Note (short form)





- Note


Progress Note: 





appreiciate vascular note patient will need appointment at wound care center 

prior to discharge home


once he is ready will make appointment





will have ID follow up on patient to adjust abx based on cultures


vancomycin  trough ordered for today as yesterday he didnot get iv abx given 

elevated vanco level





Problem List





- Problems


(1) Cellulitis


Code(s): L03.90 - CELLULITIS, UNSPECIFIED   





(2) Lymphedema


Code(s): I89.0 - LYMPHEDEMA, NOT ELSEWHERE CLASSIFIED   





(3) Atrial fibrillation


Code(s): I48.91 - UNSPECIFIED ATRIAL FIBRILLATION

## 2017-12-14 NOTE — PN
Progress Note (short form)





- Note


Progress Note: 





Vascular surgery 





Pt seen and examined. 


Dressings changed on both legs. 


Pt's celllulitis is getting better. 


Pt has venous stasis with drainage from right leg. 


4x4, kerlex, ACE applied for compression daily 


Pt needs leg elevation. 


Pt needs to follow in a wound care clinic. 


Pt needs reflux studies and laser vein therapy to stop drainage. 


Please make appt prior to DC -- [phone]





Dionicio hassan DO

## 2017-12-14 NOTE — PN
Progress Note, Physician


Chief Complaint: 





awake alert sitting in chair


no distress 


no fever





- Current Medication List


Current Medications: 


Active Medications





Diphenhydramine HCl (Benadryl -)  25 mg PO Q6H PRN


   PRN Reason: FOR ITCHING


Furosemide (Lasix Injection -)  40 mg IVPUSH BID@0600,1400 [facility]


   Last Admin: 12/14/17 06:29 Dose:  40 mg


Vancomycin HCl 1,250 mg/ (Dextrose)  250 mls @ 166.667 mls/hr IVPB Q12H [facility]


   Last Admin: 12/14/17 06:11 Dose:  Not Given


Metoprolol Succinate (Toprol Xl -)  50 mg PO DAILY [facility]


   Last Admin: 12/14/17 10:08 Dose:  50 mg


Nystatin (Mycostatin Ointment -)  1 applic TP BID [facility]


   Last Admin: 12/13/17 22:59 Dose:  1 applic


Oxycodone HCl (Roxicodone -)  5 mg PO Q4H PRN


   PRN Reason: PAIN


   Last Admin: 12/14/17 06:30 Dose:  5 mg


Pantoprazole Sodium (Protonix -)  20 mg PO DAILY [facility]


   Last Admin: 12/14/17 10:08 Dose:  20 mg


Rivaroxaban (Xarelto -)  20 mg PO DAILY [facility]


   Last Admin: 12/14/17 10:08 Dose:  20 mg


Senna (Senna -)  2 tab PO HS [facility]


   Last Admin: 12/13/17 22:59 Dose:  Not Given


Tamsulosin HCl (Flomax -)  0.8 mg PO DAILY@0830 [facility]


   Last Admin: 12/14/17 10:07 Dose:  0.8 mg











- Objective


Vital Signs: 


 Vital Signs











Temperature  98 F   12/14/17 10:18


 


Pulse Rate  83   12/14/17 10:18


 


Respiratory Rate  18   12/14/17 10:18


 


Blood Pressure  125/76   12/14/17 10:18


 


O2 Sat by Pulse Oximetry (%)  100   12/13/17 21:00











Constitutional: Yes: Calm


Neck: Yes: Trachea Midline


Cardiovascular: Yes: Regular Rate and Rhythm, S1, S2


Respiratory: Yes: CTA Bilaterally


Gastrointestinal: Yes: Normal Bowel Sounds, Soft, Abdomen, Obese


Extremities: Yes: Erythema (of RLE)


Edema: Yes


Neurological: Yes: Alert, Oriented


Labs: 


 CBC, BMP





 12/14/17 06:00 





 12/13/17 06:00 











Problem List





- Problems


(1) Cellulitis


Assessment/Plan: 


on iv vancomycin


trough ordered for tmw





Code(s): L03.90 - CELLULITIS, UNSPECIFIED   





(2) Lymphedema


Assessment/Plan: 


iv lasix


monitor lytes


Code(s): I89.0 - LYMPHEDEMA, NOT ELSEWHERE CLASSIFIED   





(3) Atrial fibrillation


Assessment/Plan: 


on xarelto


metoprolol


echo noted 


Code(s): I48.91 - UNSPECIFIED ATRIAL FIBRILLATION

## 2017-12-15 LAB
ALBUMIN SERPL-MCNC: 3.4 G/DL (ref 3.4–5)
ALP SERPL-CCNC: 104 U/L (ref 45–117)
ALT SERPL-CCNC: 19 U/L (ref 12–78)
ANION GAP SERPL CALC-SCNC: 11 MMOL/L (ref 8–16)
AST SERPL-CCNC: 16 U/L (ref 15–37)
BILIRUB SERPL-MCNC: 0.8 MG/DL (ref 0.2–1)
CALCIUM SERPL-MCNC: 9.4 MG/DL (ref 8.5–10.1)
CO2 SERPL-SCNC: 25 MMOL/L (ref 21–32)
CREAT SERPL-MCNC: 1.3 MG/DL (ref 0.7–1.3)
GLUCOSE SERPL-MCNC: 95 MG/DL (ref 74–106)
PROT SERPL-MCNC: 6.5 G/DL (ref 6.4–8.2)

## 2017-12-15 RX ADMIN — TIGECYCLINE SCH MLS/HR: 50 INJECTION, POWDER, LYOPHILIZED, FOR SOLUTION INTRAVENOUS at 21:49

## 2017-12-15 RX ADMIN — FUROSEMIDE SCH MG: 40 TABLET ORAL at 13:58

## 2017-12-15 RX ADMIN — NYSTATIN SCH: 100000 OINTMENT TOPICAL at 11:42

## 2017-12-15 RX ADMIN — PANTOPRAZOLE SODIUM SCH MG: 20 TABLET, DELAYED RELEASE ORAL at 10:07

## 2017-12-15 RX ADMIN — SENNOSIDES SCH TAB: 8.6 TABLET, FILM COATED ORAL at 21:49

## 2017-12-15 RX ADMIN — TAMSULOSIN HYDROCHLORIDE SCH MG: 0.4 CAPSULE ORAL at 08:58

## 2017-12-15 RX ADMIN — METOPROLOL SUCCINATE SCH MG: 50 TABLET, EXTENDED RELEASE ORAL at 10:07

## 2017-12-15 RX ADMIN — TIGECYCLINE SCH MLS/HR: 50 INJECTION, POWDER, LYOPHILIZED, FOR SOLUTION INTRAVENOUS at 11:42

## 2017-12-15 RX ADMIN — FUROSEMIDE SCH MG: 40 TABLET ORAL at 06:05

## 2017-12-15 RX ADMIN — RIVAROXABAN SCH MG: 20 TABLET, FILM COATED ORAL at 10:07

## 2017-12-15 NOTE — DS
Physical Examination


Vital Signs: 


 Vital Signs











Temperature  98.1 F   12/15/17 09:09


 


Pulse Rate  72   12/15/17 09:09


 


Respiratory Rate  22   12/15/17 09:09


 


Blood Pressure  127/62   12/15/17 09:09


 


O2 Sat by Pulse Oximetry (%)  99   12/14/17 21:00











Constitutional: Yes: Calm


Neck: Yes: Trachea Midline


Cardiovascular: Yes: Regular Rate and Rhythm, S1, S2


Respiratory: Yes: CTA Bilaterally


Gastrointestinal: Yes: Soft, Abdomen, Obese


Extremities: Yes: Other (decreased erythema of RLE, wounds draining serous 

discharge)


Edema: Yes


Neurological: Yes: Alert, Oriented


Labs: 


 CBC, BMP





 12/14/17 06:00 





 12/15/17 06:30 











Discharge Summary


Reason For Visit: CELLULITIS


Current Active Problems





Atrial fibrillation (Acute)


Cellulitis (Acute)








Hospital Course: 





Patient is an 82 y.o. 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 

and Cellulitis who presents at the behest of his wound care NP for a concern of 

LLE cellulitis.  Patient initially developed LLE Stage 2 ulcer while @ [facility] 4-5 month previous.  Since discharge from [place], 

patient has been recieving bi-weekly wound dressing changes.  Patient denies 

any fevers, chills, chest pain, shortness of breath.  





NKDA


Social: denies cigarettes (previously 1 ppd > 20 years), denies alcohol, denies 

recreational drugs


Surgical: Pacemaker placement (November 2016)








admitted for RLE cellulitis and draining wounds 


venous stasis drainage seen by vascular will need laser therapy and reflux 

studies at wound care center


iv lasix changed to po lasix


had echo done as well





- Instructions


Disposition: SKILLED NURSING FACILITY





- Home Medications


Comprehensive Discharge Medication List: 


Ambulatory Orders





Metoprolol Succinate [Toprol Xl] 50 mg PO DAILY 03/11/17 


Omeprazole 20 mg PO DAILY 03/11/17 


Oxycodone HCl/Acetaminophen [Percocet 5-325 mg Tablet] 1 tab PO PRN PRN 03/11/ 17 


Rivaroxaban [Xarelto -] 20 mg PO DAILY 03/11/17 


Tamsulosin HCl [Flomax] 0.8 mg PO DAILY 03/11/17 


Sennosides [Senna -] 2 tab PO HS  tablet 03/15/17 


Furosemide [Lasix -] 40 mg PO BID #0 tab 06/06/17 


Diphenhydramine HCl [Benadryl Capsule -] 25 mg PO Q6H PRN #0 tab 06/08/17 


Triamcinolone 0.1% Cream [Aristocort 0.1% Cream -] 1 applic TP QID  applic 06/08 /17

## 2017-12-15 NOTE — PN
Progress Note, Physician


Chief Complaint: 





nhung sitting in chair


legs wrapped


no fever


says his right leg wounds is draining 





- Current Medication List


Current Medications: 


Active Medications





Acetaminophen (Tylenol -)  650 mg PO Q6H PRN


   PRN Reason: FEVER OR PAIN


Diphenhydramine HCl (Benadryl -)  25 mg PO Q6H PRN


   PRN Reason: FOR ITCHING


Furosemide (Lasix -)  60 mg PO BID@0600,1400 [facility]


   Last Admin: 12/15/17 06:05 Dose:  60 mg


Tigecycline 50 mg/ Dextrose  100 mls @ 100 mls/hr IVPB BID [facility]


   PRN Reason: Protocol


   Last Admin: 12/14/17 21:47 Dose:  100 mls/hr


Metoprolol Succinate (Toprol Xl -)  50 mg PO DAILY [facility]


   Last Admin: 12/15/17 10:07 Dose:  50 mg


Nystatin (Mycostatin Ointment -)  1 applic TP BID [facility]


   Last Admin: 12/14/17 21:27 Dose:  Not Given


Oxycodone HCl (Roxicodone -)  5 mg PO Q4H PRN


   PRN Reason: PAIN


   Last Admin: 12/15/17 09:05 Dose:  5 mg


Pantoprazole Sodium (Protonix -)  20 mg PO DAILY [facility]


   Last Admin: 12/15/17 10:07 Dose:  20 mg


Rivaroxaban (Xarelto -)  20 mg PO DAILY [facility]


   Last Admin: 12/15/17 10:07 Dose:  20 mg


Senna (Senna -)  2 tab PO HS [facility]


   Last Admin: 12/14/17 21:47 Dose:  2 tab


Tamsulosin HCl (Flomax -)  0.8 mg PO DAILY@0830 [facility]


   Last Admin: 12/15/17 08:58 Dose:  0.8 mg











- Objective


Vital Signs: 


 Vital Signs











Temperature  98.1 F   12/15/17 09:09


 


Pulse Rate  72   12/15/17 09:09


 


Respiratory Rate  22   12/15/17 09:09


 


Blood Pressure  127/62   12/15/17 09:09


 


O2 Sat by Pulse Oximetry (%)  99   12/14/17 21:00











Constitutional: Yes: Calm


Cardiovascular: Yes: Regular Rate and Rhythm, S1, S2


Respiratory: Yes: CTA Bilaterally


Gastrointestinal: Yes: Normal Bowel Sounds, Soft


Edema: Yes


Wound/Incision: Yes: Other (erythema of right leg is decreased)


Labs: 


 CBC, BMP





 12/14/17 06:00 





 12/15/17 06:30 











Problem List





- Problems


(1) Cellulitis


Assessment/Plan: 


Microbiology





12/12/17 23:40   Leg - Left Lower   Gram Stain - Final


12/12/17 23:40   Leg - Left Lower   Wound Culture - Preliminary


                              Lactose Fermenting Neg Bacilli


                              Non Lactose Fermenting Gnb





on tigacil awaiting final cultures


Code(s): L03.90 - CELLULITIS, UNSPECIFIED   





(2) Lymphedema


Assessment/Plan: 


po lasix


wound care consult noted will need to follow up at [facility] upon discharge


needs reflux studies and laser vein therapy


elevated leg  and ACE wrap


Code(s): I89.0 - LYMPHEDEMA, NOT ELSEWHERE CLASSIFIED   





(3) Atrial fibrillation


Assessment/Plan: 


on xarelto


metoprolol


echo noted 


Code(s): I48.91 - UNSPECIFIED ATRIAL FIBRILLATION   





Assessment/Plan





will need rehab called the care taker to talk to her about it


she also wants patient to go to rehab

## 2017-12-15 NOTE — PN
Progress Note, Physician


History of Present Illness: 





OOB in chair


No c/o leg pain


No fever/chills


Wound c/s  Klebsiella, serratia


Tolerating Tygacil





- Current Medication List


Current Medications: 


Active Medications





Acetaminophen (Tylenol -)  650 mg PO Q6H PRN


   PRN Reason: FEVER OR PAIN


Diphenhydramine HCl (Benadryl -)  25 mg PO Q6H PRN


   PRN Reason: FOR ITCHING


Furosemide (Lasix -)  60 mg PO BID@0600,1400 [facility]


   Last Admin: 12/15/17 13:58 Dose:  60 mg


Tigecycline 50 mg/ Dextrose  100 mls @ 100 mls/hr IVPB BID [facility]


   PRN Reason: Protocol


   Last Admin: 12/15/17 11:42 Dose:  100 mls/hr


Metoprolol Succinate (Toprol Xl -)  50 mg PO DAILY [facility]


   Last Admin: 12/15/17 10:07 Dose:  50 mg


Oxycodone HCl (Roxicodone -)  5 mg PO Q4H PRN


   PRN Reason: PAIN


   Last Admin: 12/15/17 09:05 Dose:  5 mg


Pantoprazole Sodium (Protonix -)  20 mg PO DAILY [facility]


   Last Admin: 12/15/17 10:07 Dose:  20 mg


Rivaroxaban (Xarelto -)  20 mg PO DAILY [facility]


   Last Admin: 12/15/17 10:07 Dose:  20 mg


Senna (Senna -)  2 tab PO HS [facility]


   Last Admin: 12/14/17 21:47 Dose:  2 tab


Tamsulosin HCl (Flomax -)  0.8 mg PO DAILY@0830 [facility]


   Last Admin: 12/15/17 08:58 Dose:  0.8 mg











- Objective


Vital Signs: 


 Vital Signs











Temperature  98.1 F   12/15/17 09:09


 


Pulse Rate  72   12/15/17 09:09


 


Respiratory Rate  22   12/15/17 09:09


 


Blood Pressure  127/62   12/15/17 09:09


 


O2 Sat by Pulse Oximetry (%)  98   12/15/17 09:00











Constitutional: Yes: No Distress


Eyes: Yes: Conjunctiva Clear


Cardiovascular: Yes: Regular Rate and Rhythm, S1, S2


Respiratory: Yes: CTA Bilaterally


Gastrointestinal: Yes: Normal Bowel Sounds, Soft.  No: Tenderness


Extremities: Yes: Other (+ LE erythema/ warmth improved + serous weepage)


Edema: Yes


Labs: 


 CBC, BMP





 12/14/17 06:00 





 12/15/17 06:30 











Assessment/Plan


Cellulitis/ non-healing ulcer R LE


Cephalosporin allergy


+ Wound c/s  klebsiella/ serratia





Continue  Tygacil


Elevation

## 2017-12-16 LAB
ALBUMIN SERPL-MCNC: 3.3 G/DL (ref 3.4–5)
ALP SERPL-CCNC: 113 U/L (ref 45–117)
ALT SERPL-CCNC: 19 U/L (ref 12–78)
ANION GAP SERPL CALC-SCNC: 9 MMOL/L (ref 8–16)
AST SERPL-CCNC: 14 U/L (ref 15–37)
BASOPHILS # BLD: 1.2 % (ref 0–2)
BILIRUB SERPL-MCNC: 0.8 MG/DL (ref 0.2–1)
CALCIUM SERPL-MCNC: 8.6 MG/DL (ref 8.5–10.1)
CO2 SERPL-SCNC: 28 MMOL/L (ref 21–32)
CREAT SERPL-MCNC: 1.4 MG/DL (ref 0.7–1.3)
DEPRECATED RDW RBC AUTO: 14.7 % (ref 11.9–15.9)
EOSINOPHIL # BLD: 6.1 % (ref 0–4.5)
GLUCOSE SERPL-MCNC: 98 MG/DL (ref 74–106)
MCH RBC QN AUTO: 28.3 PG (ref 25.7–33.7)
MCHC RBC AUTO-ENTMCNC: 32.5 G/DL (ref 32–35.9)
MCV RBC: 87.2 FL (ref 80–96)
NEUTROPHILS # BLD: 54.9 % (ref 42.8–82.8)
PLATELET # BLD AUTO: 320 K/MM3 (ref 134–434)
PMV BLD: 7.5 FL (ref 7.5–11.1)
PROT SERPL-MCNC: 6.1 G/DL (ref 6.4–8.2)
WBC # BLD AUTO: 7.1 K/MM3 (ref 4–10)

## 2017-12-16 RX ADMIN — RIVAROXABAN SCH MG: 20 TABLET, FILM COATED ORAL at 09:35

## 2017-12-16 RX ADMIN — TIGECYCLINE SCH MLS/HR: 50 INJECTION, POWDER, LYOPHILIZED, FOR SOLUTION INTRAVENOUS at 09:34

## 2017-12-16 RX ADMIN — FUROSEMIDE SCH MG: 40 TABLET ORAL at 05:39

## 2017-12-16 RX ADMIN — FUROSEMIDE SCH MG: 40 TABLET ORAL at 13:37

## 2017-12-16 RX ADMIN — TAMSULOSIN HYDROCHLORIDE SCH MG: 0.4 CAPSULE ORAL at 08:15

## 2017-12-16 RX ADMIN — SENNOSIDES SCH TAB: 8.6 TABLET, FILM COATED ORAL at 22:01

## 2017-12-16 RX ADMIN — METOPROLOL SUCCINATE SCH MG: 50 TABLET, EXTENDED RELEASE ORAL at 09:35

## 2017-12-16 RX ADMIN — ACETAMINOPHEN PRN MG: 325 TABLET ORAL at 23:10

## 2017-12-16 RX ADMIN — ACETAMINOPHEN PRN MG: 325 TABLET ORAL at 15:02

## 2017-12-16 RX ADMIN — PANTOPRAZOLE SODIUM SCH MG: 20 TABLET, DELAYED RELEASE ORAL at 09:35

## 2017-12-16 RX ADMIN — TIGECYCLINE SCH MLS/HR: 50 INJECTION, POWDER, LYOPHILIZED, FOR SOLUTION INTRAVENOUS at 22:01

## 2017-12-16 NOTE — PN
Progress Note, Physician





- Current Medication List


Current Medications: 


Active Medications





Acetaminophen (Tylenol -)  650 mg PO Q6H PRN


   PRN Reason: FEVER OR PAIN


Diphenhydramine HCl (Benadryl -)  25 mg PO Q6H PRN


   PRN Reason: FOR ITCHING


Furosemide (Lasix -)  60 mg PO BID@0600,1400 [facility]


   Last Admin: 12/16/17 13:37 Dose:  60 mg


Tigecycline 50 mg/ Dextrose  100 mls @ 100 mls/hr IVPB BID [facility]


   PRN Reason: Protocol


   Last Admin: 12/16/17 09:34 Dose:  100 mls/hr


Metoprolol Succinate (Toprol Xl -)  50 mg PO DAILY [facility]


   Last Admin: 12/16/17 09:35 Dose:  50 mg


Pantoprazole Sodium (Protonix -)  20 mg PO DAILY [facility]


   Last Admin: 12/16/17 09:35 Dose:  20 mg


Rivaroxaban (Xarelto -)  20 mg PO DAILY [facility]


   Last Admin: 12/16/17 09:35 Dose:  20 mg


Senna (Senna -)  2 tab PO HS [facility]


   Last Admin: 12/15/17 21:49 Dose:  2 tab


Tamsulosin HCl (Flomax -)  0.8 mg PO DAILY@0830 [facility]


   Last Admin: 12/16/17 08:15 Dose:  0.8 mg











- Objective


Vital Signs: 


 Vital Signs











Temperature  97.6 F   12/16/17 09:00


 


Pulse Rate  70   12/16/17 09:00


 


Respiratory Rate  20   12/16/17 09:00


 


Blood Pressure  123/54   12/16/17 09:00


 


O2 Sat by Pulse Oximetry (%)  97   12/16/17 09:00











Cardiovascular: Yes: S1, S2


Respiratory: Yes: Regular, CTA Bilaterally


Edema: Yes


Labs: 


 CBC, BMP





 12/16/17 06:00 





 12/16/17 06:00 











Problem List





- Problems


(1) Cellulitis


Code(s): L03.90 - CELLULITIS, UNSPECIFIED   





(2) CHF (congestive heart failure)


Code(s): I50.9 - HEART FAILURE, UNSPECIFIED   





(3) Hypertension


Code(s): I10 - ESSENTIAL (PRIMARY) HYPERTENSION   





(4) Lymphedema


Code(s): I89.0 - LYMPHEDEMA, NOT ELSEWHERE CLASSIFIED   





(5) Morbid obesity


Code(s): E66.01 - MORBID (SEVERE) OBESITY DUE TO EXCESS CALORIES   





Assessment/Plan








- Problems


(1) Cellulitis


Assessment/Plan: 


Microbiology





12/12/17 23:40   Leg - Left Lower   Gram Stain - Final


12/12/17 23:40   Leg - Left Lower   Wound Culture - Preliminary


                              Lactose Fermenting Neg Bacilli


                              Non Lactose Fermenting Gnb





on tigacil awaiting final cultures


Code(s): L03.90 - CELLULITIS, UNSPECIFIED   





(2) Lymphedema


Assessment/Plan: 


po lasix


wound care consult noted will need to follow up at [facility] upon discharge


needs reflux studies and laser vein therapy


elevated leg  and ACE wrap


Code(s): I89.0 - LYMPHEDEMA, NOT ELSEWHERE CLASSIFIED   





(3) Atrial fibrillation


Assessment/Plan: 


on xarelto


metoprolol


echo noted 


Code(s): I48.91 - UNSPECIFIED ATRIAL FIBRILLATION

## 2017-12-17 LAB
ALBUMIN SERPL-MCNC: 3.2 G/DL (ref 3.4–5)
ALP SERPL-CCNC: 122 U/L (ref 45–117)
ALT SERPL-CCNC: 20 U/L (ref 12–78)
ANION GAP SERPL CALC-SCNC: 10 MMOL/L (ref 8–16)
AST SERPL-CCNC: 14 U/L (ref 15–37)
BILIRUB SERPL-MCNC: 0.5 MG/DL (ref 0.2–1)
CALCIUM SERPL-MCNC: 8.6 MG/DL (ref 8.5–10.1)
CO2 SERPL-SCNC: 29 MMOL/L (ref 21–32)
CREAT SERPL-MCNC: 1.4 MG/DL (ref 0.7–1.3)
GLUCOSE SERPL-MCNC: 119 MG/DL (ref 74–106)
PROT SERPL-MCNC: 5.9 G/DL (ref 6.4–8.2)

## 2017-12-17 RX ADMIN — SENNOSIDES SCH TAB: 8.6 TABLET, FILM COATED ORAL at 22:25

## 2017-12-17 RX ADMIN — TIGECYCLINE SCH MLS/HR: 50 INJECTION, POWDER, LYOPHILIZED, FOR SOLUTION INTRAVENOUS at 09:37

## 2017-12-17 RX ADMIN — RIVAROXABAN SCH MG: 20 TABLET, FILM COATED ORAL at 09:37

## 2017-12-17 RX ADMIN — PANTOPRAZOLE SODIUM SCH MG: 20 TABLET, DELAYED RELEASE ORAL at 09:37

## 2017-12-17 RX ADMIN — FUROSEMIDE SCH: 40 TABLET ORAL at 13:24

## 2017-12-17 RX ADMIN — TIGECYCLINE SCH MLS/HR: 50 INJECTION, POWDER, LYOPHILIZED, FOR SOLUTION INTRAVENOUS at 22:25

## 2017-12-17 RX ADMIN — METOPROLOL SUCCINATE SCH MG: 50 TABLET, EXTENDED RELEASE ORAL at 09:37

## 2017-12-17 RX ADMIN — FUROSEMIDE SCH MG: 40 TABLET ORAL at 05:32

## 2017-12-17 RX ADMIN — TAMSULOSIN HYDROCHLORIDE SCH MG: 0.4 CAPSULE ORAL at 07:58

## 2017-12-17 RX ADMIN — ACETAMINOPHEN PRN MG: 325 TABLET ORAL at 06:40

## 2017-12-17 NOTE — PN
Progress Note, Physician





- Current Medication List


Current Medications: 


Active Medications





Acetaminophen (Tylenol -)  650 mg PO Q6H PRN


   PRN Reason: FEVER OR PAIN


   Last Admin: 12/17/17 06:40 Dose:  650 mg


Diphenhydramine HCl (Benadryl -)  25 mg PO Q6H PRN


   PRN Reason: FOR ITCHING


Furosemide (Lasix -)  60 mg PO BID@0600,1400 [facility]


   Last Admin: 12/17/17 13:24 Dose:  Not Given


Tigecycline 50 mg/ Dextrose  100 mls @ 100 mls/hr IVPB BID [facility]


   PRN Reason: Protocol


   Last Admin: 12/17/17 09:37 Dose:  100 mls/hr


Melatonin (Melatonin)  5 mg PO HS PRN


   PRN Reason: INSOMNIA


Metoprolol Succinate (Toprol Xl -)  50 mg PO DAILY [facility]


   Last Admin: 12/17/17 09:37 Dose:  50 mg


Pantoprazole Sodium (Protonix -)  20 mg PO DAILY [facility]


   Last Admin: 12/17/17 09:37 Dose:  20 mg


Rivaroxaban (Xarelto -)  20 mg PO DAILY [facility]


   Last Admin: 12/17/17 09:37 Dose:  20 mg


Senna (Senna -)  2 tab PO HS [facility]


   Last Admin: 12/16/17 22:01 Dose:  2 tab


Tamsulosin HCl (Flomax -)  0.8 mg PO DAILY@0830 [facility]


   Last Admin: 12/17/17 07:58 Dose:  0.8 mg











- Objective


Vital Signs: 


 Vital Signs











Temperature  97.8 F   12/17/17 14:04


 


Pulse Rate  70   12/17/17 14:04


 


Respiratory Rate  20   12/17/17 14:04


 


Blood Pressure  119/53   12/17/17 14:04


 


O2 Sat by Pulse Oximetry (%)  99   12/17/17 09:00











Cardiovascular: Yes: S1, S2


Respiratory: Yes: Regular, CTA Bilaterally


Gastrointestinal: Yes: Normal Bowel Sounds, Soft


Edema: Yes


Labs: 


 CBC, BMP





 12/16/17 06:00 





 12/17/17 13:56 











Problem List





- Problems


(1) Cellulitis


Code(s): L03.90 - CELLULITIS, UNSPECIFIED   





(2) CHF (congestive heart failure)


Code(s): I50.9 - HEART FAILURE, UNSPECIFIED   





(3) Hypertension


Code(s): I10 - ESSENTIAL (PRIMARY) HYPERTENSION   





(4) Lymphedema


Code(s): I89.0 - LYMPHEDEMA, NOT ELSEWHERE CLASSIFIED   





(5) Morbid obesity


Code(s): E66.01 - MORBID (SEVERE) OBESITY DUE TO EXCESS CALORIES   





Assessment/Plan








- Problems


(1) Cellulitis


Assessment/Plan: 


Microbiology





12/12/17 23:40   Leg - Left Lower   Gram Stain - Final


12/12/17 23:40   Leg - Left Lower   Wound Culture - Preliminary


                              Lactose Fermenting Neg Bacilli


                              Non Lactose Fermenting Gnb





on tigacil awaiting final cultures


Code(s): L03.90 - CELLULITIS, UNSPECIFIED   





(2) Lymphedema


Assessment/Plan: 


po lasix


wound care consult noted will need to follow up at [facility] upon discharge


needs reflux studies and laser vein therapy


elevated leg  and ACE wrap


Code(s): I89.0 - LYMPHEDEMA, NOT ELSEWHERE CLASSIFIED   





(3) Atrial fibrillation


Assessment/Plan: 


on xarelto


metoprolol


echo noted 


Code(s): I48.91 - UNSPECIFIED ATRIAL FIBRILLATION

## 2017-12-18 LAB
ANION GAP SERPL CALC-SCNC: 10 MMOL/L (ref 8–16)
CALCIUM SERPL-MCNC: 8.3 MG/DL (ref 8.5–10.1)
CO2 SERPL-SCNC: 26 MMOL/L (ref 21–32)
CREAT SERPL-MCNC: 1.3 MG/DL (ref 0.7–1.3)
GLUCOSE SERPL-MCNC: 91 MG/DL (ref 74–106)

## 2017-12-18 RX ADMIN — METOPROLOL SUCCINATE SCH MG: 50 TABLET, EXTENDED RELEASE ORAL at 09:47

## 2017-12-18 RX ADMIN — ACETAMINOPHEN PRN MG: 325 TABLET ORAL at 05:34

## 2017-12-18 RX ADMIN — TIGECYCLINE SCH MLS/HR: 50 INJECTION, POWDER, LYOPHILIZED, FOR SOLUTION INTRAVENOUS at 20:59

## 2017-12-18 RX ADMIN — TIGECYCLINE SCH MLS/HR: 50 INJECTION, POWDER, LYOPHILIZED, FOR SOLUTION INTRAVENOUS at 09:47

## 2017-12-18 RX ADMIN — PANTOPRAZOLE SODIUM SCH MG: 20 TABLET, DELAYED RELEASE ORAL at 09:47

## 2017-12-18 RX ADMIN — FUROSEMIDE SCH MG: 40 TABLET ORAL at 13:06

## 2017-12-18 RX ADMIN — ACETAMINOPHEN PRN MG: 325 TABLET ORAL at 13:05

## 2017-12-18 RX ADMIN — SENNOSIDES SCH TAB: 8.6 TABLET, FILM COATED ORAL at 20:59

## 2017-12-18 RX ADMIN — FUROSEMIDE SCH MG: 40 TABLET ORAL at 05:34

## 2017-12-18 RX ADMIN — ACETAMINOPHEN PRN MG: 325 TABLET ORAL at 00:32

## 2017-12-18 RX ADMIN — TAMSULOSIN HYDROCHLORIDE SCH MG: 0.4 CAPSULE ORAL at 09:46

## 2017-12-18 RX ADMIN — RIVAROXABAN SCH MG: 20 TABLET, FILM COATED ORAL at 09:47

## 2017-12-18 RX ADMIN — ACETAMINOPHEN PRN MG: 325 TABLET ORAL at 20:59

## 2017-12-18 NOTE — PN
Progress Note, Physician





- Current Medication List


Current Medications: 


Active Medications





Acetaminophen (Tylenol -)  650 mg PO Q6H PRN


   PRN Reason: FEVER OR PAIN


   Last Admin: 12/18/17 05:34 Dose:  650 mg


Diphenhydramine HCl (Benadryl -)  25 mg PO Q6H PRN


   PRN Reason: FOR ITCHING


Furosemide (Lasix -)  40 mg PO BID[facility]06[facility]


   Last Admin: 12/18/17 05:34 Dose:  40 mg


Tigecycline 50 mg/ Dextrose  100 mls @ 100 mls/hr IVPB BID [facility]


   PRN Reason: Protocol


   Last Admin: 12/18/17 09:47 Dose:  100 mls/hr


Melatonin (Melatonin)  5 mg PO HS PRN


   PRN Reason: INSOMNIA


   Last Admin: 12/17/17 22:25 Dose:  5 mg


Metoprolol Succinate (Toprol Xl -)  [facility] mg PO [facility]ILY [facility]


   Last Admin: 12/18/17 09:47 Dose:  50 mg


Pantoprazole Sodium (Protonix -)  20 mg PO DAILY [facility]


   Last Admin: 12/18/17 09:47 Dose:  20 mg


Rivaroxaban (Xarelto -)  20 mg PO DAILY [facility]


   Last Admin: 12/18/17 09:47 Dose:  20 mg


Senna (Senna -)  2 tab PO HS [facility]


   Last Admin: 12/17/17 22:25 Dose:  2 tab


Tamsulosin HCl (Flomax -)  0.8 mg PO DAILY@0830 [facility]


   Last Admin: 12/18/17 09:46 Dose:  0.8 mg











- Objective


Vital Signs: 


 Vital Signs











Temperature  97.8 F   12/18/17 08:53


 


Pulse Rate  68   12/18/17 08:53


 


Respiratory Rate  20   12/18/17 08:53


 


Blood Pressure  120/56   12/18/17 08:53


 


O2 Sat by Pulse Oximetry (%)  99   12/17/17 21:00











Cardiovascular: Yes: Regular Rate and Rhythm


Respiratory: Yes: Regular, CTA Bilaterally


Gastrointestinal: Yes: Normal Bowel Sounds, Soft


Labs: 


 CBC, BMP





 12/16/17 06:00 





 12/18/17 06:30 











Problem List





- Problems


(1) Cellulitis


Assessment/Plan: 


IV ABX


ID CONSULT--ON BOARD--FOLLOW FOR DURATION


Code(s): L03.90 - CELLULITIS, UNSPECIFIED   





(2) CHF (congestive heart failure)


Assessment/Plan: 


IV LASIX


ECHO


CARDIO


Code(s): I50.9 - HEART FAILURE, UNSPECIFIED   





(3) Hypertension


Assessment/Plan: 


MONITOR ON CURRENT MEDS


Code(s): I10 - ESSENTIAL (PRIMARY) HYPERTENSION   





(4) Lymphedema


Assessment/Plan: 


ELEVATION


Code(s): I89.0 - LYMPHEDEMA, NOT ELSEWHERE CLASSIFIED   





(5) Morbid obesity


Code(s): E66.01 - MORBID (SEVERE) OBESITY DUE TO EXCESS CALORIES

## 2017-12-19 LAB
ALBUMIN SERPL-MCNC: 3.3 G/DL (ref 3.4–5)
ALP SERPL-CCNC: 132 U/L (ref 45–117)
ALT SERPL-CCNC: 20 U/L (ref 12–78)
ANION GAP SERPL CALC-SCNC: 8 MMOL/L (ref 8–16)
AST SERPL-CCNC: 16 U/L (ref 15–37)
BASOPHILS # BLD: 1.2 % (ref 0–2)
BILIRUB SERPL-MCNC: 0.7 MG/DL (ref 0.2–1)
CALCIUM SERPL-MCNC: 8.5 MG/DL (ref 8.5–10.1)
CO2 SERPL-SCNC: 28 MMOL/L (ref 21–32)
CREAT SERPL-MCNC: 1.2 MG/DL (ref 0.7–1.3)
DEPRECATED RDW RBC AUTO: 14.9 % (ref 11.9–15.9)
EOSINOPHIL # BLD: 5.7 % (ref 0–4.5)
GLUCOSE SERPL-MCNC: 96 MG/DL (ref 74–106)
MCH RBC QN AUTO: 28.4 PG (ref 25.7–33.7)
MCHC RBC AUTO-ENTMCNC: 32.9 G/DL (ref 32–35.9)
MCV RBC: 86.5 FL (ref 80–96)
NEUTROPHILS # BLD: 51.1 % (ref 42.8–82.8)
PLATELET # BLD AUTO: 321 K/MM3 (ref 134–434)
PMV BLD: 7.6 FL (ref 7.5–11.1)
PROT SERPL-MCNC: 6.1 G/DL (ref 6.4–8.2)
WBC # BLD AUTO: 6.9 K/MM3 (ref 4–10)

## 2017-12-19 PROCEDURE — 0HDRXZZ EXTRACTION OF TOE NAIL, EXTERNAL APPROACH: ICD-10-PCS | Performed by: PODIATRIST

## 2017-12-19 RX ADMIN — RIVAROXABAN SCH MG: 20 TABLET, FILM COATED ORAL at 09:06

## 2017-12-19 RX ADMIN — FUROSEMIDE SCH MG: 40 TABLET ORAL at 13:19

## 2017-12-19 RX ADMIN — LEVOFLOXACIN SCH MG: 500 TABLET, FILM COATED ORAL at 13:20

## 2017-12-19 RX ADMIN — TAMSULOSIN HYDROCHLORIDE SCH MG: 0.4 CAPSULE ORAL at 08:42

## 2017-12-19 RX ADMIN — ACETAMINOPHEN PRN MG: 325 TABLET ORAL at 22:21

## 2017-12-19 RX ADMIN — METOPROLOL SUCCINATE SCH MG: 50 TABLET, EXTENDED RELEASE ORAL at 09:06

## 2017-12-19 RX ADMIN — PANTOPRAZOLE SODIUM SCH MG: 20 TABLET, DELAYED RELEASE ORAL at 09:06

## 2017-12-19 RX ADMIN — FUROSEMIDE SCH MG: 40 TABLET ORAL at 06:23

## 2017-12-19 RX ADMIN — SENNOSIDES SCH TAB: 8.6 TABLET, FILM COATED ORAL at 22:22

## 2017-12-19 RX ADMIN — TIGECYCLINE SCH MLS/HR: 50 INJECTION, POWDER, LYOPHILIZED, FOR SOLUTION INTRAVENOUS at 09:06

## 2017-12-19 NOTE — CONSULT
Consult - text type





- Consultation


Consultation Note: 





Podiatry Consultation:





82 year old M presents for admission with RLE cellulitis.  Patient has history 

of edema with venous stasis.  Podiatry consultation requested for trimming of 

elongated toe nails.  Afebrile, VSS.





PMHx: CAD s/p pacemaker, AFib, HTN, CHF, BPH


Meds: noted


ALL: cephalexin





SUSHIL:





Pedal pulses 1/4, TG wnl, CFT brisk to all toes.  Nails are elongated, 

discolored, thickened with subungual debris, tender x 10.  No nail bed 

ulcerations, no signs of active infection.  Venous stasis ulcers B/L LEs.





Imp: 82 year old M with onychomycosis 


1. Manual debridement of mycotic nails x 10 using nail nipper.  Pt tolerated 

the procedure well.


2. Appropriate foot hygiene discussed with the patient.


3. Management of venous stasis by Dr. Keith as outpatient.


4. Can f/u with me in wound healing center for routine care in 3-4 months.  

Thank you for the courtesy of this consultation.





KALIN Riojas DPM

## 2017-12-19 NOTE — PN
Progress Note, Physician


History of Present Illness: 





OOB in chair


No c/o leg pain


No fever/chills


Wound c/s  Klebsiella, serratia  (s) levaquin





 





- Current Medication List


Current Medications: 


Active Medications





Acetaminophen (Tylenol -)  650 mg PO Q6H PRN


   PRN Reason: FEVER OR PAIN


   Last Admin: 12/18/17 20:59 Dose:  650 mg


Diphenhydramine HCl (Benadryl -)  25 mg PO Q6H PRN


   PRN Reason: FOR ITCHING


Furosemide (Lasix -)  40 mg PO BID@0600,[facility]


   Last Admin: 12/19/17 06:23 Dose:  40 mg


Levofloxacin (Levaquin -)  500 mg PO DAILY [facility]


Melatonin (Melatonin)  5 mg PO HS PRN


   PRN Reason: INSOMNIA


   Last Admin: 12/17/17 22:25 Dose:  5 mg


Metoprolol Succinate (Toprol Xl -)  50 mg PO DAILY [facility]


   Last Admin: 12/19/17 09:06 Dose:  50 mg


Oxycodone HCl (Roxicodone -)  5 mg PO Q6H PRN


   PRN Reason: PAIN


   Last Admin: 12/18/17 23:28 Dose:  5 mg


Pantoprazole Sodium (Protonix -)  20 mg PO DAILY [facility]


   Last Admin: 12/19/17 09:06 Dose:  20 mg


Rivaroxaban (Xarelto -)  20 mg PO DAILY [facility]


   Last Admin: 12/19/17 09:06 Dose:  20 mg


Senna (Senna -)  2 tab PO HS [facility]


   Last Admin: 12/18/17 20:59 Dose:  2 tab


Tamsulosin HCl (Flomax -)  0.8 mg PO DAILY@0830 [facility]


   Last Admin: 12/19/17 08:42 Dose:  0.8 mg











- Objective


Vital Signs: 


 Vital Signs











Temperature  97.4 F L  12/19/17 10:00


 


Pulse Rate  72   12/19/17 10:00


 


Respiratory Rate  20   12/19/17 10:00


 


Blood Pressure  115/70   12/19/17 10:00


 


O2 Sat by Pulse Oximetry (%)  100   12/19/17 10:00











Constitutional: Yes: No Distress


Eyes: Yes: Conjunctiva Clear


Cardiovascular: Yes: Regular Rate and Rhythm, S1, S2


Respiratory: Yes: CTA Bilaterally


Gastrointestinal: Yes: Normal Bowel Sounds, Soft, Abdomen, Obese


Edema: Yes


Integumentary: Yes: Other (erythema R LE nearly all resolved. No erythema)


Labs: 


 CBC, BMP





 12/19/17 06:00 





 12/19/17 06:00 











Assessment/Plan


Cellulitis/ non-healing ulcer R LE    improved


Cephalosporin allergy


+ Wound c/s  klebsiella/ serratia





Discontinue  Tygacil


Levaquin 500mg po qd x 7d


Elevation

## 2017-12-19 NOTE — DS
Physical Examination


Vital Signs: 


 Vital Signs











Temperature  97.5 F L  12/19/17 06:00


 


Pulse Rate  69   12/19/17 06:00


 


Respiratory Rate  20   12/19/17 06:00


 


Blood Pressure  128/75   12/19/17 06:00


 


O2 Sat by Pulse Oximetry (%)  100   12/18/17 21:00











Labs: 


 CBC, BMP





 12/19/17 06:00 





 12/19/17 06:00 











Discharge Summary


Reason For Visit: CELLULITIS


Current Active Problems





Atrial fibrillation (Acute)


Cellulitis (Acute)








Hospital Course: 











- Past Medical History


Cardio/Vascular: Yes: AFIB, CHF, HTN, Other (pacemaker)


Renal/: Yes: BPH, Renal Calculi





- Past Surgical History


Past Surgical History: Yes: Joint Replacement (knee), Permanent Pacemaker, 

Prostatectomy





Patient is an 82 y.o. 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 

and Cellulitis who presents at the behest of his wound care NP for a concern of 

LLE cellulitis.  Patient initially developed LLE Stage 2 ulcer while @ [facility] 4-5 month previous.  Since discharge from [place], 

patient has been recieving bi-weekly wound dressing changes.  Patient denies 

any fevers, chills, chest pain, shortness of breath.  





NKDA


Social: denies cigarettes (previously 1 ppd > 20 years), denies alcohol, denies 

recreational drugs


Surgical: Pacemaker placement (November 2016)








admitted for RLE cellulitis and draining wounds 


venous stasis drainage seen by vascular will need laser therapy and reflux 

studies at wound care center


iv lasix changed to po lasix


had echo done as well








- Problems


(1) Cellulitis


Assessment/Plan: 


Microbiology





 Microbiology





12/12/17 15:06   Blood - Peripheral Venous   Blood Culture - Final


                            NO GROWTH AFTER 5 DAYS INCUBATION


12/12/17 15:06   Blood - Peripheral Venous   Blood Culture - Final


                            NO GROWTH AFTER 5 DAYS INCUBATION


12/12/17 23:40   Leg - Left Lower   Gram Stain - Final


12/12/17 23:40   Leg - Left Lower   Wound Culture - Final


                            Klebsiella Oxytoca


                            Serratia Marcescens








on tigacil awaiting final cultures


Code(s): L03.90 - CELLULITIS, UNSPECIFIED   





(2) Lymphedema


Assessment/Plan: 


po lasix


wound care consult noted will need to follow up at [facility] upon discharge


needs reflux studies and laser vein therapy


elevated leg  and ACE wrap


Code(s): I89.0 - LYMPHEDEMA, NOT ELSEWHERE CLASSIFIED   





(3) Atrial fibrillation


Assessment/Plan: 


on xarelto


metoprolol


echo noted 


Code(s): I48.91 - UNSPECIFIED ATRIAL FIBRILLATION   





Assessment/Plan





will need rehab 


Condition: Improved





- Instructions


Diet, Activity, Other Instructions: 


wound care center appointment with Dr hassan thursday december 21 2:00pm


Disposition: SKILLED NURSING FACILITY





- Home Medications


Comprehensive Discharge Medication List: 


Ambulatory Orders





Metoprolol Succinate [Toprol Xl] 50 mg PO DAILY 03/11/17 


Omeprazole 20 mg PO DAILY 03/11/17 


Rivaroxaban [Xarelto -] 20 mg PO DAILY 03/11/17 


Tamsulosin HCl [Flomax] 0.8 mg PO DAILY 03/11/17 


Sennosides [Senna -] 2 tab PO HS  tablet 03/15/17 


Furosemide [Lasix -] 40 mg PO BID #0 tab 06/06/17 


Diphenhydramine HCl [Benadryl Capsule -] 25 mg PO Q6H PRN #0 tab 06/08/17 


Oxycodone HCl [Roxicodone -] 5 mg PO Q6H PRN  tablet MDD 4 12/19/17

## 2017-12-20 RX ADMIN — FUROSEMIDE SCH MG: 40 TABLET ORAL at 17:11

## 2017-12-20 RX ADMIN — TAMSULOSIN HYDROCHLORIDE SCH MG: 0.4 CAPSULE ORAL at 11:10

## 2017-12-20 RX ADMIN — RIVAROXABAN SCH MG: 20 TABLET, FILM COATED ORAL at 11:13

## 2017-12-20 RX ADMIN — PANTOPRAZOLE SODIUM SCH MG: 20 TABLET, DELAYED RELEASE ORAL at 11:10

## 2017-12-20 RX ADMIN — LEVOFLOXACIN SCH MG: 500 TABLET, FILM COATED ORAL at 11:09

## 2017-12-20 RX ADMIN — FUROSEMIDE SCH MG: 40 TABLET ORAL at 06:27

## 2017-12-20 RX ADMIN — SENNOSIDES SCH TAB: 8.6 TABLET, FILM COATED ORAL at 21:41

## 2017-12-20 RX ADMIN — METOPROLOL SUCCINATE SCH MG: 50 TABLET, EXTENDED RELEASE ORAL at 11:10

## 2017-12-20 NOTE — PN
Progress Note, Physician


History of Present Illness: 





NAD, no fever, no chills, OOB to chair


awaiting discharge


wife contested discharge yesterday


patient medically cleared and stable for discharge





- Current Medication List


Current Medications: 


Active Medications





Acetaminophen (Tylenol -)  650 mg PO Q6H PRN


   PRN Reason: FEVER OR PAIN


   Last Admin: 12/19/17 22:21 Dose:  650 mg


Diphenhydramine HCl (Benadryl -)  25 mg PO Q6H PRN


   PRN Reason: FOR ITCHING


Furosemide (Lasix -)  40 mg PO BID@0600,1400 [facility]


   Last Admin: 12/20/17 17:11 Dose:  40 mg


Levofloxacin (Levaquin -)  500 mg PO DAILY [facility]


   Last Admin: 12/20/17 11:09 Dose:  500 mg


Melatonin (Melatonin)  5 mg PO HS PRN


   PRN Reason: INSOMNIA


   Last Admin: 12/17/17 22:25 Dose:  5 mg


Metoprolol Succinate (Toprol Xl -)  50 mg PO DAILY [facility]


   Last Admin: 12/20/17 11:10 Dose:  50 mg


Oxycodone HCl (Roxicodone -)  5 mg PO Q6H PRN


   PRN Reason: PAIN


   Last Admin: 12/20/17 02:01 Dose:  5 mg


Pantoprazole Sodium (Protonix -)  20 mg PO DAILY [facility]


   Last Admin: 12/20/17 11:10 Dose:  20 mg


Rivaroxaban (Xarelto -)  20 mg PO DAILY [facility]


   Last Admin: 12/20/17 11:13 Dose:  20 mg


Senna (Senna -)  2 tab PO HS [facility]


   Last Admin: 12/19/17 22:22 Dose:  2 tab


Tamsulosin HCl (Flomax -)  0.8 mg PO DAILY@0830 [facility]


   Last Admin: 12/20/17 11:10 Dose:  0.8 mg











- Objective


Vital Signs: 


 Vital Signs











Temperature  97.4 F L  12/20/17 15:32


 


Pulse Rate  67   12/20/17 15:32


 


Respiratory Rate  20   12/20/17 15:32


 


Blood Pressure  112/50   12/20/17 15:32


 


O2 Sat by Pulse Oximetry (%)  98   12/20/17 09:00











Constitutional: Yes: Well Nourished, No Distress, Calm


Cardiovascular: Yes: Regular Rate and Rhythm


Respiratory: Yes: Regular


Gastrointestinal: Yes: Normal Bowel Sounds, Soft, Abdomen, Obese


Integumentary: Yes: Erythema (mild RLE)


Labs: 


 CBC, BMP





 12/19/17 06:00 





 12/19/17 06:00 











Problem List





- Problems


(1) Cellulitis


Assessment/Plan: 


PO abx


seen by ID





Code(s): L03.90 - CELLULITIS, UNSPECIFIED   





(2) CHF (congestive heart failure)


Assessment/Plan: 


chronic


seen by cardio


echo done


Code(s): I50.9 - HEART FAILURE, UNSPECIFIED   





(3) Hypertension


Assessment/Plan: 


controlled


Code(s): I10 - ESSENTIAL (PRIMARY) HYPERTENSION   





(4) Morbid obesity


Code(s): E66.01 - MORBID (SEVERE) OBESITY DUE TO EXCESS CALORIES   





Assessment/Plan





see problem list


awaiting discharge

## 2017-12-21 RX ADMIN — ACETAMINOPHEN PRN MG: 325 TABLET ORAL at 21:35

## 2017-12-21 RX ADMIN — TAMSULOSIN HYDROCHLORIDE SCH MG: 0.4 CAPSULE ORAL at 09:00

## 2017-12-21 RX ADMIN — RIVAROXABAN SCH MG: 20 TABLET, FILM COATED ORAL at 09:37

## 2017-12-21 RX ADMIN — SENNOSIDES SCH TAB: 8.6 TABLET, FILM COATED ORAL at 21:35

## 2017-12-21 RX ADMIN — FUROSEMIDE SCH MG: 40 TABLET ORAL at 13:59

## 2017-12-21 RX ADMIN — FUROSEMIDE SCH MG: 40 TABLET ORAL at 06:32

## 2017-12-21 RX ADMIN — PANTOPRAZOLE SODIUM SCH MG: 20 TABLET, DELAYED RELEASE ORAL at 09:37

## 2017-12-21 RX ADMIN — ACETAMINOPHEN PRN MG: 325 TABLET ORAL at 06:40

## 2017-12-21 RX ADMIN — LEVOFLOXACIN SCH MG: 500 TABLET, FILM COATED ORAL at 09:37

## 2017-12-21 RX ADMIN — METOPROLOL SUCCINATE SCH MG: 50 TABLET, EXTENDED RELEASE ORAL at 09:37

## 2017-12-21 NOTE — PN
Progress Note, Physician


History of Present Illness: 





OOB in chair


No c/o leg pain


No fever/chills


Tolerating oral levaquin





 





- Current Medication List


Current Medications: 


Active Medications





Acetaminophen (Tylenol -)  650 mg PO Q6H PRN


   PRN Reason: FEVER OR PAIN


   Last Admin: 12/21/17 06:40 Dose:  650 mg


Diphenhydramine HCl (Benadryl -)  25 mg PO Q6H PRN


   PRN Reason: FOR ITCHING


   Last Admin: 12/21/17 02:24 Dose:  25 mg


Furosemide (Lasix -)  40 mg PO BID@0600,1400 [facility]


   Last Admin: 12/21/17 13:59 Dose:  40 mg


Levofloxacin (Levaquin -)  500 mg PO DAILY [facility]


   Last Admin: 12/21/17 09:37 Dose:  500 mg


Melatonin (Melatonin)  5 mg PO HS PRN


   PRN Reason: INSOMNIA


   Last Admin: 12/17/17 22:25 Dose:  5 mg


Metoprolol Succinate (Toprol Xl -)  50 mg PO DAILY [facility]


   Last Admin: 12/21/17 09:37 Dose:  50 mg


Oxycodone HCl (Roxicodone -)  5 mg PO Q6H PRN


   PRN Reason: PAIN


   Last Admin: 12/21/17 09:37 Dose:  5 mg


Pantoprazole Sodium (Protonix -)  20 mg PO DAILY [facility]


   Last Admin: 12/21/17 09:37 Dose:  20 mg


Rivaroxaban (Xarelto -)  20 mg PO DAILY [facility]


   Last Admin: 12/21/17 09:37 Dose:  20 mg


Senna (Senna -)  2 tab PO HS [facility]


   Last Admin: 12/20/17 21:41 Dose:  2 tab


Tamsulosin HCl (Flomax -)  0.8 mg PO DAILY@0830 [facility]


   Last Admin: 12/21/17 09:00 Dose:  0.8 mg











- Objective


Vital Signs: 


 Vital Signs











Temperature  97.4 F L  12/21/17 14:32


 


Pulse Rate  70   12/21/17 14:32


 


Respiratory Rate  20   12/21/17 14:32


 


Blood Pressure  108/57   12/21/17 14:32


 


O2 Sat by Pulse Oximetry (%)  98   12/21/17 10:00











Constitutional: Yes: No Distress


Eyes: Yes: Conjunctiva Clear


Cardiovascular: Yes: Regular Rate and Rhythm, S1, S2


Respiratory: Yes: CTA Bilaterally


Gastrointestinal: Yes: Normal Bowel Sounds, Soft, Abdomen, Obese.  No: 

Tenderness


Extremities: Yes: Other (Erythema/ warmth R LE resolved + superficial 

ulcerations     no weeage noted + stasis dermatitis)


Edema: Yes


Labs: 


 CBC, BMP





 12/19/17 06:00 





 12/19/17 06:00 











Assessment/Plan


Cellulitis/ non-healing ulcer R LE    resolved


Cephalosporin allergy


+ Wound c/s  klebsiella/ serratia





 


Continue Levaquin 500mg po qd x 7d


Elevation


Discussed with S.O. at bedside

## 2017-12-21 NOTE — PN
Progress Note, Physician


History of Present Illness: 





NAD, no fever, no chills, OOB to chair


awaiting discharge


wife contested discharge, awaiting appeal


patient medically cleared and stable for discharge





- Current Medication List


Current Medications: 


Active Medications





Acetaminophen (Tylenol -)  650 mg PO Q6H PRN


   PRN Reason: FEVER OR PAIN


   Last Admin: 12/21/17 06:40 Dose:  650 mg


Diphenhydramine HCl (Benadryl -)  25 mg PO Q6H PRN


   PRN Reason: FOR ITCHING


   Last Admin: 12/21/17 02:24 Dose:  25 mg


Furosemide (Lasix -)  40 mg PO BID@0600,1400 [facility]


   Last Admin: 12/21/17 06:32 Dose:  40 mg


Levofloxacin (Levaquin -)  500 mg PO DAILY [facility]


   Last Admin: 12/21/17 09:37 Dose:  500 mg


Melatonin (Melatonin)  5 mg PO HS PRN


   PRN Reason: INSOMNIA


   Last Admin: 12/17/17 22:25 Dose:  5 mg


Metoprolol Succinate (Toprol Xl -)  50 mg PO DAILY [facility]


   Last Admin: 12/21/17 09:37 Dose:  50 mg


Oxycodone HCl (Roxicodone -)  5 mg PO Q6H PRN


   PRN Reason: PAIN


   Last Admin: 12/21/17 09:37 Dose:  5 mg


Pantoprazole Sodium (Protonix -)  20 mg PO DAILY [facility]


   Last Admin: 12/21/17 09:37 Dose:  20 mg


Rivaroxaban (Xarelto -)  20 mg PO DAILY [facility]


   Last Admin: 12/21/17 09:37 Dose:  20 mg


Senna (Senna -)  2 tab PO HS [facility]


   Last Admin: 12/20/17 21:41 Dose:  2 tab


Tamsulosin HCl (Flomax -)  0.8 mg PO DAILY@0830 [facility]


   Last Admin: 12/21/17 09:00 Dose:  0.8 mg











- Objective


Vital Signs: 


 Vital Signs











Temperature  97.1 F L  12/21/17 10:00


 


Pulse Rate  69   12/21/17 10:00


 


Respiratory Rate  18   12/21/17 10:00


 


Blood Pressure  112/60   12/21/17 10:00


 


O2 Sat by Pulse Oximetry (%)  98   12/21/17 10:00











Constitutional: Yes: Well Nourished, No Distress, Calm


Cardiovascular: Yes: Regular Rate and Rhythm


Respiratory: Yes: Regular


Labs: 


 CBC, BMP





 12/19/17 06:00 





 12/19/17 06:00 











Problem List





- Problems


(1) Cellulitis


Assessment/Plan: 


PO abx


seen by ID





Code(s): L03.90 - CELLULITIS, UNSPECIFIED   





(2) CHF (congestive heart failure)


Assessment/Plan: 


chronic


seen by cardio


echo done


Code(s): I50.9 - HEART FAILURE, UNSPECIFIED   





(3) Hypertension


Assessment/Plan: 


controlled


Code(s): I10 - ESSENTIAL (PRIMARY) HYPERTENSION   





(4) Morbid obesity


Code(s): E66.01 - MORBID (SEVERE) OBESITY DUE TO EXCESS CALORIES   





Assessment/Plan





see problem list


awaiting discharge

## 2017-12-22 RX ADMIN — TAMSULOSIN HYDROCHLORIDE SCH MG: 0.4 CAPSULE ORAL at 09:10

## 2017-12-22 RX ADMIN — SENNOSIDES SCH TAB: 8.6 TABLET, FILM COATED ORAL at 21:12

## 2017-12-22 RX ADMIN — ACETAMINOPHEN PRN MG: 325 TABLET ORAL at 21:12

## 2017-12-22 RX ADMIN — FUROSEMIDE SCH MG: 40 TABLET ORAL at 15:02

## 2017-12-22 RX ADMIN — RIVAROXABAN SCH MG: 20 TABLET, FILM COATED ORAL at 09:10

## 2017-12-22 RX ADMIN — PANTOPRAZOLE SODIUM SCH MG: 20 TABLET, DELAYED RELEASE ORAL at 09:10

## 2017-12-22 RX ADMIN — FUROSEMIDE SCH MG: 40 TABLET ORAL at 05:45

## 2017-12-22 RX ADMIN — ACETAMINOPHEN PRN MG: 325 TABLET ORAL at 03:52

## 2017-12-22 RX ADMIN — LEVOFLOXACIN SCH MG: 500 TABLET, FILM COATED ORAL at 09:10

## 2017-12-22 RX ADMIN — METOPROLOL SUCCINATE SCH: 50 TABLET, EXTENDED RELEASE ORAL at 09:11

## 2017-12-22 NOTE — PN
Progress Note, Physician


History of Present Illness: 





NAD, no fever, no chills, OOB to chair


awaiting discharge


wife contested discharge, awaiting appeal


patient medically cleared and stable for discharge





- Current Medication List


Current Medications: 


Active Medications





Acetaminophen (Tylenol -)  650 mg PO Q6H PRN


   PRN Reason: FEVER OR PAIN


   Last Admin: 12/22/17 03:52 Dose:  650 mg


Diphenhydramine HCl (Benadryl -)  25 mg PO Q6H PRN


   PRN Reason: FOR ITCHING


   Last Admin: 12/21/17 02:24 Dose:  25 mg


Furosemide (Lasix -)  40 mg PO BID@0600,1400 [facility]


   Last Admin: 12/22/17 05:45 Dose:  40 mg


Levofloxacin (Levaquin -)  500 mg PO DAILY [facility]


   Last Admin: 12/22/17 09:10 Dose:  500 mg


Melatonin (Melatonin)  5 mg PO HS PRN


   PRN Reason: INSOMNIA


   Last Admin: 12/17/17 22:25 Dose:  5 mg


Metoprolol Succinate (Toprol Xl -)  50 mg PO DAILY [facility]


   Last Admin: 12/22/17 09:11 Dose:  Not Given


Pantoprazole Sodium (Protonix -)  20 mg PO DAILY [facility]


   Last Admin: 12/22/17 09:10 Dose:  20 mg


Rivaroxaban (Xarelto -)  20 mg PO DAILY [facility]


   Last Admin: 12/22/17 09:10 Dose:  20 mg


Senna (Senna -)  2 tab PO HS [facility]


   Last Admin: 12/21/17 21:35 Dose:  2 tab


Tamsulosin HCl (Flomax -)  0.8 mg PO DAILY@0830 [facility]


   Last Admin: 12/22/17 09:10 Dose:  0.8 mg











- Objective


Vital Signs: 


 Vital Signs











Temperature  98 F   12/22/17 09:11


 


Pulse Rate  65   12/22/17 09:11


 


Respiratory Rate  18   12/22/17 09:11


 


Blood Pressure  100/50   12/22/17 09:11


 


O2 Sat by Pulse Oximetry (%)  100   12/21/17 21:00











Constitutional: Yes: Well Nourished, No Distress, Calm


Cardiovascular: Yes: Regular Rate and Rhythm


Respiratory: Yes: Regular


Musculoskeletal: Yes: Muscle Weakness


Extremities: Yes: Erythema


Edema: Yes (BLLE)


Neurological: Yes: Alert, Oriented


Psychiatric: Yes: Alert, Oriented


Labs: 


 CBC, BMP





 12/19/17 06:00 





 12/19/17 06:00 











Problem List





- Problems


(1) Cellulitis


Assessment/Plan: 


PO abx


seen by ID





Code(s): L03.90 - CELLULITIS, UNSPECIFIED   





(2) CHF (congestive heart failure)


Assessment/Plan: 


chronic


seen by cardio


echo done


Code(s): I50.9 - HEART FAILURE, UNSPECIFIED   





(3) Hypertension


Assessment/Plan: 


controlled


Code(s): I10 - ESSENTIAL (PRIMARY) HYPERTENSION   





(4) Morbid obesity


Code(s): E66.01 - MORBID (SEVERE) OBESITY DUE TO EXCESS CALORIES   





Assessment/Plan





see problem list


awaiting discharge,

## 2017-12-23 VITALS — DIASTOLIC BLOOD PRESSURE: 64 MMHG | TEMPERATURE: 97.9 F | HEART RATE: 67 BPM | SYSTOLIC BLOOD PRESSURE: 142 MMHG

## 2017-12-23 RX ADMIN — FUROSEMIDE SCH MG: 40 TABLET ORAL at 13:46

## 2017-12-23 RX ADMIN — LEVOFLOXACIN SCH MG: 500 TABLET, FILM COATED ORAL at 09:25

## 2017-12-23 RX ADMIN — METOPROLOL SUCCINATE SCH MG: 50 TABLET, EXTENDED RELEASE ORAL at 09:25

## 2017-12-23 RX ADMIN — RIVAROXABAN SCH MG: 20 TABLET, FILM COATED ORAL at 09:25

## 2017-12-23 RX ADMIN — FUROSEMIDE SCH MG: 40 TABLET ORAL at 06:23

## 2017-12-23 RX ADMIN — PANTOPRAZOLE SODIUM SCH MG: 20 TABLET, DELAYED RELEASE ORAL at 09:25

## 2017-12-23 RX ADMIN — TAMSULOSIN HYDROCHLORIDE SCH MG: 0.4 CAPSULE ORAL at 09:25

## 2017-12-23 NOTE — DS
Physical Examination


Vital Signs: 


 Vital Signs











Temperature  98.1 F   12/23/17 10:00


 


Pulse Rate  70   12/23/17 10:00


 


Respiratory Rate  20   12/23/17 10:00


 


Blood Pressure  124/63   12/23/17 10:00


 


O2 Sat by Pulse Oximetry (%)  100   12/23/17 09:00











Constitutional: Yes: No Distress


Eyes: Yes: WNL


HENT: Yes: WNL


Neck: Yes: WNL


Cardiovascular: Yes: WNL


Respiratory: Yes: WNL


Gastrointestinal: Yes: WNL


Renal/: Yes: WNL


Musculoskeletal: Yes: Muscle Weakness


Extremities: Yes: Deformity, Erythema


Edema: Yes


Edema: LLE: 3+, RLE: 3+


Peripheral Pulses WNL: Yes


Integumentary: Yes: Erythema, Pressure Ulcer


Wound/Incision: Yes: Dressing Dry and Intact


Neurological: Yes: Pre-Existing Deficit


...Motor Strength: LLE, RLE


Psychiatric: Yes: Other


Labs: 


 CBC, BMP





 12/19/17 06:00 





 12/19/17 06:00 











Discharge Summary


Reason For Visit: CELLULITIS


Current Active Problems





Atrial fibrillation (Acute)


Cellulitis (Acute)








Hospital Course: 





admitted for iv abx, wound care, treated and evaluated with vasc sx and id


Condition: Improved





- Instructions


Diet, Activity, Other Instructions: 


wound care center appointment with Dr hassan thursday december 21 2:00pm


Disposition: SKILLED NURSING FACILITY





- Home Medications


Comprehensive Discharge Medication List: 


Ambulatory Orders





Metoprolol Succinate [Toprol Xl] 50 mg PO DAILY 03/11/17 


Omeprazole 20 mg PO DAILY 03/11/17 


Rivaroxaban [Xarelto -] 20 mg PO DAILY 03/11/17 


Tamsulosin HCl [Flomax] 0.8 mg PO DAILY 03/11/17 


Sennosides [Senna -] 2 tab PO HS  tablet 03/15/17 


Furosemide [Lasix -] 40 mg PO BID #0 tab 06/06/17 


Diphenhydramine HCl [Benadryl Capsule -] 25 mg PO Q6H PRN #0 tab 06/08/17 


Levofloxacin [Levaquin -] 500 mg PO DAILY  tablet 12/19/17 


Oxycodone HCl [Roxicodone -] 5 mg PO Q6H PRN  tablet MDD 4 12/19/17

## 2018-03-24 ENCOUNTER — HOSPITAL ENCOUNTER (INPATIENT)
Dept: HOSPITAL 74 - JER | Age: 83
LOS: 5 days | Discharge: NURSING, REHAB OR LONG-TERM CARE | DRG: 292 | End: 2018-03-29
Attending: FAMILY MEDICINE | Admitting: INTERNAL MEDICINE
Payer: COMMERCIAL

## 2018-03-24 VITALS — BODY MASS INDEX: 44 KG/M2

## 2018-03-24 DIAGNOSIS — Z87.442: ICD-10-CM

## 2018-03-24 DIAGNOSIS — I89.0: ICD-10-CM

## 2018-03-24 DIAGNOSIS — E66.01: ICD-10-CM

## 2018-03-24 DIAGNOSIS — Z87.891: ICD-10-CM

## 2018-03-24 DIAGNOSIS — L97.929: ICD-10-CM

## 2018-03-24 DIAGNOSIS — L89.100: ICD-10-CM

## 2018-03-24 DIAGNOSIS — I50.33: ICD-10-CM

## 2018-03-24 DIAGNOSIS — L03.116: ICD-10-CM

## 2018-03-24 DIAGNOSIS — Z95.0: ICD-10-CM

## 2018-03-24 DIAGNOSIS — Z96.659: ICD-10-CM

## 2018-03-24 DIAGNOSIS — I48.91: ICD-10-CM

## 2018-03-24 DIAGNOSIS — M16.12: ICD-10-CM

## 2018-03-24 DIAGNOSIS — G62.9: ICD-10-CM

## 2018-03-24 DIAGNOSIS — M48.07: ICD-10-CM

## 2018-03-24 DIAGNOSIS — I87.2: ICD-10-CM

## 2018-03-24 DIAGNOSIS — B35.6: ICD-10-CM

## 2018-03-24 DIAGNOSIS — R26.9: ICD-10-CM

## 2018-03-24 DIAGNOSIS — I11.0: Primary | ICD-10-CM

## 2018-03-24 DIAGNOSIS — E78.00: ICD-10-CM

## 2018-03-24 DIAGNOSIS — N40.0: ICD-10-CM

## 2018-03-24 LAB
ALBUMIN SERPL-MCNC: 3.4 G/DL (ref 3.4–5)
ALP SERPL-CCNC: 110 U/L (ref 45–117)
ALT SERPL-CCNC: 15 U/L (ref 12–78)
ANION GAP SERPL CALC-SCNC: 12 MMOL/L (ref 8–16)
AST SERPL-CCNC: 13 U/L (ref 15–37)
BASOPHILS # BLD: 0.4 % (ref 0–2)
BILIRUB SERPL-MCNC: 0.7 MG/DL (ref 0.2–1)
BNP SERPL-MCNC: 1294.92 PG/ML (ref 5–450)
BUN SERPL-MCNC: 24 MG/DL (ref 7–18)
CALCIUM SERPL-MCNC: 9.1 MG/DL (ref 8.5–10.1)
CHLORIDE SERPL-SCNC: 100 MMOL/L (ref 98–107)
CO2 SERPL-SCNC: 29 MMOL/L (ref 21–32)
CREAT SERPL-MCNC: 1.3 MG/DL (ref 0.7–1.3)
DEPRECATED RDW RBC AUTO: 16.1 % (ref 11.9–15.9)
EOSINOPHIL # BLD: 5.5 % (ref 0–4.5)
GLUCOSE SERPL-MCNC: 90 MG/DL (ref 74–106)
HCT VFR BLD CALC: 35.1 % (ref 35.4–49)
HGB BLD-MCNC: 11.7 GM/DL (ref 11.7–16.9)
LYMPHOCYTES # BLD: 21.4 % (ref 8–40)
MCH RBC QN AUTO: 29.5 PG (ref 25.7–33.7)
MCHC RBC AUTO-ENTMCNC: 33.3 G/DL (ref 32–35.9)
MCV RBC: 88.5 FL (ref 80–96)
MONOCYTES # BLD AUTO: 10.3 % (ref 3.8–10.2)
NEUTROPHILS # BLD: 62.4 % (ref 42.8–82.8)
PLATELET # BLD AUTO: 328 K/MM3 (ref 134–434)
PMV BLD: 7.3 FL (ref 7.5–11.1)
POTASSIUM SERPLBLD-SCNC: 3.7 MMOL/L (ref 3.5–5.1)
PROT SERPL-MCNC: 6.3 G/DL (ref 6.4–8.2)
RBC # BLD AUTO: 3.96 M/MM3 (ref 4–5.6)
SODIUM SERPL-SCNC: 141 MMOL/L (ref 136–145)
WBC # BLD AUTO: 7.9 K/MM3 (ref 4–10)

## 2018-03-24 PROCEDURE — G0378 HOSPITAL OBSERVATION PER HR: HCPCS

## 2018-03-24 RX ADMIN — ZINC OXIDE SCH APPLIC: 200 OINTMENT TOPICAL at 23:41

## 2018-03-24 NOTE — PDOC
History of Present Illness





- General


Chief Complaint: Weakness


Stated Complaint: TROUBLE WALKING


Time Seen by Provider: 03/24/18 12:35





- History of Present Illness


Initial Comments: 





03/24/18 13:25


The patient is an 82 year old with a history of AFib s/p pacemaker, CHF, HTN, 

Lymphadema who presents for evaluation of worsening lower extremity edema and 

inability to ambulate.  The patient reports worsening lower extremity edema 

over the past few days.  He notes that he was recently in rehab after a 

hospitalist admission for cellulitis and has since been unable to ambulate 

since his discharge from rehab.  He does note a new wound to his left lower 

extremity that has developed over the past few days as well.  He otherwise 

denies fevers, chills, SOB, chest pain, abdominal pain, nausea, vomiting, or 

changes with urination or bowel movements.





Past History





- Past Medical History


Allergies/Adverse Reactions: 


 Allergies











Allergy/AdvReac Type Severity Reaction Status Date / Time


 


cephalexin monohydrate Allergy Mild Hives Verified 03/24/18 12:34





[From Keflex]     


 


ertapenem Allergy   Verified 03/24/18 12:35











Home Medications: 


Ambulatory Orders





Metoprolol Succinate [Toprol Xl] 50 mg PO DAILY 03/11/17 


Rivaroxaban [Xarelto -] 20 mg PO DAILY 03/11/17 


Tamsulosin HCl [Flomax] 0.8 mg PO DAILY 03/11/17 


Sennosides [Senna -] 2 tab PO HS  tablet 03/15/17 


Furosemide [Lasix -] 40 mg PO BID #0 tab 06/06/17 


Acetaminophen [Tylenol .Regular Strength -] 650 mg PO Q6H PRN  tablet 12/23/17 


Metolazone [Zaroxolyn -] 2.5 mg PO Q2D@1000  tablet 12/23/17 


Clotrimazole [Antifungal] 30 gm TP DAILY 03/24/18 


Docusate Sodium [Colace] 300 mg PO HS 03/24/18 


Methyl Salicylate/Menthol [Bengay Greaseless Cream] 57 gm TP DAILY 03/24/18 


Nystatin Cream [Mycostatin] 1 applic TP BID 03/24/18 


Ranitidine [Zantac -] 150 mg PO HS 03/24/18 








Anemia: No


Asthma: No


Cancer: No


Cardiac Disorders: Yes


CVA: No


COPD: No


CHF: Yes


Dementia: No


Diabetes: No


GI Disorders: Yes


 Disorders: No


HTN: Yes


Hypercholesterolemia: Yes


Liver Disease: No


Seizures: No


Thyroid Disease: No





- Surgical History


Abdominal Surgery: No


Appendectomy: No


Cardiac Surgery: Yes


Cholecystectomy: No


Lung Surgery: No


Neurologic Surgery: No


Orthopedic Surgery: Yes (bilateral knee sx 1965)





- Immunization History


Immunization Up to Date: Yes





- Suicide/Smoking/Psychosocial Hx


Smoking History: Former smoker


Have you smoked in the past 12 months: No


If you are a former smoker, when did you quit?: 1970


Information on smoking cessation initiated: No


Hx Alcohol Use: No


Drug/Substance Use Hx: No


Substance Use Type: None


Hx Substance Use Treatment: No





**Review of Systems





- Review of Systems


Comments:: 





03/24/18 13:33


Constitutional: No fevers, chills, fatigue, malaise


HEENT: No Rhinorrhea, nasal congestion, visual changes


Cardiovascular: No chest pain, syncope, palpitations, lightheadedness


Respiratory: No Cough, SOB, Hemoptysis,


Gastrointestinal: No Abdominal pain, Nausea, Vomiting, Constipation, Diarrhea, 

Melena


Genitourinary: No Dysuria, Frequency, Urgency, Hesitancy, Hematuria, Flank pain


Musculoskeletal: No Myalgia, arthralgia


Skin: Lower extremity Edema. No rashes, itching, bruising, pallor


Neurologic: No Headache, Dizziness, Numbness, Weakness, or Tingling


Psychiatric: No Hallucinations. No SI or HI








*Physical Exam





- Vital Signs


 Last Vital Signs











Temp Pulse Resp BP Pulse Ox


 


 97.5 F L  76   16   143/62   100 


 


 03/24/18 12:20  03/24/18 12:20  03/24/18 12:20  03/24/18 12:20  03/24/18 12:20














- Physical Exam


Comments: 





03/24/18 13:36


General Appearance: Nourished. No Apparent Distress


HEENT: EOMI, JUSTO. No Pharyngeal Erythema, Tonsillar Exudate, Tonsillar Erythema


Neck: No Cervical Lymphadenopathy


Respiratory/Chest: Lungs Clear, Normal Breath Sounds. No Crackles, Rales, 

Rhonchi, Wheezing


Cardiovascular: Regular Rhythm, Regular Rate. No Murmur, Gallops, Rubs


Gastrointestinal/Abdominal: Normal Bowel Sounds, Soft. No Guarding, Rebound, 

Tenderness


Musculoskeletal: No CVA Tenderness


Extremity: 3+ Pitting Edema in the lower extremities bilaterally with a ulcer 

to the left lower extremity with surrounding erythema, warmth, and purulent 

drainage.Normal Capillary Refill


Integumentary: Normal Color, Dry, Warm


Neurologic:  Fully Oriented, Alert, Normal Mood/Affect, Normal Response, 





**Heart Score/ECG Review


  ** #1


ECG reviewed & interpreted by me at: 14:51 (Ventricularly Paced Rhythm)


General ECG Interpretation: Normal Rate, No acute ischemic changes





ED Treatment Course





- LABORATORY


CBC & Chemistry Diagram: 


 03/24/18 13:10





 03/24/18 13:10





Medical Decision Making





- Medical Decision Making





03/24/18 13:37


The patient is an 82 year old with a history of AFib s/p pacemaker, CHF, HTN, 

Lymphadema who presents for evaluation of worsening lower extremity edema and 

inability to ambulate.  Differential includes but is not limited to: Cellulitis

, Wound Infection, Lymphadema, infectious, metabolic derangement.  Given the 

patient's physical exam, it is possible his symptoms are due to a cellulitis.  

We will obtain a cbc, cmp, bnp, blood cultures to evaluate further.  We will 

continue to monitor and reassess.





03/24/18 14:38


CBC, cmp are unremarkable.  However given the patient's inability to ambulate 

he will require PT eval for possible rehab placement and admission for further 

management.  We discussed the case with the hospitalist team who accepted the 

patient for admission.





*DC/Admit/Observation/Transfer


Diagnosis at time of Disposition: 


 Lymphedema








- Discharge Dispostion


Condition at time of disposition: Stable


Admit: Yes





- Referrals


Referrals: 


Genaro Fabian MD [Primary Care Provider] - 





- Patient Instructions





- Post Discharge Activity

## 2018-03-24 NOTE — PDOC
Attending Attestation





- Resident


Resident Name: Brad Cast





- ED Attending Attestation


I have performed the following: I have examined & evaluated the patient, The 

case was reviewed & discussed with the resident, I agree w/resident's findings 

& plan, Exceptions are as noted





- HPI


HPI: 





03/24/18 13:00


82y M hx of chf, afib, lymphedema, presents with worsening LE edema and 

difficulty walking due to the edema over the past few days. Family notes that 

since previous admission and then going to [facility], he has been unable to walk 

around at home. Spends most of his day in bed. pt does note a new wound on the 

LLE over the past day. denies any fever/chills, cp, n/v, diarrhea, dysuria. 











GENERAL: The patient is awake, alert, and fully oriented, Nontoxic - in no 

acute distress. Obese appearing


HEAD: Normocephalic, atraumatic.


EYES: extraocular movements intact, sclera anicteric, conjunctiva clear.


ENT: Normal voice,  Moist mucous membranes.


NECK: Normal range of motion, supple


LUNGS: Breath sounds equal, clear to auscultation bilaterally.  No wheezes, no 

rhonchi, no rales.


HEART: PM in chest wall, Regular rate and rhythm, normal S1 and S2 without 

murmur, rub or gallop.


ABDOMEN: Soft, nontender, normoactive bowel sounds.  No guarding, no rebound. 

No CVA tenderness


EXTREMITIES: Lymphedema, skin breakdown in the left lower extremity without 

significant erythema, warmth, diffusely tender


NEUROLOGICAL: No facial assymetry, Normal speech, moving all 4 extremities 

spontaneously and symmetrically, overall weak appearing


PSYCH: Normal mood, normal affect.


SKIN: Warm, Dry, normal turgor,





will ck labs to r/o electrolyte abnormalities, occult infection


may need to be admitted for further rehab














- Physicial Exam


PE: 





03/29/18 09:54


see abve





- Medical Decision Making





03/29/18 09:54


see above





**Heart Score/ECG Review





- ECG Impressions


Comment:: 





03/24/18 16:25


EKG performed at 13:33:22 on 3/24/18 


Ventricular paced EKG


Rate of 73

## 2018-03-24 NOTE — HP
CHIEF COMPLAINT:


leg swelling


PCP:





HISTORY OF PRESENT ILLNESS:


This is a 82 year old male with a history of atrial fibrillation s/p pacemaker,

chf, htn, chronic bilateral leg swelling and wound infections, who presents 

with complaints of bilateral leg swelling, pain, erythema and oozing from wound 

sites on legs. He also complains of sores/pain/open wounds around buttocks. 

These symptoms initially started around three weeks ago, just before being 

discharged from the nursing home. In the nursing home, he was treated with 

local wound care. He was able to ambulate slightly then, now the pain and 

swelling has progressed to the point that he is unable to ambulate. He denies 

fever, chills, n, v,, chest pain , sob, flank pain , dysuria. He is bowel and 

bladder continent; although at times he has "dribbling from penis".  


Patient is cared for by girlfriend at home. His last hospitalization was in 12/ 17 for LE cellulitis, with klebsiella on wound, treated with levaquin. Last 

micro culutre listed was +ESBL in wound, unknown if treated , possible 

colonization.?











Recent Travel:


no


PAST MEDICAL HISTORY:


as above


PAST SURGICAL HISTORY:


knee sx; 


Social History: poor historian ; lives with girlfriend


Smoking:quit 15 yrs ago 


Alcohol:no


Drugs: no





Family History:


Allergies





cephalexin monohydrate [From Keflex] Allergy (Mild, Verified 03/24/18 12:34)


 Hives


ertapenem Allergy (Verified 03/24/18 12:35)


 








HOME MEDICATIONS:


 Home Medications











 Medication  Instructions  Recorded


 


Metoprolol Succinate [Toprol Xl] 50 mg PO DAILY 03/11/17


 


Rivaroxaban [Xarelto -] 20 mg PO DAILY 03/11/17


 


Tamsulosin HCl [Flomax] 0.8 mg PO DAILY 03/11/17


 


Sennosides [Senna -] 2 tab PO HS  tablet 03/15/17


 


Furosemide [Lasix -] 40 mg PO BID #0 tab 06/06/17


 


Acetaminophen [Tylenol .Regular 650 mg PO Q6H PRN  tablet 12/23/17





Strength -]  


 


Metolazone [Zaroxolyn -] 2.5 mg PO Q2D@1000  tablet 12/23/17


 


Clotrimazole [Antifungal] 30 gm TP DAILY 03/24/18


 


Docusate Sodium [Colace] 300 mg PO HS 03/24/18


 


Methyl Salicylate/Menthol [Bengay 57 gm TP DAILY 03/24/18





Greaseless Cream]  


 


Nystatin Cream [Mycostatin] 1 applic TP BID 03/24/18


 


Ranitidine [Zantac -] 150 mg PO HS 03/24/18








REVIEW OF SYSTEMS as above 











PHYSICAL EXAMINATION


 Vital Signs - 24 hr











  03/24/18 03/24/18 03/24/18





  12:20 14:25 17:30


 


Temperature 97.5 F L 98.1 F 


 


Pulse Rate 76  


 


Pulse Rate [  78 





Left Apical]   


 


Respiratory 16 22 22





Rate   


 


Blood Pressure 143/62  


 


Blood Pressure  140/73 





[Left Arm]   


 


O2 Sat by Pulse 100 97 91 L





Oximetry (%)   














  03/24/18





  18:00


 


Temperature 98.3 F


 


Pulse Rate 70


 


Pulse Rate [ 





Left Apical] 


 


Respiratory 18





Rate 


 


Blood Pressure 125/51


 


Blood Pressure 





[Left Arm] 


 


O2 Sat by Pulse 





Oximetry (%) 











GENERAL: obese male; foul cmell when entering room; Awake, alert, and fully 

oriented


HEAD: Normal with no signs of trauma.


EYES: Pupils equal, round and reactive to light, extraocular movements intact, 

sclera anicteric, conjunctiva clear. No lid lag.


EARS, NOSE, THROAT: Ears normal, nares patent, oropharynx clear without 

exudates. Moist mucous membranes.


NECK: Normal range of motion, supple without lymphadenopathy, JVD, or masses.


LUNGS: Breath sounds equal, clear to auscultation bilaterally. No wheezes, and 

very mild bibasilar crackles. No accessory muscle use.


HEART: Regular rate and rhythm, normal S1 and S2 without murmur, rub or gallop.


ABDOMEN: obese, Soft, nontender, not distended, normoactive bowel sounds, no 

guarding


MUSCULOSKELETAL: Normal range of motion at all joints. No bony deformities or 

tenderness. No CVA tenderness.


UPPER EXTREMITIES: 2+ pulses, warm, well-perfused. No cyanosis. No clubbing. No 

peripheral edema.


LOWER EXTREMITIES: bilateral pedal pulses not palpable due to edema; b/l warm, 

with erythemal edema, chronic venous stasis; areas op open macerated skin with 

clear yellow fluid on dressing; no puss; 


NEUROLOGICAL:  Cranial nerves II-XII intact. Normal speech. motor 5/5 biceps/

triceps; hand ; wrist/ankle/hip/knee flexion and extension intact; 

sensation of the plantar aspect of bilateral feel slightly decreased; reflexes 

bracheoradialis 2+; knee 1+ b/l; ankle 0


PSYCHIATRIC: Cooperative. Good eye contact. Appropriate mood and affect.


SKIN: pressure ulcer on bottom with skin breakdown ; tinea like rash around 

anus and scrotum


 Laboratory Results - last 24 hr











  03/24/18 03/24/18





  13:10 13:10


 


WBC  7.9 


 


RBC  3.96 L 


 


Hgb  11.7  D 


 


Hct  35.1 L 


 


MCV  88.5 


 


MCH  29.5 


 


MCHC  33.3 


 


RDW  16.1 H 


 


Plt Count  328 


 


MPV  7.3 L 


 


Neutrophils %  62.4  D 


 


Lymphocytes %  21.4  D 


 


Monocytes %  10.3 H 


 


Eosinophils %  5.5 H 


 


Basophils %  0.4 


 


Sodium   141


 


Potassium   3.7


 


Chloride   100


 


Carbon Dioxide   29


 


Anion Gap   12


 


BUN   24 H D


 


Creatinine   1.3


 


Creat Clearance w eGFR   52.85


 


Random Glucose   90


 


Calcium   9.1


 


Total Bilirubin   0.7


 


AST   13 L


 


ALT   15  D


 


Alkaline Phosphatase   110


 


B-Natriuretic Peptide   1294.92 H


 


Total Protein   6.3 L


 


Albumin   3.4











ASSESSMENT/PLAN:


This is a 82 year old male with a past medical history of a fib on xarelto; 

bradycardia s/p pacemakerl CHF, HTN, chronic lymphendea, presents with 

bilateral leg swelling, pain , with inability to ambulated. 





#LE edema with erythema causing inability to ambulate:


-need to rule out cellulitis vc chronic lympedema


-has hx of wound cx + ESBL ; not sure if was treated  or not


-wound care; vascular/wound care consult


-monitor off antibiotics


-appreciate ID





#Decubitus ulcers; 


-positioning


-wound care


-fungal power





#fungal infection; tinea


-apply nystatin on affected area bid; buttocks; inner thigh b/l; elevate scrotum





#hc of CHF; 


-monitor for signs over overload:


-daily wt/ I/Os


-mild basilar crackles at base; bnp chronically elevated


-cont lasix bid





#hc of atrial fibrillation


-s/p pacemaker


-on AC xaretlo





#HTN: controlled





Diet : na controlled





VTE: on AC


GI: on zantac


Physical Therapy














Case discussed with Dr. Monika Matt PGY-2








  


























Visit type





- Emergency Visit


Emergency Visit: Yes


ED Registration Date: 03/24/18


Care time: The patient presented to the Emergency Department on the above date 

and was hospitalized for further evaluation of their emergent condition.





- New Patient


This patient is new to me today: Yes


Date on this admission: 03/25/18





- Critical Care


Critical Care patient: No





Hospitalist Screening





- Colonoscopy Questionnaire


Colonoscopy Questionnaire: 





Colonoscopy Questionnaire








-   Patient:


50 - 75 years old and never had a screening colonoscopy: No


History of colon or rectal polyps, or CA: Unknown


History of IBD, Crohn's disease or UC: Unknown


History of abdominal radiation therapy as a child: Unknown





-   Relative:


1 with colon or rectal CA, or polyps at age 60 or younger: Unknown


Colon or rectal CA diagnosed at age 45 or younger: Unknown


Multiple relatives with colon or rectal CA: Unknown





-   Outcome:


Screening Result: Negative Screen

## 2018-03-24 NOTE — PN
Teaching Attending Note


Name of Resident: Jessika Matt





ATTENDING PHYSICIAN STATEMENT





I saw and evaluated the patient.


I reviewed the resident's note and discussed the case with the resident.


I agree with the resident's findings and plan as documented.








SUBJECTIVE:


CC: " can't walk " 


HPI: unfortunate 83 y/o with h/o HTN, A fib on AC, BPH, bradycardia with 

pacemaker ,chronic diastolic CHF, OA of L hip,  and other medical problems who 

presented with inability to walk. 


He was admitted in 12/17 for treatment of cellulitis.  After that [facility] . [facility] 3 weeks ago and was able to walk.  He then had worsening 

edema in LE, and his legs felt heavy.  he also have chronic wounds on his LE  

for which he sees dr. Keith in wound clinic but has not done this since dc form 

[facility]. 


he does not know his meds and whether he is on diuretics. 


he denies fever or chills, SOB or CP . has no dysuria . 








OBJECTIVE:


NAd , AAox3 . 


HEENT: MMM, no facial droop. EOMI, tongue at mid line . JVD b/l . no LAP . 


CV : RRR. possible 2/6 SM at [facility]


Lungs ; CTAB 


Abd; obese , soft, NT, ND , NL BS . 


Ext: pitting edema and thick erythematous skin on both legs. 5x3 cm superficial 

ulcer on medial aspect of L leg with yellowish discoloration. skin of both legs 

is oozing clear thin fluid.  fungal infection among toes . 


upper thighs and lower buttocks with wet, ulcers covered with grey skin .  


: erythema, and wet excoriated skin around scrotum and around rectum. bad 

odor  








ASSESSMENT AND PLAN:


Unfortunate 83 y/o with h/o HTN, A fib on AC, BPH, bradycardia with pacemaker ,

chronic diastolic CHF, OA of L hip,  and other medical problems who presented 

with inability to walk. 





1- LE erythema : likely due to the edematous skin. I don't think patient has 

infection/cellulitis . 


wound on L leg grew ESBL, klebsiella and other organisms in past. 





- Hold off Abx a this time . as cellulitis is unlikely and start diuresis  


- ID to evaluate


- will apply xerform on L leg ulcer 





2- Unstagable decubitus ulcers on lower buttocks and upper posterior thighs. 


- no signs of infection. 


- off load pressure . 


- wound care consult 





3-  Tinia cruris : 


- nystatin powder and zinc oxide 





4- Acute on chronic diastolic R heart failure: he nicholas snot know his meds , but 

per previous records he was on 40 of po lasix BID 


- start lasix 40 BID through IV 


- weight and strict I&O 


- montior LE





5- h/o A fib. 


- will confirm meds with girlfriend. 


-per previous records he wa son BB and xarelto 





6- h/o bradycardia s/p Pace maker . 





dispo : [facility]

## 2018-03-25 LAB
BASOPHILS # BLD: 0.6 % (ref 0–2)
DEPRECATED RDW RBC AUTO: 15.9 % (ref 11.9–15.9)
EOSINOPHIL # BLD: 4.6 % (ref 0–4.5)
HCT VFR BLD CALC: 34.4 % (ref 35.4–49)
HGB BLD-MCNC: 11.4 GM/DL (ref 11.7–16.9)
LYMPHOCYTES # BLD: 15.7 % (ref 8–40)
MCH RBC QN AUTO: 29.3 PG (ref 25.7–33.7)
MCHC RBC AUTO-ENTMCNC: 33.2 G/DL (ref 32–35.9)
MCV RBC: 88.5 FL (ref 80–96)
MONOCYTES # BLD AUTO: 8.1 % (ref 3.8–10.2)
NEUTROPHILS # BLD: 71 % (ref 42.8–82.8)
PLATELET # BLD AUTO: 300 K/MM3 (ref 134–434)
PMV BLD: 7.5 FL (ref 7.5–11.1)
RBC # BLD AUTO: 3.89 M/MM3 (ref 4–5.6)
WBC # BLD AUTO: 7.8 K/MM3 (ref 4–10)

## 2018-03-25 RX ADMIN — RANITIDINE SCH MG: 150 TABLET ORAL at 21:22

## 2018-03-25 RX ADMIN — DOCUSATE SODIUM SCH MG: 100 CAPSULE, LIQUID FILLED ORAL at 21:22

## 2018-03-25 RX ADMIN — ZINC OXIDE SCH APPLIC: 200 OINTMENT TOPICAL at 21:23

## 2018-03-25 RX ADMIN — NYSTATIN SCH APPLIC: 100000 POWDER TOPICAL at 10:05

## 2018-03-25 RX ADMIN — FUROSEMIDE SCH MG: 40 TABLET ORAL at 06:28

## 2018-03-25 RX ADMIN — FUROSEMIDE SCH MG: 40 TABLET ORAL at 14:31

## 2018-03-25 RX ADMIN — RIVAROXABAN SCH MG: 20 TABLET, FILM COATED ORAL at 10:05

## 2018-03-25 RX ADMIN — CLOTRIMAZOLE SCH APPLIC: 1 CREAM TOPICAL at 10:05

## 2018-03-25 RX ADMIN — ACETAMINOPHEN PRN MG: 325 TABLET ORAL at 21:22

## 2018-03-25 RX ADMIN — SENNOSIDES SCH TAB: 8.6 TABLET, FILM COATED ORAL at 21:22

## 2018-03-25 RX ADMIN — TAMSULOSIN HYDROCHLORIDE SCH MG: 0.4 CAPSULE ORAL at 10:04

## 2018-03-25 RX ADMIN — METOLAZONE SCH MG: 2.5 TABLET ORAL at 13:48

## 2018-03-25 RX ADMIN — ACETAMINOPHEN PRN MG: 325 TABLET ORAL at 06:28

## 2018-03-25 RX ADMIN — ZINC OXIDE SCH APPLIC: 200 OINTMENT TOPICAL at 10:06

## 2018-03-25 NOTE — EKG
Test Reason : 

Blood Pressure : ***/*** mmHG

Vent. Rate : 073 BPM     Atrial Rate : 070 BPM

   P-R Int : 000 ms          QRS Dur : 202 ms

    QT Int : 482 ms       P-R-T Axes : 000 -58 102 degrees

   QTc Int : 531 ms

 

Ventricular-paced rhythm WITH FREQUENT PREMATURE VENTRICULAR COMPLEXES

POSSIBLY UNDERLYING ATRIAL FIBRILLATION PRESENT

ABNORMAL ECG

WHEN COMPARED WITH ECG OF 13-DEC-2017 14:03,

VENT. RATE HAS DECREASED BY   2 BPM

Confirmed by EFREM TROTTER MD ([id number]) on 3/25/2018 9:38:41 PM

 

Referred By:             Confirmed By:EFREM TROTTER MD

## 2018-03-25 NOTE — PN
Progress Note (short form)





- Note


Progress Note: 





Subjective:


feels hi legs are better 


Objective:








Vital Signs:


 Last Vital Signs











Temp Pulse Resp BP Pulse Ox


 


 98.4 F   64   20   138/56   95 


 


 03/25/18 18:00  03/25/18 18:00  03/25/18 18:00  03/25/18 18:00  03/25/18 01:00














Physical Exam:


NAd , AAox3 . 


CV : RRR. possible 2/6 SM at [facility]


Lungs ; bibasilar crackles  


Abd; obese , soft, NT, ND , NL BS . 


Ext: pitting edema and thick erythematous skin on both legs improved form 

yesterday. 5x3 cm superficial ulcer on medial aspect of L leg with yellowish 

discoloration. skin of both legs is oozing clear thin fluid.  fungal infection 

among toes . 


upper thighs and lower buttocks with wet, ulcers covered with grey skin .  


: erythema, and wet excoriated skin around scrotum  .bad odor  








ASSESSMENT AND PLAN:





Unfortunate 81 y/o with h/o HTN, A fib on AC, BPH, bradycardia with pacemaker ,

chronic diastolic CHF, OA of L hip,  and other medical problems who presented 

with inability to walk. 





1- LE erythema: likely due to the edematous skin. I don't think patient has 

cellulitis . erythema and edema improved with IV lasix 


wound on L leg grew ESBL, klebsiella and other organisms in past. 





- Hold off Abx a this time 


- ID eval pending 


- will apply xerform on L leg ulcer 





2- Unstagable decubitus ulcers on lower buttocks and upper posterior thighs. 


- no signs of infection. 


- off load pressure . 


- wound care consult 





3- Tinia cruris : 


- nystatin powder and zinc oxide 





4- Acute on chronic diastolic R heart failure: 


- lasix  BID through IV 


- weight and strict I&O 


- monitor LE





5- H/o A fib. 


-cont BB and xarelto 





6- h/o bradycardia s/p Pace maker . 





Dispo: [facility] 




















Visit type





- Emergency Visit


Emergency Visit: Yes


ED Registration Date: 03/24/18


Care time: The patient presented to the Emergency Department on the above date 

and was hospitalized for further evaluation of their emergent condition.





- New Patient


This patient is new to me today: No





- Critical Care


Critical Care patient: No

## 2018-03-26 LAB
ANION GAP SERPL CALC-SCNC: 7 MMOL/L (ref 8–16)
BUN SERPL-MCNC: 19 MG/DL (ref 7–18)
CALCIUM SERPL-MCNC: 8.4 MG/DL (ref 8.5–10.1)
CHLORIDE SERPL-SCNC: 104 MMOL/L (ref 98–107)
CO2 SERPL-SCNC: 28 MMOL/L (ref 21–32)
CREAT SERPL-MCNC: 1.2 MG/DL (ref 0.7–1.3)
GLUCOSE SERPL-MCNC: 97 MG/DL (ref 74–106)
POTASSIUM SERPLBLD-SCNC: 3.9 MMOL/L (ref 3.5–5.1)
SODIUM SERPL-SCNC: 139 MMOL/L (ref 136–145)

## 2018-03-26 RX ADMIN — TAMSULOSIN HYDROCHLORIDE SCH MG: 0.4 CAPSULE ORAL at 09:01

## 2018-03-26 RX ADMIN — DOCUSATE SODIUM SCH: 100 CAPSULE, LIQUID FILLED ORAL at 21:13

## 2018-03-26 RX ADMIN — RANITIDINE SCH MG: 150 TABLET ORAL at 21:13

## 2018-03-26 RX ADMIN — ACETAMINOPHEN PRN MG: 325 TABLET ORAL at 21:11

## 2018-03-26 RX ADMIN — SENNOSIDES SCH: 8.6 TABLET, FILM COATED ORAL at 21:13

## 2018-03-26 RX ADMIN — ZINC OXIDE SCH APPLIC: 200 OINTMENT TOPICAL at 09:05

## 2018-03-26 RX ADMIN — NYSTATIN SCH APPLIC: 100000 POWDER TOPICAL at 09:04

## 2018-03-26 RX ADMIN — RIVAROXABAN SCH MG: 20 TABLET, FILM COATED ORAL at 09:02

## 2018-03-26 RX ADMIN — CLOTRIMAZOLE SCH APPLIC: 1 CREAM TOPICAL at 10:58

## 2018-03-26 RX ADMIN — ZINC OXIDE SCH APPLIC: 200 OINTMENT TOPICAL at 21:14

## 2018-03-26 RX ADMIN — FUROSEMIDE SCH MG: 40 TABLET ORAL at 05:58

## 2018-03-26 RX ADMIN — ACETAMINOPHEN PRN MG: 325 TABLET ORAL at 05:58

## 2018-03-26 RX ADMIN — FUROSEMIDE SCH MG: 40 TABLET ORAL at 14:50

## 2018-03-26 NOTE — CONS
DATE OF CONSULTATION:

 

DATE OF DICTATION:  03/26/2018

 

REQUESTED BY:  The hospitalist service.

 

This is an 82-year-old man.  He was in the hospital in December, after which he was

discharged to [facility], where he stayed for 60 days.  He was

apparently there for rehab.  He had had open ulcers on his legs that had healed.  He

was discharged home.  He went to his girlfriend's apartment roughly 2 to 3 weeks ago.

 This history is all from the girlfriend, and he apparently developed worsening

swelling of his legs.  He used a wheelchair in her apartment all the time.  He sat in

a wheelchair.  He was able to, with a walker, get into the bathroom and out, but

besides that, was not ambulating at all.  He was even sleeping sitting in the

wheelchair.  Prior to discharge from the nursing home, he was noted to have some

pressure ulcers in the back of his upper thighs from sitting in the wheelchair.  On

the day of his arrival in the emergency room on the 24th, he had an episode of sudden

weakness and he was unable to ambulate with the walker.  He was brought to the

emergency room.  He was noted to have bilateral leg swelling.  He was noted to have

now an open wound again on the left lower extremity that was draining clear fluid. 

He had no fevers or chills, nausea or vomiting.  He has not had any VNS services or

physical therapy at home.

 

Past medical history is notable for atrial fibrillation, status post permanent

pacemaker.  He has a history of CHF, hypertension, chronic bilateral leg swelling. 

On his last admission in December, he had a culture of his left leg that grew

Klebsiella and Serratia.  As an outpatient in January, he had an E coli ESBL 

from a leg culture, though the legs have now healed.

 

His surgical history is notable for the pacemaker and he has had bilateral total knee

replacements.

 

SOCIAL HISTORY:  He is a poor historian.  He lives with his girlfriend.  He stopped

smoking 16 years ago.  No history of any substance use.

 

His medications as an outpatient include Flomax, senna, Xarelto, Zantac, nystatin

cream, Toprol XL, Zaroxolyn, Lasix, Colace, clotrimazole, and Tylenol.

 

He is allergic to CEPHALEXIN and ERTAPENEM.

 

REVIEW OF SYSTEMS:  He has no fevers or chills.  He otherwise feels well.

 

PHYSICAL EXAMINATION:

Vital Signs:  His temperature is 98.6.  Pulse is 69.  Blood pressure 117/49. 

Respiratory rate 20.  Weight is 310 pounds.

General:  He is alert, in no distress.

HEENT:  He is normocephalic.  His eyes are anicteric

Neck:  Supple.

Lungs:  Diminished breath sounds at the bases.

Heart:  Regular rate and rhythm. 

Abdomen:  Soft, nontender.

Extremities:  He has well-healed bilateral total knee replacement scars.  He has an

open abrasion on his left leg that has minimal erythema.  He has bilateral edema of

both legs.  There is some clear serous drainage from the leg.  On the posterior

aspect of his upper thigh, he has superficial pressure ulcers.  There is no

surrounding erythema.

 

Labs are notable for a white count of 7.8, hemoglobin 11.4, platelets are 300,000. 

INR is 1.2.  BUN 19, creatinine 1.2.  Urinalysis was not done.  Blood cultures are

negative.

 

In summary, this is an 82-year-old man admitted with volume overload and edema of his

legs.  He has no signs of cellulitis, with a skin abrasion and pressure ulcers.  No

need for systemic antibiotics.  Would continue contact isolation, wound care for

local treatment of the wound, and we need to offload the ulcers as tolerated.  Please

call back if needed.

 

 

AMARJIT VALDEZ M.D.

 

[id number]

DD: 03/26/2018 14:01

DT: 03/26/2018 16:17

Job #:  [id number]

## 2018-03-26 NOTE — EKG
Test Reason : 

Blood Pressure : ***/*** mmHG

Vent. Rate : 069 BPM     Atrial Rate : 241 BPM

   P-R Int : 000 ms          QRS Dur : 206 ms

    QT Int : 480 ms       P-R-T Axes : 000 -69 099 degrees

   QTc Int : 514 ms

 

Ventricular-paced rhythm

ABNORMAL ECG

WHEN COMPARED WITH ECG OF 24-MAR-2018 13:33,

PREMATURE VENTRICULAR COMPLEXES ARE NO LONGER PRESENT

VENT. RATE HAS DECREASED BY   4 BPM

Confirmed by SITA RUST, EDI ([id number]) on 3/26/2018 2:41:36 PM

 

Referred By:             Confirmed By:EDI BUCKNER MD

## 2018-03-26 NOTE — PN
Progress Note (short form)





- Note


Progress Note: 





ID consult dictated





imp/reccd





no signs cellulitis


skin abrasion left lower extremity


pressure ulcers posterior upper thighs bilaterally from sitting in wheelchair


chf








no need for systemic antiibotics


continue contact isolation


wound care for local treatment


off load the ulcers as tolerated





please call back if needed








Problem List





- Problems


(1) CHF (congestive heart failure)


Code(s): I50.9 - HEART FAILURE, UNSPECIFIED   





(2) Pressure ulcer


Code(s): L89.90 - PRESSURE ULCER OF UNSPECIFIED SITE, UNSPECIFIED STAGE

## 2018-03-26 NOTE — PN
Progress Note, Physician


History of Present Illness: 





feels better


less swelling





- Current Medication List


Current Medications: 


Active Medications





Acetaminophen (Tylenol -)  650 mg PO Q6H PRN


   PRN Reason: FEVER


   Last Admin: 03/26/18 05:58 Dose:  650 mg


Clotrimazole (Lotrimin 1% Cream -)  1 applic TP DAILY [facility]


   Last Admin: 03/25/18 10:05 Dose:  1 applic


Docusate Sodium (Colace -)  300 mg PO HS [facility]


   Last Admin: 03/25/18 21:22 Dose:  300 mg


Furosemide (Lasix -)  40 mg PO BIDLASIX [facility]


   Last Admin: 03/26/18 05:58 Dose:  40 mg


Metolazone (Zaroxolyn -)  2.5 mg PO Q2D@1330 [facility]


   Last Admin: 03/25/18 13:48 Dose:  2.5 mg


Metoprolol Succinate (Toprol Xl -)  50 mg PO DAILY [facility]


   Last Admin: 03/25/18 10:05 Dose:  50 mg


Multi-Ingredient Ointment (Zinc Oxide)  1 applic TP BID [facility]


   Last Admin: 03/25/18 21:23 Dose:  1 applic


Nystatin (Nystop Powder -)  1 applic TP DAILY [facility]


   Last Admin: 03/25/18 10:05 Dose:  1 applic


Ranitidine HCl (Zantac -)  150 mg PO HS [facility]


   Last Admin: 03/25/18 21:22 Dose:  150 mg


Rivaroxaban (Xarelto -)  20 mg PO DAILY [facility]


   Last Admin: 03/25/18 10:05 Dose:  20 mg


Senna (Senna -)  2 tab PO HS [facility]


   Last Admin: 03/25/18 21:22 Dose:  2 tab


Tamsulosin HCl (Flomax -)  0.8 mg PO DAILY@0830 [facility]


   Last Admin: 03/25/18 10:04 Dose:  0.8 mg











- Objective


Vital Signs: 


 Vital Signs











Temperature  98.5 F   03/26/18 05:00


 


Pulse Rate  74   03/26/18 05:00


 


Respiratory Rate  22   03/26/18 05:00


 


Blood Pressure  131/53   03/26/18 05:00


 


O2 Sat by Pulse Oximetry (%)  95   03/25/18 21:00











Cardiovascular: Yes: Regular Rate and Rhythm


Respiratory: Yes: Regular, CTA Bilaterally


Gastrointestinal: Yes: Normal Bowel Sounds, Soft


Extremities: Yes: Erythema


Edema: Yes


Integumentary: Yes: Venous Stasis Changes


Labs: 


 CBC, BMP





 03/25/18 07:15 











Problem List





- Problems


(1) CHF (congestive heart failure)


Assessment/Plan: 


iv lasix


monitor lytes


echo in december --nl lv


Code(s): I50.9 - HEART FAILURE, UNSPECIFIED   


Qualifiers: 


   Heart failure type: diastolic   Heart failure chronicity: acute on chronic   

Qualified Code(s): I50.33 - Acute on chronic diastolic (congestive) heart 

failure   





(2) Atrial fibrillation


Assessment/Plan: 


f/u ekg


on xarelto


holter


cardio


Code(s): I48.91 - UNSPECIFIED ATRIAL FIBRILLATION   





(3) Venous insufficiency


Assessment/Plan: 


leg elevation


Code(s): I87.2 - VENOUS INSUFFICIENCY (CHRONIC) (PERIPHERAL)   





(4) Morbid obesity


Code(s): E66.01 - MORBID (SEVERE) OBESITY DUE TO EXCESS CALORIES   





(5) Pacemaker


Assessment/Plan: 


cardio


Code(s): Z95.0 - PRESENCE OF CARDIAC PACEMAKER   





(6) Cellulitis


Assessment/Plan: 


hold off abx


id consult


Code(s): L03.90 - CELLULITIS, UNSPECIFIED

## 2018-03-26 NOTE — CONSULT
Consult - text type





- Consultation


Consultation Note: 








NEUROLOGY CONSULTATION is greatly appreciated:





This 81 yo RH man, recently living in his [facility] apartment (he cannot 

navigate his house in the [place]) has


PMH of HTN, CHF, AFIB, s/p PPM and chronic edema is maintained on Xarewlto, 

metoprolol, tamsulosin, lasix, zaroxylin and ranitidine.


OA: S/P B/L TKR's. 3 years of progressive gait impairment attributed to "Bad 

hips."


Recently admitted with recurrent cellulitis, then to rehab, but has been non-

ambulatory since arriving home.


Claims he took "3 steps" today with rehab x 2 assists.





ARJUN: No bruits. 1 + edema. Reddish discoloration both calves.


       s/p B/L TKR's. Wt= 300lbs





NEURO: MS/speech: Normal


           CN II-XII normal except intermittent, rhythmic, left jaw tremor.


           Motor: No rest tremor. No drift. Normal arm strength. Min 

cogwheeling left arm.


                     Cannot elevate either leg against gravity. However, Knee 

extension and ankle dorsiflexion are normal (5/5).


                     Plantars are silent.


           Coord: No FTN dystaxia


           sensory: Decreased vibration both feet to the ankles.


           Gait: deferred.





IMP: Chronic gait dysfunction probably multifactorial.


       Exam suggest peripheral neuropathy and/or LS spinal stenosis.


       There are also mild extrapyramidal features on the left.


       The preponderant weakness is proximal in the legs (Bicrural). Probably 

is due to his hips. R/O polymyositis/high lumbar radiculopathy.





SUGGEST: CT scans of the head and LS spine.


               Check CK, TSH, T4, B12, RPR.


               XRays of the hips/ ortho eval.





Thank you very much,


Thien Hensley MD

## 2018-03-26 NOTE — CON.CARD
Consult


Consult Specialty:: Cardiology


Referred by:: Genaro Fabian


Reason for Consultation:: CHF/leg edema





- History of Present Illness


Chief Complaint: Leg edema.  Cannot walk


History of Present Illness: 


 82 year old male with a pmhx of htn, afib on AC s/p ppm, bph, chronic 

diastolic chf, arthritis, and h/o LE cellulitis who presents with worsening LE 

edema and inability to walk due to leg heaviness.  +chronic wound on LE's.   





- History Source


History Provided By: Patient, Medical Record





- Past Medical History


Cardio/Vascular: Yes: AFIB, CHF, HTN, Other (pacemaker)


Renal/: Yes: BPH, Renal Calculi





- Past Surgical History


Past Surgical History: Yes: Joint Replacement (knee), Permanent Pacemaker, 

Prostatectomy





- Alcohol/Substance Use


Hx Alcohol Use: No


History of Substance Use: reports: None





- Smoking History


Smoking history: Former smoker


Have you smoked in the past 12 months: No


If you are a former smoker, when did you quit?: 1970





Home Medications





- Allergies


Allergies/Adverse Reactions: 


 Allergies











Allergy/AdvReac Type Severity Reaction Status Date / Time


 


cephalexin monohydrate Allergy Mild Hives Verified 03/24/18 12:34





[From Keflex]     


 


ertapenem Allergy   Verified 03/24/18 12:35














- Home Medications


Home Medications: 


Ambulatory Orders





Metoprolol Succinate [Toprol Xl] 50 mg PO DAILY 03/11/17 


Rivaroxaban [Xarelto -] 20 mg PO DAILY 03/11/17 


Tamsulosin HCl [Flomax] 0.8 mg PO DAILY 03/11/17 


Sennosides [Senna -] 2 tab PO HS  tablet 03/15/17 


Furosemide [Lasix -] 40 mg PO BID #0 tab 06/06/17 


Acetaminophen [Tylenol .Regular Strength -] 650 mg PO Q6H PRN  tablet 12/23/17 


Metolazone [Zaroxolyn -] 2.5 mg PO Q2D@1000  tablet 12/23/17 


Clotrimazole [Antifungal] 30 gm TP DAILY 03/24/18 


Docusate Sodium [Colace] 300 mg PO HS 03/24/18 


Methyl Salicylate/Menthol [Bengay Greaseless Cream] 57 gm TP DAILY 03/24/18 


Nystatin Cream [Mycostatin] 1 applic TP BID 03/24/18 


Ranitidine [Zantac -] 150 mg PO HS 03/24/18 








Vital Signs: 


 Vital Signs











Temperature  98.5 F   03/26/18 05:00


 


Pulse Rate  74   03/26/18 05:00


 


Respiratory Rate  22   03/26/18 05:00


 


Blood Pressure  131/53   03/26/18 05:00


 


O2 Sat by Pulse Oximetry (%)  95   03/25/18 21:00











Constitutional: Yes: No Distress


Neck: Yes: Supple


Respiratory: Yes: CTA Bilaterally


Gastrointestinal: Yes: Soft


Cardiovascular: Yes: Pulse Irregular


JVD: No


Carotid Bruit: No


Heart Sounds: Yes: S1, S2


Murmur: No: Systolic Murmur


Edema: LLE: 1+, RLE: 1+





- Other Data


Labs, Other Data: 


 CBC, BMP





 03/25/18 07:15 





 03/25/18 07:15 











Imaging





- Results


Chest X-ray: Report Reviewed


EKG: Image Reviewed





Problem List





- Problems


(1) Atrial fibrillation


Code(s): I48.91 - UNSPECIFIED ATRIAL FIBRILLATION   





(2) CHF (congestive heart failure)


Code(s): I50.9 - HEART FAILURE, UNSPECIFIED   


Qualifiers: 


   Heart failure type: diastolic   Heart failure chronicity: acute on chronic   

Qualified Code(s): I50.33 - Acute on chronic diastolic (congestive) heart 

failure   





Assessment/Plan





82 year old male with a pmhx of htn, afib on AC s/p ppm, bph, chronic diastolic 

chf, arthritis, and h/o LE cellulitis who presents with worsening LE edema and 

inability to walk due to leg heaviness.  +chronic wound on LE's.  


-EKG with afib and v pacing with occasional pvcs


-BNP mildly elevated


Troponins negative





1) HTN


Controlled





2) Afib


-rate control on metoprolol


On rivaroxaban for AC





3) Acute on chronic diastolic chf


-furosemide 40mg BID PO


Appears that LE edema is improving on metolazone.  Would continue


monitor lytes, daily weights, and I/O's


-Should follow up with his cardiologist Dr. Ronnie Esposito upon DC.





No other further cardiac testing at this time


Please call with any questions.

## 2018-03-27 RX ADMIN — RIVAROXABAN SCH MG: 20 TABLET, FILM COATED ORAL at 09:45

## 2018-03-27 RX ADMIN — TAMSULOSIN HYDROCHLORIDE SCH MG: 0.4 CAPSULE ORAL at 08:40

## 2018-03-27 RX ADMIN — RANITIDINE SCH MG: 150 TABLET ORAL at 21:45

## 2018-03-27 RX ADMIN — NYSTATIN SCH APPLIC: 100000 POWDER TOPICAL at 09:47

## 2018-03-27 RX ADMIN — ACETAMINOPHEN PRN MG: 325 TABLET ORAL at 20:51

## 2018-03-27 RX ADMIN — SENNOSIDES SCH: 8.6 TABLET, FILM COATED ORAL at 21:45

## 2018-03-27 RX ADMIN — METOLAZONE SCH MG: 2.5 TABLET ORAL at 13:07

## 2018-03-27 RX ADMIN — FUROSEMIDE SCH MG: 40 TABLET ORAL at 13:07

## 2018-03-27 RX ADMIN — CLOTRIMAZOLE SCH APPLIC: 1 CREAM TOPICAL at 09:47

## 2018-03-27 RX ADMIN — FUROSEMIDE SCH MG: 40 TABLET ORAL at 06:28

## 2018-03-27 RX ADMIN — DOCUSATE SODIUM SCH: 100 CAPSULE, LIQUID FILLED ORAL at 21:45

## 2018-03-27 RX ADMIN — ZINC OXIDE SCH APPLIC: 200 OINTMENT TOPICAL at 21:45

## 2018-03-27 RX ADMIN — ZINC OXIDE SCH APPLIC: 200 OINTMENT TOPICAL at 09:46

## 2018-03-27 RX ADMIN — ACETAMINOPHEN PRN MG: 325 TABLET ORAL at 06:29

## 2018-03-27 NOTE — PN
Progress Note, Physician


History of Present Illness: 





feels better


less swelling





- Current Medication List


Current Medications: 


Active Medications





Acetaminophen (Tylenol -)  650 mg PO Q6H PRN


   PRN Reason: FEVER


   Last Admin: 03/27/18 06:29 Dose:  650 mg


Clotrimazole (Lotrimin 1% Cream -)  1 applic TP DAILY [facility]


   Last Admin: 03/26/18 10:58 Dose:  1 applic


Docusate Sodium (Colace -)  300 mg PO HS [facility]


   Last Admin: 03/26/18 21:13 Dose:  Not Given


Furosemide (Lasix -)  40 mg PO BIDLASIX [facility]


   Last Admin: 03/27/18 06:28 Dose:  40 mg


Metolazone (Zaroxolyn -)  2.5 mg PO Q2D@1330 [facility]


   Last Admin: 03/25/18 13:48 Dose:  2.5 mg


Metoprolol Succinate (Toprol Xl -)  50 mg PO DAILY [facility]


   Last Admin: 03/26/18 09:02 Dose:  50 mg


Multi-Ingredient Ointment (Zinc Oxide)  1 applic TP BID [facility]


   Last Admin: 03/26/18 21:14 Dose:  1 applic


Nystatin (Nystop Powder -)  1 applic TP DAILY [facility]


   Last Admin: 03/26/18 09:04 Dose:  1 applic


Ranitidine HCl (Zantac -)  150 mg PO HS [facility]


   Last Admin: 03/26/18 21:13 Dose:  150 mg


Rivaroxaban (Xarelto -)  20 mg PO DAILY [facility]


   Last Admin: 03/26/18 09:02 Dose:  20 mg


Senna (Senna -)  2 tab PO HS [facility]


   Last Admin: 03/26/18 21:13 Dose:  Not Given


Tamsulosin HCl (Flomax -)  0.8 mg PO DAILY@0830 [facility]


   Last Admin: 03/27/18 08:40 Dose:  0.8 mg











- Objective


Vital Signs: 


 Vital Signs











Temperature  98.5 F   03/27/18 06:00


 


Pulse Rate  70   03/27/18 06:00


 


Respiratory Rate  20   03/27/18 06:00


 


Blood Pressure  138/69   03/27/18 06:00


 


O2 Sat by Pulse Oximetry (%)  94 L  03/26/18 21:00











Cardiovascular: Yes: S1, S2


Respiratory: Yes: Regular, CTA Bilaterally


Gastrointestinal: Yes: Normal Bowel Sounds, Soft


Labs: 


 CBC, BMP





 03/25/18 07:15 





 03/25/18 07:15 











Problem List





- Problems


(1) CHF (congestive heart failure)


Assessment/Plan: 


iv lasix


monitor lytes


echo in december --nl lv


Code(s): I50.9 - HEART FAILURE, UNSPECIFIED   


Qualifiers: 


   Heart failure type: diastolic   Heart failure chronicity: acute on chronic   

Qualified Code(s): I50.33 - Acute on chronic diastolic (congestive) heart 

failure   





(2) Atrial fibrillation


Assessment/Plan: 


f/u ekg


on xarelto


holter


cardio


Code(s): I48.91 - UNSPECIFIED ATRIAL FIBRILLATION   





(3) Venous insufficiency


Assessment/Plan: 


leg elevation


Code(s): I87.2 - VENOUS INSUFFICIENCY (CHRONIC) (PERIPHERAL)   





(4) Morbid obesity


Code(s): E66.01 - MORBID (SEVERE) OBESITY DUE TO EXCESS CALORIES   





(5) Pacemaker


Assessment/Plan: 


cardio


Code(s): Z95.0 - PRESENCE OF CARDIAC PACEMAKER   





(6) Cellulitis


Assessment/Plan: 


hold off abx


id consult NOTED--NO ABX


Code(s): L03.90 - CELLULITIS, UNSPECIFIED

## 2018-03-27 NOTE — PN
Progress Note (short form)





- Note


Progress Note: 





Vascular Surgery:





PT states that he was oob and stood yesterday with PT, otherwise in bed. He has 

lost weight since admission.





 Vital Signs











 Period  Temp  Pulse  Resp  BP Sys/Ross  Pulse Ox


 


 Last 24 Hr  97.6 F-98.6 F  69-70  20-20  117-138/46-69  94








GEN: appears comfortable


Back: buttock region is red, minor fissure inbetween the upper buttock region, 

otherwise skin intact. 


LE: b/l lower ext with evidence of decreased swelling. RLE blister over 

anterior shin, approxmately 3x1 inch


LLE: two superfical wounds with minimal exudate/clean over anterior/medial 

shin. medial 4x1.5 inch and anterior 2x2 cm. No erythema to his lower ext. LE's 

raised off the bed on pillows.





 CBC, BMP





 03/25/18 07:15 





 03/25/18 07:15 





A/P: 83 yo male b/l lymphedema, LE improved with Laxix/elevation. No evidence 

of cellulitis


   D/w Dr. Keith, continue local wound care to LE with xeroform/kerlix/Ace wraps


   Skin intact to buttock with minor fissure, continue to moisten with skin 

cream and reposition frequently


   Continue PT, plan for transfer to skilled nursing facility for 

reconditioning.

## 2018-03-27 NOTE — PN
Progress Note, Physician





- Current Medication List


Current Medications: 


Active Medications





Acetaminophen (Tylenol -)  650 mg PO Q6H PRN


   PRN Reason: FEVER


   Last Admin: 03/27/18 06:29 Dose:  650 mg


Clotrimazole (Lotrimin 1% Cream -)  1 applic TP DAILY [facility]


   Last Admin: 03/26/18 10:58 Dose:  1 applic


Docusate Sodium (Colace -)  300 mg PO HS [facility]


   Last Admin: 03/26/18 21:13 Dose:  Not Given


Furosemide (Lasix -)  40 mg PO BIDLASIX [facility]


   Last Admin: 03/27/18 06:28 Dose:  40 mg


Metolazone (Zaroxolyn -)  2.5 mg PO Q2D@1330 [facility]


   Last Admin: 03/25/18 13:48 Dose:  2.5 mg


Metoprolol Succinate (Toprol Xl -)  50 mg PO DAILY [facility]


   Last Admin: 03/26/18 09:02 Dose:  50 mg


Multi-Ingredient Ointment (Zinc Oxide)  1 applic TP BID [facility]


   Last Admin: 03/26/18 21:14 Dose:  1 applic


Nystatin (Nystop Powder -)  1 applic TP DAILY [facility]


   Last Admin: 03/26/18 09:04 Dose:  1 applic


Ranitidine HCl (Zantac -)  150 mg PO HS [facility]


   Last Admin: 03/26/18 21:13 Dose:  150 mg


Rivaroxaban (Xarelto -)  20 mg PO DAILY [facility]


   Last Admin: 03/26/18 09:02 Dose:  20 mg


Senna (Senna -)  2 tab PO HS [facility]


   Last Admin: 03/26/18 21:13 Dose:  Not Given


Tamsulosin HCl (Flomax -)  0.8 mg PO DAILY@0830 [facility]


   Last Admin: 03/27/18 08:40 Dose:  0.8 mg











- Objective


Vital Signs: 


 Vital Signs











Temperature  98.5 F   03/27/18 06:00


 


Pulse Rate  70   03/27/18 06:00


 


Respiratory Rate  20   03/27/18 06:00


 


Blood Pressure  138/69   03/27/18 06:00


 


O2 Sat by Pulse Oximetry (%)  94 L  03/26/18 21:00











Labs: 


 CBC, BMP





 03/25/18 07:15 





 03/25/18 07:15 











Problem List





- Problems


(1) CHF (congestive heart failure)


Code(s): I50.9 - HEART FAILURE, UNSPECIFIED   


Qualifiers: 


   Heart failure type: diastolic   Heart failure chronicity: acute on chronic   

Qualified Code(s): I50.33 - Acute on chronic diastolic (congestive) heart 

failure   





(2) Atrial fibrillation


Code(s): I48.91 - UNSPECIFIED ATRIAL FIBRILLATION   





(3) Venous insufficiency


Code(s): I87.2 - VENOUS INSUFFICIENCY (CHRONIC) (PERIPHERAL)   





(4) Morbid obesity


Code(s): E66.01 - MORBID (SEVERE) OBESITY DUE TO EXCESS CALORIES   





(5) Pacemaker


Code(s): Z95.0 - PRESENCE OF CARDIAC PACEMAKER   





(6) Cellulitis


Code(s): L03.90 - CELLULITIS, UNSPECIFIED

## 2018-03-28 RX ADMIN — RIVAROXABAN SCH MG: 20 TABLET, FILM COATED ORAL at 09:37

## 2018-03-28 RX ADMIN — SENNOSIDES SCH TAB: 8.6 TABLET, FILM COATED ORAL at 22:36

## 2018-03-28 RX ADMIN — CLOTRIMAZOLE SCH APPLIC: 1 CREAM TOPICAL at 11:59

## 2018-03-28 RX ADMIN — ZINC OXIDE SCH APPLIC: 200 OINTMENT TOPICAL at 12:01

## 2018-03-28 RX ADMIN — ACETAMINOPHEN PRN MG: 325 TABLET ORAL at 14:10

## 2018-03-28 RX ADMIN — NYSTATIN SCH APPLIC: 100000 POWDER TOPICAL at 12:01

## 2018-03-28 RX ADMIN — ZINC OXIDE SCH APPLIC: 200 OINTMENT TOPICAL at 22:36

## 2018-03-28 RX ADMIN — DOCUSATE SODIUM SCH MG: 100 CAPSULE, LIQUID FILLED ORAL at 22:34

## 2018-03-28 RX ADMIN — ACETAMINOPHEN PRN MG: 325 TABLET ORAL at 22:35

## 2018-03-28 RX ADMIN — RANITIDINE SCH MG: 150 TABLET ORAL at 22:34

## 2018-03-28 RX ADMIN — FUROSEMIDE SCH MG: 40 TABLET ORAL at 14:11

## 2018-03-28 RX ADMIN — ACETAMINOPHEN PRN MG: 325 TABLET ORAL at 03:24

## 2018-03-28 RX ADMIN — TAMSULOSIN HYDROCHLORIDE SCH MG: 0.4 CAPSULE ORAL at 08:45

## 2018-03-28 RX ADMIN — FUROSEMIDE SCH MG: 40 TABLET ORAL at 06:33

## 2018-03-28 NOTE — DS
Physical Examination


Vital Signs: 


 Vital Signs











Temperature  97.7 F   03/28/18 06:00


 


Pulse Rate  71   03/28/18 06:00


 


Respiratory Rate  20   03/28/18 06:00


 


Blood Pressure  127/60   03/28/18 06:00


 


O2 Sat by Pulse Oximetry (%)  96   03/27/18 21:00











Cardiovascular: Yes: S1, S2


Respiratory: Yes: Regular, CTA Bilaterally


Gastrointestinal: Yes: Normal Bowel Sounds, Soft


Edema: Yes


Integumentary: Yes: Venous Stasis Changes


Labs: 


 CBC, BMP





 03/25/18 07:15 





 03/25/18 07:15 











Discharge Summary


Reason For Visit: LYMPHEDEMA


Current Active Problems





CHF (congestive heart failure) (Acute)


Pressure ulcer (Acute)


Lymphedema (Chronic)








Hospital Course: 





82 year old male with a pmhx of htn, afib on AC s/p ppm, bph, chronic diastolic 

chf, arthritis, and h/o LE cellulitis who presents with worsening LE edema and 

inability to walk due to leg heaviness.  +chronic wound on LE's.   





- History Source


History Provided By: Patient, Medical Record





- Past Medical History


Cardio/Vascular: Yes: AFIB, CHF, HTN, Other (pacemaker)


Renal/: Yes: BPH, Renal Calculi





- Past Surgical History


Past Surgical History: Yes: Joint Replacement (knee), Permanent Pacemaker, 

Prostatectomy








- Problems


(1) CHF (congestive heart failure)


Assessment/Plan: 


po lasix and zaroxyln


monitor lytes


echo in december --nl lv


Code(s): I50.9 - HEART FAILURE, UNSPECIFIED   


Qualifiers: 


   Heart failure type: diastolic   Heart failure chronicity: acute on chronic   

Qualified Code(s): I50.33 - Acute on chronic diastolic (congestive) heart 

failure   





(2) Atrial fibrillation


Assessment/Plan: 


f/u ekg


on xarelto


holter


cardio


Code(s): I48.91 - UNSPECIFIED ATRIAL FIBRILLATION   





(3) Venous insufficiency


Assessment/Plan: 


leg elevation


Code(s): I87.2 - VENOUS INSUFFICIENCY (CHRONIC) (PERIPHERAL)   





(4) Morbid obesity


Code(s): E66.01 - MORBID (SEVERE) OBESITY DUE TO EXCESS CALORIES   





(5) Pacemaker


Assessment/Plan: 


cardio


Code(s): Z95.0 - PRESENCE OF CARDIAC PACEMAKER   





(6) Cellulitis


Assessment/Plan: 


hold off abx


id consult NOTED--NO ABX


Code(s): L03.90 - CELLULITIS, UNSPECIFIED   








dc planning for snf


Condition: Stable





- Instructions


Referrals: 


Genaro Fabian MD [Primary Care Provider] - 


Disposition: SKILLED NURSING FACILITY





- Home Medications


Comprehensive Discharge Medication List: 


Ambulatory Orders





Metoprolol Succinate [Toprol Xl] 50 mg PO DAILY 03/11/17 


Rivaroxaban [Xarelto -] 20 mg PO DAILY 03/11/17 


Tamsulosin HCl [Flomax] 0.8 mg PO DAILY 03/11/17 


Sennosides [Senna -] 2 tab PO HS  tablet 03/15/17 


Furosemide [Lasix -] 40 mg PO BID #0 tab 06/06/17 


Acetaminophen [Tylenol .Regular Strength -] 650 mg PO Q6H PRN  tablet 12/23/17 


Metolazone [Zaroxolyn -] 2.5 mg PO Q2D@1000  tablet 12/23/17 


Clotrimazole [Antifungal] 30 gm TP DAILY 03/24/18 


Docusate Sodium [Colace] 300 mg PO HS 03/24/18 


Ranitidine [Zantac -] 150 mg PO HS 03/24/18

## 2018-03-29 VITALS — TEMPERATURE: 98 F

## 2018-03-29 VITALS — HEART RATE: 69 BPM

## 2018-03-29 VITALS — DIASTOLIC BLOOD PRESSURE: 65 MMHG | SYSTOLIC BLOOD PRESSURE: 119 MMHG

## 2018-03-29 RX ADMIN — METOLAZONE SCH MG: 2.5 TABLET ORAL at 13:30

## 2018-03-29 RX ADMIN — FUROSEMIDE SCH MG: 40 TABLET ORAL at 06:25

## 2018-03-29 RX ADMIN — RIVAROXABAN SCH MG: 20 TABLET, FILM COATED ORAL at 10:00

## 2018-03-29 RX ADMIN — ZINC OXIDE SCH APPLIC: 200 OINTMENT TOPICAL at 10:00

## 2018-03-29 RX ADMIN — NYSTATIN SCH APPLIC: 100000 POWDER TOPICAL at 10:00

## 2018-03-29 RX ADMIN — FUROSEMIDE SCH MG: 40 TABLET ORAL at 15:15

## 2018-03-29 RX ADMIN — CLOTRIMAZOLE SCH APPLIC: 1 CREAM TOPICAL at 10:00

## 2018-03-29 RX ADMIN — TAMSULOSIN HYDROCHLORIDE SCH MG: 0.4 CAPSULE ORAL at 08:30

## 2018-03-29 NOTE — DS
Physical Examination


Vital Signs: 


 Vital Signs











Temperature  97.6 F   03/29/18 06:00


 


Pulse Rate  69   03/29/18 06:00


 


Respiratory Rate  20   03/29/18 06:00


 


Blood Pressure  125/63   03/29/18 06:00


 


O2 Sat by Pulse Oximetry (%)  98   03/28/18 21:00











Cardiovascular: Yes: S1, S2


Respiratory: Yes: Regular, CTA Bilaterally


Gastrointestinal: Yes: Normal Bowel Sounds, Soft


Edema: Yes


Integumentary: Yes: Venous Stasis Changes


Labs: 


 CBC, BMP





 03/25/18 07:15 





 03/25/18 07:15 











Discharge Summary


Reason For Visit: LYMPHEDEMA


Current Active Problems





CHF (congestive heart failure) (Acute)


Pressure ulcer (Acute)


Lymphedema (Chronic)








Hospital Course: 





82 year old male with a pmhx of htn, afib on AC s/p ppm, bph, chronic diastolic 

chf, arthritis, and h/o LE cellulitis who presents with worsening LE edema and 

inability to walk due to leg heaviness.  +chronic wound on LE's.   





- History Source


History Provided By: Patient, Medical Record





- Past Medical History


Cardio/Vascular: Yes: AFIB, CHF, HTN, Other (pacemaker)


Renal/: Yes: BPH, Renal Calculi





- Past Surgical History


Past Surgical History: Yes: Joint Replacement (knee), Permanent Pacemaker, 

Prostatectomy








- Problems


(1) CHF (congestive heart failure)


Assessment/Plan: 


po lasix and zaroxyln


monitor lytes


echo in december --nl lv


Code(s): I50.9 - HEART FAILURE, UNSPECIFIED   


Qualifiers: 


   Heart failure type: diastolic   Heart failure chronicity: acute on chronic   

Qualified Code(s): I50.33 - Acute on chronic diastolic (congestive) heart 

failure   





(2) Atrial fibrillation


Assessment/Plan: 


f/u ekg


on xarelto


holter


cardio


Code(s): I48.91 - UNSPECIFIED ATRIAL FIBRILLATION   





(3) Venous insufficiency


Assessment/Plan: 


leg elevation


Code(s): I87.2 - VENOUS INSUFFICIENCY (CHRONIC) (PERIPHERAL)   





(4) Morbid obesity


Code(s): E66.01 - MORBID (SEVERE) OBESITY DUE TO EXCESS CALORIES   





(5) Pacemaker


Assessment/Plan: 


cardio


Code(s): Z95.0 - PRESENCE OF CARDIAC PACEMAKER   





(6) Cellulitis


Assessment/Plan: 


hold off abx


id consult NOTED--NO ABX


Code(s): L03.90 - CELLULITIS, UNSPECIFIED   





(7) Gait Dysfunction\LBP


Assessment/Plan: 


CT LS/HEAD


NEURO NOTED





(8) B12 DEF


Assessment/Plan: 


IM B12








dc planning for snf--AFTER CT SCAN


Condition: Stable


Condition: Stable





- Instructions


Referrals: 


Genaro Fabian MD [Primary Care Provider] - 


Disposition: SKILLED NURSING FACILITY





- Home Medications


Comprehensive Discharge Medication List: 


Ambulatory Orders





Metoprolol Succinate [Toprol Xl] 50 mg PO DAILY 03/11/17 


Rivaroxaban [Xarelto -] 20 mg PO DAILY 03/11/17 


Tamsulosin HCl [Flomax] 0.8 mg PO DAILY 03/11/17 


Sennosides [Senna -] 2 tab PO HS  tablet 03/15/17 


Furosemide [Lasix -] 40 mg PO BID #0 tab 06/06/17 


Acetaminophen [Tylenol .Regular Strength -] 650 mg PO Q6H PRN  tablet 12/23/17 


Metolazone [Zaroxolyn -] 2.5 mg PO Q2D@1000  tablet 12/23/17 


Clotrimazole [Antifungal] 30 gm TP DAILY 03/24/18 


Docusate Sodium [Colace] 300 mg PO HS 03/24/18 


Ranitidine [Zantac -] 150 mg PO HS 03/24/18

## 2018-03-29 NOTE — PN
Progress Note (short form)





- Note


Progress Note: 





Vascular Surgery 





Pt seen and examined with wife at bedside. 


Dressings changed. 


Bl shins with skin tears. 


Bacitracin applied , with kerlex , and ACE for compression. 


Leg elevation. 


Cont bacitracin daily to wounds. 


Medical management.





Dionicio Keith dO

## 2018-07-03 NOTE — PDOC
History of Present Illness





- General


Chief Complaint: Injury


Stated Complaint: FALL


Time Seen by Provider: 07/03/18 07:04





- History of Present Illness


Initial Comments: 





07/03/18 07:36


83 yo M with a h/o HTN, a-fib s/p pacemaker, CHF, chronic bilateral leg swelling

, BIBA closed head injury s/p fall. Patient attempting ambulation with walker/

assistive device to bathroom this AM and reports falling and hitting the back 

of his head on the plaster wall, with no subsequent LOC nor convulsions. 

Minimal bleed, but now with open head lac. on ground 5 minutes. Also endorses 

chronic nausea without vomiting for past week. Does not recall last tetanus. On 

Xarelto. Denies back/neck trauma. Per daughter patient with isolated episode of 

weakness, when ambulating yesterday " legs gave out," and patient controlled 

his self to recliner. No other complaints. Patient with home health nurse 2 x 

per week, and PT 2 x per week. Compliant with lasix 40 mg BID. Per daughter 

legs are significantly improved from baseline and 2 healed wounds on RLE. Home 

stocking compression daily with exception of past 2 days. 





Denies F/C, N/V,orthopnea, PND, palpitations, leg pain,  CP, SOB, abdominal pain

, diarrhea, constipation, BPR, hematuria, urinary complaints, weakness, 

lightheadedness, vertigo, sensory changes. 





PMHx: as noted above


ROS: as noted above


SHx: Denies Etoh, tobacco, or IVDA


Allergies: Ertapenem, Keflex








Past History





- Past Medical History


Allergies/Adverse Reactions: 


 Allergies











Allergy/AdvReac Type Severity Reaction Status Date / Time


 


cephalexin monohydrate Allergy Mild Hives Verified 07/03/18 07:07





[From Keflex]     


 


ertapenem Allergy   Verified 07/03/18 07:07











Home Medications: 


Ambulatory Orders





Metoprolol Succinate [Toprol Xl] 50 mg PO DAILY 03/11/17 


Rivaroxaban [Xarelto -] 20 mg PO DAILY 03/11/17 


Tamsulosin HCl [Flomax] 0.8 mg PO DAILY 03/11/17 


Sennosides [Senna -] 2 tab PO HS  tablet 03/15/17 


Furosemide [Lasix -] 40 mg PO BID #0 tab 06/06/17 


Acetaminophen [Tylenol .Regular Strength -] 650 mg PO Q6H PRN  tablet 12/23/17 


Metolazone [Zaroxolyn -] 2.5 mg PO Q2D@1000  tablet 12/23/17 


Clotrimazole [Antifungal] 30 gm TP DAILY 03/24/18 


Ranitidine [Zantac -] 150 mg PO HS 03/24/18 








Anemia: No


Asthma: No


Cancer: No


Cardiac Disorders: Yes


CVA: No


COPD: No


CHF: Yes


Dementia: No


Diabetes: No


GI Disorders: Yes


 Disorders: No


HTN: Yes


Hypercholesterolemia: Yes


Liver Disease: No


Seizures: No


Thyroid Disease: No





- Surgical History


Abdominal Surgery: No


Appendectomy: No


Cardiac Surgery: Yes


Cholecystectomy: No


Lung Surgery: No


Neurologic Surgery: No


Orthopedic Surgery: Yes (bilateral knee sx 1965)





- Immunization History


Immunization Up to Date: Yes





- Suicide/Smoking/Psychosocial Hx


Smoking History: Former smoker


Have you smoked in the past 12 months: No


If you are a former smoker, when did you quit?: 1970


Information on smoking cessation initiated: No


Hx Alcohol Use: No


Drug/Substance Use Hx: No


Substance Use Type: None


Hx Substance Use Treatment: No





**Review of Systems





- Review of Systems


Comments:: 





07/03/18 07:45


GENERAL/CONSTITUTIONAL: No fever or chills. No weakness.


HEAD, EYES, EARS, NOSE AND THROAT: No change in vision. No ear pain or 

discharge. No sore throat.


CARDIOVASCULAR: No chest pain or shortness of breath


RESPIRATORY: No cough, wheezing, or hemoptysis.


GASTROINTESTINAL: + nausea. No vomiting, diarrhea or constipation.


GENITOURINARY: No dysuria, frequency, or change in urination.


MUSCULOSKELETAL: No joint or muscle swelling or pain. No neck or back pain.


SKIN: No rash


NEUROLOGIC: No headache, vertigo, loss of consciousness, or change in strength/

sensation.


ENDOCRINE: No increased thirst. No abnormal weight change


HEMATOLOGIC/LYMPHATIC: No anemia, easy bleeding, or history of blood clots.


ALLERGIC/IMMUNOLOGIC: No hives or skin allergy.











*Physical Exam





- Vital Signs


 Last Vital Signs











Temp Pulse Resp BP Pulse Ox


 


 98.4 F   64   18   104/49   94 L


 


 07/03/18 07:03  07/03/18 07:03  07/03/18 07:03  07/03/18 07:03  07/03/18 07:03














- Physical Exam


Comments: 





07/03/18 07:45


GENERAL: Awake, alert, and fully oriented, in no acute distress


HEAD: 2-3 cm superficial laceration on posterior-superior occiput, with absent 

gala visualization, or foreign body/debris. No active bleeding. 


EYES: PERRLA, EOMI, sclera anicteric, conjunctiva clear


ENT: + BL cerumen impaction, hearing grossly normal, nares patent, oropharynx 

clear without


exudates. Moist mucosa


NECK: Normal ROM, supple, no lymphadenopathy, JVD, or masses


LUNGS: No distress, speaks full sentences, clear to auscultation bilaterally 


HEART: Regular rate and rhythm, normal S1 and S2, no murmurs, rubs or gallops, 

peripheral pulses normal and equal bilaterally. 


ABDOMEN: Soft, nontender, normoactive bowel sounds.  No guarding, no rebound.  

No masses


EXTREMITIES : BL LE 2 + pitting edema. R ant tib ertyhema, and 2 healed lesions 

( 2 mm). No clubbing or cyanosis. 


LLE: shortened and externally rotated.Limited ROM at hip 2/2 chronic pain.  


NEUROLOGICAL: Cranial nerves II through XII grossly intact.  Normal speech, no 

focal sensorimotor deficits 


SKIN: Warm, Dry, normal turgor, no rashes or lesions noted








Procedures





- Laceration/Wound Repair


  ** Upper Posterior Occipital


Wound Length: to 2.5 cm


Wound Explored: clean, no foreign body present


Wound's Depth, Shape: superficial


Irrigated w/ Saline: Yes


Betadine Prep: No


Progress: 





07/03/18 10:28


3 staples placed. 





ED Treatment Course





- LABORATORY


CBC & Chemistry Diagram: 


 07/03/18 07:50





 07/03/18 07:50





Medical Decision Making





- Medical Decision Making





07/03/18 07:47


83 yo M with a h/o HTN, a-fib s/p pacemaker, CHF, chronic bilateral leg swelling

, BIBA closed head injury s/p fall. 94 % O2 RA, vitals otherwise wnl, AF, A&

Ox3. + post head lac 2-3 cm. + BL LE edema 2 +. CTH r/o SAH, or hematoma per 

[facility] CTH rules. C-spine r/o fracture. + LLE limb length discrepancy and 

external rotation. R/o hip fracture/dislocation. Will assess for electrolyte 

abnml, toxic or metabolic derangements,acid-base disturbances, or underlying 

infection. Although low suspicion, will consider acute CHF in setting of BL LE 

edema (chronic), but with absent resp findings. Low risk PE/DVT based on weils 

criteria. 





ED Course: 


CBC, CMP, Cardiac Pr., BNP, PT/INR, UA


EKG, CXR, CTH, CT C-SPINE, L HIP/PELVIS


Boostrix 


07/03/18 08:52





WBC: 21.5


K+ 2.4, patient given 40 mEQ K-Dur PO


BUN/Cr: 39/1.3 ( 1.5) 





07/03/18 08:54


BNP: 3133 ( 1200 Baseline) 





07/03/18 08:54


Trop: 0.07, EKG Ventricular Paced with absent acute JAE, STD. 





07/03/18 08:56


CXR: No acute cardiopulm pathology. 


07/03/18 09:03


L hip: Possible compacted fracture vs. chronic/severe degenerative changes. 

Will need to obtain MRI vs. CT for further eval. CT LLE w/o con. 








07/03/18 10:04


CTH: No acute change


CT C SPINE: No acute fracture


Suture repair with 3 staples. 





*DC/Admit/Observation/Transfer


Diagnosis at time of Disposition: 


 Hypokalemia





CHF (congestive heart failure)


Qualifiers:


 Heart failure type: unspecified Heart failure chronicity: unspecified 

Qualified Code(s): I50.9 - Heart failure, unspecified








- Discharge Dispostion


Condition at time of disposition: Stable


Decision to Admit order: Yes





- Referrals





- Patient Instructions





- Post Discharge Activity

## 2018-07-03 NOTE — CONSULT
Consult - text type





- Consultation


Consultation Note: 








NEUROLOGY CONSULTATION is greatly appreciated:





This 82 yo RH man lives with his girlfriend.


PMH sig for HTN, Chol, ASHD, S/P PPM, AFib and osteoarthritis s/p B/L TKR's 

with severe left hip arthropathy.


Maintained on: Metoprolol; Rivaroxaban; Tamsulosin; Furosemide; Acetaminophen; 

Metolazone; Clotrimazole; and Ranitidine .


Seen by me 3/26/18 in evaluation of >4 years of progressive gait dysfunction.


Back home, was walking with a walker until the last few days with progressive 

unsteadiness and "leg weakness" culminating in a fall this AM with head trauma 

but no LOC. 


In ER sutures to an occipital laceration.


CT of head (reviewed): Mild atrophy and microvascular changes. No heme or 

traumatic changes.


CT of C-spine: Moderate, difuse DJD, most severe at C5C6 with moderate spinal 

canal stenosis. No traumatic changes or fractures.


WBC= 21.5K Urine WBC= 46. Started on Levaquin.





ARJUN: Obese. Left Topher's > right. S/P B/L TKR's. No bruits.


        Cellulitic chnages for feet. Erythematous rash over groin and scrotum


NEURO: MS/speech: Normal


           CN II-XII: Normal without nystagmus.


           Motor: No drift or tremor. No Cogwheeling. Hip flexion 4/5. Distal 

strength normal. Areflexic in legs. Planatars are silent.


           Coord: No FTN dystaxia


           Sensory: Decreased vib/pin to both ankles.





IMP: Chronic gait dysfunction with contributions from large joint arthritis, 

peripheral neuropathy and, possibly, LS spinal stenosis.


       Worsened by Toxic-metabolic Encephalopathy (UTI).





SUGGEST: CT of LS spine (C-).


               Check B12, ESR, CRP, CK.


               Continue antibiotics.


               PT with walker.





Thank you very much,


Melvi Hensley MD

## 2018-07-03 NOTE — CON.ORTH
Consult


Reason for Consultation:: left hip pain





- Past Medical History


Cardio/Vascular: Yes: AFIB, CHF, HTN, Other (pacemaker)


Renal/: Yes: BPH, Renal Calculi





- Past Surgical History


Past Surgical History: Yes: Joint Replacement (knee), Permanent Pacemaker, 

Prostatectomy





- Alcohol/Substance Use


Hx Alcohol Use: No


History of Substance Use: reports: None





- Smoking History


Smoking history: Former smoker


Have you smoked in the past 12 months: No


If you are a former smoker, when did you quit?: 1970





- Social History


ADL: Family Assistance


History of Recent Travel: No





Home Medications





- Allergies


Allergies/Adverse Reactions: 


 Allergies











Allergy/AdvReac Type Severity Reaction Status Date / Time


 


cephalexin monohydrate Allergy Mild Hives Verified 07/03/18 07:07





[From Keflex]     


 


ertapenem Allergy   Verified 07/03/18 07:07














- Home Medications


Home Medications: 


Ambulatory Orders





Metoprolol Succinate [Toprol Xl] 50 mg PO DAILY 03/11/17 


Rivaroxaban [Xarelto -] 20 mg PO DAILY 03/11/17 


Tamsulosin HCl [Flomax] 0.8 mg PO DAILY 03/11/17 


Sennosides [Senna -] 2 tab PO HS  tablet 03/15/17 


Furosemide [Lasix -] 40 mg PO BID #0 tab 06/06/17 


Acetaminophen [Tylenol .Regular Strength -] 650 mg PO Q6H PRN  tablet 12/23/17 


Metolazone [Zaroxolyn -] 2.5 mg PO Q2D@1000  tablet 12/23/17 


Clotrimazole [Antifungal] 30 gm TP DAILY 03/24/18 


Ranitidine [Zantac -] 150 mg PO HS 03/24/18 











Physical Exam for Ortho


Vital Signs: 


 Vital Signs











Temperature  98.4 F   07/03/18 07:03


 


Pulse Rate  68   07/03/18 10:57


 


Respiratory Rate  18   07/03/18 10:57


 


Blood Pressure  97/45   07/03/18 10:57


 


O2 Sat by Pulse Oximetry (%)  98   07/03/18 10:57











Labs: 


 CBC, BMP





 07/03/18 12:27 





 07/03/18 12:27 





 INR, PTT











INR  1.81  (0.82-1.09)  H D 07/03/18  07:50    














- Lower Extremity


Hip: Yes: Left, Decreased ROM, Other (minimal ttp, Flex- 80, IR-0, ER-0, + 

crepitus, nvi)





Imaging





- Results


X-ray: Report Reviewed, Image Reviewed


Cat Scan: Report Reviewed, Image Reviewed





Assessment/Plan





83 yo M with a h/o HTN, a-fib s/p pacemaker on xarelto, CHF, chronic bilateral 

leg swelling, BIBA closed head injury s/p fall. Patient attempting ambulation 

with walker/assistive device to bathroom this AM and reports falling and 

hitting the back of his head on the plaster wall, with no subsequent LOC nor 

convulsions. Minimal bleed, but now with open head lac. on ground 5 minutes. 

Also endorses chronic nausea without vomiting for past week. Does not recall 

last tetanus.  Denies back/neck trauma. Per family member , patient with 

isolated episode of weakness, when ambulating yesterday " legs gave out," and 

patient controlled his self to recliner. No other complaints. Patient with home 

health nurse 2 x per week, and PT 2 x per week. Compliant with lasix 40 mg BID. 

Per family member legs are significantly improved from baseline and 2 healed 

wounds on RLE. 





a/p- Left hip severe DJD- no fractures


Risks and benefits were d/w pt in detail


Would need left THR, however given pts medical status is not a candidate for 

operative intervention at the present time


PT


wbat


pain control


d/w Dr. Chavez

## 2018-07-03 NOTE — HP
Admitting History and Physical





- Primary Care Physician


PCP: Genaro Fabian





- Admission


Chief Complaint: s/p fall at home


History of Present Illness: 





81 yo M with a h/o HTN, a-fib s/p pacemaker on xarelto, CHF, chronic bilateral 

leg swelling, BIBA closed head injury s/p fall. Patient attempting ambulation 

with walker/assistive device to bathroom this AM and reports falling and 

hitting the back of his head on the plaster wall, with no subsequent LOC nor 

convulsions. Minimal bleed, but now with open head lac. on ground 5 minutes. 

Also endorses chronic nausea without vomiting for past week. Does not recall 

last tetanus.  Denies back/neck trauma. Per family member , patient with 

isolated episode of weakness, when ambulating yesterday " legs gave out," and 

patient controlled his self to recliner. No other complaints. Patient with home 

health nurse 2 x per week, and PT 2 x per week. Compliant with lasix 40 mg BID. 

Per family member legs are significantly improved from baseline and 2 healed 

wounds on RLE. Home stocking compression daily with exception of past 2 days. 





per patient he has been nauseaous for last two days and has not been eating or 

drinking much.he denies fever,chills,dyuria at home





PMHx: as noted above


ROS: as noted above


SHx: Denies Etoh, tobacco, or IVDA


Allergies: Ertapenem, Keflex


History Source: Patient, Significant Other, Medical Record





- Past Medical History


Cardiovascular: Yes: AFIB, CHF, HTN, Other (pacemaker)


Renal/: Yes: BPH, Renal Calculi





- Past Surgical History


Past Surgical History: Yes: Joint Replacement (knee), Permanent Pacemaker, 

Prostatectomy





- Smoking History


Smoking history: Former smoker


Have you smoked in the past 12 months: No


If you are a former smoker, when did you quit?: 1970





- Alcohol/Substance Use


Hx Alcohol Use: No


History of Substance Use: reports: None





Home Medications





- Allergies


Allergies/Adverse Reactions: 


 Allergies











Allergy/AdvReac Type Severity Reaction Status Date / Time


 


cephalexin monohydrate Allergy Mild Hives Verified 07/03/18 07:07





[From Keflex]     


 


ertapenem Allergy   Verified 07/03/18 07:07














- Home Medications


Home Medications: 


Ambulatory Orders





Metoprolol Succinate [Toprol Xl] 50 mg PO DAILY 03/11/17 


Rivaroxaban [Xarelto -] 20 mg PO DAILY 03/11/17 


Tamsulosin HCl [Flomax] 0.8 mg PO DAILY 03/11/17 


Sennosides [Senna -] 2 tab PO HS  tablet 03/15/17 


Furosemide [Lasix -] 40 mg PO BID #0 tab 06/06/17 


Acetaminophen [Tylenol .Regular Strength -] 650 mg PO Q6H PRN  tablet 12/23/17 


Metolazone [Zaroxolyn -] 2.5 mg PO Q2D@1000  tablet 12/23/17 


Clotrimazole [Antifungal] 30 gm TP DAILY 03/24/18 


Ranitidine [Zantac -] 150 mg PO HS 03/24/18 











Physical Examination


Vital Signs: 


 Vital Signs











Temperature  98.4 F   07/03/18 07:03


 


Pulse Rate  64   07/03/18 07:03


 


Respiratory Rate  18   07/03/18 07:03


 


Blood Pressure  104/49   07/03/18 07:03


 


O2 Sat by Pulse Oximetry (%)  94 L  07/03/18 07:03











Constitutional: Yes: Calm


HENT: Yes: Other (scalp laceration noted in occiptal region)


Cardiovascular: Yes: Regular Rate and Rhythm, S1, S2


Respiratory: Yes: CTA Bilaterally, Diminished (at bases)


Gastrointestinal: Yes: Normal Bowel Sounds, Soft


Musculoskeletal: Yes: Other (left hip tenderness)


Extremities: Yes: Other (chronic skin changes noted)


Edema: Yes


Edema: RUE: 1+, LLE: Trace


Neurological: Yes: Alert, Oriented


Labs: 


 CBC, BMP





 07/03/18 07:50 





 07/03/18 07:50 











Imaging





- Results


Chest X-ray: Report Reviewed (left pacemaker leads no evidence of pleural 

effusion or active pulmonary disease)


X-ray: Report Reviewed (foreshortnening of the left fermoral neck concern for 

fracture vs degenerative changes)


Cat Scan: Report Reviewed (no evidence of acute intracranial hemorrhage, no 

fracture no deformities, chronic degenerative changes)





Problem List





- Problems


(1) Status post fall


Assessment/Plan: 


ct head done


PT eval


PMR


ambulated with walker at home but unsteady on his feet


ct scan of lower extremity to r/o fracture


ortho consult


dvt ppx


neurochecks





Code(s): Z91.81 - HISTORY OF FALLING   





(2) Hypokalemia


Assessment/Plan: 


telemonitor


repeat labs


check magnesium


renal eval


Code(s): E87.6 - HYPOKALEMIA   





(3) Atrial fibrillation


Assessment/Plan: 


rate control


currently BP is low will hold off on starting BB today and will start on lower 

dose of metoprolol 25mg daily from [facility]


telemonitor


xarelto





Code(s): I48.91 - UNSPECIFIED ATRIAL FIBRILLATION   





(4) Leukocytosis


Assessment/Plan: 


no fever, cxr negative


Urine culture ordered


got one dose of levaquin in ER for possible UTI


javon repeat CBC maybe secondary to intravascular volume depletion





Code(s): D72.829 - ELEVATED WHITE BLOOD CELL COUNT, UNSPECIFIED   





(5) Lymphedema


Assessment/Plan: 


check doppler to r/o dvt


will hold diuretic for now


Code(s): I89.0 - LYMPHEDEMA, NOT ELSEWHERE CLASSIFIED   





(6) Hip pain, left


Assessment/Plan: 


ct scan of LE to r/o fracture


ortho consult


pain control 


PT eval


Code(s): M25.552 - PAIN IN LEFT HIP

## 2018-07-03 NOTE — CONSULT
Consult


Consult Specialty:: Nephrology


Reason for Consultation:: hypokalemia





- History of Present Illness


Chief Complaint: s/p fall


History of Present Illness: 





Pt is an 83 year old male with pmhx of HTN, a-fib, CHF, and CKD who presents to 

the ER after a fall. he says he was walking and slipped and banged his head. He 

was found to be hypokalemic and I was called to evaluate him. He was on lasix 

and metolazone. He denies chest pain or palpitations. He feels that his lower 

ext edema is stable. He denies fevers or chills. He denies dysuria or 

hematuria. 





- History Source


History Provided By: Patient





- Past Medical History


Cardio/Vascular: Yes: AFIB, CHF, HTN, Other (pacemaker)


Renal/: Yes: Renal Inusuff, BPH, Renal Calculi





- Past Surgical History


Past Surgical History: Yes: Joint Replacement (knee), Permanent Pacemaker, 

Prostatectomy





- Alcohol/Substance Use


Hx Alcohol Use: No


History of Substance Use: reports: None





- Smoking History


Smoking history: Former smoker


Have you smoked in the past 12 months: No


If you are a former smoker, when did you quit?: 1970





- Social History


ADL: Family Assistance


History of Recent Travel: No





Home Medications





- Allergies


Allergies/Adverse Reactions: 


 Allergies











Allergy/AdvReac Type Severity Reaction Status Date / Time


 


cephalexin monohydrate Allergy Mild Hives Verified 07/03/18 07:07





[From Keflex]     


 


ertapenem Allergy   Verified 07/03/18 07:07














- Home Medications


Home Medications: 


Ambulatory Orders





Metoprolol Succinate [Toprol Xl] 50 mg PO DAILY 03/11/17 


Rivaroxaban [Xarelto -] 20 mg PO DAILY 03/11/17 


Tamsulosin HCl [Flomax] 0.8 mg PO DAILY 03/11/17 


Sennosides [Senna -] 2 tab PO HS  tablet 03/15/17 


Furosemide [Lasix -] 40 mg PO BID #0 tab 06/06/17 


Acetaminophen [Tylenol .Regular Strength -] 650 mg PO Q6H PRN  tablet 12/23/17 


Metolazone [Zaroxolyn -] 2.5 mg PO Q2D@1000  tablet 12/23/17 


Clotrimazole [Antifungal] 30 gm TP DAILY 03/24/18 


Ranitidine [Zantac -] 150 mg PO HS 03/24/18 











Family Disease History





- Family Disease History


Family History: Denies





Review of Systems





- Review of Systems


Constitutional: reports: Malaise


Eyes: reports: No Symptoms


HENT: reports: No Symptoms


Neck: reports: No Symptoms


Cardiovascular: reports: Edema


Respiratory: reports: No Symptoms


Gastrointestinal: reports: No Symptoms


Genitourinary: reports: No Symptoms


Musculoskeletal: reports: Other (s/p fall)


Neurological: reports: No Symptoms


Endocrine: reports: No Symptoms


Hematology/Lymphatic: reports: No Symptoms





Physical Exam


Vital Signs: 


 Vital Signs











Temperature  98.3 F   07/03/18 14:29


 


Pulse Rate  69   07/03/18 14:29


 


Respiratory Rate  16   07/03/18 14:29


 


Blood Pressure  105/49   07/03/18 14:29


 


O2 Sat by Pulse Oximetry (%)  98   07/03/18 10:57











Constitutional: Yes: Calm


Eyes: Yes: Conjunctiva Clear


Cardiovascular: Yes: S1, S2


Respiratory: Yes: CTA Bilaterally


Gastrointestinal: Yes: Soft, Abdomen, Obese


Renal/: Yes: WNL


Musculoskeletal: Yes: WNL


Edema: Yes


Edema: LLE: 1+, RLE: 1+


Integumentary: Yes: Venous Stasis Changes


Neurological: Yes: Oriented


Psychiatric: Yes: Oriented


Labs: 


 CBC, BMP





 07/03/18 12:27 





 07/03/18 12:27 





 Laboratory Tests











  07/03/18 07/03/18 07/03/18





  07:50 07:50 12:27


 


WBC   21.5 H  19.7 H


 


Potassium  2.7 L* D  


 


BUN   


 


Creatinine  1.3  














  07/03/18





  12:27


 


WBC 


 


Potassium  3.2 L


 


BUN  38 H


 


Creatinine  1.3














Imaging





- Results


Chest X-ray: Report Reviewed





Problem List





- Problems


(1) CHF (congestive heart failure)


Code(s): I50.9 - HEART FAILURE, UNSPECIFIED   


Qualifiers: 


   Heart failure type: unspecified   Heart failure chronicity: unspecified   

Qualified Code(s): I50.9 - Heart failure, unspecified   





(2) Hypokalemia


Code(s): E87.6 - HYPOKALEMIA   





Assessment/Plan





 Current Medications











Generic Name Dose Route Start Last Admin





  Trade Name Freq  PRN Reason Stop Dose Admin


 


Acetaminophen  650 mg  07/03/18 10:38  





  Tylenol -  PO   





  Q6H PRN   





  PAIN LEVEL 1-5   





     





     





     


 


Levofloxacin  500 mg in 100 mls @ 100 mls/hr  07/04/18 10:00  





  Levaquin 500 Mg Premixed Ivpb -  IVPB  07/07/18 09:59  





  DAILY [facility]   





     





     





  Protocol   





     


 


Metoprolol Succinate  25 mg  07/04/18 10:00  





  Toprol Xl -  PO   





  DAILY [facility]   





     





     





     





     


 


Oxycodone HCl  5 mg  07/03/18 11:00  07/03/18 14:13





  Roxicodone -  PO   5 mg





  Q6H PRN   Administration





  PAIN LEVEL 7 - 10   





     





     





     


 


Ranitidine HCl  150 mg  07/03/18 22:00  





  Zantac -  PO   





  HS [facility]   





     





     





     





     


 


Senna  2 tab  07/03/18 22:00  





  Senna -  PO   





  HS [facility]   





     





     





     





     


 


Tamsulosin HCl  0.8 mg  07/04/18 08:30  





  Flomax -  PO   





  DAILY@0830 [facility]   





     





     





     





     














Impression


1. CKD


2. hx of hydronephrosis


3. hx of obstructive uropathy


4. s/p fall


5. HTN


6. UTI


7. a-fib


8. CHF








Plan


- replace potassium


- stop metolazone


- will give a dose of spironolactone


- repeat labs in am


- restart lasix tomorrow


- check renal ultrasound as crt is above baseline and he has history of 

obstruction


Dr Teran

## 2018-07-03 NOTE — CON.CARD
Consult


Consult Specialty:: Cardiology


Referred by:: Dr. Hatch


Reason for Consultation:: CHF





- History of Present Illness


Chief Complaint: mechanical fall


History of Present Illness: 


83 year old male with a pmhx of htn, afib on AC s/p ppm, bph, chronic diastolic 

chf, arthritis, and h/o LE cellulitis adm 3/2018 with worsening LE edema and 

inability to walk due to leg heaviness also chronic wound on LE's was on 

metolazone in addition to lasix started 3/2018 but then stopped and recently 

restarted metolazone every other day, has taken 2 doses thus far after 

restarting, now admitted with a mechanical fall hitting the back of his head. 

pt states that his legs have been weak the past 2 days, yesterday felt unsteady 

due to leg weakness and again today prior to falling felt unsteady due to leg 

weakness. Denies any dizziness, lightheadedness, syncope, or near syncope. No 

chest pain, sob, or palpitations. States that he always urinates a lot and 

denies any change in urine quantity since restarting metolazone a few days ago.








- History Source


History Provided By: Patient, Family Member


Limitations to Obtaining History: No Limitations





- Past Medical History


Cardio/Vascular: Yes: AFIB, CHF, HTN, Other (pacemaker)


Renal/: Yes: BPH, Renal Calculi





- Past Surgical History


Past Surgical History: Yes: Joint Replacement (knee), Permanent Pacemaker, 

Prostatectomy





- Alcohol/Substance Use


Hx Alcohol Use: No


History of Substance Use: reports: None





- Smoking History


Smoking history: Former smoker


Have you smoked in the past 12 months: No


If you are a former smoker, when did you quit?: 1970





- Social History


ADL: Family Assistance


History of Recent Travel: No





Home Medications





- Allergies


Allergies/Adverse Reactions: 


 Allergies











Allergy/AdvReac Type Severity Reaction Status Date / Time


 


cephalexin monohydrate Allergy Mild Hives Verified 07/03/18 07:07





[From Keflex]     


 


ertapenem Allergy   Verified 07/03/18 07:07














- Home Medications


Home Medications: 


Ambulatory Orders





Metoprolol Succinate [Toprol Xl] 50 mg PO DAILY 03/11/17 


Rivaroxaban [Xarelto -] 20 mg PO DAILY 03/11/17 


Tamsulosin HCl [Flomax] 0.8 mg PO DAILY 03/11/17 


Sennosides [Senna -] 2 tab PO HS  tablet 03/15/17 


Furosemide [Lasix -] 40 mg PO BID #0 tab 06/06/17 


Acetaminophen [Tylenol .Regular Strength -] 650 mg PO Q6H PRN  tablet 12/23/17 


Metolazone [Zaroxolyn -] 2.5 mg PO Q2D@1000  tablet 12/23/17 


Clotrimazole [Antifungal] 30 gm TP DAILY 03/24/18 


Ranitidine [Zantac -] 150 mg PO HS 03/24/18 











Family Disease History





- Family Disease History


Family History: Denies





Review of Systems





- Review of Systems


Constitutional: reports: Weakness.  denies: Chills, Diaphoresis, Fever, Lethargy

, Loss of Appetite, Malaise, Night Sweats, Unintentional Wgt. Loss


Eyes: reports: No Symptoms.  denies: Blind Spots, Blurred Vision, Double Vision

, Eye Pain, Floaters, Photophobia, Recent Change in Vision, Other


HENT: reports: No Symptoms.  denies: Difficult Swallowing, Ear Discharge, Ear 

Pain, Epistaxis, Gingival Bleeding, Hearing Loss, Mouth Swelling, Nasal 

Congestion, Ocular Prosthesis, Throat Pain, Toothache, Ringing in Ears, Other


Neck: reports: No Symptoms.  denies: Decreased ROM, Lumps, Pain on Movement, 

Stiffness, Swollen Glands, Tenderness, Other


Cardiovascular: reports: No Symptoms


Respiratory: reports: No Symptoms.  denies: Cough, Exercise Intolerance, 

Hemoptysis, Orthopnea, PND, Snoring, SOB, SOB on Exertion, Wheezing, Other


Gastrointestinal: reports: Nausea


Genitourinary: reports: Frequency.  denies: No Symptoms, Burning, Discharge, 

Dysuria, Flank Pain, Hematuria, Incontinence, Lesions, Menses, Pain, Testicular 

Mass, Testicular Pain, Testicular Swelling, Urgency, Vaginal Bleeding, Other


Breasts: denies: No Symptoms Reported, See HPI, Breast Implants, Discharge from 

Nipple, Lumps, Pain, Skin Changes, Other


Musculoskeletal: reports: Muscle Weakness


Integumentary: denies: No Symptoms, Blister, Bruising, Change in Color, Eczema, 

Erythema, Incision, Lesions, Lump, Pallor, Pruritis, Rash, Wound, Other


Neurological: reports: Unsteady Gait.  denies: No Symptoms, Change in LOC, 

Change in Speech, Confusion, Dizziness, Headache, Incoordination, Numbness, 

Parasthesia, Pre-Existing Deficit, Seizure, Syncope, Tremors, Weakness, Other


Endocrine: denies: No Symptoms, Excessive Sweating, Flushing, Increased Hunger, 

Increased Thirst, Intolerance to Cold, Intolerance to Heat, Unexplained Weight 

Gain, Unexplained Weight Loss, Other


Hematology/Lymphatic: denies: No Symptoms, Easily Bruised, Excessive Bleeding, 

Swollen Glands, Other


Psychiatric: denies: No Symptoms, Altered Sleep Pattern, Anxiety, Depression, 

Hallucinations, Panic, Paranoia, Suicidal, Other


Vital Signs: 


 Vital Signs











Temperature  98.4 F   07/03/18 07:03


 


Pulse Rate  68   07/03/18 10:57


 


Respiratory Rate  18   07/03/18 10:57


 


Blood Pressure  97/45   07/03/18 10:57


 


O2 Sat by Pulse Oximetry (%)  98   07/03/18 10:57











Constitutional: Yes: No Distress, Calm


Eyes: Yes: Conjunctiva Clear, EOM Intact


HENT: Yes: Other (scalp lac).  No: Atraumatic


Neck: Yes: Supple, Trachea Midline


Respiratory: Yes: Regular, CTA Bilaterally.  No: Rales, Rhonchi, Wheezes


Gastrointestinal: Yes: Normal Bowel Sounds, Soft.  No: Distention, Tenderness


Cardiovascular: Yes: Regular Rate and Rhythm.  No: Bradycardia, Tachycardia, 

Pulse Irregular, Gallop, Rub, Varicosities


JVD: No


Carotid Bruit: No


PMI: Non-Displaced


Heart Sounds: Yes: S1, S2.  No: Split S2, S3, S4, Clicks, Gallop, Rub, Bruit


Murmur: No: Systolic Murmur, Diastolic Murmur


Extremities: Yes: Erythema


Edema: LLE: 1+, RLE: 1+


Peripheral Pulses WNL: Yes


Neurological: Yes: Alert, Oriented


Psychiatric: Yes: Alert, Oriented





- Other Data


Labs, Other Data: 


 CBC, BMP





 07/03/18 07:50 





 07/03/18 07:50 





 INR, PTT











INR  1.81  (0.82-1.09)  H D 07/03/18  07:50    








 Troponin, BNP











  07/03/18





  07:50


 


Troponin I  0.07 H D


 


B-Natriuretic Peptide  3133.94 H








 Troponin, BNP











  07/03/18





  07:50


 


Troponin I  0.07 H D


 


B-Natriuretic Peptide  3133.94 H











ekg-not available for review currently, reported by er as Vpaced, no sig 

changes from prior





Echo: Report Reviewed


Ejection Fraction %: LVEF > or = 40 %





Imaging





- Results


Chest X-ray: Report Reviewed, Image Reviewed


Cat Scan: Report Reviewed


EKG: Report Reviewed, Image Reviewed


Other: Report Reviewed, Image Reviewed





Assessment/Plan


83 year old male with a pmhx of htn, afib on AC s/p ppm, bph, chronic diastolic 

chf, arthritis, and h/o LE cellulitis adm 3/2018 with worsening LE edema and 

inability to walk due to leg heaviness also chronic wound on LE's was on 

metolazone in addition to lasix started 3/2018 but then stopped and recently 

restarted metolazone every other day, has taken 2 doses thus far after 

restarting, now admitted with a mechanical fall hitting the back of his head. 

pt states that his legs have been weak the past 2 days, yesterday felt unsteady 

due to leg weakness and again today prior to falling felt unsteady due to leg 

weakness. Denies any dizziness, lightheadedness, syncope, or near syncope. No 

chest pain, sob, or palpitations. States that he always urinates a lot and 

denies any change in urine quantity since restarting metolazone a few days ago.





Mechanical fall-does not appear to be syncope or arrhythmia induced


-pt and family member deny any syncope or near syncope, no dizziness or 

palpitations


-ECHO 3/2018 showed normal LV systolic function, mild AS no other sig valvular 

abnl


-pt reports leg weakness and unsteady gait for past 2 days.


-hypokalemia and evidence of intravascular depletion on blood work as well as 

possible UTI


-BP is borderline low


-agree with holding diuretics today


-replete K+


-plan to resume po lasix tomorrow and can hold metolazone until re-evaluated as 

outpatient


-no evidence of intracranial bleed on head CT, can resume Xarelto once this is 

confirmed





Afib


-rate control on metoprolol


-resume Xarelto when safe as above





Chronic diastolic chf-not in acute decompensation currently


-hold furosemide and metolazone today


-replete K+


-plan to resume furosemide without metolazone on discharge, would reduce dose 

at that time to 40mg po daily


-Should follow up with his cardiologist Dr. Ronnie Esposito upon DC and can re-

evaluate need for chronic metolazone





No other further cardiac testing at this time


Please call with any questions.

## 2018-07-03 NOTE — EKG
Test Reason : 

Blood Pressure : ***/*** mmHG

Vent. Rate : 069 BPM     Atrial Rate : 065 BPM

   P-R Int : 000 ms          QRS Dur : 214 ms

    QT Int : 488 ms       P-R-T Axes : 000 -61 100 degrees

   QTc Int : 522 ms

 

*** POOR DATA QUALITY, INTERPRETATION MAY BE ADVERSELY AFFECTED

Ventricular-paced rhythm

ABNORMAL ECG

WHEN COMPARED WITH ECG OF 26-MAR-2018 09:19,

NO SIGNIFICANT CHANGE WAS FOUND

Confirmed by MD DREA, TY ([id number]) on 7/3/2018 12:56:42 PM

 

Referred By:             Confirmed By:TY SHEPARD MD

## 2018-07-03 NOTE — PDOC
Attending Attestation





- Resident


Resident Name: Bereket Leblanc





- ED Attending Attestation


I have performed the following: I have examined & evaluated the patient, The 

case was reviewed & discussed with the resident, I agree w/resident's findings 

& plan





- HPI


HPI: 





07/03/18 09:59


83-year-old male with multiple medical problems including advanced heart disease

, baseline severe arthritis ambulating with walker at baseline, recently 

diagnosed UTI on antibiotics presents with 2 falls in the last 24 hours, this 

morning with head injury and scalp laceration. No loss of consciousness, no 

generalized headache or change in mental status or focal deficits, complaining 

of head injury and left hip pain since the fall. On xarelto for atrial 

fibrillation.





- Physicial Exam


PE: 





07/03/18 10:00


VSS, O2 94%


asleep but easily arousable, occipital scalp laceration with hemostasis


no midline spine ttp, FROM


bibasilar crackles at baseline with b/l edema


heart regular, abd benign


ttp L hip with pain on ROM, nvi distally


no other traumatic injuries








- Medical Decision Making





07/03/18 10:02


83-year-old male with fall this morning while ambulating with walker. Positive 

head injury, neurologically intact but on blood thinners. Positive left hip 

injury, limited range of motion concerning for fracture, neurovascularly intact.


Labs sent, possible infectious versus metabolic etiology from the fall


CT head, CT C-spine


Chest x-ray, left hip x-ray


Likely admission





Labs notable for leukocytosis of 21, chemistries notable for potassium of 2.7, 

urinalysis with evidence of persistent infection. Urine cultures are not 

available, which he empirically for persistent urine infection.


CT head without acute injury.


Left hip x-ray concerning for impacted femoral neck fracture, would further 

assessed with CAT scan.





Declines pain control, will need admission.





**Heart Score/ECG Review


  ** #1





07/03/18 10:04


v-paced at 69 without secondary sign of acute ischemic change

## 2018-07-03 NOTE — CONS
DATE OF CONSULTATION:  07/03/2018

 

HISTORY OF PRESENT ILLNESS:  The patient is an 83-year-old man with a past medical

history significant for bilateral hip osteoarthritis, bilateral total knee

replacements, as well as atrial fibrillation on Xarelto, congestive heart failure,

and chronic bilateral lower extremity edema who was admitted after he fell striking

the back of his head.  The patient developed a laceration which was stapled.  He

underwent a head CT, which showed no acute intracranial pathology and no fracture or

hemorrhage.  The patient had a CT of the cervical spine which showed no fracture of

multilevel spondylosis and there were also some degenerative changes in the

sternoclavicular joint.  He underwent x-ray of his left hip which showed extensive

degenerative changes in the left hip with narrowing and sclerosis sclerotic changes

in the left femoral head and flattening of the left femoral head.  No fracture was

noted.  He underwent a CT of the lower extremity which showed severe degenerative

changes in the left hip without any evidence of fracture.  The patient underwent

orthopedic evaluation who recommended PT weight bearing as tolerated left lower

extremity and the patient would benefit eventually from a left total hip replacement;

however, he is not currently a candidate due to medical issues.  CBC on admission

showed leukocytosis with WBCs 21.5, hemoglobin was normal at 12.7 and platelet count

normal at 245.  Chemistries showed a low potassium on admission of 2.7.  Repeat 5

hours later with improvement to 32, his albumin was low at 3.2, his BUN was elevated

on admission of 39 with a creatinine of 1.3 and chloride slightly low at 95;

otherwise chemistry fairly unremarkable.  The patient complains of headache but no

other injuries from the fall.  He was seen by physical therapy and required contact

guard for supine to sit of 1, close contact guard at 2 for sit to stand ambulating 5

feet x1 with an antalgic gait favoring the left lower extremity.  I was asked to see

the patient in regards to rehabilitation program.  The patient apparently had been in

a skilled ursine facility and received home care and was staying with significant

other in an apartment with an elevator for access while recovering.  He states that

the legs just gave out on him which has happened before but he did not notice any

dizziness or lightheadedness.  He has no current dizziness, lightheadedness or change

in vision.  No bowel or bladder change.  No numbness or tingling that is new in the

upper or lower extremities.  

 

PAST MEDICAL AND SURGICAL HISTORY:  Atrial fibrillation on Xarelto, history of

permanent pacemaker, hypertension, congestive heart failure, chronic lower extremity

edema, diffuse osteoarthritis, and bilateral total hip replacements.

 

SOCIAL HISTORY:  He lives with significant other as noted above in an apartment with

an elevator for access.  He is a former tobacco user and uses the walker for

ambulating short distances currently.  

 

REVIEW OF SYSTEMS:  He has no headache except for where he had the staples.  No

light-headedness or dizziness.  No blurry vision or double vision.  No nausea or

vomiting, difficulty swallowing or difficulty chewing.  No chest pain.  He does get

dyspneic with exertion but no abdominal discomfort.  No bowel or bladder complaints. 

He has chronic pain in the left hip and chronic weakness in his right lower

extremity, which is less painful but also has underlying osteoarthritis.  Chronic

swelling in the lower extremities but no numbness or tingling.  No weakness in the

upper extremities, no numbness or tingling.  The patient has a chronic wound which is

healed at the sacrum and again a new laceration on the posterior aspect of his head. 

 

 

PHYSICAL EXAMINATION:  

General:  He is a large man lying in bed.  Elevated BMI.  No acute distress.  He has

staples 3 in the posterior occipital region but no other areas of facial trauma.  

HEENT:  His extraocular muscles appear intact.  No facial weakness. 

Neck:  Supple with diminished range of motion but no palpable spasm.  Minimal

tenderness.

Extremities:  He has pitting edema +1 to +2.  No isolated calf tenderness just

vascular changes  

Neuromuscular:  He is awake, alert and oriented x3.  Cranial nerves II to XII are

grossly intact.  He has got fairly good strength and range of motion in the upper

extremities with some mild arthritic changes which are not limiting in the upper

extremities.  In the lower extremities he has antigravity strength in the left hip

but in the right hip he has only got 1 to 2/5 strength and more pain with range of

motion in the left hip than the right hip.  He has got fairly functional range in the

knee; it is lacking a few degrees of extension but antigravity bilaterally.  He is 3+

to 4/5.  He has good dorsiflexion, plantar flexion, and normal sensation to light

touch.  

 

OVERALL IMPRESSION:

1.  Deficits in mobility and activities of daily living multifactorial.

2.  Status post fall.

3.  Muscle weakness.

4.  Underlying bilateral hip osteoarthritis, left more than right.

5.  History of congestive heart failure.

6.  Chronic edema.

7.  Atrial fibrillation on Xarelto.

8.  Leukocytosis.

9.  Healing sacral ulcer.

 

PLAN/SUGGESTION:

1.  Physical therapy for mobilization transfers and gait training as able.

2.  Out of bed to chair.

3.  The patient on Xarelto.  No further DVT prophylaxis needed.

4.  Monitor skin including sacrum and heels for pending erythema or new ulceration or

breakdown.

5.  Bowel regimen monitored for constipation.

6.  Cardiac consult.

7.  Return to apartment with significant other if he is able to versus short term

rehabilitation facility.

 

Thank you for this referral.  

 

 

 

YASMIN LANCASTER M.D.

 

[id number]

DD: 07/03/2018 15:37

DT: 07/03/2018 17:02

Job #:  [id number]

## 2018-07-04 NOTE — PN
Progress Note (short form)





- Note


Progress Note: 





ID Consult dictated


UTI


Leukocytosis


Multiple antibiotic allergies


Await c/s 


Continue levaquin

## 2018-07-04 NOTE — HOSP
Physical Examination


Vital Signs: 


 Vital Signs











Temperature  97.9 F   07/04/18 14:18


 


Pulse Rate  72   07/04/18 14:18


 


Respiratory Rate  20   07/04/18 14:18


 


Blood Pressure  144/51   07/04/18 14:18


 


O2 Sat by Pulse Oximetry (%)  98   07/04/18 10:00











Labs: 


 CBC, BMP





 07/04/18 06:00 





 07/04/18 06:00 











Hospitalist Encounter


Assessment: 





Patient is an 83 year old male with a past medical history of hypertension, a-

fib s/p pacemaker on xarelto, CHF, chronic bilateral leg swelling.  Comes to 

[place] via EMS for closed head injury s/p fall.  He sustained a laceration 

to the back of his head currently stapled.





Received call from primary RN that patient is more restless and as per patient 

is  "unable to get comfortable".





On exam patient is sitting up in bed, moderately anxiously.  States he is 

"uncomfortable" and cannot sit still.  Vitals signs stable.  No visual defect, 

he is alert and oriented x 3. States he feels dizzy with position changes.  

Some nausea.  Mild headache 6/10.  Speech is clear.  





Will order repeat Head CT to rule out any acute bleed as patient is on Xarelto 

and had a recent fall with closed head injury.





Plan:


Monitor mental status.


Will follow up head ct.


Monitor vitals


Neurochecks 


Telemonitoring

## 2018-07-04 NOTE — PN
Progress Note (short form)





- Note


Progress Note: 





RENAL


Pt is awake and alert


c/o constipation


otherwise well


 Last Vital Signs











Temp Pulse Resp BP Pulse Ox


 


 98.0 F   69   18   115/54   98 


 


 07/04/18 06:00  07/04/18 06:00  07/04/18 06:00  07/04/18 06:00  07/03/18 19:48








lungs clear


cvs s1s2 rr


abd soft, ?umbilical hernia (pt was n right lateral decub)


ext dressed


neuro a+Ox3





 CBC, BMP





 07/04/18 06:00 





 07/04/18 06:00 





 Current Medications











Generic Name Dose Route Start Last Admin





  Trade Name Freq  PRN Reason Stop Dose Admin


 


Acetaminophen  650 mg  07/03/18 10:38  07/04/18 03:27





  Tylenol -  PO   650 mg





  Q6H PRN   Administration





  PAIN LEVEL 1-5   





     





     





     


 


Levofloxacin  500 mg in 100 mls @ 100 mls/hr  07/04/18 10:00  07/04/18 09:02





  Levaquin 500 Mg Premixed Ivpb -  IVPB  07/07/18 09:59  100 mls/hr





  DAILY KELVIN   Administration





     





     





  Protocol   





     


 


Metoprolol Succinate  25 mg  07/04/18 10:00  07/04/18 09:02





  Toprol Xl -  PO   25 mg





  DAILY KELVIN   Administration





     





     





     





     


 


Oxycodone HCl  5 mg  07/03/18 11:00  07/04/18 03:27





  Roxicodone -  PO   5 mg





  Q6H PRN   Administration





  PAIN LEVEL 7 - 10   





     





     





     


 


Ranitidine HCl  150 mg  07/03/18 22:00  07/03/18 21:11





  Zantac -  PO   150 mg





  HS KELVIN   Administration





     





     





     





     


 


Senna  2 tab  07/03/18 22:00  07/03/18 21:11





  Senna -  PO   2 tab





  HS KELVIN   Administration





     





     





     





     


 


Tamsulosin HCl  0.8 mg  07/04/18 08:30  07/04/18 09:02





  Flomax -  PO   0.8 mg





  DAILY@0830 KELVIN   Administration





     





     





     





     








Impression


1. CKD


2. hx of hydronephrosis


3. hx of obstructive uropathy


4. s/p fall


5. HTN


6. UTI


7. a-fib


8. CHF








Plan


renal sono


cautious use of diuretics given lowish bp and fall


would check orthostatics and consider changing flomax to finasteride since 

flomax can cause orthostasis








MV

## 2018-07-04 NOTE — PN
Progress Note, Physician





- Current Medication List


Current Medications: 


Active Medications





Acetaminophen (Tylenol -)  650 mg PO Q6H PRN


   PRN Reason: PAIN LEVEL 1-5


   Last Admin: 07/04/18 03:27 Dose:  650 mg


Levofloxacin (Levaquin 500 Mg Premixed Ivpb -)  500 mg in 100 mls @ 100 mls/hr 

IVPB [facility]ILY [facility]; Protocol


   Stop: 07/07/18 09:59


   Last Admin: 07/04/18 09:02 Dose:  100 mls/hr


Metoprolol Succinate (Toprol Xl -)  25 mg PO [facility]ILY [facility]


   Last Admin: 07/04/18 09:02 Dose:  25 mg


Oxycodone HCl (Roxicodone -)  5 mg PO Q6H PRN


   PRN Reason: PAIN LEVEL 7 - 10


   Last Admin: 07/04/18 03:27 Dose:  5 mg


Ranitidine HCl (Zantac -)  150 mg PO [facility]


   Last Admin: 07/03/18 21:11 Dose:  150 mg


Senna (Senna -)  2 tab PO [facility]


   Last Admin: 07/03/18 21:11 Dose:  2 tab


Tamsulosin HCl (Flomax -)  0.8 mg PO DAILY@0830 [facility]


   Last Admin: 07/04/18 09:02 Dose:  0.8 mg











- Objective


Vital Signs: 


 Vital Signs











Temperature  98.0 F   07/04/18 06:00


 


Pulse Rate  69   07/04/18 06:00


 


Respiratory Rate  18   07/04/18 06:00


 


Blood Pressure  115/54   07/04/18 06:00


 


O2 Sat by Pulse Oximetry (%)  98   07/03/18 19:48











Labs: 


 CBC, BMP





 07/04/18 06:00 





 07/04/18 06:00 





 INR, PTT











INR  1.81  (0.82-1.09)  H D 07/03/18  07:50    














Problem List





- Problems


(1) Status post fall


Assessment/Plan: 





ct head done


PT eval


PMR


ambulated with walker at home but unsteady on his feet


ct scan of lower extremity to r/o fracture


ortho consult noted-no fx


dvt ppx


neurochecks








Code(s): Z91.81 - HISTORY OF FALLING   





(2) Atrial fibrillation


Assessment/Plan: 





rate control


currently BP is low will hold off on starting BB today and will start on lower 

dose of metoprolol 25mg daily 


tele monitor


xarelto








Code(s): I48.91 - UNSPECIFIED ATRIAL FIBRILLATION   





(3) Hip pain, left


Assessment/Plan: 





ct scan no fracture


ortho consult


pain control  no fx


PT eval


 





Code(s): M25.552 - PAIN IN LEFT HIP   





(4) UTI (urinary tract infection)


Assessment/Plan: 


And  Leukocytosis





no fever, cxr negative


Urine culture 


 Microbiology











 07/03/18 10:40 Urine Culture - Preliminary





 Urine - Urine Clean Catch    Lactose Fermenting Neg Bacilli











on levaquin in ER for  UTI


javon repeat CBC 


id consult


Code(s): N39.0 - URINARY TRACT INFECTION, SITE NOT SPECIFIED   





(5) Lymphedema


Assessment/Plan: 





check doppler to r/o dvt


will hold diuretic for now 





Code(s): I89.0 - LYMPHEDEMA, NOT ELSEWHERE CLASSIFIED   





(6) Hypokalemia


Assessment/Plan: 





tele monitor


repeat labs


check magnesium


renal eval








Code(s): E87.6 - HYPOKALEMIA

## 2018-07-05 NOTE — CONS
DATE OF CONSULTATION:  

 

DATE OF DICTATION:  07/04/2018

 

An 83-year-old male evaluated for urinary tract infection.  Patient was admitted to

the hospital on July 3, 2018, with a fall resulting in head trauma.  He sustained a

laceration to the occipital area of his head.  No loss of consciousness was reported.

 According to the notes, he had some nausea prior to admission and had 2 falls.  

 

Urinalysis showed pyuria.  Urine culture is now growing a gram-negative bonnie.  He

reports episodes of dysuria.  No reports of frequency or urgency.  No gross

hematuria.  

 

PAST MEDICAL HISTORY:  Positive for congestive heart failure, hypertension, atrial

fibrillation, osteoarthritis, BPH, nephrolithiasis.  

 

PAST SURGICAL HISTORY:   Status post permanent pacemaker, total knee replacement,

prostatectomy.

 

ALLERGIES:  CEPHALEXIN AND ERTAPENEM.  Patient is  unable to provide the nature of

the allergies. 

 

MEDICATIONS:  Toprol, Xarelto, Flomax, Lasix, Zaroxolyn.

 

SOCIAL HISTORY:  Lives at home with family members.  Denies active tobacco or alcohol

use.  

 

REVIEW OF SYSTEMS:  

Neurologic:  No loss of consciousness, seizure activity, focal weakness.  

Cardiac:  Negative for chest pain or palpitations.  

Respiratory:  Negative for cough or sputum production.  

Gastrointestinal:  Positive nausea, no vomiting or diarrhea. 

Genitourinary:  As per HPI.

 

LABORATORY DATA:  White count 12.1, hematocrit 36.2, platelet count 206.  BUN 31,

creatinine 1.4.  Urinalysis:  42 white cells.

 

PHYSICAL EXAMINATION:   

General:  He is awake and alert, not acutely toxic appearing.  

Vital Signs:  Temperature max is 99.8, blood pressure 140/68, pulse 69 and regular,

respirations 18 per minute.

HEENT:  Sclerae anicteric. 

Heart Sounds:  S1, S2.  

Lungs:  Clear.  

Abdomen:  Soft, obese.  No suprapubic or flank tenderness.  

Extremities:  Positive for edema.  

 

IMPRESSION: 

1.  Urinary tract infection.  

2.  Possible sepsis secondary to urinary tract infection.  

3.  Toxic metabolic encephalopathy.

4.  Status post falls.

5.  Multiple antibiotic allergies.  

 

PLAN:  Pending cultures, empiric antibiotic coverage with Levaquin 500 mg IV piggy

back every 24 hours.  Further recommendations pending cultures.  I will follow. 

Thank you for the kind referral. 

 

 

 

ELAYNE AMAYA M.D.

 

[id number]

DD: 07/04/2018 23:21

DT: 07/05/2018 00:45

Job #:  [id number]

## 2018-07-05 NOTE — PN
Progress Note, Physician


History of Present Illness: 





Pt seen and examined at bedside. He is awake and alert. He denies shortness of 

breath. 





- Current Medication List


Current Medications: 


Active Medications





Acetaminophen (Tylenol -)  650 mg PO Q6H PRN


   PRN Reason: PAIN LEVEL 1-5


   Last Admin: 07/05/18 09:49 Dose:  650 mg


Alprazolam (Xanax -)  0.25 mg PO Q8H PRN


   PRN Reason: ANXIETY


Finasteride (Proscar -)  5 mg PO DAILY [facility]


Levofloxacin (Levaquin 500 Mg Premixed Ivpb -)  500 mg in 100 mls @ 100 mls/hr 

IVPB DAILY [facility]; Protocol


   Stop: 07/07/18 09:59


   Last Admin: 07/05/18 09:31 Dose:  100 mls/hr


Metoprolol Succinate (Toprol Xl -)  25 mg PO DAILY [facility]


   Last Admin: 07/05/18 09:30 Dose:  25 mg


Oxycodone HCl (Roxicodone -)  5 mg PO Q6H PRN


   PRN Reason: PAIN LEVEL 7 - 10


   Last Admin: 07/05/18 09:48 Dose:  5 mg


Polyethylene Glycol (Miralax (For Daily Use) -)  17 gm PO BID [facility]


   Last Admin: 07/05/18 09:31 Dose:  17 gm


Ranitidine HCl (Zantac -)  150 mg PO [facility]


   Last Admin: 07/04/18 21:19 Dose:  150 mg


Senna (Senna -)  2 tab PO [facility]


   Last Admin: 07/04/18 21:19 Dose:  Not Given











- Objective


Vital Signs: 


 Vital Signs











Temperature  99.7 F H  07/05/18 09:43


 


Pulse Rate  71   07/05/18 09:43


 


Respiratory Rate  20   07/05/18 09:43


 


Blood Pressure  113/53   07/05/18 09:43


 


O2 Sat by Pulse Oximetry (%)  98   07/05/18 09:00











Constitutional: Yes: Calm


Eyes: Yes: Conjunctiva Clear


HENT: Yes: Atraumatic


Neck: Yes: Supple


Cardiovascular: Yes: S1, S2


Respiratory: Yes: CTA Bilaterally


Gastrointestinal: Yes: Soft


Genitourinary: Yes: WNL


Musculoskeletal: Yes: WNL


Edema: Yes


Edema: LLE: 1+, RLE: 1+


Integumentary: Yes: Venous Stasis Changes


Neurological: Yes: Oriented


Labs: 


 CBC, BMP





 07/05/18 05:30 





 07/05/18 05:30 





 INR, PTT











INR  1.81  (0.82-1.09)  H D 07/03/18  07:50    














Problem List





- Problems


(1) CHF (congestive heart failure)


Code(s): I50.9 - HEART FAILURE, UNSPECIFIED   


Qualifiers: 


   Heart failure type: unspecified   Heart failure chronicity: unspecified   

Qualified Code(s): I50.9 - Heart failure, unspecified   





(2) Hypokalemia


Code(s): E87.6 - HYPOKALEMIA   





Assessment/Plan


 Current Medications











Generic Name Dose Route Start Last Admin





  Trade Name Freq  PRN Reason Stop Dose Admin


 


Acetaminophen  650 mg  07/03/18 10:38  07/05/18 09:49





  Tylenol -  PO   650 mg





  Q6H PRN   Administration





  PAIN LEVEL 1-5   





     





     





     


 


Alprazolam  0.25 mg  07/05/18 10:59  





  Xanax -  PO   





  Q8H PRN   





  ANXIETY   





     





     





     


 


Finasteride  5 mg  07/06/18 10:00  





  Proscar -  PO   





  DAILY KELVIN   





     





     





     





     


 


Levofloxacin  500 mg in 100 mls @ 100 mls/hr  07/04/18 10:00  07/05/18 09:31





  Levaquin 500 Mg Premixed Ivpb -  IVPB  07/07/18 09:59  100 mls/hr





  DAILY KELVIN   Administration





     





     





  Protocol   





     


 


Metoprolol Succinate  25 mg  07/04/18 10:00  07/05/18 09:30





  Toprol Xl -  PO   25 mg





  DAILY KELVIN   Administration





     





     





     





     


 


Oxycodone HCl  5 mg  07/03/18 11:00  07/05/18 09:48





  Roxicodone -  PO   5 mg





  Q6H PRN   Administration





  PAIN LEVEL 7 - 10   





     





     





     


 


Polyethylene Glycol  17 gm  07/04/18 22:00  07/05/18 09:31





  Miralax (For Daily Use) -  PO   17 gm





  BID KELVIN   Administration





     





     





     





     


 


Ranitidine HCl  150 mg  07/03/18 22:00  07/04/18 21:19





  Zantac -  PO   150 mg





  HS KELVIN   Administration





     





     





     





     


 


Senna  2 tab  07/03/18 22:00  07/04/18 21:19





  Senna -  PO   Not Given





  HS KELVIN   





     





     





     





     

















Impression


1. CKD


2. hx of hydronephrosis


3. hx of obstructive uropathy


4. s/p fall


5. HTN


6. UTI


7. a-fib


8. CHF


9. hypokalemia








Plan


- check mag


- replace potassium


- diuretics on hold for now


- will not restart metolazone


- can give spironolactone once we start diuretics


- follow up renal ultrasound


- crt improving





Dr Teran

## 2018-07-05 NOTE — PN
Progress Note, Physician


Chief Complaint: 





patient seen and examined in pain 


complaining of hip/ leg pain


no fever 








- Current Medication List


Current Medications: 


Active Medications





Acetaminophen (Tylenol -)  650 mg PO Q6H PRN


   PRN Reason: PAIN LEVEL 1-5


   Last Admin: 07/05/18 09:49 Dose:  650 mg


Finasteride (Proscar -)  5 mg PO DAILY [facility]


Levofloxacin (Levaquin 500 Mg Premixed Ivpb -)  500 mg in 100 mls @ 100 mls/hr 

IVPB DAILY [facility]; Protocol


   Stop: 07/07/18 09:59


   Last Admin: 07/05/18 09:31 Dose:  100 mls/hr


Metoprolol Succinate (Toprol Xl -)  25 mg PO DAILY [facility]


   Last Admin: 07/05/18 09:30 Dose:  25 mg


Oxycodone HCl (Roxicodone -)  5 mg PO Q6H PRN


   PRN Reason: PAIN LEVEL 7 - 10


   Last Admin: 07/05/18 09:48 Dose:  5 mg


Polyethylene Glycol (Miralax (For Daily Use) -)  17 gm PO BID [facility]


   Last Admin: 07/05/18 09:31 Dose:  17 gm


Ranitidine HCl (Zantac -)  150 mg PO [facility]


   Last Admin: 07/04/18 21:19 Dose:  150 mg


Senna (Senna -)  2 tab PO [facility]


   Last Admin: 07/04/18 21:19 Dose:  Not Given











- Objective


Vital Signs: 


 Vital Signs











Temperature  99.7 F H  07/05/18 09:43


 


Pulse Rate  71   07/05/18 09:43


 


Respiratory Rate  20   07/05/18 09:43


 


Blood Pressure  113/53   07/05/18 09:43


 


O2 Sat by Pulse Oximetry (%)  98   07/05/18 09:00











Constitutional: Yes: Mild Distress


Neck: Yes: Trachea Midline


Cardiovascular: Yes: Regular Rate and Rhythm, S1, S2


Respiratory: Yes: CTA Bilaterally


Gastrointestinal: Yes: Normal Bowel Sounds, Soft


Musculoskeletal: Yes: Other (no tenderness of extremities on exam)


Extremities: Yes: Other (legs unwrapped)


Edema: No


Neurological: Yes: Alert, Oriented


Labs: 


 CBC, BMP





 07/05/18 05:30 





 07/05/18 05:30 





 INR, PTT











INR  1.81  (0.82-1.09)  H D 07/03/18  07:50    














Problem List





- Problems


(1) Status post fall


Assessment/Plan: 


ct head done no infarct


PT eval


PMR-  ?SNF vs home if possible


ambulated with walker at home but unsteady on his feet


ct scan of lower extremity - severe DJD of left hip -appreciate ortho consult





dvt ppx





Code(s): Z91.81 - HISTORY OF FALLING   





(2) Hypokalemia


Assessment/Plan: 


telemonitor


repeat labs noted and order potassium today


check magnesium


renal eval noted


 will otder renal sono r/o CKD


Code(s): E87.6 - HYPOKALEMIA   





(3) Atrial fibrillation


Assessment/Plan: 


rate control


 on lower dose of metoprolol 25mg daily from [facility]


telemonitor


xarelto





Code(s): I48.91 - UNSPECIFIED ATRIAL FIBRILLATION   





(4) Leukocytosis


Assessment/Plan: 


no fever, cxr negative


Urine culture prelim E coli on levaquin


WBC count now normal





Code(s): D72.829 - ELEVATED WHITE BLOOD CELL COUNT, UNSPECIFIED   





(5) Lymphedema


Assessment/Plan: 


negative  doppler \


diuretic on hold for now given low BP and patient is not in acute failure for 

now


on discharge will see if patient can tolerate low dose of lasix 40mg po daily 

with potassium supplements


Code(s): I89.0 - LYMPHEDEMA, NOT ELSEWHERE CLASSIFIED   





(6) Hip pain, left


Assessment/Plan: 


ct scan of LE -severe DJD


pain control 


PT eval noted


Code(s): M25.552 - PAIN IN LEFT HIP   





(7) Anxiety


Assessment/Plan: 


will give xanax prn 


Code(s): F41.9 - ANXIETY DISORDER, UNSPECIFIED

## 2018-07-05 NOTE — PN
Progress Note, Physician


History of Present Illness: 





C/O headache from head trauma


Denies dysuria, suprapubic or flank pain


No c/o fever/ chills


Low grade temp


Urine c/s E coli   final sensi pending


WBC improved  WNL





- Current Medication List


Current Medications: 


Active Medications





Acetaminophen (Tylenol -)  650 mg PO Q6H PRN


   PRN Reason: PAIN LEVEL 1-5


   Last Admin: 07/05/18 04:05 Dose:  650 mg


Levofloxacin (Levaquin 500 Mg Premixed Ivpb -)  500 mg in 100 mls @ 100 mls/hr 

IVPB DAILY [facility]; Protocol


   Stop: 07/07/18 09:59


   Last Admin: 07/04/18 09:02 Dose:  100 mls/hr


Metoprolol Succinate (Toprol Xl -)  25 mg PO DAILY [facility]


   Last Admin: 07/04/18 09:02 Dose:  25 mg


Oxycodone HCl (Roxicodone -)  5 mg PO Q6H PRN


   PRN Reason: PAIN LEVEL 7 - 10


   Last Admin: 07/05/18 04:05 Dose:  5 mg


Polyethylene Glycol (Miralax (For Daily Use) -)  17 gm PO BID [facility]


   Last Admin: 07/04/18 21:19 Dose:  Not Given


Ranitidine HCl (Zantac -)  150 mg PO [facility]


   Last Admin: 07/04/18 21:19 Dose:  150 mg


Senna (Senna -)  2 tab PO [facility]


   Last Admin: 07/04/18 21:19 Dose:  Not Given


Tamsulosin HCl (Flomax -)  0.8 mg PO DAILY@0830 [facility]


   Last Admin: 07/05/18 08:32 Dose:  0.8 mg











- Objective


Vital Signs: 


 Vital Signs











Temperature  98.2 F   07/05/18 05:00


 


Pulse Rate  69   07/05/18 05:00


 


Respiratory Rate  20   07/05/18 05:00


 


Blood Pressure  130/63   07/05/18 05:00


 


O2 Sat by Pulse Oximetry (%)  98   07/04/18 21:00











Constitutional: Yes: No Distress, Obese


Cardiovascular: Yes: Regular Rate and Rhythm, S1, S2


Respiratory: Yes: CTA Bilaterally


Gastrointestinal: Yes: Normal Bowel Sounds, Soft.  No: Tenderness


Edema: Yes


Integumentary: Yes: Other (scalp laceration no erythema/ drainage)


Labs: 


 CBC, BMP





 07/05/18 05:30 





 07/05/18 05:30 





 INR, PTT











INR  1.81  (0.82-1.09)  H D 07/03/18  07:50    














Assessment/Plan





E coli UTI


Toxic metabolic encephalopathy- improved


Cephalosporin/ Carbapenem allergies


S/P fall, head trauma





Await final urine c/s result


Continue empiric levaquin

## 2018-07-06 NOTE — PN
Progress Note, Physician


History of Present Illness: 





Pt seen and examined at bedside. He is awake and alert. He denies shortness of 

breath. 





- Current Medication List


Current Medications: 


Active Medications





Acetaminophen (Tylenol -)  650 mg PO Q6H PRN


   PRN Reason: PAIN LEVEL 1-5


   Last Admin: 07/06/18 10:57 Dose:  650 mg


Alprazolam (Xanax -)  0.25 mg PO Q8H PRN


   PRN Reason: ANXIETY


Finasteride (Proscar -)  5 mg PO DAILY [facility]


   Last Admin: 07/06/18 10:17 Dose:  5 mg


Metoprolol Succinate (Toprol Xl -)  25 mg PO DAILY [facility]


   Last Admin: 07/06/18 10:17 Dose:  25 mg


Nitrofurantoin Macrocrystals (Macrodantin -)  50 mg PO Q6HPO [facility]


   Last Admin: 07/06/18 12:24 Dose:  50 mg


Oxycodone HCl (Roxicodone -)  5 mg PO Q6H PRN


   PRN Reason: PAIN LEVEL 7 - 10


   Last Admin: 07/06/18 10:57 Dose:  5 mg


Polyethylene Glycol (Miralax (For Daily Use) -)  17 gm PO BID [facility]


   Last Admin: 07/06/18 10:17 Dose:  Not Given


Ranitidine HCl (Zantac -)  150 mg PO [facility]


   Last Admin: 07/05/18 21:16 Dose:  150 mg


Rivaroxaban (Xarelto -)  20 mg PO DAILY@1800 [facility]


Senna (Senna -)  2 tab PO [facility]


   Last Admin: 07/05/18 21:19 Dose:  Not Given











- Objective


Vital Signs: 


 Vital Signs











Temperature  98.3 F   07/06/18 14:16


 


Pulse Rate  70   07/06/18 14:16


 


Respiratory Rate  20   07/06/18 14:16


 


Blood Pressure  114/78   07/06/18 14:16


 


O2 Sat by Pulse Oximetry (%)  98   07/06/18 09:00











Constitutional: Yes: Calm


Eyes: Yes: Conjunctiva Clear


HENT: Yes: Atraumatic


Neck: Yes: Supple


Cardiovascular: Yes: S1, S2


Respiratory: Yes: CTA Bilaterally


Gastrointestinal: Yes: Soft, Abdomen, Obese


Genitourinary: Yes: WNL


Musculoskeletal: Yes: Muscle Weakness


Edema: Yes


Edema: LLE: Trace, RLE: Trace


Integumentary: Yes: Venous Stasis Changes


Neurological: Yes: Oriented


Psychiatric: Yes: Oriented


Labs: 


 CBC, BMP





 07/06/18 06:00 





 07/06/18 06:00 





 INR, PTT











INR  1.81  (0.82-1.09)  H D 07/03/18  07:50    














Problem List





- Problems


(1) CHF (congestive heart failure)


Code(s): I50.9 - HEART FAILURE, UNSPECIFIED   


Qualifiers: 


   Heart failure type: unspecified   Heart failure chronicity: unspecified   

Qualified Code(s): I50.9 - Heart failure, unspecified   





(2) Hypokalemia


Code(s): E87.6 - HYPOKALEMIA   





Assessment/Plan


 Current Medications











Generic Name Dose Route Start Last Admin





  Trade Name Freq  PRN Reason Stop Dose Admin


 


Acetaminophen  650 mg  07/03/18 10:38  07/06/18 10:57





  Tylenol -  PO   650 mg





  Q6H PRN   Administration





  PAIN LEVEL 1-5   





     





     





     


 


Alprazolam  0.25 mg  07/05/18 10:59  





  Xanax -  PO   





  Q8H PRN   





  ANXIETY   





     





     





     


 


Finasteride  5 mg  07/06/18 10:00  07/06/18 10:17





  Proscar -  PO   5 mg





  DAILY KELVIN   Administration





     





     





     





     


 


Metoprolol Succinate  25 mg  07/04/18 10:00  07/06/18 10:17





  Toprol Xl -  PO   25 mg





  DAILY KELVIN   Administration





     





     





     





     


 


Nitrofurantoin Macrocrystals  50 mg  07/06/18 12:00  07/06/18 12:24





  Macrodantin -  PO   50 mg





  Q6HPO KELVIN   Administration





     





     





     





     


 


Oxycodone HCl  5 mg  07/03/18 11:00  07/06/18 10:57





  Roxicodone -  PO   5 mg





  Q6H PRN   Administration





  PAIN LEVEL 7 - 10   





     





     





     


 


Polyethylene Glycol  17 gm  07/04/18 22:00  07/06/18 10:17





  Miralax (For Daily Use) -  PO   Not Given





  BID KELVIN   





     





     





     





     


 


Ranitidine HCl  150 mg  07/03/18 22:00  07/05/18 21:16





  Zantac -  PO   150 mg





  HS KELVIN   Administration





     





     





     





     


 


Rivaroxaban  20 mg  07/06/18 18:00  





  Xarelto -  PO   





  DAILY@1800 KEL[facility]   





     





     





     





     


 


Senna  2 tab  07/03/18 22:00  07/05/18 21:19





  Senna -  PO   Not Given





  HS KELVIN   





     





     





     





     




















Impression


1. CKD


2. hx of hydronephrosis


3. hx of obstructive uropathy


4. s/p fall


5. HTN


6. UTI


7. a-fib


8. CHF


9. hypokalemia








Plan


- renal function is stable


- check daily weight


- pt off of diuretics for now


- bp is improving


- can give spironolactone once we start diuretics


- follow up renal ultrasound, ordered already


- crt improving





Dr Teran

## 2018-07-06 NOTE — PN
Progress Note, Physician


History of Present Illness: 





Awake,alert


No focal complaint


Denies dysuria, suprapubic or flank pain


No c/o fever/ chills


Afebrile


Urine c/s E coli    


WBC improved  WNL





- Current Medication List


Current Medications: 


Active Medications





Acetaminophen (Tylenol -)  650 mg PO Q6H PRN


   PRN Reason: PAIN LEVEL 1-5


   Last Admin: 07/06/18 03:49 Dose:  650 mg


Alprazolam (Xanax -)  0.25 mg PO Q8H PRN


   PRN Reason: ANXIETY


Finasteride (Proscar -)  5 mg PO DAILY [facility]


Levofloxacin (Levaquin 500 Mg Premixed Ivpb -)  500 mg in 100 mls @ 100 mls/hr 

IVPB DAILY [facility]; Protocol


   Stop: 07/07/18 09:59


   Last Admin: 07/05/18 09:31 Dose:  100 mls/hr


Metoprolol Succinate (Toprol Xl -)  25 mg PO DAILY [facility]


   Last Admin: 07/05/18 09:30 Dose:  25 mg


Oxycodone HCl (Roxicodone -)  5 mg PO Q6H PRN


   PRN Reason: PAIN LEVEL 7 - 10


   Last Admin: 07/06/18 03:48 Dose:  5 mg


Polyethylene Glycol (Miralax (For Daily Use) -)  17 gm PO BID [facility]


   Last Admin: 07/05/18 21:17 Dose:  Not Given


Ranitidine HCl (Zantac -)  150 mg PO [facility]


   Last Admin: 07/05/18 21:16 Dose:  150 mg


Senna (Senna -)  2 tab PO [facility]


   Last Admin: 07/05/18 21:19 Dose:  Not Given











- Objective


Vital Signs: 


 Vital Signs











Temperature  98.0 F   07/06/18 06:00


 


Pulse Rate  69   07/06/18 06:00


 


Respiratory Rate  20   07/06/18 06:00


 


Blood Pressure  123/57   07/06/18 06:00


 


O2 Sat by Pulse Oximetry (%)  98   07/05/18 20:24











Constitutional: Yes: No Distress


Eyes: Yes: Conjunctiva Clear


Cardiovascular: Yes: Regular Rate and Rhythm, S1, S2


Respiratory: Yes: CTA Bilaterally


Gastrointestinal: Yes: Normal Bowel Sounds, Soft, Abdomen, Obese.  No: 

Tenderness


Edema: Yes


Labs: 


 CBC, BMP





 07/06/18 06:00 





 07/06/18 06:00 





 INR, PTT











INR  1.81  (0.82-1.09)  H D 07/03/18  07:50    














Assessment/Plan





E coli UTI  ESBL


Toxic metabolic encephalopathy- improved


Cephalosporin/ Carbapenem allergies


S/P fall, head trauma





Substitute nitrofurantoin


Macrobid 100mg po bid x 7d at discharge

## 2018-07-06 NOTE — PN
Progress Note, Physician





- Current Medication List


Current Medications: 


Active Medications





Acetaminophen (Tylenol -)  650 mg PO Q6H PRN


   PRN Reason: PAIN LEVEL 1-5


   Last Admin: 07/06/18 10:57 Dose:  650 mg


Alprazolam (Xanax -)  0.25 mg PO Q8H PRN


   PRN Reason: ANXIETY


Finasteride (Proscar -)  5 mg PO DAILY [facility]


   Last Admin: 07/06/18 10:17 Dose:  5 mg


Metoprolol Succinate (Toprol Xl -)  25 mg PO DAILY [facility]


   Last Admin: 07/06/18 10:17 Dose:  25 mg


Nitrofurantoin Macrocrystals (Macrodantin -)  50 mg PO Q6HPO [facility]


   Last Admin: 07/06/18 12:24 Dose:  50 mg


Oxycodone HCl (Roxicodone -)  5 mg PO Q6H PRN


   PRN Reason: PAIN LEVEL 7 - 10


   Last Admin: 07/06/18 10:57 Dose:  5 mg


Polyethylene Glycol (Miralax (For Daily Use) -)  17 gm PO BID [facility]


   Last Admin: 07/06/18 10:17 Dose:  Not Given


Ranitidine HCl (Zantac -)  150 mg PO [facility]


   Last Admin: 07/05/18 21:16 Dose:  150 mg


Rivaroxaban (Xarelto -)  20 mg PO DAILY@1800 [facility]


Senna (Senna -)  2 tab PO [facility]


   Last Admin: 07/05/18 21:19 Dose:  Not Given











- Objective


Vital Signs: 


 Vital Signs











Temperature  98.3 F   07/06/18 10:20


 


Pulse Rate  74   07/06/18 10:20


 


Respiratory Rate  19   07/06/18 10:20


 


Blood Pressure  126/97   07/06/18 10:20


 


O2 Sat by Pulse Oximetry (%)  98   07/06/18 09:00











Labs: 


 CBC, BMP





 07/06/18 06:00 





 07/06/18 06:00 





 INR, PTT











INR  1.81  (0.82-1.09)  H D 07/03/18  07:50

## 2018-07-06 NOTE — PN
Progress Note, Physician


Chief Complaint: 





AWAKE ALERT


C/O HIP AND BACK PAIN





- Current Medication List


Current Medications: 


Active Medications





Acetaminophen (Tylenol -)  650 mg PO Q6H PRN


   PRN Reason: PAIN LEVEL 1-5


   Last Admin: 07/06/18 10:57 Dose:  650 mg


Alprazolam (Xanax -)  0.25 mg PO Q8H PRN


   PRN Reason: ANXIETY


Finasteride (Proscar -)  5 mg PO DAILY [facility]


   Last Admin: 07/06/18 10:17 Dose:  5 mg


Metoprolol Succinate (Toprol Xl -)  25 mg PO DAILY [facility]


   Last Admin: 07/06/18 10:17 Dose:  25 mg


Nitrofurantoin Macrocrystals (Macrodantin -)  50 mg PO Q6HPO [facility]


   Last Admin: 07/06/18 12:24 Dose:  50 mg


Oxycodone HCl (Roxicodone -)  5 mg PO Q6H PRN


   PRN Reason: PAIN LEVEL 7 - 10


   Last Admin: 07/06/18 10:57 Dose:  5 mg


Polyethylene Glycol (Miralax (For Daily Use) -)  17 gm PO BID [facility]


   Last Admin: 07/06/18 10:17 Dose:  Not Given


Ranitidine HCl (Zantac -)  150 mg PO [facility]


   Last Admin: 07/05/18 21:16 Dose:  150 mg


Senna (Senna -)  2 tab PO [facility]


   Last Admin: 07/05/18 21:19 Dose:  Not Given











- Objective


Vital Signs: 


 Vital Signs











Temperature  98.3 F   07/06/18 10:20


 


Pulse Rate  74   07/06/18 10:20


 


Respiratory Rate  19   07/06/18 10:20


 


Blood Pressure  126/97   07/06/18 10:20


 


O2 Sat by Pulse Oximetry (%)  98   07/06/18 09:00











Constitutional: Yes: Mild Distress


Eyes: Yes: WNL


HENT: Yes: WNL


Neck: Yes: WNL


Cardiovascular: Yes: Pulse Irregular


Respiratory: Yes: WNL


Gastrointestinal: Yes: WNL


Musculoskeletal: Yes: Muscle Pain, Muscle Weakness


Extremities: Yes: Deformity


Edema: Yes


Integumentary: Yes: Other


Wound/Incision: Yes: Dressing Dry and Intact


Neurological: Yes: Pre-Existing Deficit


...Motor Strength: LLE, RLE


Psychiatric: Yes: WNL


Labs: 


 CBC, BMP





 07/06/18 06:00 





 07/06/18 06:00 





 INR, PTT











INR  1.81  (0.82-1.09)  H D 07/03/18  07:50    














Problem List





- Problems


(1) Anxiety


Code(s): F41.9 - ANXIETY DISORDER, UNSPECIFIED   





(2) CHF (congestive heart failure)


Code(s): I50.9 - HEART FAILURE, UNSPECIFIED   


Qualifiers: 


   Heart failure type: unspecified   Heart failure chronicity: unspecified   

Qualified Code(s): I50.9 - Heart failure, unspecified   





(3) Hypokalemia


Code(s): E87.6 - HYPOKALEMIA   





(4) Status post fall


Code(s): Z91.81 - HISTORY OF FALLING   





(5) Atrial fibrillation


Code(s): I48.91 - UNSPECIFIED ATRIAL FIBRILLATION   





(6) Pressure ulcer


Code(s): L89.90 - PRESSURE ULCER OF UNSPECIFIED SITE, UNSPECIFIED STAGE   





(7) Hip pain, left


Code(s): M25.552 - PAIN IN LEFT HIP   





Assessment/Plan





RESTARTING XARELTO AFIB


ORTHOPEDIC EVAL HIP/BACK PAIN


MONITOR LABS


RENAL EVAL


PT


OOB TO CHAIR


CHANGE PO ABX

## 2018-07-07 NOTE — CONSULT
Consult - text type





- Consultation


Consultation Note: 





Pt seen in bed.  Vss Tmax 97.5.  Patient complains of burning and aching b/l 

feet equally after sitting or walking. States he puts a blue medicine on his 

feet to help ease the pain.   Pt also complains of elongated thickened toe 

nails.  Painful in shoe gear and ambulation.  








non palpable pulses b/l feet, -cellulitis, -ulcertion, -mal odor, -drainage, -

tender to palpation, +mycotic nails x 10, +subunual debris, +hyperrophy, 








arthralgia?


neuralgia?


ischemic pain?








vascular consult.  xray both feet.  ESR to be ordered.  Will follow.  Read 

neurology note.

## 2018-07-07 NOTE — PN
Progress Note, Physician


Chief Complaint: 





UTI


CHF


History of Present Illness: 





NAD


labs unremarkable


Seen by Podiatry


Foot xray unremarkable


Chronic afib- Xarelto re-initiated








- Current Medication List


Current Medications: 


Active Medications





Acetaminophen (Tylenol -)  650 mg PO Q6H PRN


   PRN Reason: PAIN LEVEL 1-5


   Last Admin: 07/07/18 05:04 Dose:  650 mg


Alprazolam (Xanax -)  0.25 mg PO Q8H PRN


   PRN Reason: ANXIETY


   Last Admin: 07/06/18 21:09 Dose:  0.25 mg


Finasteride (Proscar -)  5 mg PO DAILY [facility]


   Last Admin: 07/07/18 09:52 Dose:  5 mg


Metoprolol Succinate (Toprol Xl -)  25 mg PO DAILY [facility]


   Last Admin: 07/07/18 09:52 Dose:  25 mg


Nitrofurantoin Macrocrystals (Macrodantin -)  50 mg PO Q6HPO [facility]


   Last Admin: 07/07/18 12:30 Dose:  50 mg


Oxycodone HCl (Roxicodone -)  5 mg PO Q6H PRN


   PRN Reason: PAIN LEVEL 7 - 10


   Last Admin: 07/07/18 05:03 Dose:  5 mg


Polyethylene Glycol (Miralax (For Daily Use) -)  17 gm PO BID [facility]


   Last Admin: 07/07/18 09:52 Dose:  17 gm


Ranitidine HCl (Zantac -)  150 mg PO [facility]


   Last Admin: 07/06/18 21:11 Dose:  150 mg


Rivaroxaban (Xarelto -)  20 mg PO DAILY@1800 [facility]


   Last Admin: 07/06/18 17:54 Dose:  20 mg


Senna (Senna -)  2 tab PO [facility]


   Last Admin: 07/06/18 21:09 Dose:  2 tab











- Objective


Vital Signs: 


 Vital Signs











Temperature  97.3 F L  07/07/18 10:05


 


Pulse Rate  69   07/07/18 10:05


 


Respiratory Rate  20   07/07/18 10:05


 


Blood Pressure  113/65   07/07/18 10:05


 


O2 Sat by Pulse Oximetry (%)  95   07/07/18 09:00











Constitutional: Yes: Well Nourished, No Distress, Calm


Cardiovascular: Yes: Regular Rate and Rhythm


Respiratory: Yes: Regular


Gastrointestinal: Yes: Normal Bowel Sounds, Soft


Musculoskeletal: Yes: WNL


Extremities: Yes: WNL


Labs: 


 CBC, BMP





 07/07/18 06:35 





 07/07/18 06:35 





 INR, PTT











INR  1.81  (0.82-1.09)  H D 07/03/18  07:50    














Problem List





- Problems


(1) PVD (peripheral vascular disease)


Assessment/Plan: 


-Seen by Podiatry


-Vascular consult





Code(s): I73.9 - PERIPHERAL VASCULAR DISEASE, UNSPECIFIED   





(2) CHF (congestive heart failure)


Assessment/Plan: 


-Off furosemide now


-Start Spironolactone


Code(s): I50.9 - HEART FAILURE, UNSPECIFIED   


Qualifiers: 


   Heart failure type: unspecified   Heart failure chronicity: unspecified   

Qualified Code(s): I50.9 - Heart failure, unspecified   





(3) Status post fall


Code(s): Z91.81 - HISTORY OF FALLING   





(4) UTI (urinary tract infection)


Assessment/Plan: 


-UC:


 Microbiology





07/03/18 10:40   Urine - Urine Clean Catch   Urine Culture - Final


                            Escherichia Coli Esbl 





-ID on board


-On oral abx


-afebrile


-no leukocytosis


Code(s): N39.0 - URINARY TRACT INFECTION, SITE NOT SPECIFIED   





(5) Atrial fibrillation


Assessment/Plan: 


-Restarted on Xarelto


-d/c tele


Code(s): I48.91 - UNSPECIFIED ATRIAL FIBRILLATION   





(6) Lumbar stenosis


Assessment/Plan: 


-Last CT lumbar in 03/2018


-Seen by Neurology


-Neurosurgery consult





Code(s): M48.061 - SPINAL STENOSIS, LUMBAR REGION WITHOUT NEUROGENIC LALA   





Assessment/Plan





see problem list

## 2018-07-07 NOTE — CONSULT
Consult - text type





- Consultation


Consultation Note: 





NEUROSURGERY CONSULTATION





Marko Platt is an 83 year old male with multiple medical problems who is 

anticoagulated with Xeralto for atrial fibrillation and has a Pacemaker (not 

MRI compatible per patient report). The patient has had progressive gait 

difficulties and was evaluated with CT Cervical and Lumbar recently. The 

patient fell earlier this week and sustained a small Occipital laceration which 

was closed with skin clips upon presentation to the [facility] ER. The patient 

describes progressive loss of fine motor skills in his hands as well as numbness

, tingling and dropping things. He feels that his gait is "stiff" and that he 

has poor balance.





CT Cervical is notable for multilevel spondylosis, most significant at C45 

where a large calcified annulus/posterior longitudinal ligament results in 

severe canal narrowing. The spinal canal is measured at 5.7mm without being 

able to identify any soft tissue compression with this imaging modality. There 

is sub-10mm spinal canal at other levels as well.





CT Lumbar is notable for multiple levels of spondylotic changes with 

calcification of the supporting ligamentous structures and osteophytes at most 

levels. There are very large spinous processes which touch one another and have 

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 This is 

despite appreciable loss of disc heights and in addition to mild coronal 

deformity. There is at least moderate spinal stenosis from hypertrophy of the 

facet joints and ligamentum flavum.





The Bastrup's disease and Lumbar stenosis would tend to encourage walking with 

a flexed posture and maintainence of his center of gravity anterior to his 

hips. The Cervical spondylosis and C45 narrowing could certainly account for 

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





CT myelography may be the best imaging modality given his inability to have MRI 

(unless documentation concerning safety of imaging his Pacemaker can be 

established). Alternatively, focal decompression of his Cervical spine, with 

attention to the C45 level, may be considered for palliation of his fairly 

compelling Cervical spondylosis and could be done based upon the CT alone. 

Either Anterior Cervical decompression and fusion (C4 & C5 corpectomies or 

hemicorpectomies with restoration of Lordosis and reconstruction with a PEEK 

cage and anterior plating) or C3-7 laminectomies and lateral mass fixation with 

restoration of Lordosis may be considered.





I am available to discuss the potential role of surgical decompression/fusion 

of his Cervical spondylosis in light of his current medical conditions. 

Additionally, if his anticoagulation were to be held for other reasons, local 

injections of his Lumbar Interspinous spaces may be considered for both 

diagnostic and therapeutic purposes.

## 2018-07-08 NOTE — PN
Progress Note, Physician


Chief Complaint: 





UTI


CHF


History of Present Illness: 





NAD


labs unremarkable


Seen by Podiatry


Foot xray unremarkable


Chronic afib- Xarelto re-initiated








- Current Medication List


Current Medications: 


Active Medications





Acetaminophen (Tylenol -)  650 mg PO Q6H PRN


   PRN Reason: PAIN LEVEL 1-5


   Last Admin: 07/07/18 21:00 Dose:  650 mg


Alprazolam (Xanax -)  0.25 mg PO Q8H PRN


   PRN Reason: ANXIETY


   Last Admin: 07/07/18 21:00 Dose:  0.25 mg


Finasteride (Proscar -)  5 mg PO DAILY [facility]


   Last Admin: 07/07/18 09:52 Dose:  5 mg


Metoprolol Succinate (Toprol Xl -)  25 mg PO DAILY [facility]


   Last Admin: 07/07/18 09:52 Dose:  25 mg


Nitrofurantoin Macrocrystals (Macrodantin -)  50 mg PO Q6HPO [facility]


   Last Admin: 07/08/18 05:03 Dose:  50 mg


Oxycodone HCl (Roxicodone -)  5 mg PO Q6H PRN


   PRN Reason: PAIN LEVEL 7 - 10


   Last Admin: 07/07/18 21:00 Dose:  5 mg


Polyethylene Glycol (Miralax (For Daily Use) -)  17 gm PO BID [facility]


   Last Admin: 07/07/18 21:01 Dose:  17 gm


Ranitidine HCl (Zantac -)  150 mg PO [facility]


   Last Admin: 07/07/18 21:00 Dose:  150 mg


Rivaroxaban (Xarelto -)  20 mg PO DAILY@1800 [facility]


   Last Admin: 07/07/18 17:37 Dose:  20 mg


Senna (Senna -)  2 tab PO [facility]


   Last Admin: 07/07/18 21:00 Dose:  2 tab











- Objective


Vital Signs: 


 Vital Signs











Temperature  97.0 F L  07/08/18 05:03


 


Pulse Rate  70   07/08/18 05:03


 


Respiratory Rate  20   07/08/18 05:03


 


Blood Pressure  137/69   07/08/18 05:03


 


O2 Sat by Pulse Oximetry (%)  93 L  07/07/18 21:00











Labs: 


 CBC, BMP





 07/07/18 06:35 





 07/07/18 06:35 





 INR, PTT











INR  1.81  (0.82-1.09)  H D 07/03/18  07:50    














Problem List





- Problems


(1) PVD (peripheral vascular disease)


Code(s): I73.9 - PERIPHERAL VASCULAR DISEASE, UNSPECIFIED   





(2) CHF (congestive heart failure)


Code(s): I50.9 - HEART FAILURE, UNSPECIFIED   


Qualifiers: 


   Heart failure type: unspecified   Heart failure chronicity: unspecified   

Qualified Code(s): I50.9 - Heart failure, unspecified   





(3) Status post fall


Code(s): Z91.81 - HISTORY OF FALLING   





(4) UTI (urinary tract infection)


Code(s): N39.0 - URINARY TRACT INFECTION, SITE NOT SPECIFIED   





(5) Atrial fibrillation


Code(s): I48.91 - UNSPECIFIED ATRIAL FIBRILLATION   





(6) Lumbar stenosis


Code(s): M48.061 - SPINAL STENOSIS, LUMBAR REGION WITHOUT NEUROGENIC LALA

## 2018-07-08 NOTE — PN
Progress Note (short form)





- Note


Progress Note: 





VAscular surgery 





Pt seen and examined. 


Doing better. 


Just get enema for constipation. 


bl lower ext warm. 


swelling is much better with ace. 


No complaints of any pain. 


Cont medical management. 





Dionicio Keith DO

## 2018-07-08 NOTE — PN
Progress Note (short form)





- Note


Progress Note: 


problems





1. CKD


2. hx of hydronephrosis


3. hx of obstructive uropathy


4. s/p fall


5. HTN


6. UTI


7. a-fib


8. CHF


9. hypokalemia








 Current Medications





Acetaminophen (Tylenol -)  650 mg PO Q6H PRN


   PRN Reason: PAIN LEVEL 1-5


Alprazolam (Xanax -)  0.25 mg PO Q8H PRN


   PRN Reason: ANXIETY


Cyanocobalamin (Vitamin B12 Injection -)  1,000 mcg IM DAILY Pending sale to [facility]


   Last Admin: 07/08/18 10:19 Dose:  1,000 mcg


Finasteride (Proscar -)  5 mg PO DAILY Pending sale to [facility]


Methylnaltrexone Bromide (Relistor -)  12 mg SQ DAILY Pending [facility] to [facility]


   Last Admin: 07/08/18 18:18 Dose:  12 mg


Metoprolol Succinate (Toprol Xl -)  25 mg PO DAILY Pending sale to [facility]


Nitrofurantoin Macrocrystals (Macrodantin -)  50 mg PO Q6HPO Pending sale to [facility]


   Last Admin: 07/08/18 17:16 Dose:  50 mg


Oxycodone HCl (Roxicodone -)  5 mg PO Q6H PRN


   PRN Reason: PAIN LEVEL 7 - 10


   Last Admin: 07/08/18 18:39 Dose:  5 mg


Ranitidine HCl (Zantac -)  150 mg PO HS Pending sale to [facility]


Rivaroxaban (Xarelto -)  20 mg PO DAILY@1800 Pending sale to [facility]


   Last Admin: 07/08/18 17:16 Dose:  20 mg


Rosuvastatin Calcium (Crestor -)  5 mg PO HS Pending sale to [facility]


Senna (Senna -)  2 tab PO HS Pending sale to [facility]





 Last Vital Signs











Temp Pulse Resp BP Pulse Ox


 


 97.9 F   69   20   129/69   95 


 


 07/08/18 17:30  07/08/18 17:30  07/08/18 17:30  07/08/18 17:30  07/08/18 10:00








lungs clear


heart reg


abd soft nontender


ext no edema











 CBC, BMP





 07/08/18 06:20 





 07/08/18 06:20 




















Plan- same rx

## 2018-07-09 NOTE — PN
Progress Note, Physician


History of Present Illness: 





Pt seen and examined at bedside. He is awake and alert. He feels that his lower 

ext edema is stable. He denies shortness of breath. 





- Current Medication List


Current Medications: 


Active Medications





Acetaminophen (Tylenol -)  650 mg PO Q6H PRN


   PRN Reason: PAIN LEVEL 1-5


Alprazolam (Xanax -)  0.25 mg PO Q8H PRN


   PRN Reason: ANXIETY


   Last Admin: 07/08/18 21:13 Dose:  0.25 mg


Cyanocobalamin (Vitamin B12 Injection -)  1,000 mcg IM DAILY [facility]


   Last Admin: 07/09/18 10:08 Dose:  1,000 mcg


Finasteride (Proscar -)  5 mg PO DAILY [facility]


   Last Admin: 07/09/18 10:08 Dose:  5 mg


Methylnaltrexone Bromide (Relistor -)  12 mg SQ DAILY [facility]


   Last Admin: 07/09/18 10:07 Dose:  12 mg


Metoprolol Succinate (Toprol Xl -)  25 mg PO DAILY [facility]


   Last Admin: 07/09/18 10:08 Dose:  25 mg


Nitrofurantoin Macrocrystals (Macrodantin -)  50 mg PO Q6HPO [facility]


   Last Admin: 07/09/18 12:43 Dose:  50 mg


Oxycodone HCl (Roxicodone -)  5 mg PO Q6H PRN


   PRN Reason: PAIN LEVEL 7 - 10


   Last Admin: 07/08/18 18:39 Dose:  5 mg


Ranitidine HCl (Zantac -)  150 mg PO [facility]


   Last Admin: 07/08/18 21:12 Dose:  150 mg


Rivaroxaban (Xarelto -)  20 mg PO DAILY@1800 [facility]


   Last Admin: 07/08/18 17:16 Dose:  20 mg


Rosuvastatin Calcium (Crestor -)  5 mg PO [facility]


   Last Admin: 07/08/18 21:12 Dose:  5 mg


Senna (Senna -)  2 tab PO [facility]


   Last Admin: 07/08/18 21:11 Dose:  2 tab











- Objective


Vital Signs: 


 Vital Signs











Temperature  97.6 F   07/09/18 10:01


 


Pulse Rate  70   07/09/18 10:01


 


Respiratory Rate  20   07/09/18 10:01


 


Blood Pressure  134/59   07/09/18 10:01


 


O2 Sat by Pulse Oximetry (%)  95   07/08/18 21:00











Constitutional: Yes: Calm


Eyes: Yes: Conjunctiva Clear


HENT: Yes: Atraumatic


Neck: Yes: Supple


Cardiovascular: Yes: S1, S2


Respiratory: Yes: CTA Bilaterally


Gastrointestinal: Yes: Normal Bowel Sounds, Soft


Genitourinary: Yes: WNL


Edema: Yes


Edema: LLE: 1+, RLE: 1+


Neurological: Yes: Oriented


Psychiatric: Yes: Oriented


Labs: 


 CBC, BMP





 07/08/18 06:20 





 07/08/18 06:20 





 INR, PTT











INR  1.81  (0.82-1.09)  H D 07/03/18  07:50    














Problem List





- Problems


(1) CHF (congestive heart failure)


Code(s): I50.9 - HEART FAILURE, UNSPECIFIED   


Qualifiers: 


   Heart failure type: unspecified   Heart failure chronicity: unspecified   

Qualified Code(s): I50.9 - Heart failure, unspecified   





(2) Hypokalemia


Code(s): E87.6 - HYPOKALEMIA   





Assessment/Plan


 Current Medications











Generic Name Dose Route Start Last Admin





  Trade Name Freq  PRN Reason Stop Dose Admin


 


Acetaminophen  650 mg  07/08/18 10:14  





  Tylenol -  PO   





  Q6H PRN   





  PAIN LEVEL 1-5   





     





     





     


 


Alprazolam  0.25 mg  07/08/18 10:14  07/08/18 21:13





  Xanax -  PO   0.25 mg





  Q8H PRN   Administration





  ANXIETY   





     





     





     


 


Cyanocobalamin  1,000 mcg  07/08/18 10:15  07/09/18 10:08





  Vitamin B12 Injection -  IM   1,000 mcg





  DAILY KELVIN   Administration





     





     





     





     


 


Finasteride  5 mg  07/09/18 10:00  07/09/18 10:08





  Proscar -  PO   5 mg





  DAILY KELVIN   Administration





     





     





     





     


 


Methylnaltrexone Bromide  12 mg  07/08/18 10:15  07/09/18 10:07





  Relistor -  SQ   12 mg





  DAILY KELVIN   Administration





     





     





     





     


 


Metoprolol Succinate  25 mg  07/09/18 10:00  07/09/18 10:08





  Toprol Xl -  PO   25 mg





  DAILY KELVIN   Administration





     





     





     





     


 


Nitrofurantoin Macrocrystals  50 mg  07/08/18 12:00  07/09/18 12:43





  Macrodantin -  PO   50 mg





  Q6HPO KELVIN   Administration





     





     





     





     


 


Oxycodone HCl  5 mg  07/08/18 10:14  07/08/18 18:39





  Roxicodone -  PO   5 mg





  Q6H PRN   Administration





  PAIN LEVEL 7 - 10   





     





     





     


 


Ranitidine HCl  150 mg  07/08/18 22:00  07/08/18 21:12





  Zantac -  PO   150 mg





  HS KELVIN   Administration





     





     





     





     


 


Rivaroxaban  20 mg  07/08/18 18:00  07/08/18 17:16





  Xarelto -  PO   20 mg





  DAILY@1800 KELVIN   Administration





     





     





     





     


 


Rosuvastatin Calcium  5 mg  07/08/18 22:00  07/08/18 21:12





  Crestor -  PO   5 mg





  HS KELVIN   Administration





     





     





     





     


 


Senna  2 tab  07/08/18 22:00  07/08/18 21:11





  Senna -  PO   2 tab





  HS KELVIN   Administration





     





     





     





     




















Impression


1. CKD


2. hx of hydronephrosis


3. hx of obstructive uropathy


4. s/p fall


5. HTN


6. UTI


7. a-fib


8. CHF


9. hypokalemia








Plan


- check bmp


- will give a PO dose of lasix, 20 mg


- repeat labs in am


- check renal ultrasound


- will evaluate for spironolactone


- crt improving





Dr Teran

## 2018-07-09 NOTE — PN
Progress Note, Physician


Chief Complaint: 





AWAKE ALERT


WIFE BEDSIDE


C/O NOT BEING ABLE TO WALK AND TRANSFER OUT OF BED


DENIES CHEST PAIN, NO SOB





- Current Medication List


Current Medications: 


Active Medications





Acetaminophen (Tylenol -)  650 mg PO Q6H PRN


   PRN Reason: PAIN LEVEL 1-5


Alprazolam (Xanax -)  0.25 mg PO Q8H PRN


   PRN Reason: ANXIETY


   Last Admin: 07/08/18 21:13 Dose:  0.25 mg


Cyanocobalamin (Vitamin B12 Injection -)  1,000 mcg IM DAILY [facility]


   Last Admin: 07/09/18 10:08 Dose:  1,000 mcg


Finasteride (Proscar -)  5 mg PO DAILY [facility]


   Last Admin: 07/09/18 10:08 Dose:  5 mg


Methylnaltrexone Bromide (Relistor -)  12 mg SQ DAILY [facility]


   Last Admin: 07/09/18 10:07 Dose:  12 mg


Metoprolol Succinate (Toprol Xl -)  25 mg PO DAILY [facility]


   Last Admin: 07/09/18 10:08 Dose:  25 mg


Nitrofurantoin Macrocrystals (Macrodantin -)  50 mg PO Q6HPO [facility]


   Last Admin: 07/09/18 17:29 Dose:  50 mg


Oxycodone HCl (Roxicodone -)  5 mg PO Q6H PRN


   PRN Reason: PAIN LEVEL 7 - 10


   Last Admin: 07/08/18 18:39 Dose:  5 mg


Ranitidine HCl (Zantac -)  150 mg PO [facility]


   Last Admin: 07/08/18 21:12 Dose:  150 mg


Rivaroxaban (Xarelto -)  20 mg PO DAILY@1800 [facility]


   Last Admin: 07/09/18 17:29 Dose:  20 mg


Rosuvastatin Calcium (Crestor -)  5 mg PO [facility]


   Last Admin: 07/08/18 21:12 Dose:  5 mg


Senna (Senna -)  2 tab PO [facility]


   Last Admin: 07/08/18 21:11 Dose:  2 tab











- Objective


Vital Signs: 


 Vital Signs











Temperature  98.8 F   07/09/18 17:49


 


Pulse Rate  70   07/09/18 17:49


 


Respiratory Rate  20   07/09/18 17:49


 


Blood Pressure  134/69   07/09/18 17:49


 


O2 Sat by Pulse Oximetry (%)  94 L  07/09/18 09:00











Constitutional: Yes: Mild Distress


Eyes: Yes: WNL


HENT: Yes: WNL


Neck: Yes: WNL


Cardiovascular: Yes: WNL


Respiratory: Yes: WNL


Gastrointestinal: Yes: WNL


Genitourinary: Yes: WNL


Musculoskeletal: Yes: Back Pain, Joint Stiffness, Muscle Pain, Muscle Weakness


Extremities: Yes: Other


Edema: Yes


Edema: LLE: 1+, RLE: 1+


Peripheral Pulses WNL: Yes


Integumentary: Yes: Erythema, Rash, Venous Stasis Changes


Wound/Incision: Yes: Dressing Dry and Intact


Neurological: Yes: Pre-Existing Deficit, Unsteady Gait


...Motor Strength: LLE, RLE


Psychiatric: Yes: WNL


Labs: 


 CBC, BMP





 07/08/18 06:20 





 07/08/18 06:20 





 INR, PTT











INR  1.81  (0.82-1.09)  H D 07/03/18  07:50    














Problem List





- Problems


(1) Anxiety


Code(s): F41.9 - ANXIETY DISORDER, UNSPECIFIED   





(2) CHF (congestive heart failure)


Code(s): I50.9 - HEART FAILURE, UNSPECIFIED   


Qualifiers: 


   Heart failure type: unspecified   Heart failure chronicity: unspecified   

Qualified Code(s): I50.9 - Heart failure, unspecified   





(3) Hypokalemia


Code(s): E87.6 - HYPOKALEMIA   





(4) Status post fall


Code(s): Z91.81 - HISTORY OF FALLING   





(5) Atrial fibrillation


Code(s): I48.91 - UNSPECIFIED ATRIAL FIBRILLATION   





(6) Pressure ulcer


Code(s): L89.90 - PRESSURE ULCER OF UNSPECIFIED SITE, UNSPECIFIED STAGE   





(7) Hip pain, left


Code(s): M25.552 - PAIN IN LEFT HIP   





Assessment/Plan





AWAIT SECOND OPINION FROM DR [facility] FOR HIP AND LOWER BACK PAIN


OOB TO CHAIR WITH PHYSICAL THERAPY


WILL NEED SNF VS HOME WITH VNS AND EXTENDED CARE


DC PLANNING

## 2018-07-09 NOTE — PN
Progress Note (short form)





- Note


Progress Note: 


Pt seen in bed.  Alert and oriented.  Vss Tmax 97.5.  Still having pain bottom 

feet.   





non palpable pulses b/l feet, -cellulitis, -ulcertion, -mal odor, -drainage, -

tender to palpation, +mycotic nails x 10, +subunual debris, +hyperrophy,+

arthralgia








arthralgia


neuritis








xray reviewed both feet.  ESR=87.  Will follow. Reviewed vascular note.

## 2018-07-09 NOTE — PN
Progress Note (short form)





- Note


Progress Note: 





Discussed case with patient and spouse. Explained that Cervical spondylosis may 

account for some of his troublesome symptoms, however, this is not an urgent 

matter. I explained that if these symptoms are troubling to him, since he 

cannot have MRI due to pacemaker, CT Myelography would represent the imaging 

modality of choice. I gave them contact information in the event that they wish 

to pursue this further. The Lumbar degenerative changes would seem to be even 

less urgent. All questions answered.

## 2018-07-10 NOTE — PN
Progress Note, Physician


History of Present Illness: 





Pt seen and examine at bedside. He is awake and alert. He denies shortness of 

breath. 





- Current Medication List


Current Medications: 


Active Medications





Acetaminophen (Tylenol -)  650 mg PO Q6H PRN


   PRN Reason: PAIN LEVEL 1-5


   Last Admin: 07/10/18 10:03 Dose:  650 mg


Alprazolam (Xanax -)  0.25 mg PO Q8H PRN


   PRN Reason: ANXIETY


   Last Admin: 07/09/18 22:03 Dose:  0.25 mg


Cyanocobalamin (Vitamin B12 Injection -)  1,000 mcg IM DAILY [facility]


   Last Admin: 07/09/18 10:08 Dose:  1,000 mcg


Finasteride (Proscar -)  5 mg PO DAILY [facility]


   Last Admin: 07/10/18 09:51 Dose:  5 mg


Methylnaltrexone Bromide (Relistor -)  12 mg SQ DAILY [facility]


   Last Admin: 07/10/18 09:52 Dose:  12 mg


Metoprolol Succinate (Toprol Xl -)  25 mg PO DAILY [facility]


   Last Admin: 07/10/18 09:51 Dose:  25 mg


Nitrofurantoin Macrocrystals (Macrodantin -)  50 mg PO Q6HPO [facility]


   Last Admin: 07/10/18 12:42 Dose:  50 mg


Oxycodone HCl (Roxicodone -)  5 mg PO Q6H PRN


   PRN Reason: PAIN LEVEL 7 - 10


   Last Admin: 07/10/18 10:02 Dose:  5 mg


Ranitidine HCl (Zantac -)  150 mg PO [facility]


   Last Admin: 07/09/18 22:06 Dose:  Not Given


Rivaroxaban (Xarelto -)  20 mg PO DAILY@1800 [facility]


   Last Admin: 07/09/18 17:29 Dose:  20 mg


Rosuvastatin Calcium (Crestor -)  5 mg PO [facility]


   Last Admin: 07/09/18 22:02 Dose:  5 mg


Senna (Senna -)  2 tab PO [facility]


   Last Admin: 07/09/18 22:01 Dose:  2 tab











- Objective


Vital Signs: 


 Vital Signs











Temperature  98 F   07/10/18 09:49


 


Pulse Rate  68   07/10/18 09:49


 


Respiratory Rate  20   07/10/18 09:49


 


Blood Pressure  139/69   07/10/18 09:49


 


O2 Sat by Pulse Oximetry (%)  94 L  07/09/18 21:00











Constitutional: Yes: Calm


Eyes: Yes: Conjunctiva Clear


HENT: Yes: Atraumatic


Neck: Yes: Supple


Cardiovascular: Yes: S1, S2


Respiratory: Yes: CTA Bilaterally


Gastrointestinal: Yes: Normal Bowel Sounds, Soft


Genitourinary: Yes: WNL


Musculoskeletal: Yes: Other (hip pain)


Edema: Yes


Edema: LLE: 1+, RLE: 1+


Neurological: Yes: Oriented


Psychiatric: Yes: Oriented


Labs: 


 CBC, BMP





 07/08/18 06:20 





 07/10/18 06:00 





 INR, PTT











INR  1.81  (0.82-1.09)  H D 07/03/18  07:50    














Problem List





- Problems


(1) CHF (congestive heart failure)


Code(s): I50.9 - HEART FAILURE, UNSPECIFIED   


Qualifiers: 


   Heart failure type: unspecified   Heart failure chronicity: unspecified   

Qualified Code(s): I50.9 - Heart failure, unspecified   





(2) Hypokalemia


Code(s): E87.6 - HYPOKALEMIA   





Assessment/Plan


 Current Medications











Generic Name Dose Route Start Last Admin





  Trade Name Freq  PRN Reason Stop Dose Admin


 


Acetaminophen  650 mg  07/08/18 10:14  07/10/18 10:03





  Tylenol -  PO   650 mg





  Q6H PRN   Administration





  PAIN LEVEL 1-5   





     





     





     


 


Alprazolam  0.25 mg  07/08/18 10:14  07/09/18 22:03





  Xanax -  PO   0.25 mg





  Q8H PRN   Administration





  ANXIETY   





     





     





     


 


Cyanocobalamin  1,000 mcg  07/08/18 10:15  07/09/18 10:08





  Vitamin B12 Injection -  IM   1,000 mcg





  DAILY KELVIN   Administration





     





     





     





     


 


Finasteride  5 mg  07/09/18 10:00  07/10/18 09:51





  Proscar -  PO   5 mg





  DAILY KELVIN   Administration





     





     





     





     


 


Methylnaltrexone Bromide  12 mg  07/08/18 10:15  07/10/18 09:52





  Relistor -  SQ   12 mg





  DAILY KELVIN   Administration





     





     





     





     


 


Metoprolol Succinate  25 mg  07/09/18 10:00  07/10/18 09:51





  Toprol Xl -  PO   25 mg





  DAILY KELVIN   Administration





     





     





     





     


 


Nitrofurantoin Macrocrystals  50 mg  07/08/18 12:00  07/10/18 12:42





  Macrodantin -  PO   50 mg





  Q6HPO KELVIN   Administration





     





     





     





     


 


Oxycodone HCl  5 mg  07/08/18 10:14  07/10/18 10:02





  Roxicodone -  PO   5 mg





  Q6H PRN   Administration





  PAIN LEVEL 7 - 10   





     





     





     


 


Ranitidine HCl  150 mg  07/08/18 22:00  07/09/18 22:06





  Zantac -  PO   Not Given





  HS KELVIN   





     





     





     





     


 


Rivaroxaban  20 mg  07/08/18 18:00  07/09/18 17:29





  Xarelto -  PO   20 mg





  DAILY@1800 KELVIN   Administration





     





     





     





     


 


Rosuvastatin Calcium  5 mg  07/08/18 22:00  07/09/18 22:02





  Crestor -  PO   5 mg





  HS KELVIN   Administration





     





     





     





     


 


Senna  2 tab  07/08/18 22:00  07/09/18 22:01





  Senna -  PO   2 tab





  HS KELVIN   Administration





     





     





     





     














Impression


1. CKD


2. hx of hydronephrosis


3. hx of obstructive uropathy


4. s/p fall


5. HTN


6. UTI


7. a-fib


8. CHF


9. hypokalemia








Plan


- labs reviewed


- lasix 20 mg daily and monitor volume status


- spironolactone prn


- crt improving


- discussed plan with pt





Dr Teran

## 2018-07-10 NOTE — DS
Physical Examination


Vital Signs: 


 Vital Signs











Temperature  98 F   07/10/18 09:49


 


Pulse Rate  68   07/10/18 09:49


 


Respiratory Rate  20   07/10/18 09:49


 


Blood Pressure  139/69   07/10/18 09:49


 


O2 Sat by Pulse Oximetry (%)  94 L  07/10/18 09:00











Constitutional: Yes: Mild Distress


Eyes: Yes: WNL


HENT: Yes: WNL


Neck: Yes: WNL


Cardiovascular: Yes: Pulse Irregular


Respiratory: Yes: WNL


Gastrointestinal: Yes: WNL


Renal/: Yes: Incontinence


Musculoskeletal: Yes: Back Pain, Joint Stiffness, Muscle Pain, Muscle Weakness


Extremities: Yes: WNL


Edema: Yes


Peripheral Pulses WNL: Yes


Integumentary: Yes: Pressure Ulcer, Rash, Venous Stasis Changes


Wound/Incision: Yes: Dressing Dry and Intact


...Motor Strength: LLE, RLE


Psychiatric: Yes: WNL


Labs: 


 CBC, BMP





 07/08/18 06:20 





 07/10/18 06:00 











Discharge Summary


Reason For Visit: CHF,LEUKOCYTOSIS,HYPOKALEMIA


Current Active Problems





Anxiety (Acute)


CHF (congestive heart failure) (Acute)


Hypokalemia (Acute)


Leukocytosis (Acute)


Lumbar stenosis (Acute)


PVD (peripheral vascular disease) (Acute)


Status post fall (Acute)


UTI (urinary tract infection) (Acute)








Procedures: Principal: CT SCAN


Hospital Course: 





ADMITTED CHF, UTI, RESP DISTRESS, LUMBAR AND HIP ARTHRITIC DISEASE TREATED WITH 

IV ABX, WOUND CARE , PAIN CONTROL. NOT A CANDIDATE FOR ORTHOPEDIC SURGERY.


Condition: Stable





- Instructions


Diet, Activity, Other Instructions: 


LOW SALT


ORTHOPEDIC F/U DR ALEXANDER IN 6 WEEKS


Referrals: 


Genaro Fabian MD [Primary Care Provider] - 


Disposition: VNS/HOME HEALTH CARE





- Home Medications


Comprehensive Discharge Medication List: 


Ambulatory Orders





Metoprolol Succinate [Toprol Xl] 50 mg PO DAILY 03/11/17 


Rivaroxaban [Xarelto -] 20 mg PO DAILY 03/11/17 


Tamsulosin HCl [Flomax] 0.8 mg PO DAILY 03/11/17 


Sennosides [Senna -] 2 tab PO HS  tablet 03/15/17 


Furosemide [Lasix -] 40 mg PO BID #0 tab 06/06/17 


Acetaminophen [Tylenol .Regular Strength -] 650 mg PO Q6H PRN  tablet 12/23/17 


Metolazone [Zaroxolyn -] 2.5 mg PO Q2D@1000  tablet 12/23/17 


Clotrimazole [Antifungal] 30 gm TP DAILY 03/24/18 


Ranitidine [Zantac -] 150 mg PO HS 03/24/18

## 2018-07-10 NOTE — PN
Progress Note (short form)





- Note


Progress Note: 





Consultation: Left hip pain.





83M p/w chronic left hip pain.


(+) Admitted 7/3/2018 d/t sudden weakness in legs causing fall from standing 

height & head injury with no LOC.


(+) CHF exacerbation led to anasarca and massive fluid third spacing in both of 

his legs, the weight of which made ambulation difficult.


Pt. believes this may have contributed to his fall.


(+) E. Coli UTI.





(+) Progressively worsening left anterior groin pain x >3 years associated with 

all left hip ROM and weight bearing activity involving LLE.





PMH:


A-Fib (+) Pacemaker


CHF


UTI


Possible cervical & lumbar spinal stenosis


HTN


Hypokalemia


Hydronephrosis


Obstructive uropathy





All labs & vitals reviewed.





PE: AAO x 3, NAD.


Left Hip: Left groin pain elicited with ROM L hip. Able to straight-leg raise, 

flex knee, & dorsi and plantar flex left ankle.


B/L LE edema improving. Multiple epithelializing ulcers noted.





X-Rays L Hip: Diffuse osteoarthritic changes on both sides of the joint.





A/P: 83M p/w primary osteoarthritis left hip.


-A long conversation was held with Mr. Platt about the risks, benefits, and 

alternatives of non-surgical vs. surgical management of his left hip. He has 

failed years of conservative, non-surgical management and wishes to undergo 

left total hip replacement surgery. At this point, he is not a surgical 

candidate given his recent CHF exacerbation, UTI, and leg wounds. His recent 

falls put him at further risk of sustaining a post-operative mica-prosthetic 

fracture or dislocation. If Mr. Platt can be medically optimized for surgery, 

his infection can be eradicated for at least 6 weeks, and his anti-coagulation 

can be converted from Xarelto to Coumadin (for easier mica-operative management 

to minimize blood loss), then Mr. Platt would be a better candidate for left hip 

replacement surgery.


-Pain control.


-WBAT B/L LE.


-PT/OT/Rehab, OOB.


-Incentive spirometry.


-Spine care as per Dr. Graham.


-Cardiac care with input from primary cardiologist, Dr. Ronnie Esposito ([facility]).


-Care per primary medical hospitalist team, vascular surgery service, podiatry 

service, and all other consulting services.


-No further orthopaedic surgery intervention at this time.


-Pt. may follow-up with [facility] [place] office within a week of 

hospital discharge: [phone].





Thien Rascon MD (Orthopaedic Surgery).

## 2018-07-11 NOTE — PN
Progress Note (short form)





- Note


Progress Note: 





3 STAPLES REMOVED FROM OCCIPITAL AREA


CLEAN NO BLEED


TOLERATED WELL


DC PLANNING





Problem List





- Problems


(1) Anxiety


Code(s): F41.9 - ANXIETY DISORDER, UNSPECIFIED   





(2) CHF (congestive heart failure)


Code(s): I50.9 - HEART FAILURE, UNSPECIFIED   


Qualifiers: 


   Heart failure type: unspecified   Heart failure chronicity: unspecified   

Qualified Code(s): I50.9 - Heart failure, unspecified   





(3) Hypokalemia


Code(s): E87.6 - HYPOKALEMIA   





(4) Status post fall


Code(s): Z91.81 - HISTORY OF FALLING   





(5) Atrial fibrillation


Code(s): I48.91 - UNSPECIFIED ATRIAL FIBRILLATION   





(6) Pressure ulcer


Code(s): L89.90 - PRESSURE ULCER OF UNSPECIFIED SITE, UNSPECIFIED STAGE   





(7) Hip pain, left


Code(s): M25.552 - PAIN IN LEFT HIP

## 2018-07-11 NOTE — PN
Progress Note, Physician


History of Present Illness: 





Pt seen and examined at bedside. He is awake and alert. His volume status is 

stable. He denies shortness of breath. 





- Current Medication List


Current Medications: 


Active Medications





Acetaminophen (Tylenol -)  650 mg PO Q6H PRN


   PRN Reason: PAIN LEVEL 1-5


   Last Admin: 07/11/18 05:30 Dose:  650 mg


Alprazolam (Xanax -)  0.25 mg PO Q8H PRN


   PRN Reason: ANXIETY


   Last Admin: 07/10/18 20:04 Dose:  0.25 mg


Cyanocobalamin (Vitamin B12 Injection -)  1,000 mcg IM DAILY [facility]


   Last Admin: 07/11/18 11:29 Dose:  Not Given


Finasteride (Proscar -)  5 mg PO DAILY [facility]


   Last Admin: 07/11/18 11:12 Dose:  5 mg


Furosemide (Lasix -)  20 mg PO DAILY [facility]


   Last Admin: 07/11/18 11:12 Dose:  20 mg


Methylnaltrexone Bromide (Relistor -)  12 mg SQ DAILY [facility]


   Last Admin: 07/10/18 09:52 Dose:  12 mg


Metoprolol Succinate (Toprol Xl -)  25 mg PO DAILY [facility]


   Last Admin: 07/11/18 11:12 Dose:  25 mg


Nitrofurantoin Macrocrystals (Macrodantin -)  50 mg PO Q6HPO [facility]


   Last Admin: 07/11/18 05:30 Dose:  50 mg


Oxycodone HCl (Roxicodone -)  5 mg PO Q6H PRN


   PRN Reason: PAIN LEVEL 7 - 10


   Last Admin: 07/11/18 05:30 Dose:  5 mg


Ranitidine HCl (Zantac -)  150 mg PO [facility]


   Last Admin: 07/10/18 21:22 Dose:  150 mg


Rivaroxaban (Xarelto -)  20 mg PO DAILY@1800 [facility]


   Last Admin: 07/10/18 17:44 Dose:  20 mg


Rosuvastatin Calcium (Crestor -)  5 mg PO [facility]


   Last Admin: 07/10/18 21:22 Dose:  5 mg


Senna (Senna -)  2 tab PO HS [facility]


   Last Admin: 07/10/18 21:22 Dose:  2 tab











- Objective


Vital Signs: 


 Vital Signs











Temperature  97.6 F   07/11/18 06:00


 


Pulse Rate  70   07/11/18 06:00


 


Respiratory Rate  20   07/11/18 10:00


 


Blood Pressure  133/68   07/11/18 06:00


 


O2 Sat by Pulse Oximetry (%)  96   07/11/18 10:00











Constitutional: Yes: Calm


Eyes: Yes: Conjunctiva Clear


HENT: Yes: Atraumatic


Neck: Yes: Supple


Cardiovascular: Yes: S1, S2


Respiratory: Yes: CTA Bilaterally


Gastrointestinal: Yes: Normal Bowel Sounds, Soft


Genitourinary: Yes: WNL


Edema: Yes


Edema: LLE: Trace, RLE: Trace


Neurological: Yes: Oriented


Psychiatric: Yes: Oriented


Labs: 


 CBC, BMP





 07/08/18 06:20 





 07/10/18 06:00 





 INR, PTT











INR  1.81  (0.82-1.09)  H D 07/03/18  07:50    














Problem List





- Problems


(1) CHF (congestive heart failure)


Code(s): I50.9 - HEART FAILURE, UNSPECIFIED   


Qualifiers: 


   Heart failure type: unspecified   Heart failure chronicity: unspecified   

Qualified Code(s): I50.9 - Heart failure, unspecified   





(2) Hypokalemia


Code(s): E87.6 - HYPOKALEMIA   





Assessment/Plan


 Current Medications











Generic Name Dose Route Start Last Admin





  Trade Name Freq  PRN Reason Stop Dose Admin


 


Acetaminophen  650 mg  07/08/18 10:14  07/11/18 05:30





  Tylenol -  PO   650 mg





  Q6H PRN   Administration





  PAIN LEVEL 1-5   





     





     





     


 


Alprazolam  0.25 mg  07/08/18 10:14  07/10/18 20:04





  Xanax -  PO   0.25 mg





  Q8H PRN   Administration





  ANXIETY   





     





     





     


 


Cyanocobalamin  1,000 mcg  07/08/18 10:15  07/11/18 11:29





  Vitamin B12 Injection -  IM   Not Given





  DAILY KELVIN   





     





     





     





     


 


Finasteride  5 mg  07/09/18 10:00  07/11/18 11:12





  Proscar -  PO   5 mg





  DAILY KELVIN   Administration





     





     





     





     


 


Furosemide  20 mg  07/10/18 15:00  07/11/18 11:12





  Lasix -  PO   20 mg





  DAILY KELVIN   Administration





     





     





     





     


 


Methylnaltrexone Bromide  12 mg  07/08/18 10:15  07/10/18 09:52





  Relistor -  SQ   12 mg





  DAILY KELVIN   Administration





     





     





     





     


 


Metoprolol Succinate  25 mg  07/09/18 10:00  07/11/18 11:12





  Toprol Xl -  PO   25 mg





  DAILY KELVIN   Administration





     





     





     





     


 


Nitrofurantoin Macrocrystals  50 mg  07/08/18 12:00  07/11/18 05:30





  Macrodantin -  PO   50 mg





  Q6HPO [facility]   Administration





     





     





     





     


 


Oxycodone HCl  5 mg  07/08/18 10:14  07/11/18 05:30





  Roxicodone -  PO   5 mg





  Q6H PRN   Administration





  PAIN LEVEL 7 - 10   





     





     





     


 


Ranitidine HCl  150 mg  07/08/18 22:00  07/10/18 21:22





  Zantac -  PO   150 mg





  HS KELV[facility]   Administration





     





     





     





     


 


Rivaroxaban  20 mg  07/08/18 18:00  07/10/18 17:44





  Xarelto -  PO   20 mg





  DAILY@1800 [facility]   Administration





     





     





     





     


 


Rosuvastatin Calcium  5 mg  07/08/18 22:00  07/10/18 21:22





  Crestor -  PO   5 mg





  HS KELVIN   Administration





     





     





     





     


 


Senna  2 tab  07/08/18 22:00  07/10/18 21:22





  Senna -  PO   2 tab





  HS KELVIN   Administration





     





     





     





     











Impression


1. CKD


2. hx of hydronephrosis


3. hx of obstructive uropathy


4. s/p fall


5. HTN


6. UTI


7. a-fib


8. CHF


9. hypokalemia








Plan


- increase lasix to 40 mg


- will need to monitor labs after discharge


- discussed plan with pt and his partner


- may need to adjust diuretics further as his diet changes


- renal function stable





Dr Teran

## 2018-09-08 NOTE — PDOC
History of Present Illness





- General


History Source: Patient


Exam Limitations: No Limitations





- History of Present Illness


Initial Comments: 


09/08/18 11:50


The patient is an 83-year-old male, with a past medical history of HTN, HLD, 

and CHF, who presents to the ED with with 3-4 days of LLE swelling, redness, 

and pain. Patient also has wounds presents to the left anterior shin and left 

great toe, without any drainage. He has been following up with a wound care 

center for the past 3 days. He was recently seen by his visiting nurse who 

advised that he report to the ED to rule out cellulitis. 





The patient denies any fever, chills, nausea, vomiting, diarrhea, constipation, 

or abdominal pain.


Denies chest pain, shortness of breath, headache and dizziness.


Denies dysuria, frequency, urgency, hesitancy, and hematuria.


 


Allergies: cephalexin monohydrate, ertapenem.


Surgical history: Bilateral knee replacement - 1965.


Social history: None reported. 


PCP: Dr. Maikol Allen








<Stephanie Vargas - Last Filed: 09/08/18 12:06>





<Monik Jean - Last Filed: 09/08/18 13:55>





- General


Chief Complaint: Wound


Stated Complaint: CELLULITIS LT LEG


Time Seen by Provider: 09/08/18 10:56





Past History





<Stephanie Vargas - Last Filed: 09/08/18 12:06>





- Past Medical History


Anemia: No


Asthma: No


Cancer: No


Cardiac Disorders: Yes


CVA: No


COPD: No


CHF: Yes


Dementia: No


Diabetes: No


GI Disorders: Yes


 Disorders: No


HTN: Yes


Hypercholesterolemia: Yes


Liver Disease: No


Seizures: No


Thyroid Disease: No





- Surgical History


Abdominal Surgery: No


Appendectomy: No


Cardiac Surgery: Yes


Cholecystectomy: No


Lung Surgery: No


Neurologic Surgery: No


Orthopedic Surgery: Yes (bilateral knee sx 1965)





- Immunization History


Immunization Up to Date: Yes





- Suicide/Smoking/Psychosocial Hx


Smoking History: Former smoker


Have you smoked in the past 12 months: No


If you are a former smoker, when did you quit?: 1970


Hx Alcohol Use: No


Drug/Substance Use Hx: No


Substance Use Type: None


Hx Substance Use Treatment: No





<Monik Jean - Last Filed: 09/08/18 13:55>





- Past Medical History


Allergies/Adverse Reactions: 


 Allergies











Allergy/AdvReac Type Severity Reaction Status Date / Time


 


cephalexin monohydrate Allergy Mild Hives Verified 07/03/18 07:07





[From Keflex]     


 


ertapenem Allergy   Verified 07/03/18 07:07











Home Medications: 


Ambulatory Orders





Rivaroxaban [Xarelto -] 20 mg PO DAILY 03/11/17 


Tamsulosin HCl [Flomax] 0.8 mg PO DAILY 03/11/17 


Sennosides [Senna -] 2 tab PO HS  tablet 03/15/17 


Furosemide [Lasix -] 40 mg PO BID #0 tab 06/06/17 


Acetaminophen [Tylenol .Regular Strength -] 650 mg PO Q6H PRN  tablet 12/23/17 


Metolazone [Zaroxolyn -] 2.5 mg PO Q2D@1000  tablet 12/23/17 


Clotrimazole [Antifungal] 30 gm TP DAILY 03/24/18 


Ranitidine [Zantac -] 150 mg PO HS 03/24/18 


Acetaminophen [Tylenol .Regular Strength -] 650 mg PO Q6H PRN  tablet 07/11/18 


Alprazolam [Xanax] 0.25 mg PO Q8H PRN  tablet MDD 3 07/11/18 


Finasteride [Proscar -] 5 mg PO DAILY #30 tablet 07/11/18 


Furosemide [Lasix -] 20 mg PO DAILY  tablet 07/11/18 


Metoprolol Succinate [Toprol XL -] 25 mg PO DAILY #30 tab.sr.24h 07/11/18 


Rivaroxaban [Xarelto -] 20 mg PO DAILY@1800 #30 tablet 07/11/18 


Rosuvastatin [Crestor -] 5 mg PO HS #30 tablet 07/11/18 


Sennosides [Senna -] 2 tab PO HS #60 tablet 07/11/18 











**Review of Systems





- Review of Systems


Able to Perform ROS?: Yes


Comments:: 


09/08/18 11:52


GENERAL/CONSTITUTIONAL: No fever or chills. No weakness.


HEAD, EYES, EARS, NOSE AND THROAT: No change in vision. No ear pain or 

discharge. No sore throat.


CARDIOVASCULAR: No chest pain or shortness of breath.


RESPIRATORY: No cough, wheezing, or hemoptysis.


GASTROINTESTINAL: No nausea, vomiting, diarrhea or constipation.


GENITOURINARY: No dysuria, frequency, or change in urination.


MUSCULOSKELETAL: No joint swelling or pain. No neck or back pain.


EXTREMITIES: (+)Left lower extremity swelling, redness, and pain; wounds to 

left anterior shin and left great toe.


SKIN: No rash


NEUROLOGIC: No headache, vertigo, loss of consciousness, or change in strength/

sensation.


ENDOCRINE: No increased thirst. No abnormal weight change.


HEMATOLOGIC/LYMPHATIC: No anemia, easy bleeding, or history of blood clots.


ALLERGIC/IMMUNOLOGIC: No hives or skin allergy.











<Stephanie Vargas - Last Filed: 09/08/18 12:06>





*Physical Exam





- Physical Exam


Comments: 


09/08/18 11:58


GENERAL: Awake, alert, and fully oriented, in no acute distress


HEAD: No signs of trauma


EYES: PERRLA, EOMI, sclera anicteric, conjunctiva clear


ENT: Auricles normal inspection, hearing grossly normal, nares patent, 

oropharynx clear without exudates. Moist mucosa


NECK: Normal ROM, supple, no lymphadenopathy, JVD, or masses


LUNGS: Breath sounds equal, clear to auscultation bilaterally.  No wheezes, and 

no crackles


HEART: Regular rate and rhythm, normal S1 and S2, no murmurs, rubs or gallops


ABDOMEN: Soft, nontender, normoactive bowel sounds.  No guarding, no rebound.  

No masses


EXTREMITIES: (+)LLE: chronic lymphedema, left greater than right, erythema to 

the dorsum of foot up to the shin, open wounds to left great toe and anterior 

shin, leg is cellulitic and hot to touch. 


NEUROLOGICAL: Cranial nerves II through XII grossly intact. 


SKIN: Warm, Dry, normal turgor.








<Stephanie Vargas - Last Filed: 09/08/18 12:06>





**Heart Score/ECG Review





- ECG Intrepretation


Comment:: 





09/08/18 12:20


paced at 69, no acute changes





<Monik Jean - Last Filed: 09/08/18 13:55>





ED Treatment Course





- LABORATORY


CBC & Chemistry Diagram: 


 09/08/18 11:45





 09/08/18 11:45





<Stephanie Vargas - Last Filed: 09/08/18 12:06>





- LABORATORY


CBC & Chemistry Diagram: 


 09/08/18 11:45





 09/08/18 11:45





<Monik Jean - Last Filed: 09/08/18 13:55>





Medical Decision Making





- Medical Decision Making








09/08/18 11:31


Dr. Romeo was paged and notified via phone service. Second page was placed at 12

:06.








<Stephanie Vargas - Last Filed: 09/08/18 12:06>





- Medical Decision Making





09/08/18 11:08


a/p: 82yo male from home with LLE redness, swelling, pain


-pt with wound to L anterior tib without purulent drainage


-wound to top of L big toe


-redness/cellulitis to anterior surface of the foot from the toe up the 

anterior tib to just below the knee


-2x swelling to L leg compared to R


-has been undergoing local wound care x 3 days


-swelling of L leg > R leg x 3-4 days with increasing redness each day


-visiting home NP sent patient for concern of cellulitis


-will send labs, ekg,cxr, duplex LE to eval for dvt, blood cultures


-will need iv abx


09/08/18 12:57


case discussed with DR. Gaitan given hx of ESBL ecoli, staph, enterococcus, 

proteus to Lower extremity in Jan 2018


-she recommends Tigacyl for abx


will see in consult


09/08/18 12:58


pt and fam member updated on the plan


pt currently in ultrasound


09/08/18 13:53


case discussed with Dr. Fabian who accepts pt to service and admission for iv 

abx for complicated cellulitis and wound infection to the LLE


09/08/18 13:54


vascular study negative for dvt





<Monik Jean - Last Filed: 09/08/18 13:55>





*DC/Admit/Observation/Transfer





- Attestations


Scribe Attestion: 





09/08/18 12:02





Documentation prepared by Stephanie Vargas, acting as medical scribe for Monik Jean DO.





<Stephanie Vargas - Last Filed: 09/08/18 12:06>





- Discharge Dispostion


Decision to Admit order: Yes





- Attestations


Physician Attestion: 





09/08/18 11:08








I, Dr. Monik Jean DO, attest that this document has been prepared under 

my direction and personally reviewed by me in its entirety.   I further attest, 

that it accurately reflects all work, treatment, procedures and medical decision

-making performed by me.  





<Monik Jean - Last Filed: 09/08/18 13:55>


Diagnosis at time of Disposition: 


 Cellulitis








- Discharge Dispostion


Condition at time of disposition: Guarded





- Referrals


Referrals: 


Maikol Allen MD [Primary Care Provider] - 





- Patient Instructions





- Post Discharge Activity

## 2018-09-09 NOTE — CONS
DATE OF CONSULTATION:

 

DATE OF DICTATION:  09/09/2018

 

REQUESTED BY:  Genaro Fabian MD

 

The case was discussed with the emergency room doctor last night.  This is an

83-year-old man, past medical history of CHF, hypertension, he has known colonization

with E coli ESBL organisms.  He was sent to the emergency room with worsening

erythema of his left leg as well as a new blister on his toe.  This has all been

present about 2 weeks and is getting worse.  He has chronic left hip pain, which is

unchanged.  He has chronic low back pain, which is unchanged.  He has no fevers or

chills.  He lives at home with his girlfriend, and any questions about his medicines,

he refers to his girlfriend, who is currently not present.  He has chronic nausea,

for which he takes a pill 3 to 4 times a day right after he eats.  He has no

vomiting.  He has no diarrhea or dysuria.  There is no chest pain or abdominal pain. 

He does have chronic pain of his legs and hips and back.

 

His past medical history is notable for CHF, hypertension, atrial fibrillation,

osteoarthritis, BPH, and nephrolithiasis.

 

Surgical history is notable for permanent pacemaker, bilateral total knee

replacements, and prostatectomy.  

 

He is allergic to CELEXA and ERTAPENEM.  He is unaware of the nature of the

allergies.

 

He was sent to the emergency room by his VNS.

 

SOCIAL HISTORY:  He lives with his girlfriend.  Denies any cigarette or substance

use.  He is able to sleep in the bed.  He uses a wheelchair occasionally, though he

reports he is able to ambulate.

 

His medications at home include Xarelto, Lasix, Proscar, vitamin D, Flomax, Crestor,

Lopressor, metolazone, senna, clotrimazole cream, Xanax, and Tylenol p.r.n. 

 

REVIEW OF SYSTEMS:  As per HPI.

 

PHYSICAL EXAMINATION:

General:  He is awake and alert, in no distress.

Vital Signs:  Temperature is 98, pulse is 71, blood pressure 121/56, respiratory rate

18, 122 kg.

HEENT:  Normocephalic.  Eyes are anicteric.

Neck:  Supple.

Skin:  His pacemaker site is without erythema.  His total knee replacements are well

healed.  He has erythema of his left anterior tibia and he has a shallow ulcer on his

left big toe which appears to be a blister.  He has diffuse erythema of the anterior

tibia.  He has no sacral skin breakdown.

Heart:  Regular rate and rhythm. 

Lungs:  Clear to auscultation.

Abdomen:  Soft, nontender.

Extremities:  He has 1+ edema of both legs.

 

White count is 9.4, hemoglobin 14.1, platelets 243.  BUN 30, creatinine 1. 

Urinalysis has 2+ leukocytes with 4 to 6 white cells.  Blood cultures are pending. 

He had a vascular study of his legs, for which there is no evidence of DVT.  He had a

chest x-ray that showed no acute chest pathology.

 

In summary, this is an 83-year-old man with multiple antibiotic allergies of unclear

etiology, with worsening erythema of the leg, new blister on the big toe, no fevers

or chills.  I would suggest we continue Tygacil, given his multiple allergies.  He

needs contact isolation for his history of the Escherichia coli extended-spectrum

beta lactamase organism, which I suspect is colonization, he has no urinary tract

symptoms.  Further recommendations to follow.

 

 

TAVO [id number]

DD: 09/09/2018 08:43

DT: 09/09/2018 12:01

Job #:  [id number]

## 2018-09-09 NOTE — HP
Admitting History and Physical





- Admission


History of Present Illness: 





83-year-old male, with a past medical history of HTN, HLD, and CHF, who 

presents to the ED with with 3-4 days of LLE swelling, redness, and pain. 

Patient also has wounds presents to the left anterior shin and left great toe, 

without any drainage. He has been following up with a wound care center for the 

past 3 days. He was recently seen by his visiting nurse who advised that he 

report to the ED to rule out cellulitis. 








- Past Medical History


Cardiovascular: Yes: AFIB, CHF, HTN, Other (pacemaker)


Renal/: Yes: Renal Inusuff, BPH, Renal Calculi





- Past Surgical History


Past Surgical History: Yes: Joint Replacement (knee), Permanent Pacemaker, 

Prostatectomy





- Smoking History


Smoking history: Former smoker


Have you smoked in the past 12 months: No


If you are a former smoker, when did you quit?: 1970





- Alcohol/Substance Use


Hx Alcohol Use: No


History of Substance Use: reports: None





- Social History


ADL: Family Assistance


History of Recent Travel: No





Home Medications





- Allergies


Allergies/Adverse Reactions: 


 Allergies











Allergy/AdvReac Type Severity Reaction Status Date / Time


 


cephalexin monohydrate Allergy Mild Hives Verified 07/03/18 07:07





[From Keflex]     


 


ertapenem Allergy   Verified 07/03/18 07:07














- Home Medications


Home Medications: 


Ambulatory Orders





Tamsulosin HCl [Flomax] 0.8 mg PO DAILY 03/11/17 


Sennosides [Senna -] 2 tab PO HS  tablet 03/15/17 


Furosemide [Lasix -] 40 mg PO BID #0 tab 06/06/17 


Acetaminophen [Tylenol .Regular Strength -] 650 mg PO Q6H PRN  tablet 12/23/17 


Clotrimazole [Antifungal] 30 gm TP DAILY 03/24/18 


Alprazolam [Xanax] 0.25 mg PO Q8H PRN  tablet MDD 3 07/11/18 


Finasteride [Proscar -] 5 mg PO DAILY #30 tablet 07/11/18 


Rivaroxaban [Xarelto -] 20 mg PO DAILY@1800 #30 tablet 07/11/18 


Rosuvastatin [Crestor -] 5 mg PO HS #30 tablet 07/11/18 


Cholecalciferol (Vitamin D3) [Vitamin D3] 0 unit PO DAILY 09/08/18 


Metolazone 2.5 mg PO Q48H 09/08/18 


Metoprolol Tartrate [Lopressor -] 0 mg PO DAILY 09/08/18 


Ranitidine HCl 150 mg PO BID 09/09/18 











Review of Systems





- Review of Systems


Cardiovascular: denies: Chest Pain


Respiratory: reports: SOB


Gastrointestinal: denies: Abdominal Pain


Musculoskeletal: reports: Extremity Pain, Muscle Weakness


Integumentary: reports: Blister, Erythema





Physical Examination


Vital Signs: 


 Vital Signs











Temperature  98.0 F   09/09/18 06:00


 


Pulse Rate  71   09/09/18 06:00


 


Respiratory Rate  18   09/09/18 06:00


 


Blood Pressure  121/56   09/09/18 06:00


 


O2 Sat by Pulse Oximetry (%)  100   09/08/18 22:00











Cardiovascular: Yes: S1, S2


Respiratory: Yes: Regular, CTA Bilaterally


Gastrointestinal: Yes: Normal Bowel Sounds, Soft


Edema: Yes


Edema: LLE: 2+, RLE: 1+


Integumentary: Yes: Erythema (LEFT LEG)


Wound/Incision: Yes: Excoriated


Labs: 


 CBC, BMP





 09/09/18 06:10 





 09/09/18 06:10 











Problem List





- Problems


(1) Cellulitis


Assessment/Plan: 


-IV ABX


-ID CONSULT


CULTURES


Code(s): L03.90 - CELLULITIS, UNSPECIFIED   





(2) Atrial fibrillation


Assessment/Plan: 


Orders





09/08/18 18:00


Rivaroxaban [Xarelto -]   20 mg PO DAILY@1800 





09/09/18 10:00


Metoprolol Succinate [Toprol Xl -]   25 mg PO DAILY 








MONITOR RATE


Code(s): I48.91 - UNSPECIFIED ATRIAL FIBRILLATION   





(3) CHF (congestive heart failure)


Assessment/Plan: 


-IV LASIX


-CARDIO


Code(s): I50.9 - HEART FAILURE, UNSPECIFIED   


Qualifiers: 


   Heart failure type: unspecified   Heart failure chronicity: unspecified   

Qualified Code(s): I50.9 - Heart failure, unspecified

## 2018-09-09 NOTE — CON.CARD
Consult


Consult Specialty:: Cardiology


Referred by:: Caren


Reason for Consultation:: CHF





- History of Present Illness


Chief Complaint: LLE erythema


History of Present Illness: 


83 year old male with a pmhx of htn, hld, and chronic diastolic chf with h/o 

cellulitis who presents with one week kof LLE swelling and redness.  Also with 

wound to left shin and toe.  No chest pain or sob.  No pnd or orthopnea.  No 

palpitations.


Takes furosemide daily and metolazone every other day.


EKG: v paced


CXR clear lungs


BNP 800s





 








- Past Medical History


Cardio/Vascular: Yes: AFIB, CHF, HTN, Other (pacemaker)


Renal/: Yes: Renal Inusuff, BPH, Renal Calculi





- Past Surgical History


Past Surgical History: Yes: Joint Replacement (knee), Permanent Pacemaker, 

Prostatectomy





- Alcohol/Substance Use


Hx Alcohol Use: No


History of Substance Use: reports: None





- Smoking History


Smoking history: Former smoker


Have you smoked in the past 12 months: No


If you are a former smoker, when did you quit?: 1970





- Social History


ADL: Family Assistance


History of Recent Travel: No





Home Medications





- Allergies


Allergies/Adverse Reactions: 


 Allergies











Allergy/AdvReac Type Severity Reaction Status Date / Time


 


cephalexin monohydrate Allergy Mild Hives Verified 07/03/18 07:07





[From Keflex]     


 


ertapenem Allergy   Verified 07/03/18 07:07














- Home Medications


Home Medications: 


Ambulatory Orders





Tamsulosin HCl [Flomax] 0.8 mg PO DAILY 03/11/17 


Sennosides [Senna -] 2 tab PO HS  tablet 03/15/17 


Furosemide [Lasix -] 40 mg PO BID #0 tab 06/06/17 


Acetaminophen [Tylenol .Regular Strength -] 650 mg PO Q6H PRN  tablet 12/23/17 


Clotrimazole [Antifungal] 30 gm TP DAILY 03/24/18 


Alprazolam [Xanax] 0.25 mg PO Q8H PRN  tablet MDD 3 07/11/18 


Finasteride [Proscar -] 5 mg PO DAILY #30 tablet 07/11/18 


Rivaroxaban [Xarelto -] 20 mg PO DAILY@1800 #30 tablet 07/11/18 


Rosuvastatin [Crestor -] 5 mg PO HS #30 tablet 07/11/18 


Cholecalciferol (Vitamin D3) [Vitamin D3] 0 unit PO DAILY 09/08/18 


Metolazone 2.5 mg PO Q48H 09/08/18 


Metoprolol Tartrate [Lopressor -] 0 mg PO DAILY 09/08/18 


Ranitidine HCl 150 mg PO BID 09/09/18 








Vital Signs: 


 Vital Signs











Temperature  98.0 F   09/09/18 06:00


 


Pulse Rate  71   09/09/18 06:00


 


Respiratory Rate  18   09/09/18 06:00


 


Blood Pressure  121/56   09/09/18 06:00


 


O2 Sat by Pulse Oximetry (%)  100   09/08/18 22:00











Constitutional: Yes: No Distress


Neck: Yes: Supple


Respiratory: Yes: CTA Bilaterally


Gastrointestinal: Yes: Soft


Cardiovascular: Yes: Regular Rate and Rhythm


JVD: No


Carotid Bruit: No


Heart Sounds: Yes: S1, S2


Murmur: No: Systolic Murmur


Edema: No (Guaze wrap on lle)





- Other Data


Labs, Other Data: 


 CBC, BMP





 09/09/18 06:10 





 09/09/18 06:10 





 INR, PTT











INR  1.10  (0.83-1.09)  H  09/08/18  11:45    








 Troponin, BNP











  09/08/18





  14:02


 


Troponin I  0.03


 


B-Natriuretic Peptide  827.00 H








 Troponin, BNP











  09/08/18





  14:02


 


Troponin I  0.03


 


B-Natriuretic Peptide  827.00 H














Imaging





- Results


Chest X-ray: Report Reviewed


EKG: Image Reviewed





Problem List





- Problems


(1) Cellulitis


Code(s): L03.90 - CELLULITIS, UNSPECIFIED   





(2) Atrial fibrillation


Code(s): I48.91 - UNSPECIFIED ATRIAL FIBRILLATION   





(3) CHF (congestive heart failure)


Code(s): I50.9 - HEART FAILURE, UNSPECIFIED   


Qualifiers: 


   Heart failure type: unspecified   Heart failure chronicity: unspecified   

Qualified Code(s): I50.9 - Heart failure, unspecified   





Assessment/Plan





83 year old male with a pmhx of htn, hld, afib s/p ppm on rivaroxabn, prostate 

ca, and chronic diastolic chf with h/o cellulitis who presents with one week 

kof LLE swelling and redness.  Also with wound to left shin and toe.  No chest 

pain or sob.  No pnd or orthopnea.  No palpitations.


Takes furosemide daily and metolazone every other day.


EKG: v paced


CXR clear lungs


BNP 800s





1) Chronic diastolic chf


-LLE swelling/erythema likely more related to infection opposed to CHF.  BNP 

only mildly elevated 800s.  Unilateral LLE edema which is now resolved, no RLE 

edema, Lungs clear, and no jvd


Would change furosemide back to PO 40mg bid tomorrow


-Replete K as needed


-BP control 


Abx as per primary team for infection.





2) Afib


Vpaced


On rivaroxaban for AC

## 2018-09-09 NOTE — PN
Progress Note, Physician





- Current Medication List


Current Medications: 


Active Medications





Acetaminophen (Tylenol -)  650 mg PO Q6H PRN


   PRN Reason: FEVER


   Last Admin: 09/09/18 03:30 Dose:  650 mg


Alprazolam (Xanax -)  0.25 mg PO Q8H PRN


   PRN Reason: ANXIETY


Finasteride (Proscar -)  5 mg PO DAILY [facility]


Furosemide (Lasix Injection -)  40 mg IVPUSH BID@0600,1400 [facility]


   Last Admin: 09/09/18 05:39 Dose:  40 mg


Tigecycline 50 mg/ Dextrose  100 mls @ 100 mls/hr IVPB BID [facility]; Protocol


Metoprolol Succinate (Toprol Xl -)  25 mg PO DAILY [facility]


Ranitidine HCl (Zantac -)  150 mg PO HS [facility]


   Last Admin: 09/08/18 23:11 Dose:  Not Given


Rivaroxaban (Xarelto -)  20 mg PO DAILY@1800 [facility]


Rosuvastatin Calcium (Crestor -)  5 mg PO HS [facility]


   Last Admin: 09/08/18 23:09 Dose:  5 mg


Senna (Senna -)  2 tab PO HS [facility]


   Last Admin: 09/08/18 23:11 Dose:  Not Given


Tamsulosin HCl (Flomax -)  0.8 mg PO DAILY@0830 [facility]











- Objective


Vital Signs: 


 Vital Signs











Temperature  98.0 F   09/09/18 06:00


 


Pulse Rate  71   09/09/18 06:00


 


Respiratory Rate  18   09/09/18 06:00


 


Blood Pressure  121/56   09/09/18 06:00


 


O2 Sat by Pulse Oximetry (%)  100   09/08/18 22:00











Labs: 


 CBC, BMP





 09/09/18 06:10 





 09/09/18 06:10 





 INR, PTT











INR  1.10  (0.83-1.09)  H  09/08/18  11:45

## 2018-09-09 NOTE — PN
Progress Note (short form)





- Note


Progress Note: 





ID consult dictated





83 year old man with multiple antibiotic allergies admitted with worsening 

erythema of the LLE , new bilster on the big toe





no fevers or chills





chronic nausea for which he takes an unknown pill


no vomiting





d/w ED Attending 


continue tygacil








f/u cultures











Problem List





- Problems


(1) Cellulitis


Code(s): L03.90 - CELLULITIS, UNSPECIFIED   





(2) Allergy to multiple antibiotics


Code(s): Z88.1 - ALLERGY STATUS TO OTHER ANTIBIOTIC AGENTS STATUS   





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


Code(s): Z22.39 - CARRIER OF OTHER SPECIFIED BACTERIAL DISEASES

## 2018-09-10 NOTE — PN
Progress Note, Physician


Chief Complaint: 


The patient complains of severe leg pain. He has no SOB at rest while lying 

flat. No chest pain or palpitation. 





History of Present Illness: 


83 year old man with a PMHx of HTN, HLD, chronic atrial fibrillation on 

rivaroxaban (Xarelto), s/p PPM , chronic diastolic CHF, prostate cancer, severe 

arthritis and LE cellulitis admitted with one week of LLE swelling and redness.

  Also with wound to left shin and toe.  No chest pain or sob.  No pnd or 

orthopnea.  No palpitations. Takes furosemide daily and metolazone every other 

day for diastolic CHF.








- Current Medication List


Current Medications: 


Active Medications





Acetaminophen (Tylenol -)  650 mg PO Q6H PRN


   PRN Reason: FEVER


   Last Admin: 09/09/18 03:30 Dose:  650 mg


Alprazolam (Xanax -)  0.25 mg PO Q8H PRN


   PRN Reason: ANXIETY


Bacitracin (Bacitracin -)  1 applic TP DAILY [facility]


   Last Admin: 09/10/18 12:10 Dose:  Not Given


Docusate Sodium (Colace -)  100 mg PO BID PRN


   PRN Reason: CONSTIPATION


Finasteride (Proscar -)  5 mg PO DAILY [facility]


   Last Admin: 09/10/18 09:59 Dose:  5 mg


Furosemide (Lasix Injection -)  40 mg IVPUSH BID@0600,1400 [facility]


   Last Admin: 09/10/18 14:05 Dose:  40 mg


Gabapentin (Neurontin -)  200 mg PO TID [facility]


Tigecycline 50 mg/ Dextrose  100 mls @ 100 mls/hr IVPB BID [facility]; Protocol


   Last Admin: 09/10/18 11:00 Dose:  100 mls/hr


Metoprolol Succinate (Toprol Xl -)  25 mg PO DAILY [facility]


   Last Admin: 09/10/18 09:59 Dose:  25 mg


Ondansetron HCl (Zofran Injection)  4 mg IVPUSH Q8H PRN


   PRN Reason: NAUSEA AND/OR VOMITING


   Last Admin: 09/09/18 17:45 Dose:  4 mg


Oxycodone HCl (Roxicodone -)  5 mg PO Q6H PRN


   PRN Reason: PAIN LEVEL 7 - 10


   Last Admin: 09/10/18 16:13 Dose:  5 mg


Potassium Chloride (K-Dur -)  10 meq PO DAILY [facility]


   Last Admin: 09/10/18 09:58 Dose:  10 meq


Ranitidine HCl (Zantac -)  150 mg PO BID [facility]


   Last Admin: 09/10/18 09:59 Dose:  150 mg


Rivaroxaban (Xarelto -)  20 mg PO DAILY@1800 [facility]


   Last Admin: 09/09/18 17:45 Dose:  20 mg


Rosuvastatin Calcium (Crestor -)  5 mg PO [facility]


   Last Admin: 09/09/18 21:17 Dose:  5 mg


Senna (Senna -)  2 tab PO [facility]


   Last Admin: 09/09/18 21:19 Dose:  Not Given


Tamsulosin HCl (Flomax -)  0.8 mg PO DAILY@0830 [facility]


   Last Admin: 09/10/18 09:30 Dose:  0.8 mg











- Objective


Vital Signs: 


 Vital Signs











Temperature  98.4 F   09/10/18 14:57


 


Pulse Rate  72   09/10/18 14:57


 


Respiratory Rate  16   09/10/18 14:57


 


Blood Pressure  142/82   09/10/18 14:57


 


O2 Sat by Pulse Oximetry (%)  98   09/10/18 10:00








General:  Well developed. Well nourished. No acute distress. 


Head: Normocephalic. Atraumatic, 


Eyes: PERRLA, EOMI. Sclerae anicteric. Conjunctivae clear.


Neck: Supple. No JVD. No bruits. 


Heart: Normal S1, S2: Regularly regular rhythm and rate. No murmur. No gallop 

or rub. 


Lungs: Symmetrical air entry. Clear to auscultation. No crackle. No wheezing or 

rhonchi.  


Abdomen: Soft. Bowel sound positive. Non tender. No masses. 


Extremities: Wrapped. Mild erythma. 1+ edema. No clubbing or cyanosis. 


Neuro: Intact, no focal findings. AAO X3.


Labs: 


 CBC, BMP





 09/10/18 07:00 





 09/10/18 07:00 





 INR, PTT











INR  1.10  (0.83-1.09)  H  09/08/18  11:45    














Assessment/Plan


83 year old man with a PMHx of HTN, HLD, chronic atrial fibrillation on 

rivaroxaban (Xarelto), s/p PPM , chronic diastolic CHF, prostate cancer, severe 

arthritis and LE cellulitis admitted with one week of LLE swelling and redness.

  Also with wound to left shin and toe.  No chest pain or sob.  No pnd or 

orthopnea.  No palpitations. Takes furosemide daily and metolazone every other 

day for diastolic CHF.





EKG: atrial fibrillation with ventricular pacing.


CXR clear lungs


BNP 800s





1) Chronic diastolic CHF: 


-LLE swelling/erythema likely more related to infection opposed to CHF.  BNP 

only mildly elevated 800s.  Unilateral LLE edema which is now resolved, no RLE 

edema, Lungs clear, and no jvd


Would change furosemide back to PO 40mg bid today. 


-Replete K as needed


-BP control 


Abx as per primary team for infection.





2) Chronic atrial fibrillation with ventricular pacing. 


Continue rivaroxaban for stoke prevention. 


May increase Metoprolol succinate to 50 mg daily for better BP control. 





Please call us for reconsult as needed.

## 2018-09-10 NOTE — PN
Progress Note, Physician


Chief Complaint: 





EVENTS NOTED CHART REVIEWED


PATIENT HERE FOR WORSENING CELLULITIS LOWER EXTREMITY LEFT WORSE THAN RIGHT





- Current Medication List


Current Medications: 


Active Medications





Acetaminophen (Tylenol -)  650 mg PO Q6H PRN


   PRN Reason: FEVER


   Last Admin: 09/09/18 03:30 Dose:  650 mg


Alprazolam (Xanax -)  0.25 mg PO Q8H PRN


   PRN Reason: ANXIETY


Bacitracin (Bacitracin -)  1 applic TP DAILY [facility]


   Last Admin: 09/10/18 12:10 Dose:  Not Given


Docusate Sodium (Colace -)  100 mg PO BID PRN


   PRN Reason: CONSTIPATION


Finasteride (Proscar -)  5 mg PO DAILY [facility]


   Last Admin: 09/10/18 09:59 Dose:  5 mg


Furosemide (Lasix Injection -)  40 mg IVPUSH BID@0600,1400 [facility]


   Last Admin: 09/10/18 14:05 Dose:  40 mg


Gabapentin (Neurontin -)  200 mg PO TID [facility]


Tigecycline 50 mg/ Dextrose  100 mls @ 100 mls/hr IVPB BID [facility]; Protocol


   Last Admin: 09/10/18 11:00 Dose:  100 mls/hr


Metoprolol Succinate (Toprol Xl -)  25 mg PO DAILY [facility]


   Last Admin: 09/10/18 09:59 Dose:  25 mg


Ondansetron HCl (Zofran Injection)  4 mg IVPUSH Q8H PRN


   PRN Reason: NAUSEA AND/OR VOMITING


   Last Admin: 09/09/18 17:45 Dose:  4 mg


Oxycodone HCl (Roxicodone -)  5 mg PO Q6H PRN


   PRN Reason: PAIN LEVEL 7 - 10


Potassium Chloride (K-Dur -)  10 meq PO DAILY [facility]


   Last Admin: 09/10/18 09:58 Dose:  10 meq


Ranitidine HCl (Zantac -)  150 mg PO BID [facility]


   Last Admin: 09/10/18 09:59 Dose:  150 mg


Rivaroxaban (Xarelto -)  20 mg PO DAILY@1800 [facility]


   Last Admin: 09/09/18 17:45 Dose:  20 mg


Rosuvastatin Calcium (Crestor -)  5 mg PO HS [facility]


   Last Admin: 09/09/18 21:17 Dose:  5 mg


Senna (Senna -)  2 tab PO [facility]


   Last Admin: 09/09/18 21:19 Dose:  Not Given


Tamsulosin HCl (Flomax -)  0.8 mg PO DAILY@0830 [facility]


   Last Admin: 09/10/18 09:30 Dose:  0.8 mg











- Objective


Vital Signs: 


 Vital Signs











Temperature  98.4 F   09/10/18 14:57


 


Pulse Rate  72   09/10/18 14:57


 


Respiratory Rate  16   09/10/18 14:57


 


Blood Pressure  142/82   09/10/18 14:57


 


O2 Sat by Pulse Oximetry (%)  98   09/10/18 10:00











Constitutional: Yes: Moderate Distress


Eyes: Yes: WNL


HENT: Yes: WNL


Neck: Yes: WNL


Cardiovascular: Yes: Regular Rate and Rhythm


Respiratory: Yes: WNL


Gastrointestinal: Yes: WNL


Genitourinary: Yes: Other (URINAL USAGE)


Musculoskeletal: Yes: Back Pain, Joint Stiffness, Muscle Pain, Muscle Weakness


Extremities: Yes: Erythema


Edema: LLE: 2+, RLE: 2+


Peripheral Pulses WNL: Yes


Integumentary: Yes: Erythema, Pressure Ulcer (STAGE 2 LOWER LEFT FOOT), Skin 

Tear


Wound/Incision: Yes: Open to air, Excoriated, Unapproximated


Neurological: Yes: Loss of Sensation, Pre-Existing Deficit, Unsteady Gait, 

Weakness


...Motor Strength: LLE, RLE


Psychiatric: Yes: WNL


Labs: 


 CBC, BMP





 09/10/18 07:00 





 09/10/18 07:00 





 INR, PTT











INR  1.10  (0.83-1.09)  H  09/08/18  11:45    














Problem List





- Problems


(1) Allergy to multiple antibiotics


Code(s): Z88.1 - ALLERGY STATUS TO OTHER ANTIBIOTIC AGENTS STATUS   





(2) Cellulitis


Code(s): L03.90 - CELLULITIS, UNSPECIFIED   





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


Code(s): Z22.39 - CARRIER OF OTHER SPECIFIED BACTERIAL DISEASES   





(4) Anxiety


Code(s): F41.9 - ANXIETY DISORDER, UNSPECIFIED   





(5) Atrial fibrillation


Code(s): I48.91 - UNSPECIFIED ATRIAL FIBRILLATION   





(6) CHF (congestive heart failure)


Code(s): I50.9 - HEART FAILURE, UNSPECIFIED   


Qualifiers: 


   Heart failure type: unspecified   Heart failure chronicity: unspecified   

Qualified Code(s): I50.9 - Heart failure, unspecified   





(7) Leukocytosis


Code(s): D72.829 - ELEVATED WHITE BLOOD CELL COUNT, UNSPECIFIED   





(8) Lumbar stenosis


Code(s): M48.061 - SPINAL STENOSIS, LUMBAR REGION WITHOUT NEUROGENIC LALA   





(9) PVD (peripheral vascular disease)


Code(s): I73.9 - PERIPHERAL VASCULAR DISEASE, UNSPECIFIED   





(10) Pressure ulcer


Code(s): L89.90 - PRESSURE ULCER OF UNSPECIFIED SITE, UNSPECIFIED STAGE   





(11) Status post fall


Code(s): Z91.81 - HISTORY OF FALLING   





(12) Venous insufficiency


Code(s): I87.2 - VENOUS INSUFFICIENCY (CHRONIC) (PERIPHERAL)   





(13) Hip pain, left


Code(s): M25.552 - PAIN IN LEFT HIP   





(14) Hypertension


Code(s): I10 - ESSENTIAL (PRIMARY) HYPERTENSION   





(15) Lymphedema


Code(s): I89.0 - LYMPHEDEMA, NOT ELSEWHERE CLASSIFIED   





(16) Morbid obesity


Code(s): E66.01 - MORBID (SEVERE) OBESITY DUE TO EXCESS CALORIES   





(17) Osteoarthritis


Code(s): M19.90 - UNSPECIFIED OSTEOARTHRITIS, UNSPECIFIED SITE   





(18) Pacemaker


Code(s): Z95.0 - PRESENCE OF CARDIAC PACEMAKER   





(19) Prostate cancer


Code(s): C61 - MALIGNANT NEOPLASM OF PROSTATE   





Assessment/Plan





ON IV ABX TIGACIL CONTINUE OER ID


H/O ESBL WITH ABX RESISTANCE


PAIN CONTROL OXYCODONE 10MG Q 6HRS PRN


GABAPENTIN FOR NEUROPATHY


H/O SEVERE SPINAL STENOSIS NON-AMBULATORY


WILL AGGRESSIVE WOUND CARE POSSIBLE HYPERBARIC 02 CHAMBER TO ACCELERATE WOUND 

CARE

## 2018-09-10 NOTE — PN
Progress Note (short form)





- Note


Progress Note: 





I





83 year old man with multiple antibiotic allergies admitted with worsening 

erythema of the LLE , new blister on the big toe





 Vital Signs











 Period  Temp  Pulse  Resp  BP Sys/Ross  Pulse Ox


 


 Last 24 Hr  97.8 F-99 F  69-70  20-20  117-157/62-80  98








cor-rrr


lungs clear


abd soft, nt


ext +erythema of the left leg


small pustule open


open blister big toe





 CBC, BMP





 09/10/18 07:00 





 09/10/18 07:00 





 Microbiology





09/08/18 11:45   Blood - Peripheral Venous   Blood Culture - Preliminary


                            NO GROWTH OBTAINED AFTER 24 HOURS, INCUBATION TO 

CONTINUE


                            FOR 4 DAYS.


09/08/18 11:45   Blood - Peripheral Venous   Blood Culture - Preliminary


                            NO GROWTH OBTAINED AFTER 24 HOURS, INCUBATION TO 

CONTINUE


                            FOR 4 DAYS.





a/p


cellulitis


wound culture sent from pustule


continue tygacil


has multiple allergies





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




















Problem List





- Problems


(1) Cellulitis


Code(s): L03.90 - CELLULITIS, UNSPECIFIED   





(2) Allergy to multiple antibiotics


Code(s): Z88.1 - ALLERGY STATUS TO OTHER ANTIBIOTIC AGENTS STATUS   





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


Code(s): Z22.39 - CARRIER OF OTHER SPECIFIED BACTERIAL DISEASES

## 2018-09-10 NOTE — EKG
Test Reason : 

Blood Pressure : ***/*** mmHG

Vent. Rate : 069 BPM     Atrial Rate : 068 BPM

   P-R Int : 000 ms          QRS Dur : 162 ms

    QT Int : 474 ms       P-R-T Axes : 000 -62 110 degrees

   QTc Int : 507 ms

 

Ventricular-paced rhythm

ABNORMAL ECG

WHEN COMPARED WITH ECG OF 03-JUL-2018 07:35,

NO SIGNIFICANT CHANGE WAS FOUND

Confirmed by ARNULFO BAER MD ([id number]) on 9/10/2018 10:44:46 AM

 

Referred By:             Confirmed By:ARNULFO BAER MD

## 2018-09-11 NOTE — PN
Progress Note, Physician


Chief Complaint: 





AWAKE ALERT


FEELING BETTER C/O COUGH NON-PRODUCTIVE


NO FEVER OR CHILLS





- Current Medication List


Current Medications: 


Active Medications





Acetaminophen (Tylenol -)  650 mg PO Q6H PRN


   PRN Reason: FEVER


   Last Admin: 09/11/18 06:08 Dose:  650 mg


Alprazolam (Xanax -)  0.25 mg PO Q8H PRN


   PRN Reason: ANXIETY


   Last Admin: 09/10/18 21:55 Dose:  0.25 mg


Amino Acids (Prosource No Carb Liquid Pkt)  30 ml PO BID@0800,1730 [facility]


   Last Admin: 09/11/18 08:37 Dose:  30 ml


Bacitracin (Bacitracin -)  1 applic TP DAILY [facility]


   Last Admin: 09/10/18 12:10 Dose:  Not Given


Collagenase (Santyl -)  1 applic TP DAILY [facility]; Protocol


Docusate Sodium (Colace -)  100 mg PO BID PRN


   PRN Reason: CONSTIPATION


Finasteride (Proscar -)  5 mg PO DAILY [facility]


   Last Admin: 09/11/18 10:45 Dose:  5 mg


Furosemide (Lasix Injection -)  40 mg IVPUSH BID@0600,1400 [facility]


   Last Admin: 09/11/18 14:22 Dose:  40 mg


Gabapentin (Neurontin -)  200 mg PO TID [facility]


   Last Admin: 09/11/18 14:22 Dose:  200 mg


Tigecycline 50 mg/ Dextrose  100 mls @ 100 mls/hr IVPB BID [facility]; Protocol


   Last Admin: 09/11/18 10:44 Dose:  100 mls/hr


Metoprolol Succinate (Toprol Xl -)  25 mg PO DAILY [facility]


   Last Admin: 09/11/18 10:45 Dose:  25 mg


Multivitamins/Minerals/Vitamin C (Tab-A-Vit -)  1 tab PO DAILY [facility]


   Last Admin: 09/11/18 10:45 Dose:  1 tab


Ondansetron HCl (Zofran Injection)  4 mg IVPUSH Q8H PRN


   PRN Reason: NAUSEA AND/OR VOMITING


   Last Admin: 09/09/18 17:45 Dose:  4 mg


Oxycodone HCl (Roxicodone -)  5 mg PO Q6H PRN


   PRN Reason: PAIN LEVEL 7 - 10


   Last Admin: 09/11/18 06:10 Dose:  5 mg


Potassium Chloride (K-Dur -)  10 meq PO DAILY [facility]


   Last Admin: 09/11/18 10:45 Dose:  10 meq


Ranitidine HCl (Zantac -)  150 mg PO BID [facility]


   Last Admin: 09/11/18 10:45 Dose:  150 mg


Rivaroxaban (Xarelto -)  20 mg PO DAILY@1800 [facility]


   Last Admin: 09/10/18 17:42 Dose:  20 mg


Rosuvastatin Calcium (Crestor -)  5 mg PO [facility]


   Last Admin: 09/10/18 21:54 Dose:  5 mg


Senna (Senna -)  2 tab PO [facility]


   Last Admin: 09/10/18 21:52 Dose:  Not Given


Tamsulosin HCl (Flomax -)  0.8 mg PO DAILY@0830 [facility]


   Last Admin: 09/11/18 08:36 Dose:  0.8 mg


Zinc Sulfate (Orazinc -)  220 mg PO DAILY [facility]


   Last Admin: 09/11/18 10:45 Dose:  220 mg











- Objective


Vital Signs: 


 Vital Signs











Temperature  98.1 F   09/11/18 09:00


 


Pulse Rate  78   09/11/18 09:00


 


Respiratory Rate  20   09/11/18 09:00


 


Blood Pressure  130/70   09/11/18 09:00


 


O2 Sat by Pulse Oximetry (%)  98   09/10/18 21:00











Constitutional: Yes: Mild Distress


Eyes: Yes: WNL


HENT: Yes: WNL


Neck: Yes: WNL


Cardiovascular: Yes: WNL


Respiratory: Yes: Cough, On Nasal O2


Gastrointestinal: Yes: WNL


Genitourinary: Yes: WNL


Musculoskeletal: Yes: Back Pain, Muscle Weakness


Extremities: Yes: Deformity, Erythema


Integumentary: Yes: Erythema, Pressure Ulcer, Skin Tear


Wound/Incision: Yes: Open to air


Neurological: Yes: Pre-Existing Deficit, Unsteady Gait, Weakness


...Motor Strength: LLE, RLE


Labs: 


 CBC, BMP





 09/10/18 07:00 





 09/10/18 07:00 





 INR, PTT











INR  1.10  (0.83-1.09)  H  09/08/18  11:45    














Problem List





- Problems


(1) Allergy to multiple antibiotics


Code(s): Z88.1 - ALLERGY STATUS TO OTHER ANTIBIOTIC AGENTS STATUS   





(2) Cellulitis


Code(s): L03.90 - CELLULITIS, UNSPECIFIED   





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


Code(s): Z22.39 - CARRIER OF OTHER SPECIFIED BACTERIAL DISEASES   





(4) Anxiety


Code(s): F41.9 - ANXIETY DISORDER, UNSPECIFIED   





(5) Atrial fibrillation


Code(s): I48.91 - UNSPECIFIED ATRIAL FIBRILLATION   





(6) CHF (congestive heart failure)


Code(s): I50.9 - HEART FAILURE, UNSPECIFIED   


Qualifiers: 


   Heart failure type: unspecified   Heart failure chronicity: unspecified   

Qualified Code(s): I50.9 - Heart failure, unspecified   





(7) Leukocytosis


Code(s): D72.829 - ELEVATED WHITE BLOOD CELL COUNT, UNSPECIFIED   





(8) Lumbar stenosis


Code(s): M48.061 - SPINAL STENOSIS, LUMBAR REGION WITHOUT NEUROGENIC LALA   





(9) PVD (peripheral vascular disease)


Code(s): I73.9 - PERIPHERAL VASCULAR DISEASE, UNSPECIFIED   





(10) Pressure ulcer


Code(s): L89.90 - PRESSURE ULCER OF UNSPECIFIED SITE, UNSPECIFIED STAGE   





(11) Status post fall


Code(s): Z91.81 - HISTORY OF FALLING   





(12) Venous insufficiency


Code(s): I87.2 - VENOUS INSUFFICIENCY (CHRONIC) (PERIPHERAL)   





(13) Hip pain, left


Code(s): M25.552 - PAIN IN LEFT HIP   





(14) Hypertension


Code(s): I10 - ESSENTIAL (PRIMARY) HYPERTENSION   





(15) Lymphedema


Code(s): I89.0 - LYMPHEDEMA, NOT ELSEWHERE CLASSIFIED   





(16) Morbid obesity


Code(s): E66.01 - MORBID (SEVERE) OBESITY DUE TO EXCESS CALORIES   





(17) Osteoarthritis


Code(s): M19.90 - UNSPECIFIED OSTEOARTHRITIS, UNSPECIFIED SITE   





(18) Pacemaker


Code(s): Z95.0 - PRESENCE OF CARDIAC PACEMAKER   





(19) Prostate cancer


Code(s): C61 - MALIGNANT NEOPLASM OF PROSTATE   





Assessment/Plan





SONO ABD TOTAL BILI ELEVATED


CHECK LABS IN AM


CXR FOR COUGH


NEBS TID


WOUND CARE APPRECIATED


PODIATRY F/U


IV ABX


PT EVAL

## 2018-09-12 NOTE — PN
Progress Note (short form)





- Note


Progress Note: 





I





83 year old man with multiple antibiotic allergies admitted with worsening 

erythema of the LLE , new blister on the big toe


no complaints


walked today





  Vital Signs











 Period  Temp  Pulse  Resp  BP Sys/Ross  Pulse Ox


 


 Last 24 Hr  97.3 F-98.6 F  69-80  16-20  100-129/59-89  98-98








cor-rrr


lungs clear


abd soft,nt


ext erythema much improved





 CBC, BMP





 09/12/18 06:30 





 09/12/18 06:30 





 Microbiology





09/10/18 11:30   Leg - Left Lower   Gram Stain - Final


09/10/18 11:30   Leg - Left Lower   Wound Culture - Preliminary


                            Mr S Aureus


                            Non Lactose Fermenting Gnb


09/08/18 11:45   Blood - Peripheral Venous   Blood Culture - Preliminary


                            NO GROWTH OBTAINED AFTER 96 HOURS, INCUBATION TO 

CONTINUE


                            FOR 1 DAYS.


09/08/18 11:45   Blood - Peripheral Venous   Blood Culture - Preliminary


                            NO GROWTH OBTAINED AFTER 96 HOURS, INCUBATION TO 

CONTINUE


                            FOR 1 DAYS.





a


cellulitis


much improved


f/u culture


continue sonny


has multiple allergies





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




















Problem List





- Problems


(1) Cellulitis


Code(s): L03.90 - CELLULITIS, UNSPECIFIED   


Qualifiers: 


   Laterality: left 





(2) Allergy to multiple antibiotics


Code(s): Z88.1 - ALLERGY STATUS TO OTHER ANTIBIOTIC AGENTS STATUS   





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


Code(s): Z22.39 - CARRIER OF OTHER SPECIFIED BACTERIAL DISEASES

## 2018-09-12 NOTE — PN
Progress Note, Physician


Chief Complaint: 





AWAKE ALERT


FEELING BETTER


REFUSED ABDOMINAL SONO TO EVALUATE TOTAL BILI ELEVATION





- Current Medication List


Current Medications: 


Active Medications





Acetaminophen (Tylenol -)  650 mg PO Q6H PRN


   PRN Reason: FEVER


   Last Admin: 09/11/18 15:37 Dose:  650 mg


Albuterol/Ipratropium (Duoneb -)  1 amp NEB Q6H PRN


   PRN Reason: SHORTNESS OF BREATH


Alprazolam (Xanax -)  0.25 mg PO Q8H PRN


   PRN Reason: ANXIETY


   Last Admin: 09/10/18 21:55 Dose:  0.25 mg


Amino Acids (Prosource No Carb Liquid Pkt)  30 ml PO BID@0800,1730 [facility]


   Last Admin: 09/11/18 17:50 Dose:  30 ml


Bacitracin (Bacitracin -)  1 applic TP DAILY [facility]


   Last Admin: 09/11/18 15:38 Dose:  Not Given


Benzocaine/Menthol (Cepacol Lozenge -)  1 each MM Q2H PRN


   PRN Reason: SORE THROAT


Collagenase (Santyl -)  1 applic TP DAILY [facility]; Protocol


   Last Admin: 09/11/18 15:38 Dose:  1 applic


Docusate Sodium (Colace -)  100 mg PO BID PRN


   PRN Reason: CONSTIPATION


   Last Admin: 09/12/18 05:21 Dose:  100 mg


Finasteride (Proscar -)  5 mg PO DAILY [facility]


   Last Admin: 09/11/18 10:45 Dose:  5 mg


Furosemide (Lasix Injection -)  40 mg IVPUSH BID@0600,1400 [facility]


   Last Admin: 09/12/18 05:14 Dose:  40 mg


Gabapentin (Neurontin -)  200 mg PO TID [facility]


   Last Admin: 09/12/18 05:14 Dose:  200 mg


Guaifenesin/Codeine Phosphate (Robitussin Ac -)  5 ml PO Q8H PRN


   PRN Reason: COUGH


Tigecycline 50 mg/ Dextrose  100 mls @ 100 mls/hr IVPB BID [facility]; Protocol


   Last Admin: 09/11/18 21:40 Dose:  100 mls/hr


Metoprolol Succinate (Toprol Xl -)  25 mg PO DAILY [facility]


   Last Admin: 09/11/18 10:45 Dose:  25 mg


Multivitamins/Minerals/Vitamin C (Tab-A-Vit -)  1 tab PO DAILY [facility]


   Last Admin: 09/11/18 10:45 Dose:  1 tab


Ondansetron HCl (Zofran Injection)  4 mg IVPUSH Q8H PRN


   PRN Reason: NAUSEA AND/OR VOMITING


   Last Admin: 09/11/18 15:59 Dose:  4 mg


Oxycodone HCl (Roxicodone -)  5 mg PO Q6H PRN


   PRN Reason: PAIN LEVEL 7 - 10


   Last Admin: 09/12/18 04:43 Dose:  5 mg


Potassium Chloride (K-Dur -)  10 meq PO DAILY [facility]


   Last Admin: 09/11/18 10:45 Dose:  10 meq


Ranitidine HCl (Zantac -)  150 mg PO BID [facility]


   Last Admin: 09/11/18 21:40 Dose:  150 mg


Rivaroxaban (Xarelto -)  20 mg PO DAILY@1800 [facility]


   Last Admin: 09/11/18 17:50 Dose:  20 mg


Rosuvastatin Calcium (Crestor -)  5 mg PO [facility]


   Last Admin: 09/11/18 21:42 Dose:  5 mg


Senna (Senna -)  2 tab PO [facility]


   Last Admin: 09/11/18 21:45 Dose:  Not Given


Tamsulosin HCl (Flomax -)  0.8 mg PO DAILY@0830 [facility]


   Last Admin: 09/11/18 08:36 Dose:  0.8 mg


Zinc Sulfate (Orazinc -)  220 mg PO DAILY [facility]


   Last Admin: 09/11/18 10:45 Dose:  220 mg











- Objective


Vital Signs: 


 Vital Signs











Temperature  98.6 F   09/12/18 06:28


 


Pulse Rate  71   09/12/18 06:28


 


Respiratory Rate  20   09/12/18 06:28


 


Blood Pressure  129/89   09/12/18 06:28


 


O2 Sat by Pulse Oximetry (%)  98   09/11/18 21:00











Constitutional: Yes: Mild Distress


Eyes: Yes: WNL


HENT: Yes: WNL


Neck: Yes: WNL


Cardiovascular: Yes: WNL


Respiratory: Yes: WNL


Gastrointestinal: Yes: WNL


Genitourinary: Yes: WNL


Musculoskeletal: Yes: Back Pain, Muscle Weakness


Extremities: Yes: WNL


Edema: Yes


Peripheral Pulses WNL: Yes


Integumentary: Yes: Pressure Ulcer, Venous Stasis Changes


Wound/Incision: Yes: Open to air, Reddened, Unapproximated


...Motor Strength: LLE, RLE


Psychiatric: Yes: WNL


Labs: 


 CBC, BMP





 09/12/18 06:30 





 09/12/18 06:30 





 INR, PTT











INR  1.10  (0.83-1.09)  H  09/08/18  11:45    














Problem List





- Problems


(1) Allergy to multiple antibiotics


Code(s): Z88.1 - ALLERGY STATUS TO OTHER ANTIBIOTIC AGENTS STATUS   





(2) Cellulitis


Code(s): L03.90 - CELLULITIS, UNSPECIFIED   


Qualifiers: 


   Laterality: left 





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


Code(s): Z22.39 - CARRIER OF OTHER SPECIFIED BACTERIAL DISEASES   





(4) Anxiety


Code(s): F41.9 - ANXIETY DISORDER, UNSPECIFIED   





(5) Atrial fibrillation


Code(s): I48.91 - UNSPECIFIED ATRIAL FIBRILLATION   





(6) CHF (congestive heart failure)


Code(s): I50.9 - HEART FAILURE, UNSPECIFIED   


Qualifiers: 


   Heart failure type: unspecified   Heart failure chronicity: unspecified   

Qualified Code(s): I50.9 - Heart failure, unspecified   





(7) Leukocytosis


Code(s): D72.829 - ELEVATED WHITE BLOOD CELL COUNT, UNSPECIFIED   





(8) Lumbar stenosis


Code(s): M48.061 - SPINAL STENOSIS, LUMBAR REGION WITHOUT NEUROGENIC LALA   





(9) PVD (peripheral vascular disease)


Code(s): I73.9 - PERIPHERAL VASCULAR DISEASE, UNSPECIFIED   





(10) Pressure ulcer


Code(s): L89.90 - PRESSURE ULCER OF UNSPECIFIED SITE, UNSPECIFIED STAGE   





(11) Status post fall


Code(s): Z91.81 - HISTORY OF FALLING   





(12) Venous insufficiency


Code(s): I87.2 - VENOUS INSUFFICIENCY (CHRONIC) (PERIPHERAL)   





(13) Hip pain, left


Code(s): M25.552 - PAIN IN LEFT HIP   





(14) Hypertension


Code(s): I10 - ESSENTIAL (PRIMARY) HYPERTENSION   





(15) Lymphedema


Code(s): I89.0 - LYMPHEDEMA, NOT ELSEWHERE CLASSIFIED   





(16) Morbid obesity


Code(s): E66.01 - MORBID (SEVERE) OBESITY DUE TO EXCESS CALORIES   





(17) Osteoarthritis


Code(s): M19.90 - UNSPECIFIED OSTEOARTHRITIS, UNSPECIFIED SITE   





(18) Pacemaker


Code(s): Z95.0 - PRESENCE OF CARDIAC PACEMAKER   





(19) Prostate cancer


Code(s): C61 - MALIGNANT NEOPLASM OF PROSTATE   





Assessment/Plan





REFUSED SONO ABDOMEN TO EVALUATE TOTAL BILI ELEVATION


HOWEVER TODAY LEVEL IS NORMAL DOWN FROM 1.3 TO 0.6


IV ABX PER ID


WOUND CARE


PT EVAL


OOB TO CHAIR

## 2018-09-12 NOTE — PN
Progress Note (short form)





- Note


Progress Note: 


fuv left foot wound.  





+grade 1-2 wound left hallux.  xray reviewed, not read by radiology yet





grade 1-2 wound





continue santyl dressing changes.  vascular seeing for edema b/l legs.  will 

follow.  awaiting xray result.  








Problem List





- Problems


(1) Wound, open, toe


Code(s): S91.109A - UNSP OPEN WOUND OF UNSP TOE(S) W/O DAMAGE TO NAIL, INIT   


Qualifiers: 


   Qualified Code(s): S91.109A - Unspecified open wound of unspecified toe(s) 

without damage to nail, initial encounter

## 2018-09-12 NOTE — CONSULT
- Consultation


REQUESTING PROVIDER: Dr. Keith





CONSULT REQUEST: We have been asked to surgically evaluate this patient for 

left great toe wound.





PCP:Genaro Fabian





HISTORY OF PRESENT ILLNESS:  


83-year-old male who presents today for LLE swelling, redness, and pain. He had 

a blister on his left big toe which broke and became an open wound. No foul 

drainage noted or fevers. He states that overall the swelling has resolved.





PMHx:     AFIB, CHF, HTN, Renal Inusuff, BPH, Renal Calculi


     


Social HX: former smoker





PSHx: pacemaker, bilateral knee replacement       


 Home Medications











 Medication  Instructions  Recorded


 


Tamsulosin HCl [Flomax] 0.8 mg PO DAILY 03/11/17


 


Sennosides [Senna -] 2 tab PO HS  tablet 03/15/17


 


Furosemide [Lasix -] 40 mg PO BID #0 tab 06/06/17


 


Acetaminophen [Tylenol .Regular 650 mg PO Q6H PRN  tablet 12/23/17





Strength -]  


 


Clotrimazole [Antifungal] 30 gm TP DAILY 03/24/18


 


Alprazolam [Xanax] 0.25 mg PO Q8H PRN  tablet MDD 3 07/11/18


 


Finasteride [Proscar -] 5 mg PO DAILY #30 tablet 07/11/18


 


Rivaroxaban [Xarelto -] 20 mg PO DAILY@1800 #30 tablet 07/11/18


 


Rosuvastatin [Crestor -] 5 mg PO HS #30 tablet 07/11/18


 


Cholecalciferol (Vitamin D3) 0 unit PO DAILY 09/08/18





[Vitamin D3]  


 


Metolazone 2.5 mg PO Q48H 09/08/18


 


Metoprolol Tartrate [Lopressor -] 0 mg PO DAILY 09/08/18


 


Ranitidine HCl 150 mg PO BID 09/09/18








 Allergies











Allergy/AdvReac Type Severity Reaction Status Date / Time


 


cephalexin monohydrate Allergy Mild Hives Verified 07/03/18 07:07





[From Keflex]     


 


ertapenem Allergy   Verified 07/03/18 07:07








REVIEW OF SYSTEMS:


CONSTITUTIONAL: 


Absent:  fever, chill


CARDIOVASCULAR: 


Absent: chest pain, palpitations


RESPIRATORY: 


Absent: cough, shortness of breath


GASTROINTESTINAL:


Absent: abdominal pain, abdominal distension Present: nausea with eating, no 

vomiting. Colonscopy 3 years ago-ok


GENITOURINARY: 


Absent: dysuria,  hematuria,  Present: frequency


MUSCULOSKELETAL: 


Present: leg pain(left)


HEMATOLOGIC/IMMUNOLOGIC: 


Absent: easy bleeding, easy bruising


NEUROLOGIC: 


Absent: headache,  paresthesias








PHYSICAL EXAM:


GENERAL: Awake, alert, and fully oriented, in no acute distress.


HEAD: Normal with no signs of trauma.


EYES: sclera anicteric, conjunctiva clear.


NECK: Normal ROM,  JVD, or masses.


LUNGS: Clear to auscultation bilat anteriorly. No wheezes, and no crackles. 


HEART: Regular rate and rhythm. No murmurs


ABDOMEN: Soft, nontender, not distended, normoactive bowel sounds, no guarding, 

no rebound, no masses.  


LOWER EXTREMITIES: 2+ DP/PT pulses with doppler warm to touch, well-perfused. 

Resolving edema.Left great toe with clean superficial wound, approx 2x2cm.


NEUROLOGICAL: Normal speech, gait not observed.


PSYCH: Cooperative. Good eye contact. Appropriate mood and affect.





 Vital Signs











Temperature  98.6 F   09/12/18 06:28


 


Pulse Rate  71   09/12/18 06:28


 


Respiratory Rate  20   09/12/18 06:28


 


Blood Pressure  129/89   09/12/18 06:28


 


O2 Sat by Pulse Oximetry (%)  98   09/11/18 21:00








 Lab Results











WBC  6.9 K/mm3 (4.0-10.0)   09/12/18  06:30    


 


RBC  5.05 M/mm3 (4.00-5.60)   09/12/18  06:30    


 


Hgb  14.7 GM/dL (11.7-16.9)   09/12/18  06:30    


 


Hct  44.0 % (35.4-49)   09/12/18  06:30    


 


MCV  87.2 fl (80-96)   09/12/18  06:30    


 


MCHC  33.4 g/dl (32.0-35.9)   09/12/18  06:30    


 


RDW  14.7 % (11.9-15.9)   09/12/18  06:30    


 


Plt Count  246 K/MM3 (134-434)   09/12/18  06:30    


 


Sodium  138 mmol/L (136-145)   09/12/18  06:30    


 


Potassium  3.6 mmol/L (3.5-5.1)   09/12/18  06:30    


 


Chloride  96 mmol/L ()  L  09/12/18  06:30    


 


Carbon Dioxide  35 mmol/L (21-32)  H  09/12/18  06:30    


 


Anion Gap  7 MMOL/L (8-16)  L  09/12/18  06:30    


 


BUN  41 mg/dL (7-18)  H  09/12/18  06:30    


 


Creatinine  1.2 mg/dL (0.7-1.3)   09/12/18  06:30    


 


Random Glucose  100 mg/dL ()   09/12/18  06:30    


 


Calcium  8.4 mg/dL (8.5-10.1)  L  09/12/18  06:30    


 


INR  1.10  (0.83-1.09)  H  09/08/18  11:45    








-9/8 no evidence of DVT to Left lower





Problem List





- Problems


(1) Cellulitis


Assessment/Plan: 


Left cellulitis/edema resolving.


Left great toe with superficial skin wound, santyl being applied to the wound 

bed


No acute surgical issues, recommend the patient to follow-up with Dr. Keith in 

the wound clinic


Code(s): L03.90 - CELLULITIS, UNSPECIFIED   


Qualifiers: 


   Laterality: left

## 2018-09-13 NOTE — PN
Progress Note, Physician


Chief Complaint: 





AWAKE ALERT


RETURNED FROM SONO/ULTRASOUND ABD


AWAIT RESULTS


NO FEVER NO CHILLS





- Current Medication List


Current Medications: 


Active Medications





Acetaminophen (Tylenol -)  650 mg PO Q6H PRN


   PRN Reason: FEVER


   Last Admin: 09/12/18 23:20 Dose:  650 mg


Albuterol/Ipratropium (Duoneb -)  1 amp NEB Q6H PRN


   PRN Reason: SHORTNESS OF BREATH


Alprazolam (Xanax -)  0.25 mg PO Q8H PRN


   PRN Reason: ANXIETY


   Last Admin: 09/12/18 23:22 Dose:  0.25 mg


Amino Acids (Prosource No Carb Liquid Pkt)  30 ml PO BID@0800,1730 [facility]


   Last Admin: 09/12/18 18:16 Dose:  30 ml


Bacitracin (Bacitracin -)  1 applic TP DAILY [facility]


   Last Admin: 09/12/18 09:50 Dose:  1 applic


Benzocaine/Menthol (Cepacol Lozenge -)  1 each MM Q2H PRN


   PRN Reason: SORE THROAT


Collagenase (Santyl -)  1 applic TP DAILY [facility]; Protocol


   Last Admin: 09/12/18 09:59 Dose:  1 applic


Docusate Sodium (Colace -)  100 mg PO BID PRN


   PRN Reason: CONSTIPATION


   Last Admin: 09/12/18 05:21 Dose:  100 mg


Finasteride (Proscar -)  5 mg PO DAILY [facility]


   Last Admin: 09/12/18 09:51 Dose:  5 mg


Furosemide (Lasix Injection -)  40 mg IVPUSH BID@0600,1400 [facility]


   Last Admin: 09/13/18 06:22 Dose:  40 mg


Gabapentin (Neurontin -)  200 mg PO TID [facility]


   Last Admin: 09/13/18 06:22 Dose:  200 mg


Guaifenesin/Codeine Phosphate (Robitussin Ac -)  5 ml PO Q8H PRN


   PRN Reason: COUGH


Tigecycline 50 mg/ Dextrose  100 mls @ 100 mls/hr IVPB BID [facility]; Protocol


   Last Admin: 09/12/18 23:25 Dose:  100 mls/hr


Metoprolol Succinate (Toprol Xl -)  25 mg PO DAILY [facility]


   Last Admin: 09/12/18 09:51 Dose:  25 mg


Multivitamins/Minerals/Vitamin C (Tab-A-Vit -)  1 tab PO DAILY [facility]


   Last Admin: 09/12/18 09:51 Dose:  1 tab


Ondansetron HCl (Zofran Injection)  4 mg IVPUSH Q8H PRN


   PRN Reason: NAUSEA AND/OR VOMITING


   Last Admin: 09/11/18 15:59 Dose:  4 mg


Oxycodone HCl (Roxicodone -)  5 mg PO Q6H PRN


   PRN Reason: PAIN LEVEL 7 - 10


   Last Admin: 09/12/18 23:21 Dose:  5 mg


Potassium Chloride (K-Dur -)  10 meq PO DAILY [facility]


   Last Admin: 09/12/18 09:50 Dose:  10 meq


Ranitidine HCl (Zantac -)  150 mg PO BID [facility]


   Last Admin: 09/12/18 23:20 Dose:  150 mg


Rivaroxaban (Xarelto -)  20 mg PO DAILY@1800 [facility]


   Last Admin: 09/12/18 18:16 Dose:  20 mg


Rosuvastatin Calcium (Crestor -)  5 mg PO [facility]


   Last Admin: 09/12/18 23:25 Dose:  5 mg


Senna (Senna -)  2 tab PO [facility]


   Last Admin: 09/12/18 23:19 Dose:  2 tab


Tamsulosin HCl (Flomax -)  0.8 mg PO DAILY@0830 [facility]


   Last Admin: 09/12/18 09:50 Dose:  0.8 mg


Zinc Sulfate (Orazinc -)  220 mg PO DAILY [facility]


   Last Admin: 09/12/18 09:50 Dose:  220 mg











- Objective


Vital Signs: 


 Vital Signs











Temperature  97.8 F   09/13/18 06:00


 


Pulse Rate  69   09/13/18 06:00


 


Respiratory Rate  20   09/13/18 06:00


 


Blood Pressure  124/63   09/13/18 06:00


 


O2 Sat by Pulse Oximetry (%)  98   09/12/18 21:00











Constitutional: Yes: No Distress


Eyes: Yes: WNL


HENT: Yes: WNL


Neck: Yes: WNL


Cardiovascular: Yes: Pulse Irregular


Respiratory: Yes: WNL


Gastrointestinal: Yes: WNL


Genitourinary: Yes: WNL


Musculoskeletal: Yes: Muscle Pain, Muscle Weakness


Extremities: Yes: Deformity


Edema: Yes


Edema: LLE: 2+, RLE: 2+


Peripheral Pulses WNL: Yes


Integumentary: Yes: Pressure Ulcer


Wound/Incision: Yes: Open to air, Reddened, Excoriated


Neurological: Yes: Pre-Existing Deficit


...Motor Strength: LLE, RLE


Psychiatric: Yes: WNL


Labs: 


 CBC, BMP





 09/12/18 06:30 





 09/12/18 06:30 





 INR, PTT











INR  1.10  (0.83-1.09)  H  09/08/18  11:45    














Problem List





- Problems


(1) Allergy to multiple antibiotics


Code(s): Z88.1 - ALLERGY STATUS TO OTHER ANTIBIOTIC AGENTS STATUS   





(2) Cellulitis


Code(s): L03.90 - CELLULITIS, UNSPECIFIED   


Qualifiers: 


   Laterality: left 





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


Code(s): Z22.39 - CARRIER OF OTHER SPECIFIED BACTERIAL DISEASES   





(4) Anxiety


Code(s): F41.9 - ANXIETY DISORDER, UNSPECIFIED   





(5) Atrial fibrillation


Code(s): I48.91 - UNSPECIFIED ATRIAL FIBRILLATION   





(6) CHF (congestive heart failure)


Code(s): I50.9 - HEART FAILURE, UNSPECIFIED   


Qualifiers: 


   Heart failure type: unspecified   Heart failure chronicity: unspecified   

Qualified Code(s): I50.9 - Heart failure, unspecified   





(7) Leukocytosis


Code(s): D72.829 - ELEVATED WHITE BLOOD CELL COUNT, UNSPECIFIED   





(8) Lumbar stenosis


Code(s): M48.061 - SPINAL STENOSIS, LUMBAR REGION WITHOUT NEUROGENIC LALA   





(9) PVD (peripheral vascular disease)


Code(s): I73.9 - PERIPHERAL VASCULAR DISEASE, UNSPECIFIED   





(10) Pressure ulcer


Code(s): L89.90 - PRESSURE ULCER OF UNSPECIFIED SITE, UNSPECIFIED STAGE   





(11) Status post fall


Code(s): Z91.81 - HISTORY OF FALLING   





(12) Venous insufficiency


Code(s): I87.2 - VENOUS INSUFFICIENCY (CHRONIC) (PERIPHERAL)   





(13) Hip pain, left


Code(s): M25.552 - PAIN IN LEFT HIP   





(14) Hypertension


Code(s): I10 - ESSENTIAL (PRIMARY) HYPERTENSION   





(15) Lymphedema


Code(s): I89.0 - LYMPHEDEMA, NOT ELSEWHERE CLASSIFIED   





(16) Morbid obesity


Code(s): E66.01 - MORBID (SEVERE) OBESITY DUE TO EXCESS CALORIES   





(17) Osteoarthritis


Code(s): M19.90 - UNSPECIFIED OSTEOARTHRITIS, UNSPECIFIED SITE   





(18) Pacemaker


Code(s): Z95.0 - PRESENCE OF CARDIAC PACEMAKER   





(19) Prostate cancer


Code(s): C61 - MALIGNANT NEOPLASM OF PROSTATE   





Assessment/Plan





IV ABX PER ID


OOB TO CHAIR WITH PT


ON AC


WILL NEED SNF


PODIATRY/VASC SX FOLLOW UP APPRECIATED

## 2018-09-13 NOTE — OP
Operative Note





- Note:


Operative Date: 09/13/18


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


Operation: amputation left big toe


Findings: 


gangarene





Post-Operative Diagnosis: Same as Pre-op


Surgeon: Lisa Hinton


Assistant: Ranulfo David


Anesthesia: Local, MAC


Estimated Blood Loss (mls): 25


Instrument used (Debridements only): double action bone cutter


Operative Report Dictated: Yes

## 2018-09-14 NOTE — DS
Physical Examination


Vital Signs: 


 Vital Signs











Temperature  98.2 F   09/14/18 10:06


 


Pulse Rate  70   09/14/18 10:06


 


Respiratory Rate  18   09/14/18 10:06


 


Blood Pressure  135/59   09/14/18 10:06


 


O2 Sat by Pulse Oximetry (%)  98   09/13/18 21:00








AWAKE ALERT


Findings/Remarks: 





FEELING BETTER


READY FOR DISCHARGE


Constitutional: Yes: Mild Distress


Eyes: Yes: WNL, Occular Prosthesis


Neck: Yes: WNL


Cardiovascular: Yes: Pulse Irregular


Respiratory: Yes: WNL


Gastrointestinal: Yes: WNL


Renal/: Yes: WNL


Musculoskeletal: Yes: Joint Swelling, Muscle Weakness


Extremities: Yes: Other


Edema: Yes


Edema: LLE: 1+, RLE: 1+


Peripheral Pulses WNL: Yes


Integumentary: Yes: Erythema, Pressure Ulcer


Wound/Incision: Yes: Reddened


Neurological: Yes: Pre-Existing Deficit


...Motor Strength: LLE, RLE


Psychiatric: Yes: WNL


Labs: 


 CBC, BMP





 09/14/18 10:00 





 09/14/18 10:00 











Discharge Summary


Reason For Visit: CELLULITIS


Current Active Problems





Allergy to multiple antibiotics (Acute)


Cellulitis (Acute)


ESBL E. coli carrier (Acute)


Wound, open, toe (Acute)








Procedures: Principal: CT SCAN/DOPPLER


Hospital Course: 





ADMITTED CELLULITIS OF LEGS TREATED WITH IV ABX AND MONITORED WITH WOUND CARE 

OF TOES


Condition: Guarded





- Instructions


Diet, Activity, Other Instructions: 


SEE YOUR DOCTOR IN 3-5 DAYS FOR LABS


AND WOUND CARE


LOW SALT





Referrals: 


Dionicio Keith MD [Non Staff, Medical] - 


Maikol Allen MD [Primary Care Provider] - 


Disposition: VNS/HOME HEALTH CARE





- Home Medications


Comprehensive Discharge Medication List: 


Ambulatory Orders





Tamsulosin HCl [Flomax] 0.8 mg PO DAILY 03/11/17 


Sennosides [Senna -] 2 tab PO HS  tablet 03/15/17 


Furosemide [Lasix -] 40 mg PO BID #0 tab 06/06/17 


Acetaminophen [Tylenol .Regular Strength -] 650 mg PO Q6H PRN  tablet 12/23/17 


Clotrimazole [Antifungal] 30 gm TP DAILY 03/24/18 


Alprazolam [Xanax] 0.25 mg PO Q8H PRN  tablet MDD 3 07/11/18 


Finasteride [Proscar -] 5 mg PO DAILY #30 tablet 07/11/18 


Rivaroxaban [Xarelto -] 20 mg PO DAILY@1800 #30 tablet 07/11/18 


Rosuvastatin [Crestor -] 5 mg PO HS #30 tablet 07/11/18 


Cholecalciferol (Vitamin D3) [Vitamin D3] 0 unit PO DAILY 09/08/18 


Metolazone 2.5 mg PO Q48H 09/08/18 


Ranitidine HCl 150 mg PO BID 09/09/18 


Bacitracin - [Bacitracin Topical Ointment -] 1 applic TP DAILY #90 tube 09/14/ 18 


Collagenase Clostridium Hist. [Santyl -] 1 applic TP DAILY #90 tube 09/14/18 


Docusate Sodium [Colace -] 100 mg PO BID PRN #60 capsule 09/14/18 


Doxycycline Hyclate [Vibramycin -] 100 mg PO BID@1000,1800 10 Days #20 capsule 

09/14/18 


Furosemide [Lasix -] 40 mg PO BID@0600,1400 #60 tablet 09/14/18 


Gabapentin [Neurontin -] 200 mg PO TID #90 capsule 09/14/18 


Magnesium Hydrox 2400MG/30Ml [Milk of Magnesia -] 30 ml PO Q8H PRN  cup 09/14/ 18 


Metolazone [Zaroxolyn -] 2.5 mg PO Q2D@0530  tablet 09/14/18 


Metoprolol Succinate [Toprol XL -] 25 mg PO DAILY  tab.sr.24h 09/14/18 


Multivitamins [Multivit ([facility])] 1 tab PO DAILY  tab 09/14/18 


Mupirocin Cream [Bactroban 2% Cream -] 1 applic TP BID #90 tube 09/14/18 


Potassium Chloride [K-Dur -] 10 meq PO DAILY #30 tablet.er 09/14/18 


Ranitidine [Zantac -] 150 mg PO BID  tablet 09/14/18 


Rivaroxaban [Xarelto -] 20 mg PO DAILY@1800  tablet 09/14/18 


Zinc Sulfate [Orazinc -] 220 mg PO DAILY@1200 #30 capsule 09/14/18

## 2018-09-14 NOTE — PN
Progress Note (short form)





- Note


Progress Note: 


fuv left foot wound.  





+grade 1 wound left hallux.  +mrsa in wound culture, xray reviewed no om





grade 1 wound





DC santyl dressing changes.  ID changed topical to bactroban dressing change.  

Will follow.  





Problem List





- Problems


(1) Wound, open, toe


Code(s): S91.109A - UNSP OPEN WOUND OF UNSP TOE(S) W/O DAMAGE TO NAIL, INIT   


Qualifiers: 


   Qualified Code(s): S91.109A - Unspecified open wound of unspecified toe(s) 

without damage to nail, initial encounter

## 2018-09-14 NOTE — PN
Progress Note (short form)





- Note


Progress Note: 





I





83 year old man with multiple antibiotic allergies admitted with worsening 

erythema of the LLE , new blister on the big toe


no complaints other then chronic hip pain - needs a hip replacement per patient


 


  Vital Signs











 Period  Temp  Pulse  Resp  BP Sys/Ross  Pulse Ox


 


 Last 24 Hr  97.7 F-98.3 F  69-71  18-20  105-136/57-69  98








dressing removed from the LLE


toe ulcer is clean,shallow, no erythema, no drainage


erythema of lower extremity has resolved





 CBC, BMP





 09/12/18 06:30 





 09/12/18 06:30 





 Microbiology





09/10/18 11:30   Leg - Left Lower   Gram Stain - Final


09/10/18 11:30   Leg - Left Lower   Wound Culture - Preliminary


                            Mr S Aureus


                            Bordetella Trematum


09/08/18 11:45   Blood - Peripheral Venous   Blood Culture - Final


                            NO GROWTH AFTER 5 DAYS INCUBATION


09/08/18 11:45   Blood - Peripheral Venous   Blood Culture - Final


                            NO GROWTH AFTER 5 DAYS INCUBATION





a/p


cellulitis-day #6 tygacil-mrsa- would avoid po bactrim with cr 1.2


nearly resolved


d/c tygacil after am dose





toe ulcer- shallow from prior blister, local wound care per podiatry


no signs of infection at this time





multiple antibiotic  allergies 


contact isolation MRSA











d/w tygacil


doxycycline for one week


please call back if needed


























Problem List





- Problems


(1) Cellulitis


Code(s): L03.90 - CELLULITIS, UNSPECIFIED   


Qualifiers: 


   Laterality: left 





(2) Allergy to multiple antibiotics


Code(s): Z88.1 - ALLERGY STATUS TO OTHER ANTIBIOTIC AGENTS STATUS   





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


Code(s): Z22.39 - CARRIER OF OTHER SPECIFIED BACTERIAL DISEASES

## 2018-09-14 NOTE — PN
Progress Note, Physician





- Current Medication List


Current Medications: 


Active Medications





Acetaminophen (Tylenol -)  650 mg PO Q6H PRN


   PRN Reason: FEVER


   Last Admin: 09/14/18 05:26 Dose:  650 mg


Albuterol/Ipratropium (Duoneb -)  1 amp NEB Q6H PRN


   PRN Reason: SHORTNESS OF BREATH


Alprazolam (Xanax -)  0.25 mg PO Q8H PRN


   PRN Reason: ANXIETY


   Last Admin: 09/12/18 23:22 Dose:  0.25 mg


Amino Acids (Prosource No Carb Liquid Pkt)  30 ml PO BID@0800,1730 [facility]


   Last Admin: 09/13/18 17:34 Dose:  30 ml


Bacitracin (Bacitracin -)  1 applic TP DAILY [facility]


   Last Admin: 09/13/18 14:54 Dose:  1 applic


Benzocaine/Menthol (Cepacol Lozenge -)  1 each MM Q2H PRN


   PRN Reason: SORE THROAT


Collagenase (Santyl -)  1 applic TP DAILY [facility]; Protocol


   Last Admin: 09/13/18 10:11 Dose:  1 applic


Docusate Sodium (Colace -)  100 mg PO BID PRN


   PRN Reason: CONSTIPATION


   Last Admin: 09/12/18 05:21 Dose:  100 mg


Finasteride (Proscar -)  5 mg PO DAILY [facility]


   Last Admin: 09/13/18 10:05 Dose:  5 mg


Furosemide (Lasix -)  40 mg PO BID@0600,1400 [facility]


   Last Admin: 09/14/18 06:04 Dose:  40 mg


Gabapentin (Neurontin -)  200 mg PO TID [facility]


   Last Admin: 09/14/18 05:26 Dose:  200 mg


Guaifenesin/Codeine Phosphate (Robitussin Ac -)  5 ml PO Q8H PRN


   PRN Reason: COUGH


Tigecycline 50 mg/ Dextrose  100 mls @ 100 mls/hr IVPB BID [facility]; Protocol


   Last Admin: 09/13/18 21:03 Dose:  100 mls/hr


Magnesium Hydroxide (Milk Of Magnesia -)  30 ml PO Q8H PRN


   PRN Reason: INDIGESTION


Metolazone (Zaroxolyn -)  2.5 mg PO Q2D@0530 [facility]


   Last Admin: 09/14/18 05:26 Dose:  2.5 mg


Metoprolol Succinate (Toprol Xl -)  25 mg PO DAILY [facility]


   Last Admin: 09/13/18 10:05 Dose:  25 mg


Multivitamins/Minerals/Vitamin C (Tab-A-Vit -)  1 tab PO DAILY [facility]


   Last Admin: 09/13/18 10:05 Dose:  1 tab


Ondansetron HCl (Zofran Injection)  4 mg IVPUSH Q8H PRN


   PRN Reason: NAUSEA AND/OR VOMITING


   Last Admin: 09/11/18 15:59 Dose:  4 mg


Oxycodone HCl (Roxicodone -)  5 mg PO Q6H PRN


   PRN Reason: PAIN LEVEL 7 - 10


   Last Admin: 09/14/18 05:27 Dose:  5 mg


Potassium Chloride (K-Dur -)  10 meq PO DAILY [facility]


   Last Admin: 09/13/18 10:05 Dose:  10 meq


Ranitidine HCl (Zantac -)  150 mg PO BID [facility]


   Last Admin: 09/13/18 21:03 Dose:  150 mg


Rivaroxaban (Xarelto -)  20 mg PO DAILY@1800 [facility]


   Last Admin: 09/13/18 17:34 Dose:  20 mg


Rosuvastatin Calcium (Crestor -)  5 mg PO HS [facility]


   Last Admin: 09/13/18 21:03 Dose:  5 mg


Senna (Senna -)  2 tab PO [facility]


   Last Admin: 09/13/18 21:04 Dose:  2 tab


Tamsulosin HCl (Flomax -)  0.8 mg PO DAILY@0830 [facility]


   Last Admin: 09/13/18 10:03 Dose:  0.8 mg


Zinc Sulfate (Orazinc -)  220 mg PO DAILY [facility]


   Last Admin: 09/13/18 10:05 Dose:  220 mg











- Objective


Vital Signs: 


 Vital Signs











Temperature  97.8 F   09/14/18 06:00


 


Pulse Rate  71   09/14/18 06:00


 


Respiratory Rate  20   09/14/18 06:00


 


Blood Pressure  113/57   09/14/18 06:00


 


O2 Sat by Pulse Oximetry (%)  98   09/13/18 21:00











Labs: 


 CBC, BMP





 09/12/18 06:30 





 09/12/18 06:30 





 INR, PTT











INR  1.10  (0.83-1.09)  H  09/08/18  11:45    














Problem List





- Problems


(1) Allergy to multiple antibiotics


Code(s): Z88.1 - ALLERGY STATUS TO OTHER ANTIBIOTIC AGENTS STATUS   





(2) Cellulitis


Code(s): L03.90 - CELLULITIS, UNSPECIFIED   


Qualifiers: 


   Laterality: left 





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


Code(s): Z22.39 - CARRIER OF OTHER SPECIFIED BACTERIAL DISEASES   





(4) Anxiety


Code(s): F41.9 - ANXIETY DISORDER, UNSPECIFIED   





(5) Atrial fibrillation


Code(s): I48.91 - UNSPECIFIED ATRIAL FIBRILLATION   





(6) CHF (congestive heart failure)


Code(s): I50.9 - HEART FAILURE, UNSPECIFIED   


Qualifiers: 


   Heart failure type: unspecified   Heart failure chronicity: unspecified   

Qualified Code(s): I50.9 - Heart failure, unspecified   





(7) Leukocytosis


Code(s): D72.829 - ELEVATED WHITE BLOOD CELL COUNT, UNSPECIFIED   





(8) Lumbar stenosis


Code(s): M48.061 - SPINAL STENOSIS, LUMBAR REGION WITHOUT NEUROGENIC LALA   





(9) PVD (peripheral vascular disease)


Code(s): I73.9 - PERIPHERAL VASCULAR DISEASE, UNSPECIFIED   





(10) Pressure ulcer


Code(s): L89.90 - PRESSURE ULCER OF UNSPECIFIED SITE, UNSPECIFIED STAGE   





(11) Status post fall


Code(s): Z91.81 - HISTORY OF FALLING   





(12) Venous insufficiency


Code(s): I87.2 - VENOUS INSUFFICIENCY (CHRONIC) (PERIPHERAL)   





(13) Hip pain, left


Code(s): M25.552 - PAIN IN LEFT HIP   





(14) Hypertension


Code(s): I10 - ESSENTIAL (PRIMARY) HYPERTENSION   





(15) Lymphedema


Code(s): I89.0 - LYMPHEDEMA, NOT ELSEWHERE CLASSIFIED   





(16) Morbid obesity


Code(s): E66.01 - MORBID (SEVERE) OBESITY DUE TO EXCESS CALORIES   





(17) Osteoarthritis


Code(s): M19.90 - UNSPECIFIED OSTEOARTHRITIS, UNSPECIFIED SITE   





(18) Pacemaker


Code(s): Z95.0 - PRESENCE OF CARDIAC PACEMAKER   





(19) Prostate cancer


Code(s): C61 - MALIGNANT NEOPLASM OF PROSTATE

## 2019-03-11 NOTE — PDOC
History of Present Illness





- General


Chief Complaint: Pain, Acute


Stated Complaint: PAIN


Time Seen by Provider: 03/11/19 20:56





- History of Present Illness


Initial Comments: 





03/11/19 22:07


The patient is an 83 year old male with a PMH of CHF, HTN, HLD and cellulitis 

who presents to the ED c/o acute onset of groin pain.  Wife @ bedside assists 

in history.  States patient suddenly stated his pain was so severe he didn't 

feel like he could stand up and walk and asked his wife to call 911.  Was 

hospitalized at [facility] for a groin infection and has a visiting nurse for 

the last month. 





The patient denies any fever, chills, nausea, vomiting, diarrhea, constipation, 

or abdominal pain.


Denies chest pain, shortness of breath, headache and dizziness.


Denies dysuria, frequency, urgency, hesitancy, and hematuria.





Allergy: Keflex, Ertapenem


Surgical: B/L knee replacement 


PMD: Dr. Maikol Allen M.D.





As per EMR, patient evaluated in our ED in 09/2018 for LLE cellulitis at which 

time he was admitted for IV antibiotics - no groin or abdomen involvement 

noted. 














Past History





- Past Medical History


Allergies/Adverse Reactions: 


 Allergies











Allergy/AdvReac Type Severity Reaction Status Date / Time


 


cephalexin monohydrate Allergy Mild Hives Verified 03/11/19 20:59





[From Keflex]     


 


ertapenem Allergy   Verified 03/11/19 20:59











Home Medications: 


Ambulatory Orders





Tamsulosin HCl [Flomax] 0.8 mg PO DAILY 03/11/17 


Sennosides [Senna -] 2 tab PO HS  tablet 03/15/17 


Acetaminophen [Tylenol .Regular Strength -] 650 mg PO Q6H PRN  tablet 12/23/17 


Clotrimazole [Antifungal] 30 gm TP DAILY 03/24/18 


Alprazolam [Xanax] 0.25 mg PO Q8H PRN  tablet MDD 3 07/11/18 


Finasteride [Proscar -] 5 mg PO DAILY #30 tablet 07/11/18 


Rosuvastatin [Crestor -] 5 mg PO HS #30 tablet 07/11/18 


Cholecalciferol (Vitamin D3) [Vitamin D3] 0 unit PO DAILY 09/08/18 


Metolazone 2.5 mg PO Q48H 09/08/18 


Ranitidine HCl 150 mg PO BID 09/09/18 


Bacitracin - [Bacitracin Topical Ointment -] 1 applic TP DAILY #90 tube 09/14/ 18 


Collagenase Clostridium Hist. [Santyl -] 1 applic TP DAILY #90 tube 09/14/18 


Docusate Sodium [Colace -] 100 mg PO BID PRN #60 capsule 09/14/18 


Doxycycline Hyclate [Vibramycin -] 100 mg PO BID@1000,1800 10 Days #20 capsule 

09/14/18 


Furosemide [Lasix -] 40 mg PO BID@0600,1400 #60 tablet 09/14/18 


Gabapentin [Neurontin -] 200 mg PO TID #90 capsule 09/14/18 


Magnesium Hydrox 2400MG/30Ml [Milk of Magnesia -] 30 ml PO Q8H PRN  cup 09/14/ 18 


Metolazone [Zaroxolyn -] 2.5 mg PO Q2D@0530  tablet 09/14/18 


Metoprolol Succinate [Toprol XL -] 25 mg PO DAILY  tab.sr.24h 09/14/18 


Multivitamins [Multivit ([facility])] 1 tab PO DAILY  tab 09/14/18 


Mupirocin Cream [Bactroban 2% Cream -] 1 applic TP BID #90 tube 09/14/18 


Potassium Chloride [K-Dur -] 10 meq PO DAILY #30 tablet.er 09/14/18 


Ranitidine [Zantac -] 150 mg PO BID  tablet 09/14/18 


Rivaroxaban [Xarelto -] 20 mg PO DAILY@1800  tablet 09/14/18 


Zinc Sulfate [Orazinc -] 220 mg PO DAILY@1200 #30 capsule 09/14/18 








Anemia: No


Asthma: No


Cancer: No


Cardiac Disorders: Yes


CVA: No


COPD: No


CHF: Yes


Dementia: No


Diabetes: No


GI Disorders: Yes


 Disorders: No


HTN: Yes


Hypercholesterolemia: Yes


Liver Disease: No


Seizures: No


Thyroid Disease: No





- Surgical History


Abdominal Surgery: No


Appendectomy: No


Cardiac Surgery: Yes


Cholecystectomy: No


Lung Surgery: No


Neurologic Surgery: No


Orthopedic Surgery: Yes (bilateral knee sx 1965)





- Immunization History


Immunization Up to Date: Yes





- Suicide/Smoking/Psychosocial Hx


Smoking History: Never smoked


Have you smoked in the past 12 months: No


If you are a former smoker, when did you quit?: 1970


Information on smoking cessation initiated: No


Hx Alcohol Use: No


Drug/Substance Use Hx: No


Substance Use Type: None


Hx Substance Use Treatment: No





**Review of Systems





- Review of Systems


Constitutional: No: Chills, Fever


HEENTM: No: Blurred Vision, Recent change in vision


Respiratory: No: Orthopnea, Shortness of Breath


Cardiac (ROS): No: Chest Pain, Lightheadedness, Palpitations, Syncope


ABD/GI: No: Constipated, Diarrhea, Nausea, Vomiting





*Physical Exam





- Vital Signs


 Last Vital Signs











Temp Pulse Resp BP Pulse Ox


 


 98.8 F   88   22 H  140/75   98 


 


 03/11/19 20:44  03/11/19 20:44  03/11/19 20:44  03/11/19 20:44  03/11/19 20:44














- Physical Exam


General Appearance: Yes: Nourished, Obese


Gastrointestinal/Abdominal: positive: Normal Bowel Sounds, Soft, Other (

abdominal pannus with severe erythema and foul smelling discharge).  negative: 

Distended, Guarding, Rebound, Tenderness


Male Genitalia: positive: normal genitalia.  negative: testicular tenderness, 

epididymus tender


Extremity: positive: Other (B/L LE chronic venous stasis)





Moderate Sedation





- Procedure Monitoring


Vital Signs: 


Procedure Monitoring Vital Signs











Temperature  98.8 F   03/11/19 20:44


 


Pulse Rate  88   03/11/19 20:44


 


Respiratory Rate  22 H  03/11/19 20:44


 


Blood Pressure  140/75   03/11/19 20:44


 


O2 Sat by Pulse Oximetry (%)  98   03/11/19 20:44











ED Treatment Course





- LABORATORY


CBC & Chemistry Diagram: 


 03/13/19 06:30





 03/13/19 06:30





Medical Decision Making





- Medical Decision Making





03/11/19 22:24


83 year old male with abdominal pannus fungal infection.  VS unremarkable. PE 

shows + redness, ertyhema, warmth ,ttp with indistinct margins extending from 

lower abdominal pannus into BL groin, and testicles. ED course notable for 

Ketoconazole, and Ancef administration for possible bacterial (celluitis) vs. 

fungal groin/pannus infection. Patient has received 5 mg Haldol, 4 mg Ativan 

for acute agitation in ED and threatening staff members.  





Patient signed out to Dr. Leblanc (Resident) pending labs.  Likely disposition is 

admit for intractable pain.  














*DC/Admit/Observation/Transfer


Diagnosis at time of Disposition: 


 Cellulitis of abdominal wall, AGUILA (acute kidney injury), Hypokalemia








- Discharge Dispostion


Condition at time of disposition: Fair





- Referrals





- Patient Instructions





- Post Discharge Activity

## 2019-03-11 NOTE — PDOC
Attending Attestation





- HPI


HPI: 





03/11/19 22:49


The patient is a 83 year old male, with a significant past medical history of 

HTN, Afib (s/p pacemaker), CHF, chronic bilateral leg swelling,HLD , who 

presents to the emergency department with, weakness. 





Allergies: Ertapenem, Keflex


Past surgical history: B/L knee replacement 


Primary Care Physician: Dr. Fabian





- Physicial Exam


PE: 





03/11/19 22:49


+GENERAL: 


Morbidly obese. Well-appearing, well-nourished. No apparent distress.


HEENT: 


Normocephalic, atraumatic. PERRL, EOM intact.


CARDIOVASCULAR: 


Normal S1, S2. Regular rate and rhythm.


PULMONARY: 


Clear to auscultation bilaterally.


+ABDOMEN: 


Protuberant. Pannus with extensive, severe fungal infection with severe erythema

, tenderness, foul-smelling purulent discharge. Soft, non-distended.


+EXTREMITIES: 


Chronic venous stasis with chronic edema. Normal ROM in all four extremities. 

No gross deformities.


SKIN: 


Warm, dry.  No rash


NEUROLOGICAL: 


No focal neurological deficits.








<Sushila Light - Last Filed: 03/12/19 01:37>





- Resident


Resident Name: Dinah So





- ED Attending Attestation


I have performed the following: I have examined & evaluated the patient, The 

case was reviewed & discussed with the resident, I agree w/resident's findings 

& plan, Exceptions are as noted





- Medical Decision Making


pt had cellulitis and admitted for ID consult,IV antibiotics and nephrology 

consult


03/13/19 00:10








<Savannah Shoemaker - Last Filed: 03/13/19 00:11>





Attestations





- Attestations





03/11/19 22:50





Documentation prepared by Sushila Light, acting as medical scribe for 

Savannah Shoemaker MD.





<Sushila Light - Last Filed: 03/12/19 01:37>

## 2019-03-11 NOTE — PDOC
*Physical Exam





- Vital Signs


 Last Vital Signs











Temp Pulse Resp BP Pulse Ox


 


 98.8 F   88   22 H  140/75   98 


 


 03/11/19 20:44  03/11/19 20:44  03/11/19 20:44  03/11/19 20:44  03/11/19 20:44














- Physical Exam


Comments: 





03/12/19 00:28





GENERAL: Awake, alert, and fully oriented, in no acute distress


HEAD: No signs of trauma, normocephalic, atraumatic 


EYES: PERRLA, EOMI, sclera anicteric, conjunctiva clear


ENT: Auricles normal inspection, hearing grossly normal, nares patent, 

oropharynx clear without


exudates. Moist mucosa


NECK: Normal ROM, supple, no lymphadenopathy, JVD, or masses


LUNGS: No distress, speaks full sentences, clear to auscultation bilaterally 


HEART: Regular rate and rhythm, normal S1 and S2, no murmurs, rubs or gallops, 

peripheral pulses normal and equal bilaterally. 


ABDOMEN: Soft, nontender, normoactive bowel sounds.  No guarding, no rebound.  

No masses. Neg CVA ttp 


EXTREMITIES : Normal inspection, Normal range of motion, no edema.  No clubbing 

or cyanosis. 


NEUROLOGICAL: Cranial nerves II through XII grossly intact.  Normal speech, 

normal gait, no focal sensorimotor deficits 


SKIN: + redness, ertyhema, warmth ,ttp with indistinct margins extending from 

lower abdominal pannus into BL groin, and testicles. Absent inguinal 

lymphadenopathy, discharge, drainage, or streaking. Absent fluctuance, Warm, Dry

, normal turgor, no rashes or lesions noted











ED Treatment Course





- LABORATORY


CBC & Chemistry Diagram: 


 03/11/19 23:20





 03/11/19 23:20





- Medications


Given in the ED: 


ED Medications














Discontinued Medications














Generic Name Dose Route Start Last Admin





  Trade Name Freq  PRN Reason Stop Dose Admin


 


Alprazolam  0.25 mg  03/11/19 22:20  03/11/19 22:42





  Xanax -  PO  03/11/19 22:21  0.25 mg





  ONCE ONE   Administration





     





     





     





     


 


Haloperidol  5 mg  03/11/19 21:46  03/11/19 21:52





  Haldol Injection (Fast Acting) -  IM  03/11/19 21:47  5 mg





  ONCE ONE   Administration





     





     





     





     


 


Cefazolin Sodium 1 gm/  50 mls @ 100 mls/hr  03/11/19 23:11  03/11/19 23:45





  Dextrose  IVPB  03/11/19 23:40  100 mls/hr





  ONCE ONE   Administration





     





     





     





     


 


Lorazepam  2 mg  03/11/19 21:07  03/11/19 21:41





  Ativan Injection -  IM  03/11/19 21:08  2 mg





  ONCE ONE   Administration





     





     





     





     


 


Lorazepam  2 mg  03/11/19 22:18  03/11/19 22:41





  Ativan Injection -  IM  03/11/19 22:19  2 mg





  ONCE ONE   Administration





     





     





     





     














Medical Decision Making





- Medical Decision Making





03/11/19 23:51


82 yo M HTN, HLD, CHF, chronic venous stasis, lymphdemea, morbid obesity, who p/

w BL groin pain, redness. Recent hospitalization 03/2019 for infection of pannus

, no antibiotics. Home health aide. Vtials wnl, AF, A&Ox3.  + redness, ertyhema

, warmth ,ttp with indistinct margins extending from lower abdominal pannus 

into BL groin, and testicles. ED course notable for Ketoconazole, and Ancef 

administration for possible bacterial (celluitis) vs. fungal groin/pannus 

infection. Patient has received 5 mg Haldol, 4 mg Ativan for acute agitation in 

ED and threatening staff members. Pending labs, blood cultures sent. Patient 

with multiple risk factors, bedbound, and intractable pain. Plan on medicine 

admit. Received signout from Dr. So. PMD: Dr. Maikol Allen M.D.





Ed Course: 








03/11/19 23:55





WBC: 10.8 


BUN/Cr: 51/1.9(1.2)


Ca: 12.2


Trop: 0.07


EKG: Ventricular paced rhythm 


K+ 2.9 


Potassium 40 mEQ PO





03/12/19 02:04


Patient endorsed to Diann NP


Admitted Celullitis, AGUILA, hypokalemia


Admitted Dr. Gonzales











*DC/Admit/Observation/Transfer


Diagnosis at time of Disposition: 


 Cellulitis of abdominal wall, AGUILA (acute kidney injury), Hypokalemia








- Discharge Dispostion


Condition at time of disposition: Fair


Decision to Admit order: Yes





- Referrals





- Patient Instructions





- Post Discharge Activity

## 2019-03-12 NOTE — PN
Progress Note (short form)





- Note


Progress Note: 





ID CONSULT DICTATED


CELLULITIS, LOWER ABDOMINAL WALL/ LE


CEPHALOSPORIN ALLERGY


AZOTEMIA


TOXIC METABOLIC ENCEPHALOPATHY


EMPIRIC TYGACIL


ELEVATION

## 2019-03-12 NOTE — EKG
Test Reason : 

Blood Pressure : ***/*** mmHG

Vent. Rate : 069 BPM     Atrial Rate : 067 BPM

   P-R Int : 000 ms          QRS Dur : 162 ms

    QT Int : 472 ms       P-R-T Axes : 000 -52 109 degrees

   QTc Int : 505 ms

 

Ventricular-paced rhythm

ABNORMAL ECG

WHEN COMPARED WITH ECG OF 08-SEP-2018 11:07,

NO SIGNIFICANT CHANGE WAS FOUND

Confirmed by MD CÉSAR, JESSICA ([id number]) on 3/12/2019 1:31:10 PM

 

Referred By:             Confirmed By:JESSICA LINDSEY MD

## 2019-03-12 NOTE — CONS
INFECTIOUS DISEASE CONSULTATION

 

DATE OF CONSULTATION:  03/12/2019

 

The patient is an 83-year-old male evaluated for cellulitis of the lower abdominal

wall and lower extremities bilaterally.  He was admitted to the hospital with

complaints of groin pain.  The patient developed worsening pain, erythema, and warmth

of his lower abdomen and bilateral groin area.  The pain progressed to the point

where he was unable to stand or walk.  He was taken to the emergency room where he

was noted to have cellulitis of the lower abdominal wall as well as lower extremities

bilaterally.

 

Patient cannot give a history of secondary to his lethargy.  According to the notes,

he had recently been hospitalized at [facility] for soft tissue

infection of the lower abdomen.  He has been residing at home.  There are no reports

of any traumatic injury to the lower abdomen or lower extremities.  No documented

fever or chills.

 

He has had a history of chronic lymphedema and chronic venous stasis dermatitis of

the lower extremities as well as recurrent cellulitis.  He has also had a history of

ESBL and MRSA isolated from leg wounds.

 

PAST MEDICAL HISTORY:  Positive for chronic lymphedema of lower extremities,

recurrent cellulitis, congestive heart failure, hypertension, hyperlipidemia, atrial

fibrillation, chronic venous stasis dermatitis, BPH.

 

PAST SURGICAL HISTORY:  Status post bilateral total knee replacements, permanent

pacemaker.

 

ALLERGIES:  CEPHALEXIN and ERTAPENEM.  Nature of the allergy not known.  According to

the EMR, patient develops hives with KEFLEX.

 

SOCIAL HISTORY:  Resides in the community with his significant other.  Nonsmoker,

nondrinker.

 

SYSTEMS REVIEW:

Neurologic:  Positive for altered mental status.  No loss of consciousness, seizure

activity, or focal weakness.

Cardiac:  Negative chest pain and palpitations.

Respiratory:  Negative cough or sputum production.

Gastrointestinal:  Negative vomiting or diarrhea.

Genitourinary:  Negative for urinary tract infection.

 

LABORATORY DATA:  White count 11.2, hematocrit 33.8, platelet count 316.  BUN 46,

creatinine 1.8.  Urinalysis pending.  Blood cultures pending.

 

PHYSICAL EXAMINATION:

General:  He is morbidly obese.  He is lethargic, dozing off during interview and

examination.

Vital Signs:  Temperature 98.5; blood pressure 167/78; pulse 88, regular;

respirations 20 per minute.

HEENT:  Sclerae are anicteric.

Heart:  Sounds S1, S2.

Lungs:  Clear.

Abdomen:  Obese, soft, nontender.  There is confluent erythema involving the lower

abdomen below the umbilicus to the suprapubic area.  The erythema also involves both

inguinal areas.  There is warmth to touch.  No drainage, fluctuance, or crepitus.

Extremities:  Bilateral lower extremity edema with erythema involving the pretibial

aspects of both lower extremities.  No lymphangitic streaking.

 

IMPRESSION:

1.  Cellulitis of the lower abdominal wall and bilateral lower extremities.

2.  CEPHALOSPORIN and CARBAPENEM ALLERGY.

3.  Azotemia.

4.  Toxic metabolic encephalopathy.

 

Await cultures.  Empirically treat with Tygacil in light of CEPHALOSPORIN ALLERGY for

empiric coverage of skin pathogens including possible multi-drug-resistant organisms.

 Elevation.

 

Will follow.  Thank you for the kind referral.

 

 

ELAYNE AMAYA M.D.

 

TIM/[id number]

DD: 03/12/2019 12:44

DT: 03/12/2019 13:55

Job #:  [id number]

## 2019-03-12 NOTE — PN
Progress Note, Physician


Chief Complaint: 





Cellulitis


Acute Renal Insufficiency


Hypokalemia


History of Present Illness: 





Previous notes and events reviewed


lethargic, arouseable to tactile and verbal stimuli


NAD








- Current Medication List


Current Medications: 


Active Medications





Acetaminophen (Tylenol -)  650 mg PO Q6H PRN


   PRN Reason: FEVER


Cholecalciferol (Vitamin D3 -)  1,000 unit PO DAILY [facility]


   Last Admin: 03/12/19 12:43 Dose:  1,000 unit


Docusate Sodium (Colace -)  100 mg PO BID PRN


   PRN Reason: CONSTIPATION


Finasteride (Proscar -)  5 mg PO DAILY [facility]


   Last Admin: 03/12/19 09:25 Dose:  5 mg


Gabapentin (Neurontin -)  200 mg PO TID [facility]


   Last Admin: 03/12/19 15:12 Dose:  200 mg


Tigecycline 50 mg/ Dextrose  100 mls @ 100 mls/hr IVPB BID [facility]; Protocol


Ketoconazole (Nizoral 2% Cream -)  1 applic TP DAILY [facility]


   Last Admin: 03/12/19 12:42 Dose:  1 appful


Magnesium Hydroxide (Milk Of Magnesia -)  30 ml PO Q8H PRN


   PRN Reason: INDIGESTION


   Last Admin: 03/12/19 11:10 Dose:  30 ml


Metoprolol Succinate (Toprol Xl -)  25 mg PO DAILY [facility]


   Last Admin: 03/12/19 11:12 Dose:  25 mg


Multivitamins/Minerals/Vitamin C (Tab-A-Vit -)  1 tab PO DAILY [facility]


   Last Admin: 03/12/19 11:10 Dose:  1 tab


Ranitidine HCl (Zantac -)  150 mg PO BID [facility]


   Last Admin: 03/12/19 09:28 Dose:  150 mg


Rivaroxaban (Xarelto -)  20 mg PO DAILY@1800 [facility]


Rosuvastatin Calcium (Crestor -)  5 mg PO HS [facility]


Senna (Senna -)  2 tab PO HS [facility]


Tamsulosin HCl (Flomax -)  0.8 mg PO DAILY@0830 [facility]


   Last Admin: 03/12/19 09:25 Dose:  0.8 mg


Zinc Sulfate (Orazinc -)  220 mg PO DAILY@1200 [facility]


   Last Admin: 03/12/19 11:13 Dose:  220 mg











- Objective


Vital Signs: 


 Vital Signs











Temperature  98.1 F   03/12/19 16:51


 


Pulse Rate  69   03/12/19 16:51


 


Respiratory Rate  18   03/12/19 16:51


 


Blood Pressure  117/77   03/12/19 16:51


 


O2 Sat by Pulse Oximetry (%)  97   03/12/19 04:32











Constitutional: Yes: No Distress, Obese


Eyes: Yes: Conjunctiva Clear


HENT: Yes: Atraumatic


Cardiovascular: Yes: Regular Rate and Rhythm


Respiratory: Yes: Regular, CTA Bilaterally


Gastrointestinal: Yes: Normal Bowel Sounds, Soft, Abdomen, Obese, Other (non 

tender)


Genitourinary: Yes: Incontinence


Musculoskeletal: Yes: Muscle Weakness


Extremities: Yes: Erythema


Edema: Yes


Edema: LLE: 1+, RLE: 1+


Integumentary: Yes: Erythema (B/L lower extremity and lower abdomen)


Neurological: Yes: Lethargy


Labs: 


 CBC, BMP





 03/12/19 06:45 





 03/12/19 06:45 





 Microbiology





03/11/19 23:10   Buttock - Right   Gram Stain - Final











Problem List





- Problems


(1) Elevated troponin


Assessment/Plan: 


-troponin 0.07


-repeat ordered STAT


-cardiology consult


Code(s): R74.8 - ABNORMAL LEVELS OF OTHER SERUM ENZYMES   





(2) AGUILA (acute kidney injury)


Assessment/Plan: 


-renal on board


-monitor BUN/Cr


-current BUN/Cr 46/1.8


Code(s): N17.9 - ACUTE KIDNEY FAILURE, UNSPECIFIED   





(3) Cellulitis of abdominal wall


Assessment/Plan: 


-ID on board


-continue with tygacil 


-afebrile


-leukocytosis with WBC 11.2


Code(s): L03.311 - CELLULITIS OF ABDOMINAL WALL   





(4) Hypokalemia


Assessment/Plan: 


-K 2.8, received KCl 10mEq IVPB x 3


-monitor K and replete as needed





Code(s): E87.6 - HYPOKALEMIA   





(5) Cellulitis


Assessment/Plan: 


-ID on board


-IV tygacil


-afebrile


-leukocytosis with WBC 11.2


-ketoconazile cream


Code(s): L03.90 - CELLULITIS, UNSPECIFIED   


Qualifiers: 


   Laterality: left 





(6) Hypertension


Assessment/Plan: 


-continue with metoprolol


-low Na diet


Code(s): I10 - ESSENTIAL (PRIMARY) HYPERTENSION   





(7) Pacemaker


Assessment/Plan: 


-cardiology consult


Code(s): Z95.0 - PRESENCE OF CARDIAC PACEMAKER   





(8) Atrial fibrillation


Assessment/Plan: 


-continue xarelto


Code(s): I48.91 - UNSPECIFIED ATRIAL FIBRILLATION   





Assessment/Plan





see problem list


dvt ppx

## 2019-03-12 NOTE — PN
Progress Note (short form)





- Note


Progress Note: 





 Laboratory Tests











  03/12/19





  18:30


 


Sodium  141


 


Potassium  3.6


 


Creatinine  1.7 H


 


Calcium  10.6 H








bmp reviewed


- renal function improving


- calcium improved


- potassium improved








Problem List





- Problems


(1) AGUILA (acute kidney injury)


Code(s): N17.9 - ACUTE KIDNEY FAILURE, UNSPECIFIED   





(2) Hypokalemia


Code(s): E87.6 - HYPOKALEMIA

## 2019-03-12 NOTE — HP
CHIEF COMPLAINT:redness to bilateral groins, upper legs and penile area 





PCP:Dr. Fabian


Cardiologist: Dr. Foote 





HISTORY OF PRESENT ILLNESS:





83 year old male with a past medical history of congestive heart failure, 

permanent pacemaker implantation, hypertension, hyperlipidemia,and cellulitis 

to both legs and on/off redness to bilateral groin area, upper legs and penile/

testicle region for the past year as per his wife who presents from home with 

an acute onset of bilateral groin pain with significant redness.  Wife reported 

patient stated pain was so severe that he could not stand up to walk.  He was 

hospitalized at [facility] last month for a groin infection and 

he has been having a visiting nurse see him for the last month. He denied fever

, chest pain, shortness of breath, dizziness, palpitations, or syncopy.  Upon 

evaluation in the ER he was found to have significant bilateral groin and leg 

and penile erythema secondary to bacterial cellulitis versus a fungal infection 

to bilateral groin/penile/testicular region.  Labs notable for a WBC of 10.8, 

creatinine 1.9 (baseline 1.2),troponin 0.07 and potassium 2.9 . 





On my evaluation patient is confused and agitated and removing hospital gown.   

At baseline wife reports mentation is intact. 





He will be admitted for further evaluation/management with Infectious diseases 

and treatment with IV antibiotics and fungal medications. 





ER course was notable for:


(1) Confusion and agitation- received IV ativan and haldol . 


(2) Bacterail Cellulitis versus Fungal Infection -received 1 gram of IV ancef, 

and clotrimazole cream.


(3) Acute Renal Insufficiency- received 1 liter of IV fluids.


(4) Hypokalemia- repleted. 





Recent Travel:


unable to obtain due to patient mental status





PAST MEDICAL HISTORY:


as above 





PAST SURGICAL HISTORY:


permanent pacemaker implantation 





Social History:


unable to obtain as patient is confused 





Family History:


unble to obtain as patient is confused 





Allergies


cephalexin monohydrate [From Keflex] Allergy (Mild, Verified 03/11/19 20:59)


 Hives


ertapenem Allergy (Verified 03/11/19 20:59)


 








HOME MEDICATIONS:


 Home Medications











 Medication  Instructions  Recorded


 


Tamsulosin HCl [Flomax] 0.8 mg PO DAILY 03/11/17


 


Sennosides [Senna -] 2 tab PO HS  tablet 03/15/17


 


Acetaminophen [Tylenol .Regular 650 mg PO Q6H PRN  tablet 12/23/17





Strength -]  


 


Clotrimazole [Antifungal] 30 gm TP DAILY 03/24/18


 


Alprazolam [Xanax] 0.25 mg PO Q8H PRN  tablet MDD 3 07/11/18


 


Finasteride [Proscar -] 5 mg PO DAILY #30 tablet 07/11/18


 


Rosuvastatin [Crestor -] 5 mg PO HS #30 tablet 07/11/18


 


Cholecalciferol (Vitamin D3) 0 unit PO DAILY 09/08/18





[Vitamin D3]  


 


Metolazone 2.5 mg PO Q48H 09/08/18


 


Ranitidine HCl 150 mg PO BID 09/09/18


 


Bacitracin - [Bacitracin Topical 1 applic TP DAILY #90 tube 09/14/18





Ointment -]  


 


Collagenase Clostridium Hist. 1 applic TP DAILY #90 tube 09/14/18





[Santyl -]  


 


Docusate Sodium [Colace -] 100 mg PO BID PRN #60 capsule 09/14/18


 


Doxycycline Hyclate [Vibramycin -] 100 mg PO BID@1000,1800 10 Days 09/14/18





 #20 capsule 


 


Furosemide [Lasix -] 40 mg PO BID@0600,1400 #60 tablet 09/14/18


 


Gabapentin [Neurontin -] 200 mg PO TID #90 capsule 09/14/18


 


Magnesium Hydrox 2400MG/30Ml [Milk 30 ml PO Q8H PRN  cup 09/14/18





of Magnesia -]  


 


Metolazone [Zaroxolyn -] 2.5 mg PO Q2D@0530  tablet 09/14/18


 


Metoprolol Succinate [Toprol XL -] 25 mg PO DAILY  tab.sr.24h 09/14/18


 


Multivitamins [Multivit ([facility] 1 tab PO DAILY  tab 09/14/18





Formulary)]  


 


Mupirocin Cream [Bactroban 2% 1 applic TP BID #90 tube 09/14/18





Cream -]  


 


Potassium Chloride [K-Dur -] 10 meq PO DAILY #30 tablet.er 09/14/18


 


Ranitidine [Zantac -] 150 mg PO BID  tablet 09/14/18


 


Rivaroxaban [Xarelto -] 20 mg PO DAILY@1800  tablet 09/14/18


 


Zinc Sulfate [Orazinc -] 220 mg PO DAILY@1200 #30 capsule 09/14/18








REVIEW OF SYSTEMS


CONSTITUTIONAL: 


Absent:  fever, chills, diaphoresis, generalized weakness, malaise, loss of 

appetite, weight change


HEENT: 


Absent:  rhinorrhea, nasal congestion, throat pain, throat swelling, difficulty 

swallowing, mouth swelling, ear pain, eye pain, visual changes


CARDIOVASCULAR: 


Absent: chest pain, syncope, palpitations, irregular heart rate, lightheadedness

, peripheral edema


RESPIRATORY: 


Absent: cough, shortness of breath, dyspnea with exertion, orthopnea, wheezing, 

stridor, hemoptysis


GASTROINTESTINAL:


Absent: abdominal pain, abdominal distension, nausea, vomiting, diarrhea, 

constipation, melena, hematochezia


GENITOURINARY: 


Absent: dysuria, frequency, urgency, hesitancy, hematuria, flank pain, genital 

pain and erythema


MUSCULOSKELETAL: 


Absent: myalgia, arthralgia, joint swelling, back pain, neck pain


SKIN: 


Absent: rash, itching, pallor, redness to bilateral groin, upper legs and 

penile region 


HEMATOLOGIC/IMMUNOLOGIC: 


Absent: easy bleeding, easy bruising, lymphadenopathy, frequent infections


ENDOCRINE:


Absent: unexplained weight gain, unexplained weight loss, heat intolerance, 

cold intolerance


NEUROLOGIC: 


Absent: headache, focal weakness or paresthesias, dizziness, unsteady gait, 

seizure, mental status changes, bladder or bowel incontinence


PSYCHIATRIC: 


Absent: anxiety, depression, suicidal or homicidal ideation, hallucinations.

agitation and confusion








PHYSICAL EXAMINATION


 Vital Signs - 24 hr











  03/11/19





  20:44


 


Temperature 98.8 F


 


Pulse Rate 88


 


Respiratory 22 H





Rate 


 


Blood Pressure 140/75


 


O2 Sat by Pulse 98





Oximetry (%) 











GENERAL: confused and agitated


HEAD: no signs of trauma


EYES: pupils equal, round and reactive to light


EARS, NOSE, THROAT: ears normal, nares patent, moist mucous membranes


NECK: no JVD


LUNGS: clear to auscultation bilaterally, no wheezes, no crackles, no use of  

accessory muscle


HEART: regular rate and rhythm, normal S1 and S2


ABDOMEN: soft, large nontender, not distended, normoactive bowel sounds


MUSCULOSKELETAL: No bony deformities or tenderness


UPPER EXTREMITIES: 2+ pulses, warm, well-perfused no  cyanosis no peripheral 

edema


LOWER EXTREMITIES: 2+ pulses, warm, well-perfused 1+lower extremity swelling 

with redness 


NEUROLOGICAL: confused and agitated , nonfocal


PSYCHIATRIC: agitated


SKIN: redness to bilateral groins, upper legs and penile area 





 Laboratory Results - last 24 hr











  03/11/19 03/11/19 03/11/19





  23:20 23:20 23:20


 


WBC  10.8 H  


 


RBC  3.82 L  


 


Hgb  11.5 L  


 


Hct  33.9 L D  


 


MCV  88.7  


 


MCH  30.2  


 


MCHC  34.0  


 


RDW  14.0  


 


Plt Count  300  D  


 


MPV  7.6  


 


Absolute Neuts (auto)  7.8  


 


Neutrophils %  72.2  


 


Lymphocytes %  14.2  D  


 


Monocytes %  11.0 H  


 


Eosinophils %  2.0  


 


Basophils %  0.6  


 


Nucleated RBC %  0  


 


Sodium   136 


 


Potassium   2.9 L* 


 


Chloride   94 L 


 


Carbon Dioxide   32 


 


Anion Gap   10 


 


BUN   51 H 


 


Creatinine   1.9 H 


 


Creat Clearance w eGFR   34.02 


 


Random Glucose   110 H 


 


Calcium   12.2 H 


 


Total Bilirubin   1.1 H 


 


AST   30 


 


ALT   18 


 


Alkaline Phosphatase   67 


 


Creatine Kinase    344 H


 


Creatine Kinase Index    0.9


 


CK-MB (CK-2)    3.3


 


Troponin I    0.07 H


 


Total Protein   5.8 L 


 


Albumin   3.2 L 











ASSESSMENT/PLAN:


83 year old male with a past medical history of congestive heart failure, 

permanent pacemaker implantation, hypertension, hyperlipidemia,and cellulitis 

to both legs and on/off redness to bilateral groin area, upper legs and penile 

region for the past year as per his wife who presents from home with an acute 

onset of bilateral groin pain with significant redness.  





Bilateral groin,leg and penile erythema secondary to cellulitis versus a fungal 

infection


Mild leukocytosis. Currently afebrile, no tachycardia. He received one dosage 

of IV Ancef and clotrimazole.


Blood cultures are pending. 


Check lactic acid level. 


Continue with IV ancef and clotrimazole.


ID consulted for evaluation/management.





Acute Renal Insufficiency


Creatinine at baseline 1.2. Received 1 liter of IVF. Repeat BMP in am.


Avoid ace/arbs and NSAIDS's.


Nephrology consulted -Dr. Teran.





Elevated Troponin


Likely demand mediated ischemia in the setting of an acute infection. Continue 

to trend. 





Hypokalemia


Repleted.  


Repeat BMP in am.





Hypertension


Borderline elevated. 


Continue with metoprolol succinate and oral lasix.  





Hyperlipidemia


Continue statin therapy. 





BPH


Continue flomax. 





Chronic Congestive Heart Failure


He does not appear to be in an acute exacerbation. On exam has chronic lower 

extremity swelling .


Continue with oral lasix and metolazone.





Permanaent Pacemaker


Stable.





Confusion/Agitation


Likely developed in the setting of infection and or side effect of haldol.At 

baseline patient mentation is intact. 


Ativan prn, sitter at bedside for safety precautions. 





DVT 


Continue with Xarelto for anticoagulation. 





FEN


Avoid IV fluids to prevent acute CHF exacerbation, monitor electrolytes closely

, 


low sodium diet if able to tolerate and avoid aspiration due to confusion and 

agitation. 








Visit type





- Emergency Visit


Emergency Visit: Yes


ED Registration Date: 03/12/19


Care time: The patient presented to the Emergency Department on the above date 

and was hospitalized for further evaluation of their emergent condition.





- New Patient


This patient is new to me today: Yes


Date on this admission: 03/12/19





- Critical Care


Critical Care patient: No

## 2019-03-12 NOTE — CONSULT
Consult


Consult Specialty:: Nephrology


Reason for Consultation:: FARNAZ and hypokalemia





- History of Present Illness


Chief Complaint: groin pain and weakness


History of Present Illness: 





Pt is an 83 year old male with pmhx of CHF, CKD, PPM, HTN, HLD, and cellulitis 

who presents to the ER with cellulitis and erythema. He also has experienced 

fatigue. Pt is lethargic and very drowsy. He does wake up but is not able to 

give much history. He is knows to me from previous visits and office visits. He 

was on diuretics at home however he does not know the doses. He was recently 

treated for groin cellulitis. He denies fevers. 





- Past Medical History


Cardio/Vascular: Yes: AFIB, CHF, HTN, Other (pacemaker)


Renal/: Yes: Renal Inusuff, BPH, Renal Calculi


Dermatology: Yes: Other


Additional Medical History: wound left big toe grade 1-2





- Past Surgical History


Past Surgical History: Yes: Joint Replacement (knee), Permanent Pacemaker, 

Prostatectomy





- Alcohol/Substance Use


Hx Alcohol Use: No


History of Substance Use: reports: None





- Smoking History


Smoking history: Never smoked


Have you smoked in the past 12 months: No


If you are a former smoker, when did you quit?: 1970





- Social History


ADL: Family Assistance


History of Recent Travel: No





Home Medications





- Allergies


Allergies/Adverse Reactions: 


 Allergies











Allergy/AdvReac Type Severity Reaction Status Date / Time


 


cephalexin monohydrate Allergy Mild Hives Verified 03/11/19 20:59





[From Keflex]     


 


ertapenem Allergy   Verified 03/11/19 20:59














- Home Medications


Home Medications: 


Ambulatory Orders





Tamsulosin HCl [Flomax] 0.8 mg PO DAILY 03/11/17 


Sennosides [Senna -] 2 tab PO HS  tablet 03/15/17 


Acetaminophen [Tylenol .Regular Strength -] 650 mg PO Q6H PRN  tablet 12/23/17 


Clotrimazole [Antifungal] 30 gm TP DAILY 03/24/18 


Alprazolam [Xanax] 0.25 mg PO Q8H PRN  tablet MDD 3 07/11/18 


Finasteride [Proscar -] 5 mg PO DAILY #30 tablet 07/11/18 


Rosuvastatin [Crestor -] 5 mg PO HS #30 tablet 07/11/18 


Cholecalciferol (Vitamin D3) [Vitamin D3] 0 unit PO DAILY 09/08/18 


Metolazone 2.5 mg PO Q48H 09/08/18 


Ranitidine HCl 150 mg PO BID 09/09/18 


Bacitracin - [Bacitracin Topical Ointment -] 1 applic TP DAILY #90 tube 09/14/ 18 


Collagenase Clostridium Hist. [Santyl -] 1 applic TP DAILY #90 tube 09/14/18 


Docusate Sodium [Colace -] 100 mg PO BID PRN #60 capsule 09/14/18 


Doxycycline Hyclate [Vibramycin -] 100 mg PO BID@1000,1800 10 Days #20 capsule 

09/14/18 


Furosemide [Lasix -] 40 mg PO BID@0600,1400 #60 tablet 09/14/18 


Gabapentin [Neurontin -] 200 mg PO TID #90 capsule 09/14/18 


Magnesium Hydrox 2400MG/30Ml [Milk of Magnesia -] 30 ml PO Q8H PRN  cup 09/14/ 18 


Metolazone [Zaroxolyn -] 2.5 mg PO Q2D@0530  tablet 09/14/18 


Metoprolol Succinate [Toprol XL -] 25 mg PO DAILY  tab.sr.24h 09/14/18 


Multivitamins [Multivit ([facility] Formulary)] 1 tab PO DAILY  tab 09/14/18 


Mupirocin Cream [Bactroban 2% Cream -] 1 applic TP BID #90 tube 09/14/18 


Potassium Chloride [K-Dur -] 10 meq PO DAILY #30 tablet.er 09/14/18 


Ranitidine [Zantac -] 150 mg PO BID  tablet 09/14/18 


Rivaroxaban [Xarelto -] 20 mg PO DAILY@1800  tablet 09/14/18 


Zinc Sulfate [Orazinc -] 220 mg PO DAILY@1200 #30 capsule 09/14/18 











Family Disease History





- Family Disease History


Family History: Denies





Review of Systems


Unable to obtain ROS, reason: pt is drowsy





- Review of Systems


Constitutional: reports: Malaise


Neck: reports: No Symptoms


Cardiovascular: reports: Edema


Respiratory: reports: No Symptoms


Genitourinary: reports: Other (cellulits)


Integumentary: reports: Erythema


Endocrine: reports: No Symptoms


Hematology/Lymphatic: reports: No Symptoms


Psychiatric: reports: No Symptoms





Physical Exam


Vital Signs: 


 Vital Signs











Temperature  97.9 F   03/12/19 14:09


 


Pulse Rate  76   03/12/19 14:09


 


Respiratory Rate  18   03/12/19 14:09


 


Blood Pressure  111/52 L  03/12/19 14:09


 


O2 Sat by Pulse Oximetry (%)  97   03/12/19 04:32











Constitutional: Yes: Calm, Mild Distress


Eyes: Yes: Conjunctiva Clear


HENT: Yes: Atraumatic


Neck: Yes: Supple


Cardiovascular: Yes: S1, S2


Respiratory: Yes: CTA Bilaterally


Gastrointestinal: Yes: Abdomen, Obese


Renal/: Yes: Incontinence


Musculoskeletal: Yes: Muscle Weakness


Edema: Yes


Edema: LLE: 1+, RLE: 1+


Wound/Incision: Yes: Open to air


Neurological: Yes: Confusion, Lethargy


Labs: 


 CBC, BMP





 03/12/19 06:45 





 03/12/19 06:45 





 Laboratory Tests











  09/10/18 09/12/18 09/14/18





  07:00 06:30 10:00


 


WBC   


 


Hgb   


 


Sodium   


 


Potassium   


 


Carbon Dioxide   


 


Creatinine  1.2  1.2  1.2














  03/11/19 03/12/19 03/12/19





  23:20 06:45 06:45


 


WBC   11.2 H 


 


Hgb   11.6 L 


 


Sodium    137


 


Potassium  2.9 L*   2.8 L*


 


Carbon Dioxide    34 H


 


Creatinine  1.9 H   1.8 H














Problem List





- Problems


(1) FARNAZ (acute kidney injury)


Code(s): N17.9 - ACUTE KIDNEY FAILURE, UNSPECIFIED   





(2) Hypokalemia


Code(s): E87.6 - HYPOKALEMIA   





Assessment/Plan





 Current Medications











Generic Name Dose Route Start Last Admin





  Trade Name Rossana  PRN Reason Stop Dose Admin


 


Acetaminophen  650 mg  03/12/19 02:31  





  Tylenol -  PO   





  Q6H PRN   





  FEVER   





     





     





     


 


Cholecalciferol  1,000 unit  03/12/19 10:00  03/12/19 12:43





  Vitamin D3 -  PO   1,000 unit





  DAILY KELVIN   Administration





     





     





     





     


 


Docusate Sodium  100 mg  03/12/19 02:31  





  Colace -  PO   





  BID PRN   





  CONSTIPATION   





     





     





     


 


Finasteride  5 mg  03/12/19 10:00  03/12/19 09:25





  Proscar -  PO   5 mg





  DAILY KELVIN   Administration





     





     





     





     


 


Gabapentin  200 mg  03/12/19 06:00  03/12/19 15:12





  Neurontin -  PO   200 mg





  TID KELVIN   Administration





     





     





     





     


 


Tigecycline 50 mg/ Dextrose  100 mls @ 100 mls/hr  03/12/19 22:00  





  IVPB   





  BID [facility]   





     





     





  Protocol   





     


 


Ketoconazole  1 applic  03/11/19 23:13  03/12/19 12:42





  Nizoral 2% Cream -  TP   1 appful





  DAILY KELVIN   Administration





     





     





     





     


 


Magnesium Hydroxide  30 ml  03/12/19 02:31  03/12/19 11:10





  Milk Of Magnesia -  PO   30 ml





  Q8H PRN   Administration





  INDIGESTION   





     





     





     


 


Metoprolol Succinate  25 mg  03/12/19 10:00  03/12/19 11:12





  Toprol Xl -  PO   25 mg





  DAILY KELVIN   Administration





     





     





     





     


 


Multivitamins/Minerals/Vitamin C  1 tab  03/12/19 10:00  03/12/19 11:10





  Tab-A-Vit -  PO   1 tab





  DAILY KELVIN   Administration





     





     





     





     


 


Ranitidine HCl  150 mg  03/12/19 10:00  03/12/19 09:28





  Zantac -  PO   150 mg





  BID KELVIN   Administration





     





     





     





     


 


Rivaroxaban  20 mg  03/12/19 18:00  





  Xarelto -  PO   





  DAILY@1800 KELVIN   





     





     





     





     


 


Rosuvastatin Calcium  5 mg  03/12/19 22:00  





  Crestor -  PO   





  HS KELVIN   





     





     





     





     


 


Senna  2 tab  03/12/19 22:00  





  Senna -  PO   





  HS KELVIN   





     





     





     





     


 


Tamsulosin HCl  0.8 mg  03/12/19 08:30  03/12/19 09:25





  Flomax -  PO   0.8 mg





  DAILY@0830 KELVIN   Administration





     





     





     





     


 


Zinc Sulfate  220 mg  03/12/19 12:00  03/12/19 11:13





  Orazinc -  PO   220 mg





  DAILY@1200 KELVIN   Administration





     





     





     





     











Impression


1. CKD


2. hx of hydronephrosis


3. hx of obstructive uropathy


4. cellulitis


5. HTN


6. hypokalemia


7. a-fib


8. CHF


9. FARNAZ








Plan


- hold lasix and hold metolazone


- agree with IV fluids


- repeat bmp


- replace magnesium and potassium


- check renal ultrasound as he is in farnaz and has hx of obstructive uropathy


- check and lytes








Dr Teran

## 2019-03-13 NOTE — PN
Progress Note, Physician


Chief Complaint: 





Cellulitis


Acute Renal Insufficiency


Hypokalemia


History of Present Illness: 





lethargic, arousable to tactile and verbal stimuli


NAD


Partner at bedside





- Current Medication List


Current Medications: 


Active Medications





Acetaminophen (Tylenol -)  650 mg PO Q6H PRN


   PRN Reason: FEVER


Cholecalciferol (Vitamin D3 -)  1,000 unit PO DAILY [facility]


   Last Admin: 03/13/19 09:46 Dose:  1,000 unit


Docusate Sodium (Colace -)  100 mg PO BID PRN


   PRN Reason: CONSTIPATION


Finasteride (Proscar -)  5 mg PO DAILY [facility]


   Last Admin: 03/13/19 09:46 Dose:  5 mg


Gabapentin (Neurontin -)  200 mg PO TID [facility]


   Last Admin: 03/13/19 06:00 Dose:  200 mg


Tigecycline 50 mg/ Dextrose  100 mls @ 100 mls/hr IVPB BID [facility]; Protocol


   Last Admin: 03/12/19 21:26 Dose:  100 mls/hr


Ketoconazole (Nizoral 2% Cream -)  1 applic TP DAILY [facility]


   Last Admin: 03/13/19 09:46 Dose:  1 appful


Magnesium Hydroxide (Milk Of Magnesia -)  30 ml PO Q8H PRN


   PRN Reason: INDIGESTION


   Last Admin: 03/12/19 11:10 Dose:  30 ml


Metoprolol Succinate (Toprol Xl -)  25 mg PO DAILY [facility]


   Last Admin: 03/13/19 09:46 Dose:  25 mg


Multivitamins/Minerals/Vitamin C (Tab-A-Vit -)  1 tab PO DAILY [facility]


   Last Admin: 03/13/19 09:47 Dose:  1 tab


Ranitidine HCl (Zantac -)  150 mg PO BID [facility]


   Last Admin: 03/13/19 09:46 Dose:  150 mg


Rivaroxaban (Xarelto -)  20 mg PO DAILY@1800 [facility]


   Last Admin: 03/12/19 17:56 Dose:  20 mg


Rosuvastatin Calcium (Crestor -)  5 mg PO [facility]


   Last Admin: 03/12/19 21:25 Dose:  5 mg


Senna (Senna -)  2 tab PO HS [facility]


   Last Admin: 03/12/19 21:23 Dose:  2 tab


Tamsulosin HCl (Flomax -)  0.8 mg PO DAILY@0830 [facility]


   Last Admin: 03/13/19 09:46 Dose:  0.8 mg


Zinc Sulfate (Orazinc -)  220 mg PO DAILY@1200 [facility]


   Last Admin: 03/12/19 11:13 Dose:  220 mg











- Objective


Vital Signs: 


 Vital Signs











Temperature  99.1 F   03/13/19 09:52


 


Pulse Rate  69   03/13/19 09:52


 


Respiratory Rate  20   03/13/19 09:52


 


Blood Pressure  119/62   03/13/19 09:52


 


O2 Sat by Pulse Oximetry (%)  96   03/12/19 21:00











Constitutional: Yes: Well Nourished, No Distress, Calm, Obese


Cardiovascular: Yes: Regular Rate and Rhythm


Respiratory: Yes: Regular


Gastrointestinal: Yes: WNL, Normal Bowel Sounds, Soft, Abdomen, Obese


Genitourinary: Yes: Incontinence


Musculoskeletal: Yes: Muscle Weakness


Edema: Yes (lymphedema)


Peripheral Pulses WNL: Yes


Neurological: Yes: Alert, Lethargy


Psychiatric: Yes: Alert


Labs: 


 CBC, BMP





 03/13/19 06:30 





 03/13/19 06:30 











Problem List





- Problems


(1) AGUILA (acute kidney injury)


Assessment/Plan: 


-Nephrology on board


-Cr trending down


-monitor trend


Code(s): N17.9 - ACUTE KIDNEY FAILURE, UNSPECIFIED   





(2) Cellulitis of abdominal wall


Assessment/Plan: 


-ID on board


-IV abx


-afebrile


-+ leukocytosis


Code(s): L03.311 - CELLULITIS OF ABDOMINAL WALL   





(3) Atrial fibrillation


Assessment/Plan: 


-On eliquis


-Rate controlled


Code(s): I48.91 - UNSPECIFIED ATRIAL FIBRILLATION   


Qualifiers: 


   Atrial fibrillation type: chronic   Qualified Code(s): I48.2 - Chronic 

atrial fibrillation   





(4) Cellulitis


Assessment/Plan: 


-ID on board


-IV abx


-afebrile


-+ leukocytosis


Code(s): L03.90 - CELLULITIS, UNSPECIFIED   


Qualifiers: 


   Laterality: left 





(5) Hypercalcemia


Assessment/Plan: 


-nephrology on board


-PTH intact with Ca+


Code(s): E83.52 - HYPERCALCEMIA   





Assessment/Plan





see problem list


did poorly with Physical therapy

## 2019-03-13 NOTE — PN
Progress Note, Physician


History of Present Illness: 





Pt seen and examined at bedside. He is more awake and alert today. His mental 

status is improved but not back to baseline. 





- Current Medication List


Current Medications: 


Active Medications





Acetaminophen (Tylenol -)  650 mg PO Q6H PRN


   PRN Reason: PAIN OR FEVER


Cholecalciferol (Vitamin D3 -)  1,000 unit PO DAILY [facility]


   Last Admin: 03/13/19 09:46 Dose:  1,000 unit


Docusate Sodium (Colace -)  100 mg PO BID PRN


   PRN Reason: CONSTIPATION


Finasteride (Proscar -)  5 mg PO DAILY [facility]


   Last Admin: 03/13/19 09:46 Dose:  5 mg


Gabapentin (Neurontin -)  200 mg PO TID [facility]


   Last Admin: 03/13/19 06:00 Dose:  200 mg


Tigecycline 50 mg/ Dextrose  100 mls @ 100 mls/hr IVPB BID [facility]; Protocol


   Last Admin: 03/13/19 12:27 Dose:  100 mls/hr


Ketoconazole (Nizoral 2% Cream -)  1 applic TP DAILY [facility]


   Last Admin: 03/13/19 09:46 Dose:  1 appful


Magnesium Hydroxide (Milk Of Magnesia -)  30 ml PO Q8H PRN


   PRN Reason: INDIGESTION


   Last Admin: 03/12/19 11:10 Dose:  30 ml


Metoprolol Succinate (Toprol Xl -)  25 mg PO DAILY [facility]


   Last Admin: 03/13/19 09:46 Dose:  25 mg


Multivitamins/Minerals/Vitamin C (Tab-A-Vit -)  1 tab PO DAILY [facility]


   Last Admin: 03/13/19 09:47 Dose:  1 tab


Quetiapine Fumarate (Seroquel -)  25 mg PO BID [facility]


Ranitidine HCl (Zantac -)  150 mg PO BID [facility]


   Last Admin: 03/13/19 09:46 Dose:  150 mg


Rivaroxaban (Xarelto -)  20 mg PO DAILY@1800 [facility]


   Last Admin: 03/12/19 17:56 Dose:  20 mg


Rosuvastatin Calcium (Crestor -)  5 mg PO HS [facility]


   Last Admin: 03/12/19 21:25 Dose:  5 mg


Senna (Senna -)  2 tab PO HS [facility]


   Last Admin: 03/12/19 21:23 Dose:  2 tab


Tamsulosin HCl (Flomax -)  0.8 mg PO DAILY@0830 [facility]


   Last Admin: 03/13/19 09:46 Dose:  0.8 mg


Zinc Sulfate (Orazinc -)  220 mg PO DAILY@1200 [facility]


   Last Admin: 03/13/19 12:26 Dose:  220 mg











- Objective


Vital Signs: 


 Vital Signs











Temperature  99.1 F   03/13/19 09:52


 


Pulse Rate  69   03/13/19 09:52


 


Respiratory Rate  20   03/13/19 09:52


 


Blood Pressure  119/62   03/13/19 09:52


 


O2 Sat by Pulse Oximetry (%)  96   03/12/19 21:00











Constitutional: Yes: Calm


Eyes: Yes: Conjunctiva Clear


HENT: Yes: Atraumatic


Cardiovascular: Yes: S1, S2


Respiratory: Yes: CTA Bilaterally


Gastrointestinal: Yes: Soft, Abdomen, Obese


Genitourinary: Yes: WNL


Edema: Yes


Edema: LLE: 1+, RLE: 1+


Neurological: Yes: Oriented


Psychiatric: Yes: Agitated


Labs: 


 CBC, BMP





 03/13/19 06:30 





 03/13/19 06:30 











- ....Imaging


Chest X-ray: Report Reviewed





Problem List





- Problems


(1) AGUILA (acute kidney injury)


Code(s): N17.9 - ACUTE KIDNEY FAILURE, UNSPECIFIED   





(2) Hypokalemia


Code(s): E87.6 - HYPOKALEMIA   





Assessment/Plan


 Current Medications











Generic Name Dose Route Start Last Admin





  Trade Name Dominickq  PRN Reason Stop Dose Admin


 


Acetaminophen  650 mg  03/13/19 11:31  





  Tylenol -  PO   





  Q6H PRN   





  PAIN OR FEVER   





     





     





     


 


Cholecalciferol  1,000 unit  03/12/19 10:00  03/13/19 09:46





  Vitamin D3 -  PO   1,000 unit





  DAILY [facility]   Administration





     





     





     





     


 


Docusate Sodium  100 mg  03/12/19 02:31  





  Colace -  PO   





  BID PRN   





  CONSTIPATION   





     





     





     


 


Finasteride  5 mg  03/12/19 10:00  03/13/19 09:46





  Proscar -  PO   5 mg





  DAILY [facility]   Administration





     





     





     





     


 


Gabapentin  200 mg  03/12/19 06:00  03/13/19 06:00





  Neurontin -  PO   200 mg





  TID [facility]   Administration





     





     





     





     


 


Tigecycline 50 mg/ Dextrose  100 mls @ 100 mls/hr  03/12/19 22:00  03/13/19 12:

27





  IVPB   100 mls/hr





  BID [facility]   Administration





     





     





  Protocol   





     


 


Ketoconazole  1 applic  03/11/19 23:13  03/13/19 09:46





  Nizoral 2% Cream -  TP   1 appful





  DAILY [facility]   Administration





     





     





     





     


 


Magnesium Hydroxide  30 ml  03/12/19 02:31  03/12/19 11:10





  Milk Of Magnesia -  PO   30 ml





  Q8H PRN   Administration





  INDIGESTION   





     





     





     


 


Metoprolol Succinate  25 mg  03/12/19 10:00  03/13/19 09:46





  Toprol Xl -  PO   25 mg





  DAILY [facility]   Administration





     





     





     





     


 


Multivitamins/Minerals/Vitamin C  1 tab  03/12/19 10:00  03/13/19 09:47





  Tab-A-Vit -  PO   1 tab





  DAILY [facility]   Administration





     





     





     





     


 


Quetiapine Fumarate  25 mg  03/13/19 11:31  





  Seroquel -  PO   





  BID [facility]   





     





     





     





     


 


Ranitidine HCl  150 mg  03/12/19 10:00  03/13/19 09:46





  Zantac -  PO   150 mg





  BID [facility]   Administration





     





     





     





     


 


Rivaroxaban  20 mg  03/12/19 18:00  03/12/19 17:56





  Xarelto -  PO   20 mg





  DAILY@1800 [facility]   Administration





     





     





     





     


 


Rosuvastatin Calcium  5 mg  03/12/19 22:00  03/12/19 21:25





  Crestor -  PO   5 mg





  HS [facility]   Administration





     





     





     





     


 


Senna  2 tab  03/12/19 22:00  03/12/19 21:23





  Senna -  PO   2 tab





  HS [facility]   Administration





     





     





     





     


 


Tamsulosin HCl  0.8 mg  03/12/19 08:30  03/13/19 09:46





  Flomax -  PO   0.8 mg





  DAILY@0830 [facility]   Administration





     





     





     





     


 


Zinc Sulfate  220 mg  03/12/19 12:00  03/13/19 12:26





  Orazinc -  PO   220 mg





  DAILY@1200 [facility]   Administration





     





     





     





     











Impression


1. CKD


2. hx of hydronephrosis


3. hx of obstructive uropathy


4. cellulitis


5. HTN


6. hypokalemia


7. a-fib


8. CHF


9. AGUILA


10. hypercalcemia








Plan


- stop fluids


- hold diuretics for now


- repeat labs in am


- renal function improved


- calcium improved


- follow renal ultrasound


- follow echo report


- follow urine lytes








Dr Teran

## 2019-03-13 NOTE — PN
Progress Note, Physician


History of Present Illness: 





CONFUSED


NO C/O PAIN


AFEBRILE


WBC SL ELEVATED








- Current Medication List


Current Medications: 


Active Medications





Acetaminophen (Tylenol -)  650 mg PO Q6H PRN


   PRN Reason: PAIN OR FEVER


Cholecalciferol (Vitamin D3 -)  1,000 unit PO DAILY [facility]


   Last Admin: 03/13/19 09:46 Dose:  1,000 unit


Docusate Sodium (Colace -)  100 mg PO BID PRN


   PRN Reason: CONSTIPATION


Finasteride (Proscar -)  5 mg PO DAILY [facility]


   Last Admin: 03/13/19 09:46 Dose:  5 mg


Gabapentin (Neurontin -)  200 mg PO TID [facility]


   Last Admin: 03/13/19 06:00 Dose:  200 mg


Tigecycline 50 mg/ Dextrose  100 mls @ 100 mls/hr IVPB BID [facility]; Protocol


   Last Admin: 03/13/19 12:27 Dose:  100 mls/hr


Ketoconazole (Nizoral 2% Cream -)  1 applic TP DAILY [facility]


   Last Admin: 03/13/19 09:46 Dose:  1 appful


Magnesium Hydroxide (Milk Of Magnesia -)  30 ml PO Q8H PRN


   PRN Reason: INDIGESTION


   Last Admin: 03/12/19 11:10 Dose:  30 ml


Metoprolol Succinate (Toprol Xl -)  25 mg PO DAILY [facility]


   Last Admin: 03/13/19 09:46 Dose:  25 mg


Multivitamins/Minerals/Vitamin C (Tab-A-Vit -)  1 tab PO DAILY [facility]


   Last Admin: 03/13/19 09:47 Dose:  1 tab


Quetiapine Fumarate (Seroquel -)  25 mg PO BID [facility]


Ranitidine HCl (Zantac -)  150 mg PO BID [facility]


   Last Admin: 03/13/19 09:46 Dose:  150 mg


Rivaroxaban (Xarelto -)  20 mg PO DAILY@1800 [facility]


   Last Admin: 03/12/19 17:56 Dose:  20 mg


Rosuvastatin Calcium (Crestor -)  5 mg PO [facility]


   Last Admin: 03/12/19 21:25 Dose:  5 mg


Senna (Senna -)  2 tab PO HS [facility]


   Last Admin: 03/12/19 21:23 Dose:  2 tab


Tamsulosin HCl (Flomax -)  0.8 mg PO DAILY@0830 [facility]


   Last Admin: 03/13/19 09:46 Dose:  0.8 mg


Zinc Sulfate (Orazinc -)  220 mg PO DAILY@1200 [facility]


   Last Admin: 03/13/19 12:26 Dose:  220 mg











- Objective


Vital Signs: 


 Vital Signs











Temperature  98.1 F   03/13/19 13:52


 


Pulse Rate  68   03/13/19 13:52


 


Respiratory Rate  20   03/13/19 13:52


 


Blood Pressure  135/65   03/13/19 13:52


 


O2 Sat by Pulse Oximetry (%)  96   03/13/19 09:00











Constitutional: Yes: No Distress, Obese


Eyes: Yes: Conjunctiva Clear


Cardiovascular: Yes: Regular Rate and Rhythm, S1, S2


Respiratory: Yes: CTA Bilaterally


Gastrointestinal: Yes: Normal Bowel Sounds, Soft.  No: Tenderness


Integumentary: Yes: Other (DECREASED ERYTHEMA LOWER ABDOMEN/ LE BILATERALLY. + 1

-2CM SUPERFICIAL ULCER R POSTERIOR THIGH   NO WOUND DRAINAGE)


Labs: 


 CBC, BMP





 03/13/19 06:30 





 03/13/19 06:30 











Assessment/Plan





CELLULITIS LOWER ABDOMEN / LE B/L


CEPHALOSPORIN ALLERGY


TOXIC METABOLIC ENCEPHALOPATHY


AZOTEMIA


CONTINUE TYGACIL, TOPICAL ANTIFUNGAL

## 2019-03-14 NOTE — PN
Progress Note, Physician


History of Present Illness: 





Pt seen and examined at bedside. He is more awake than he was yesterday however 

mental status is not back to baseline. 





- Current Medication List


Current Medications: 


Active Medications





Acetaminophen (Tylenol -)  650 mg PO Q6H PRN


   PRN Reason: PAIN OR FEVER


   Last Admin: 03/13/19 15:35 Dose:  650 mg


Cholecalciferol (Vitamin D3 -)  1,000 unit PO DAILY [facility]


   Last Admin: 03/14/19 09:43 Dose:  1,000 unit


Docusate Sodium (Colace -)  100 mg PO BID PRN


   PRN Reason: CONSTIPATION


Finasteride (Proscar -)  5 mg PO DAILY [facility]


   Last Admin: 03/14/19 09:43 Dose:  5 mg


Gabapentin (Neurontin -)  200 mg PO TID [facility]


   Last Admin: 03/14/19 13:14 Dose:  200 mg


Tigecycline 50 mg/ Dextrose  100 mls @ 100 mls/hr IVPB BID [facility]; Protocol


   Last Admin: 03/14/19 09:43 Dose:  100 mls/hr


Ketoconazole (Nizoral 2% Cream -)  1 applic TP DAILY [facility]


   Last Admin: 03/14/19 11:15 Dose:  1 appful


Magnesium Hydroxide (Milk Of Magnesia -)  30 ml PO Q8H PRN


   PRN Reason: INDIGESTION


   Last Admin: 03/12/19 11:10 Dose:  30 ml


Metoprolol Succinate (Toprol Xl -)  25 mg PO DAILY [facility]


   Last Admin: 03/14/19 09:43 Dose:  25 mg


Multivitamins/Minerals/Vitamin C (Tab-A-Vit -)  1 tab PO DAILY [facility]


   Last Admin: 03/14/19 09:44 Dose:  1 tab


Quetiapine Fumarate (Seroquel -)  25 mg PO BID [facility]


   Last Admin: 03/14/19 09:44 Dose:  25 mg


Ranitidine HCl (Zantac -)  150 mg PO BID [facility]


   Last Admin: 03/14/19 09:44 Dose:  150 mg


Rivaroxaban (Xarelto -)  20 mg PO DAILY@1800 [facility]


   Last Admin: 03/13/19 17:54 Dose:  20 mg


Rosuvastatin Calcium (Crestor -)  5 mg PO HS [facility]


   Last Admin: 03/13/19 22:07 Dose:  5 mg


Senna (Senna -)  2 tab PO HS [facility]


   Last Admin: 03/13/19 22:07 Dose:  2 tab


Tamsulosin HCl (Flomax -)  0.8 mg PO DAILY@0830 [facility]


   Last Admin: 03/14/19 09:44 Dose:  0.8 mg


Zinc Sulfate (Orazinc -)  220 mg PO DAILY@1200 [facility]


   Last Admin: 03/14/19 11:32 Dose:  220 mg











- Objective


Vital Signs: 


 Vital Signs











Temperature  97.3 F L  03/14/19 08:48


 


Pulse Rate  71   03/14/19 08:48


 


Respiratory Rate  20   03/14/19 09:00


 


Blood Pressure  138/57 L  03/14/19 08:48


 


O2 Sat by Pulse Oximetry (%)  95   03/14/19 09:00











Constitutional: Yes: Calm


Eyes: Yes: Conjunctiva Clear


HENT: Yes: Atraumatic


Neck: Yes: Supple


Cardiovascular: Yes: S1, S2


Respiratory: Yes: CTA Bilaterally


Gastrointestinal: Yes: Soft, Abdomen, Obese


Genitourinary: Yes: Incontinence


Edema: Yes


Edema: LLE: 1+, RLE: 1+


Neurological: Yes: Confusion, Lethargy


Labs: 


 CBC, BMP





 03/14/19 06:15 





 03/14/19 06:15 











Problem List





- Problems


(1) AGUILA (acute kidney injury)


Code(s): N17.9 - ACUTE KIDNEY FAILURE, UNSPECIFIED   





(2) Hypokalemia


Code(s): E87.6 - HYPOKALEMIA   





Assessment/Plan


 Current Medications











Generic Name Dose Route Start Last Admin





  Trade Name Freq  PRN Reason Stop Dose Admin


 


Acetaminophen  650 mg  03/13/19 11:31  03/13/19 15:35





  Tylenol -  PO   650 mg





  Q6H PRN   Administration





  PAIN OR FEVER   





     





     





     


 


Cholecalciferol  1,000 unit  03/12/19 10:00  03/14/19 09:43





  Vitamin D3 -  PO   1,000 unit





  DAILY [facility]   Administration





     





     





     





     


 


Docusate Sodium  100 mg  03/12/19 02:31  





  Colace -  PO   





  BID PRN   





  CONSTIPATION   





     





     





     


 


Finasteride  5 mg  03/12/19 10:00  03/14/19 09:43





  Proscar -  PO   5 mg





  DAILY [facility]   Administration





     





     





     





     


 


Gabapentin  200 mg  03/12/19 06:00  03/14/19 13:14





  Neurontin -  PO   200 mg





  TID [facility]   Administration





     





     





     





     


 


Tigecycline 50 mg/ Dextrose  100 mls @ 100 mls/hr  03/12/19 22:00  03/14/19 09:

43





  IVPB   100 mls/hr





  BID [facility]   Administration





     





     





  Protocol   





     


 


Ketoconazole  1 applic  03/11/19 23:13  03/14/19 11:15





  Nizoral 2% Cream -  TP   1 appful





  DAILY [facility]   Administration





     





     





     





     


 


Magnesium Hydroxide  30 ml  03/12/19 02:31  03/12/19 11:10





  Milk Of Magnesia -  PO   30 ml





  Q8H PRN   Administration





  INDIGESTION   





     





     





     


 


Metoprolol Succinate  25 mg  03/12/19 10:00  03/14/19 09:43





  Toprol Xl -  PO   25 mg





  DAILY [facility]   Administration





     





     





     





     


 


Multivitamins/Minerals/Vitamin C  1 tab  03/12/19 10:00  03/14/19 09:44





  Tab-A-Vit -  PO   1 tab





  DAILY [facility]   Administration





     





     





     





     


 


Quetiapine Fumarate  25 mg  03/13/19 11:31  03/14/19 09:44





  Seroquel -  PO   25 mg





  BID [facility]   Administration





     





     





     





     


 


Ranitidine HCl  150 mg  03/12/19 10:00  03/14/19 09:44





  Zantac -  PO   150 mg





  BID [facility]   Administration





     





     





     





     


 


Rivaroxaban  20 mg  03/12/19 18:00  03/13/19 17:54





  Xarelto -  PO   20 mg





  DAILY@1800 [facility]   Administration





     





     





     





     


 


Rosuvastatin Calcium  5 mg  03/12/19 22:00  03/13/19 22:07





  Crestor -  PO   5 mg





  HS [facility]   Administration





     





     





     





     


 


Senna  2 tab  03/12/19 22:00  03/13/19 22:07





  Senna -  PO   2 tab





  HS [facility]   Administration





     





     





     





     


 


Tamsulosin HCl  0.8 mg  03/12/19 08:30  03/14/19 09:44





  Flomax -  PO   0.8 mg





  DAILY@0830 [facility]   Administration





     





     





     





     


 


Zinc Sulfate  220 mg  03/12/19 12:00  03/14/19 11:32





  Orazinc -  PO   220 mg





  DAILY@1200 [facility]   Administration





     





     





     





     











Impression


1. CKD


2. hx of hydronephrosis


3. hx of obstructive uropathy


4. cellulitis


5. HTN


6. hypokalemia


7. a-fib


8. CHF


9. AGUILA


10. hypercalcemia








Plan


- repeat labs in am


- calcium improved


- check renal ultrasound and place gerber if obstructed


- diuretics on hold, please restart once renal function stabilizes


and his po intake improved


- monitor volume status








Dr Teran

## 2019-03-14 NOTE — PN
Progress Note, Physician


History of Present Illness: 


Afebrile, being treated for recurrent cellulitis.





Cardiologist: Dr. Ronnie Esposito, but wife wishes him to follow up in our office





- Current Medication List


Current Medications: 


Active Medications





Acetaminophen (Tylenol -)  650 mg PO Q6H PRN


   PRN Reason: PAIN OR FEVER


   Last Admin: 03/13/19 15:35 Dose:  650 mg


Cholecalciferol (Vitamin D3 -)  1,000 unit PO DAILY [facility]


   Last Admin: 03/14/19 09:43 Dose:  1,000 unit


Docusate Sodium (Colace -)  100 mg PO BID PRN


   PRN Reason: CONSTIPATION


Finasteride (Proscar -)  5 mg PO DAILY [facility]


   Last Admin: 03/14/19 09:43 Dose:  5 mg


Gabapentin (Neurontin -)  200 mg PO TID [facility]


   Last Admin: 03/14/19 05:41 Dose:  200 mg


Tigecycline 50 mg/ Dextrose  100 mls @ 100 mls/hr IVPB BID [facility]; Protocol


   Last Admin: 03/14/19 09:43 Dose:  100 mls/hr


Ketoconazole (Nizoral 2% Cream -)  1 applic TP DAILY [facility]


   Last Admin: 03/13/19 09:46 Dose:  1 appful


Magnesium Hydroxide (Milk Of Magnesia -)  30 ml PO Q8H PRN


   PRN Reason: INDIGESTION


   Last Admin: 03/12/19 11:10 Dose:  30 ml


Metoprolol Succinate (Toprol Xl -)  25 mg PO DAILY [facility]


   Last Admin: 03/14/19 09:43 Dose:  25 mg


Multivitamins/Minerals/Vitamin C (Tab-A-Vit -)  1 tab PO DAILY [facility]


   Last Admin: 03/14/19 09:44 Dose:  1 tab


Quetiapine Fumarate (Seroquel -)  25 mg PO BID [facility]


   Last Admin: 03/14/19 09:44 Dose:  25 mg


Ranitidine HCl (Zantac -)  150 mg PO BID [facility]


   Last Admin: 03/14/19 09:44 Dose:  150 mg


Rivaroxaban (Xarelto -)  20 mg PO DAILY@1800 [facility]


   Last Admin: 03/13/19 17:54 Dose:  20 mg


Rosuvastatin Calcium (Crestor -)  5 mg PO HS [facility]


   Last Admin: 03/13/19 22:07 Dose:  5 mg


Senna (Senna -)  2 tab PO HS [facility]


   Last Admin: 03/13/19 22:07 Dose:  2 tab


Tamsulosin HCl (Flomax -)  0.8 mg PO DAILY@0830 [facility]


   Last Admin: 03/14/19 09:44 Dose:  0.8 mg


Zinc Sulfate (Orazinc -)  220 mg PO DAILY@1200 [facility]


   Last Admin: 03/13/19 12:26 Dose:  220 mg











- Objective


Vital Signs: 


 Vital Signs











Temperature  97.3 F L  03/14/19 08:48


 


Pulse Rate  71   03/14/19 08:48


 


Respiratory Rate  20   03/14/19 08:48


 


Blood Pressure  138/57 L  03/14/19 08:48


 


O2 Sat by Pulse Oximetry (%)  97   03/13/19 21:00











Constitutional: Yes: No Distress, Calm


Neck: Yes: Supple


Cardiovascular: Yes: Regular Rate and Rhythm


Respiratory: Yes: Regular, Diminished


Gastrointestinal: Yes: Normal Bowel Sounds, Soft, Abdomen, Obese


Edema: Yes


Integumentary: Yes: Venous Stasis Changes


Labs: 


 CBC, BMP





 03/14/19 06:15 





 03/14/19 06:15 











Problem List





- Problems


(1) Demand ischemia


Code(s): I24.8 - OTHER FORMS OF ACUTE ISCHEMIC HEART DISEASE   





(2) AGUILA (acute kidney injury)


Code(s): N17.9 - ACUTE KIDNEY FAILURE, UNSPECIFIED   





(3) Elevated troponin


Code(s): R74.8 - ABNORMAL LEVELS OF OTHER SERUM ENZYMES   





(4) Atrial fibrillation


Code(s): I48.91 - UNSPECIFIED ATRIAL FIBRILLATION   


Qualifiers: 


   Atrial fibrillation type: chronic   Qualified Code(s): I48.2 - Chronic 

atrial fibrillation   





(5) Cellulitis


Code(s): L03.90 - CELLULITIS, UNSPECIFIED   


Qualifiers: 


   Laterality: left 





(6) CHF (congestive heart failure)


Code(s): I50.9 - HEART FAILURE, UNSPECIFIED   


Qualifiers: 


   Heart failure type: diastolic   Heart failure chronicity: acute on chronic   

Qualified Code(s): I50.33 - Acute on chronic diastolic (congestive) heart 

failure   





(7) Hypertension


Code(s): I10 - ESSENTIAL (PRIMARY) HYPERTENSION   





(8) Lymphedema


Code(s): I89.0 - LYMPHEDEMA, NOT ELSEWHERE CLASSIFIED   





(9) Pacemaker


Code(s): Z95.0 - PRESENCE OF CARDIAC PACEMAKER   





(10) Chronic anticoagulation


Code(s): Z79.01 - LONG TERM (CURRENT) USE OF ANTICOAGULANTS   





Assessment/Plan


Echo: 03/13/2019 Echo: Normal LV size and fxn, mod LOULOU, tr-mild MR, mild TR, 

RVSP 40-50 mmHg





1. Recurrent cellulitis


2. Toxic metabolic encephelopathy


3. Chronic diastolic HF


4. Afib with ventricular pacing on Xarelto


5. HTN


6. Hyperlipidemia


7. AGUILA and hypokalemia resolving


8. CAD demand ischemia


9. BPH





P:1. Empiric abx and antifungal course per ID and C&S


2. Continue Crestor 5 qhs, Toprol XL 25 qd, Xarelto 20 qd, trops downtrending


3. Diuretics held pending renal fxn improvement, f/u renal imaging studies

## 2019-03-14 NOTE — PN
Progress Note, Physician


Chief Complaint: 





Cellulitis


Acute Renal Insufficiency


Hypokalemia


History of Present Illness: 





Previous notes and events reviewed


lethargic, arouseable to tactile and verbal stimuli


NAD








- Current Medication List


Current Medications: 


Active Medications





Acetaminophen (Tylenol -)  650 mg PO Q6H PRN


   PRN Reason: PAIN OR FEVER


   Last Admin: 03/13/19 15:35 Dose:  650 mg


Cholecalciferol (Vitamin D3 -)  1,000 unit PO DAILY [facility]


   Last Admin: 03/14/19 09:43 Dose:  1,000 unit


Docusate Sodium (Colace -)  100 mg PO BID PRN


   PRN Reason: CONSTIPATION


Finasteride (Proscar -)  5 mg PO DAILY [facility]


   Last Admin: 03/14/19 09:43 Dose:  5 mg


Gabapentin (Neurontin -)  200 mg PO TID [facility]


   Last Admin: 03/14/19 13:14 Dose:  200 mg


Tigecycline 50 mg/ Dextrose  100 mls @ 100 mls/hr IVPB BID [facility]; Protocol


   Last Admin: 03/14/19 09:43 Dose:  100 mls/hr


Ketoconazole (Nizoral 2% Cream -)  1 applic TP DAILY [facility]


   Last Admin: 03/14/19 11:15 Dose:  1 appful


Magnesium Hydroxide (Milk Of Magnesia -)  30 ml PO Q8H PRN


   PRN Reason: INDIGESTION


   Last Admin: 03/12/19 11:10 Dose:  30 ml


Metoprolol Succinate (Toprol Xl -)  25 mg PO DAILY [facility]


   Last Admin: 03/14/19 09:43 Dose:  25 mg


Multivitamins/Minerals/Vitamin C (Tab-A-Vit -)  1 tab PO DAILY [facility]


   Last Admin: 03/14/19 09:44 Dose:  1 tab


Quetiapine Fumarate (Seroquel -)  25 mg PO BID [facility]


   Last Admin: 03/14/19 09:44 Dose:  25 mg


Ranitidine HCl (Zantac -)  150 mg PO BID [facility]


   Last Admin: 03/14/19 09:44 Dose:  150 mg


Rivaroxaban (Xarelto -)  20 mg PO DAILY@1800 [facility]


   Last Admin: 03/14/19 17:31 Dose:  20 mg


Rosuvastatin Calcium (Crestor -)  5 mg PO HS [facility]


   Last Admin: 03/13/19 22:07 Dose:  5 mg


Senna (Senna -)  2 tab PO HS [facility]


   Last Admin: 03/13/19 22:07 Dose:  2 tab


Tamsulosin HCl (Flomax -)  0.8 mg PO DAILY@0830 [facility]


   Last Admin: 03/14/19 09:44 Dose:  0.8 mg


Zinc Sulfate (Orazinc -)  220 mg PO DAILY@1200 [facility]


   Last Admin: 03/14/19 11:32 Dose:  220 mg











- Objective


Vital Signs: 


 Vital Signs











Temperature  98.1 F   03/14/19 17:26


 


Pulse Rate  69   03/14/19 17:26


 


Respiratory Rate  18   03/14/19 17:26


 


Blood Pressure  140/60   03/14/19 17:26


 


O2 Sat by Pulse Oximetry (%)  95   03/14/19 09:00











Constitutional: Yes: No Distress, Calm, Obese


Eyes: Yes: Conjunctiva Clear


HENT: Yes: Atraumatic


Cardiovascular: Yes: Regular Rate and Rhythm


Respiratory: Yes: Regular, CTA Bilaterally


Gastrointestinal: Yes: Normal Bowel Sounds, Soft, Abdomen, Obese


Genitourinary: Yes: Incontinence


Musculoskeletal: Yes: Muscle Weakness


Extremities: Yes: WNL


Edema: Yes


Neurological: Yes: Alert, Confusion


Psychiatric: Yes: Alert


Labs: 


 CBC, BMP





 03/14/19 06:15 





 03/14/19 06:15 





 Microbiology





03/11/19 23:10   Buttock - Right   Gram Stain - Final


03/11/19 23:10   Buttock - Right   Wound Culture - Preliminary


                            Proteus Mirabilus - Esbl Produ


                            Presumptive Mrsa (Pbp2a Pos)


                            Group D Strep Or Entero Coccus


03/12/19 10:00   Blood - Peripheral Venous   Blood Culture - Preliminary


                            NO GROWTH OBTAINED AFTER 48 HOURS, INCUBATION TO 

CONTINUE


                            FOR 3 DAYS.


03/11/19 23:50   Blood - Peripheral Venous   Blood Culture - Preliminary


                            NO GROWTH OBTAINED AFTER 48 HOURS, INCUBATION TO 

CONTINUE


                            FOR 3 DAYS.











Problem List





- Problems


(1) Elevated troponin


Assessment/Plan: 


-troponin 0.07


-repeat ordered STAT


-cardiology consult


Code(s): R74.8 - ABNORMAL LEVELS OF OTHER SERUM ENZYMES   





(2) AGUILA (acute kidney injury)


Assessment/Plan: 


-renal on board


-monitor BUN/Cr


-current BUN/Cr 40/1.6


-Renal bladder US shows no intrinsic bladder abnormalities, prevoid bladder 

volume of 821cc, post void image demonstrates poor emptying of bladder with 

large post void residual of 571cc, kidneys are normal in size, mild 

hydronephrosis noted bilaterally 


-urology consult placed


-FC ordered


Code(s): N17.9 - ACUTE KIDNEY FAILURE, UNSPECIFIED   





(3) Cellulitis of abdominal wall


Assessment/Plan: 


-ID on board


-continue with tygacil 


-afebrile


-leukocytosis with WBC 13.9


Code(s): L03.311 - CELLULITIS OF ABDOMINAL WALL   





(4) Hypokalemia


Assessment/Plan: 


-resolved


-current K 3.6





Code(s): E87.6 - HYPOKALEMIA   





(5) Cellulitis


Assessment/Plan: 


-ID on board


-IV tygacil


-afebrile


-leukocytosis with WBC 13.9


-ketoconazile cream


Code(s): L03.90 - CELLULITIS, UNSPECIFIED   


Qualifiers: 


   Laterality: left 





(6) Hypertension


Assessment/Plan: 


-continue with metoprolol


-low Na diet


Code(s): I10 - ESSENTIAL (PRIMARY) HYPERTENSION   





(7) Pacemaker


Assessment/Plan: 


-cardiology consult


Code(s): Z95.0 - PRESENCE OF CARDIAC PACEMAKER   





(8) Atrial fibrillation


Assessment/Plan: 


-continue xarelto


Code(s): I48.91 - UNSPECIFIED ATRIAL FIBRILLATION   


Qualifiers: 


   Atrial fibrillation type: chronic   Qualified Code(s): I48.2 - Chronic 

atrial fibrillation   





Assessment/Plan





see problem list


dvt ppx

## 2019-03-15 NOTE — PN
Progress Note, Physician


Chief Complaint: 





Events noted


Not in distress


History of Present Illness: 





Patient was seen and examined. Awake and alert. Chart was reviewed


Denies chest pain, SOB or palpitations





- Current Medication List


Current Medications: 


Active Medications





Acetaminophen (Tylenol -)  650 mg PO Q6H PRN


   PRN Reason: PAIN OR FEVER


   Last Admin: 03/15/19 12:43 Dose:  650 mg


Cholecalciferol (Vitamin D3 -)  1,000 unit PO DAILY [facility]


   Last Admin: 03/15/19 10:15 Dose:  1,000 unit


Docusate Sodium (Colace -)  100 mg PO BID PRN


   PRN Reason: CONSTIPATION


Finasteride (Proscar -)  5 mg PO DAILY [facility]


   Last Admin: 03/15/19 10:14 Dose:  5 mg


Gabapentin (Neurontin -)  200 mg PO TID [facility]


   Last Admin: 03/15/19 13:04 Dose:  200 mg


Tigecycline 50 mg/ Dextrose  100 mls @ 100 mls/hr IVPB BID [facility]; Protocol


   Last Admin: 03/15/19 10:12 Dose:  100 mls/hr


Ketoconazole (Nizoral 2% Cream -)  1 applic TP DAILY [facility]


   Last Admin: 03/15/19 10:16 Dose:  1 appful


Magnesium Hydroxide (Milk Of Magnesia -)  30 ml PO Q8H PRN


   PRN Reason: INDIGESTION


   Last Admin: 03/12/19 11:10 Dose:  30 ml


Metoprolol Succinate (Toprol Xl -)  25 mg PO DAILY [facility]


   Last Admin: 03/15/19 10:15 Dose:  25 mg


Multivitamins/Minerals/Vitamin C (Tab-A-Vit -)  1 tab PO DAILY [facility]


   Last Admin: 03/15/19 10:15 Dose:  1 tab


Quetiapine Fumarate (Seroquel -)  25 mg PO BID [facility]


   Last Admin: 03/15/19 10:15 Dose:  25 mg


Ranitidine HCl (Zantac -)  150 mg PO BID [facility]


   Last Admin: 03/15/19 10:14 Dose:  150 mg


Rivaroxaban (Xarelto -)  20 mg PO DAILY@1800 [facility]


   Last Admin: 03/15/19 17:17 Dose:  20 mg


Rosuvastatin Calcium (Crestor -)  5 mg PO HS [facility]


   Last Admin: 03/14/19 21:58 Dose:  5 mg


Senna (Senna -)  2 tab PO HS [facility]


   Last Admin: 03/14/19 21:57 Dose:  2 tab


Tamsulosin HCl (Flomax -)  0.8 mg PO DAILY@0830 [facility]


   Last Admin: 03/15/19 10:15 Dose:  0.8 mg


Zinc Sulfate (Orazinc -)  220 mg PO DAILY@1200 [facility]


   Last Admin: 03/15/19 12:23 Dose:  220 mg











- Objective


Vital Signs: 


 Vital Signs











Temperature  97.5 F L  03/15/19 15:13


 


Pulse Rate  69   03/15/19 18:39


 


Respiratory Rate  18   03/15/19 18:39


 


Blood Pressure  127/61   03/15/19 18:39


 


O2 Sat by Pulse Oximetry (%)  95   03/15/19 09:00











HENT: Yes: Atraumatic


Neck: Yes: Supple


Cardiovascular: Yes: Regular Rate and Rhythm, S1, S2


Respiratory: Yes: Diminished


Gastrointestinal: Yes: Normal Bowel Sounds, Soft, Abdomen, Obese.  No: 

Tenderness


Edema: Yes


Labs: 


 CBC, BMP





 03/15/19 06:30 





 03/15/19 06:30 











Problem List





- Problems


(1) AGUILA (acute kidney injury)


Code(s): N17.9 - ACUTE KIDNEY FAILURE, UNSPECIFIED   





(2) Demand ischemia


Code(s): I24.8 - OTHER FORMS OF ACUTE ISCHEMIC HEART DISEASE   





(3) Atrial fibrillation


Code(s): I48.91 - UNSPECIFIED ATRIAL FIBRILLATION   


Qualifiers: 


   Atrial fibrillation type: chronic   Qualified Code(s): I48.2 - Chronic 

atrial fibrillation   





(4) CHF (congestive heart failure)


Code(s): I50.9 - HEART FAILURE, UNSPECIFIED   


Qualifiers: 


   Heart failure type: unspecified   Heart failure chronicity: unspecified   

Qualified Code(s): I50.9 - Heart failure, unspecified   





(5) PVD (peripheral vascular disease)


Code(s): I73.9 - PERIPHERAL VASCULAR DISEASE, UNSPECIFIED   





(6) Hypertension


Code(s): I10 - ESSENTIAL (PRIMARY) HYPERTENSION   


Qualifiers: 


   Hypertension type: essential hypertension   Qualified Code(s): I10 - 

Essential (primary) hypertension   





(7) Pacemaker


Code(s): Z95.0 - PRESENCE OF CARDIAC PACEMAKER   





(8) Cellulitis


Code(s): L03.90 - CELLULITIS, UNSPECIFIED   





Assessment/Plan





1. Recurrent cellulitis


2. Toxic metabolic encephelopathy


3. Chronic diastolic LV failure


4. AF with ventricular pacing on DOAC/Xarelto


5. HTN


6. Hyperlipidemia


7. AGUILA and hypokalemia 


8. CAD with demand ischemia


9. BPH





PLAN:


1. Empiric antibiotics and antifungal course per ID 


2. Continue Crestor 5 mg QHS, Toprol XL 25 mg QD and Xarelto 20 mg QD


3. Diuretics held pending renal improvement





Further plans are to follow


Panfilo Foote MD

## 2019-03-15 NOTE — CONS
DATE OF CONSULTATION:  

 

DATE OF DICTATION:  03/15/2019

 

HISTORY OF PRESENT ILLNESS:  Patient is an 83-year-old male with a significant past

history of congestive heart failure, atherosclerotic heart disease, hypertension,

dyslipidemia, cellulitis with stasis dermatitis of lower extremities.  He was brought

to the hospital because of loss of strength to use his walker.  Patient also has

history of peripheral vascular disease.  Presently, he is somewhat confused _____ he

states that he is usually more alert, but the pain medications have caused him to be

forgetful.  He was an inpatient at [facility] last month with

an infection in the groin.  Presently, the patient was found to have bilateral

hydroureteronephrosis and a distended bladder.  A Berry catheter was placed without

difficulty and more than 600 cc of clear, yellow urine was passed.  The ultrasound of

the kidneys revealed large postvoid residual and bilateral hydroureteronephrosis. 

The prostate gland also was enlarged.

 

The patient's white count is presently 12,200, hemoglobin 11.1, and hematocrit 32.6,

BUN 40, and creatinine 1.3.  Urine cultures are negative.  A wound culture from his

right buttocks area grew out Proteus ESBL and Enterococci faecalis.

 

Presently, the patient is sleeping, barely arousable.  His abdomen is globus and

soft.  There is no CVA tenderness.  Genitalia are atraumatic.  Testes are normal in

size and consistency.  Berry is patent.  Urine is clear.  Prostate is 3+, firm, and

nontender.  

 

MEDICATIONS:  Patient is on Flomax 2 capsules daily, as well as Proscar 5 mg daily.

 

IMPRESSION:  At present is chronic urinary retention with bilateral hydronephrosis. 

Patient is on maximum medical treatment.  Will give trial of voiding in 48 hours. 

Will repeat bladder scan as the Berry is removed.  If patient continues to have

significant postvoid residual, will recommend prostate vaporization.  Will follow

with you.

 

 

TAVO [id number]

DD: 03/15/2019 14:15

DT: 03/15/2019 16:10

Job #:  [id number]

## 2019-03-15 NOTE — PN
Progress Note, Physician


Chief Complaint: 





Cellulitis


Acute Renal Insufficiency


Hypokalemia


History of Present Illness: 





Previous notes and events reviewed


more alert and awake today, less lethargic


NAD


complain of pain to b/l lower extremity





- Current Medication List


Current Medications: 


Active Medications





Acetaminophen (Tylenol -)  650 mg PO Q6H PRN


   PRN Reason: PAIN OR FEVER


   Last Admin: 03/14/19 21:57 Dose:  650 mg


Cholecalciferol (Vitamin D3 -)  1,000 unit PO DAILY [facility]


   Last Admin: 03/15/19 10:15 Dose:  1,000 unit


Docusate Sodium (Colace -)  100 mg PO BID PRN


   PRN Reason: CONSTIPATION


Finasteride (Proscar -)  5 mg PO DAILY [facility]


   Last Admin: 03/15/19 10:14 Dose:  5 mg


Gabapentin (Neurontin -)  200 mg PO TID [facility]


   Last Admin: 03/15/19 05:28 Dose:  200 mg


Tigecycline 50 mg/ Dextrose  100 mls @ 100 mls/hr IVPB BID [facility]; Protocol


   Last Admin: 03/15/19 10:12 Dose:  100 mls/hr


Ketoconazole (Nizoral 2% Cream -)  1 applic TP DAILY [facility]


   Last Admin: 03/15/19 10:16 Dose:  1 appful


Magnesium Hydroxide (Milk Of Magnesia -)  30 ml PO Q8H PRN


   PRN Reason: INDIGESTION


   Last Admin: 03/12/19 11:10 Dose:  30 ml


Metoprolol Succinate (Toprol Xl -)  25 mg PO DAILY [facility]


   Last Admin: 03/15/19 10:15 Dose:  25 mg


Multivitamins/Minerals/Vitamin C (Tab-A-Vit -)  1 tab PO DAILY [facility]


   Last Admin: 03/15/19 10:15 Dose:  1 tab


Quetiapine Fumarate (Seroquel -)  25 mg PO BID [facility]


   Last Admin: 03/15/19 10:15 Dose:  25 mg


Ranitidine HCl (Zantac -)  150 mg PO BID [facility]


   Last Admin: 03/15/19 10:14 Dose:  150 mg


Rivaroxaban (Xarelto -)  20 mg PO DAILY@1800 [facility]


   Last Admin: 03/14/19 17:31 Dose:  20 mg


Rosuvastatin Calcium (Crestor -)  5 mg PO HS [facility]


   Last Admin: 03/14/19 21:58 Dose:  5 mg


Senna (Senna -)  2 tab PO HS [facility]


   Last Admin: 03/14/19 21:57 Dose:  2 tab


Tamsulosin HCl (Flomax -)  0.8 mg PO DAILY@0830 [facility]


   Last Admin: 03/15/19 10:15 Dose:  0.8 mg


Zinc Sulfate (Orazinc -)  220 mg PO DAILY@1200 [facility]


   Last Admin: 03/14/19 11:32 Dose:  220 mg











- Objective


Vital Signs: 


 Vital Signs











Temperature  97.6 F   03/15/19 06:29


 


Pulse Rate  77   03/15/19 06:29


 


Respiratory Rate  18   03/15/19 06:29


 


Blood Pressure  119/61   03/15/19 06:29


 


O2 Sat by Pulse Oximetry (%)  92 L  03/14/19 21:00











Constitutional: Yes: No Distress, Calm, Obese


Eyes: Yes: Conjunctiva Clear


HENT: Yes: Atraumatic


Cardiovascular: Yes: Regular Rate and Rhythm


Respiratory: Yes: Regular, CTA Bilaterally


Gastrointestinal: Yes: Normal Bowel Sounds, Soft, Abdomen, Obese


Genitourinary: Yes: Berry Present


Musculoskeletal: Yes: Muscle Weakness


Edema: Yes


Edema: LLE: 1+, RLE: 1+


Neurological: Yes: Alert, Oriented


Psychiatric: Yes: Alert


Labs: 


 CBC, BMP





 03/15/19 06:30 





 03/15/19 06:30 





 Microbiology





03/12/19 10:00   Blood - Peripheral Venous   Blood Culture - Preliminary


                            NO GROWTH OBTAINED AFTER 72 HOURS, INCUBATION TO 

CONTINUE


                            FOR 2 DAYS.


03/11/19 23:50   Blood - Peripheral Venous   Blood Culture - Preliminary


                            NO GROWTH OBTAINED AFTER 72 HOURS, INCUBATION TO 

CONTINUE


                            FOR 2 DAYS.


03/11/19 23:10   Buttock - Right   Gram Stain - Final


03/11/19 23:10   Buttock - Right   Wound Culture - Preliminary


                            Proteus Mirabilus - Esbl Produ


                            Presumptive Mrsa (Pbp2a Pos)


                            Group D Strep Or Entero Coccus











Problem List





- Problems


(1) Elevated troponin


Assessment/Plan: 


-troponin 0.07


-cardiology on board


Code(s): R74.8 - ABNORMAL LEVELS OF OTHER SERUM ENZYMES   





(2) AGUILA (acute kidney injury)


Assessment/Plan: 


-renal on board


-monitor BUN/Cr daily for downtrend 


-current BUN/Cr 40/1.3


-Renal bladder US shows no intrinsic bladder abnormalities, prevoid bladder 

volume of 821cc, post void image demonstrates poor emptying of bladder with 

large post void residual of 571cc, kidneys are normal in size, mild 

hydronephrosis noted bilaterally 


-urology consult placed


-FC ordered


Code(s): N17.9 - ACUTE KIDNEY FAILURE, UNSPECIFIED   





(3) Cellulitis of abdominal wall


Assessment/Plan: 


-ID on board


-continue with tygacil 


-afebrile


-leukocytosis with WBC 12.2


Code(s): L03.311 - CELLULITIS OF ABDOMINAL WALL   





(4) Hypokalemia


Assessment/Plan: 


-resolved


-current K 3.5





Code(s): E87.6 - HYPOKALEMIA   





(5) Cellulitis


Assessment/Plan: 


-ID on board


-IV tygacil


-afebrile


-leukocytosis with WBC 12.2


-ketoconazile cream


Code(s): L03.90 - CELLULITIS, UNSPECIFIED   


Qualifiers: 


   Laterality: left 





(6) Hypertension


Assessment/Plan: 


-continue with metoprolol


-low Na diet


Code(s): I10 - ESSENTIAL (PRIMARY) HYPERTENSION   





(7) Pacemaker


Assessment/Plan: 


-cardiology on board


Code(s): Z95.0 - PRESENCE OF CARDIAC PACEMAKER   





(8) Atrial fibrillation


Assessment/Plan: 


-continue xarelto


Code(s): I48.91 - UNSPECIFIED ATRIAL FIBRILLATION   


Qualifiers: 


   Atrial fibrillation type: chronic   Qualified Code(s): I48.2 - Chronic 

atrial fibrillation   





Assessment/Plan





see problem list


dvt ppx

## 2019-03-15 NOTE — PN
Progress Note (short form)





- Note


Progress Note: 








Problems


1. CKD


2. hx of hydronephrosis


3. hx of obstructive uropathy


4. cellulitis


5. HTN


6. hypokalemia


7. a-fib


8. CHF


9. AGUILA


10. hypercalcemia











 Current Medications





Acetaminophen (Tylenol -)  650 mg PO Q6H PRN


   PRN Reason: PAIN OR FEVER


   Last Admin: 03/15/19 12:43 Dose:  650 mg


Cholecalciferol (Vitamin D3 -)  1,000 unit PO DAILY [facility]


   Last Admin: 03/15/19 10:15 Dose:  1,000 unit


Docusate Sodium (Colace -)  100 mg PO BID PRN


   PRN Reason: CONSTIPATION


Finasteride (Proscar -)  5 mg PO DAILY [facility]


   Last Admin: 03/15/19 10:14 Dose:  5 mg


Gabapentin (Neurontin -)  200 mg PO TID [facility]


   Last Admin: 03/15/19 13:04 Dose:  200 mg


Tigecycline 50 mg/ Dextrose  100 mls @ 100 mls/hr IVPB BID [facility]; Protocol


   Last Admin: 03/15/19 10:12 Dose:  100 mls/hr


Ketoconazole (Nizoral 2% Cream -)  1 applic TP DAILY [facility]


   Last Admin: 03/15/19 10:16 Dose:  1 appful


Magnesium Hydroxide (Milk Of Magnesia -)  30 ml PO Q8H PRN


   PRN Reason: INDIGESTION


   Last Admin: 03/12/19 11:10 Dose:  30 ml


Metoprolol Succinate (Toprol Xl -)  25 mg PO DAILY [facility]


   Last Admin: 03/15/19 10:15 Dose:  25 mg


Multivitamins/Minerals/Vitamin C (Tab-A-Vit -)  1 tab PO DAILY [facility]


   Last Admin: 03/15/19 10:15 Dose:  1 tab


Quetiapine Fumarate (Seroquel -)  25 mg PO BID [facility]


   Last Admin: 03/15/19 10:15 Dose:  25 mg


Ranitidine HCl (Zantac -)  150 mg PO BID [facility]


   Last Admin: 03/15/19 10:14 Dose:  150 mg


Rivaroxaban (Xarelto -)  20 mg PO DAILY@1800 [facility]


   Last Admin: 03/15/19 17:17 Dose:  20 mg


Rosuvastatin Calcium (Crestor -)  5 mg PO HS [facility]


   Last Admin: 03/14/19 21:58 Dose:  5 mg


Senna (Senna -)  2 tab PO HS [facility]


   Last Admin: 03/14/19 21:57 Dose:  2 tab


Tamsulosin HCl (Flomax -)  0.8 mg PO DAILY@0830 [facility]


   Last Admin: 03/15/19 10:15 Dose:  0.8 mg


Zinc Sulfate (Orazinc -)  220 mg PO DAILY@1200 [facility]


   Last Admin: 03/15/19 12:23 Dose:  220 mg





 Last Vital Signs











Temp Pulse Resp BP Pulse Ox


 


 97.5 F L  69   18   127/61   95 


 


 03/15/19 15:13  03/15/19 18:39  03/15/19 18:39  03/15/19 18:39  03/15/19 09:00














lungs clear


heart reg


abd soft nontender


ext no edema














 CBC, BMP





 03/15/19 06:30 





 03/15/19 06:30 





IMP- improved renal function








Plan


- monitor volume status

## 2019-03-16 NOTE — PN
Progress Note, Physician


Chief Complaint: 





Events noted


Not in distress


History of Present Illness: 





Patient was seen and examined. Awake and alert. Chart was reviewed


Denies chest pain, SOB or palpitations





- Current Medication List


Current Medications: 


Active Medications





Acetaminophen (Tylenol -)  650 mg PO Q6H PRN


   PRN Reason: PAIN OR FEVER


   Last Admin: 03/15/19 12:43 Dose:  650 mg


Cholecalciferol (Vitamin D3 -)  1,000 unit PO DAILY [facility]


   Last Admin: 03/16/19 10:45 Dose:  1,000 unit


Docusate Sodium (Colace -)  100 mg PO BID PRN


   PRN Reason: CONSTIPATION


Finasteride (Proscar -)  5 mg PO DAILY [facility]


   Last Admin: 03/16/19 10:45 Dose:  5 mg


Gabapentin (Neurontin -)  200 mg PO TID [facility]


   Last Admin: 03/16/19 05:27 Dose:  200 mg


Tigecycline 50 mg/ Dextrose  100 mls @ 100 mls/hr IVPB BID [facility]; Protocol


   Last Admin: 03/16/19 10:44 Dose:  100 mls/hr


Ketoconazole (Nizoral 2% Cream -)  1 applic TP DAILY [facility]


   Last Admin: 03/16/19 10:45 Dose:  1 appful


Magnesium Hydroxide (Milk Of Magnesia -)  30 ml PO Q8H PRN


   PRN Reason: INDIGESTION


   Last Admin: 03/12/19 11:10 Dose:  30 ml


Metoprolol Succinate (Toprol Xl -)  25 mg PO DAILY [facility]


   Last Admin: 03/16/19 10:45 Dose:  25 mg


Multivitamins/Minerals/Vitamin C (Tab-A-Vit -)  1 tab PO DAILY [facility]


   Last Admin: 03/16/19 10:45 Dose:  1 tab


Quetiapine Fumarate (Seroquel -)  25 mg PO BID [facility]


   Last Admin: 03/16/19 10:45 Dose:  25 mg


Ranitidine HCl (Zantac -)  150 mg PO BID [facility]


   Last Admin: 03/16/19 10:45 Dose:  150 mg


Rivaroxaban (Xarelto -)  20 mg PO DAILY@1800 [facility]


   Last Admin: 03/15/19 17:17 Dose:  20 mg


Rosuvastatin Calcium (Crestor -)  5 mg PO HS [facility]


   Last Admin: 03/15/19 22:03 Dose:  5 mg


Senna (Senna -)  2 tab PO HS [facility]


   Last Admin: 03/15/19 22:01 Dose:  2 tab


Tamsulosin HCl (Flomax -)  0.8 mg PO DAILY@0830 [facility]


   Last Admin: 03/16/19 10:45 Dose:  0.8 mg


Zinc Sulfate (Orazinc -)  220 mg PO DAILY@1200 [facility]


   Last Admin: 03/15/19 12:23 Dose:  220 mg











- Objective


Vital Signs: 


 Vital Signs











Temperature  97.7 F   03/16/19 05:58


 


Pulse Rate  101 H  03/16/19 05:58


 


Respiratory Rate  20   03/16/19 05:58


 


Blood Pressure  132/69   03/16/19 05:58


 


O2 Sat by Pulse Oximetry (%)  95   03/15/19 21:00











Eyes: Yes: PERRL


HENT: Yes: Atraumatic


Neck: Yes: Supple


Cardiovascular: Yes: Regular Rate and Rhythm, S1, S2


Respiratory: Yes: Diminished


Gastrointestinal: Yes: Normal Bowel Sounds, Soft, Abdomen, Obese.  No: 

Tenderness


Edema: No


Labs: 


 CBC, BMP





 03/15/19 06:30 





 03/15/19 06:30 











Problem List





- Problems


(1) AGUILA (acute kidney injury)


Code(s): N17.9 - ACUTE KIDNEY FAILURE, UNSPECIFIED   





(2) Demand ischemia


Code(s): I24.8 - OTHER FORMS OF ACUTE ISCHEMIC HEART DISEASE   





(3) Atrial fibrillation


Code(s): I48.91 - UNSPECIFIED ATRIAL FIBRILLATION   


Qualifiers: 


   Atrial fibrillation type: chronic   Qualified Code(s): I48.2 - Chronic 

atrial fibrillation   





(4) CHF (congestive heart failure)


Code(s): I50.9 - HEART FAILURE, UNSPECIFIED   


Qualifiers: 


   Heart failure type: unspecified   Heart failure chronicity: unspecified   

Qualified Code(s): I50.9 - Heart failure, unspecified   





(5) PVD (peripheral vascular disease)


Code(s): I73.9 - PERIPHERAL VASCULAR DISEASE, UNSPECIFIED   





(6) Hypertension


Code(s): I10 - ESSENTIAL (PRIMARY) HYPERTENSION   


Qualifiers: 


   Hypertension type: essential hypertension   Qualified Code(s): I10 - 

Essential (primary) hypertension   





(7) Pacemaker


Code(s): Z95.0 - PRESENCE OF CARDIAC PACEMAKER   





(8) Cellulitis


Code(s): L03.90 - CELLULITIS, UNSPECIFIED   





Assessment/Plan





1. Recurrent cellulitis


2. Toxic metabolic encephelopathy


3. Chronic diastolic LV failure


4. AF with ventricular pacing on DOAC/Xarelto


5. HTN


6. Hyperlipidemia


7. AGUILA and hypokalemia 


8. CAD with demand ischemia


9. BPH





PLAN:


1. Empiric antibiotics and antifungal course per ID 


2. Continue Crestor 5 mg QHS, Toprol XL 25 mg QD and Xarelto 20 mg QD


3. Diuretics held pending renal improvement





Further plans are to follow


Panfilo Foote MD

## 2019-03-16 NOTE — PN
Progress Note, Physician


Chief Complaint: 





Cellulitis


Acute Renal Insufficiency


Hypokalemia


History of Present Illness: 





much more awake and alert


NAD








- Current Medication List


Current Medications: 


Active Medications





Acetaminophen (Tylenol -)  650 mg PO Q6H PRN


   PRN Reason: PAIN OR FEVER


   Last Admin: 03/15/19 12:43 Dose:  650 mg


Cholecalciferol (Vitamin D3 -)  1,000 unit PO DAILY [facility]


   Last Admin: 03/15/19 10:15 Dose:  1,000 unit


Docusate Sodium (Colace -)  100 mg PO BID PRN


   PRN Reason: CONSTIPATION


Finasteride (Proscar -)  5 mg PO DAILY [facility]


   Last Admin: 03/15/19 10:14 Dose:  5 mg


Gabapentin (Neurontin -)  200 mg PO TID [facility]


   Last Admin: 03/16/19 05:27 Dose:  200 mg


Tigecycline 50 mg/ Dextrose  100 mls @ 100 mls/hr IVPB BID [facility]; Protocol


   Last Admin: 03/15/19 22:02 Dose:  100 mls/hr


Ketoconazole (Nizoral 2% Cream -)  1 applic TP DAILY [facility]


   Last Admin: 03/15/19 10:16 Dose:  1 appful


Magnesium Hydroxide (Milk Of Magnesia -)  30 ml PO Q8H PRN


   PRN Reason: INDIGESTION


   Last Admin: 03/12/19 11:10 Dose:  30 ml


Metoprolol Succinate (Toprol Xl -)  25 mg PO DAILY [facility]


   Last Admin: 03/15/19 10:15 Dose:  25 mg


Multivitamins/Minerals/Vitamin C (Tab-A-Vit -)  1 tab PO DAILY [facility]


   Last Admin: 03/15/19 10:15 Dose:  1 tab


Quetiapine Fumarate (Seroquel -)  25 mg PO BID [facility]


   Last Admin: 03/15/19 22:01 Dose:  25 mg


Ranitidine HCl (Zantac -)  150 mg PO BID [facility]


   Last Admin: 03/15/19 22:02 Dose:  150 mg


Rivaroxaban (Xarelto -)  20 mg PO DAILY@1800 [facility]


   Last Admin: 03/15/19 17:17 Dose:  20 mg


Rosuvastatin Calcium (Crestor -)  5 mg PO HS [facility]


   Last Admin: 03/15/19 22:03 Dose:  5 mg


Senna (Senna -)  2 tab PO [facility]


   Last Admin: 03/15/19 22:01 Dose:  2 tab


Tamsulosin HCl (Flomax -)  0.8 mg PO DAILY@0830 [facility]


   Last Admin: 03/15/19 10:15 Dose:  0.8 mg


Zinc Sulfate (Orazinc -)  220 mg PO DAILY@1200 [facility]


   Last Admin: 03/15/19 12:23 Dose:  220 mg











- Objective


Vital Signs: 


 Vital Signs











Temperature  97.7 F   03/16/19 05:58


 


Pulse Rate  101 H  03/16/19 05:58


 


Respiratory Rate  20   03/16/19 05:58


 


Blood Pressure  132/69   03/16/19 05:58


 


O2 Sat by Pulse Oximetry (%)  95   03/15/19 21:00











Constitutional: Yes: Well Nourished, No Distress, Calm, Obese


Cardiovascular: Yes: Regular Rate and Rhythm


Respiratory: Yes: Regular


Gastrointestinal: Yes: Normal Bowel Sounds, Soft, Abdomen, Obese


Genitourinary: Yes: Incontinence


Musculoskeletal: Yes: Muscle Weakness


Extremities: Yes: Erythema (mild BLLE)


Edema: Yes


Edema: LLE: Trace, RLE: Trace


Peripheral Pulses WNL: Yes


Neurological: Yes: Alert, Pre-Existing Deficit


Psychiatric: Yes: Alert


Labs: 


 CBC, BMP





 03/15/19 06:30 





 03/15/19 06:30 











Problem List





- Problems


(1) AGUILA (acute kidney injury)


Assessment/Plan: 


-Nephrology on board


-Cr trending down


-monitor trend


Code(s): N17.9 - ACUTE KIDNEY FAILURE, UNSPECIFIED   





(2) Cellulitis of abdominal wall


Assessment/Plan: 


-ID on board


-IV abx


-afebrile


-+ leukocytosis


Code(s): L03.311 - CELLULITIS OF ABDOMINAL WALL   





(3) Atrial fibrillation


Assessment/Plan: 


-On eliquis


-Rate controlled


Code(s): I48.91 - UNSPECIFIED ATRIAL FIBRILLATION   


Qualifiers: 


   Atrial fibrillation type: chronic   Qualified Code(s): I48.2 - Chronic 

atrial fibrillation   





(4) Cellulitis


Assessment/Plan: 


-ID on board


-IV abx


-afebrile


-+ leukocytosis


Code(s): L03.90 - CELLULITIS, UNSPECIFIED   


Qualifiers: 


   Laterality: left 





(5) Hypercalcemia


Assessment/Plan: 


-nephrology on board


-PTH intact with Ca+


Code(s): E83.52 - HYPERCALCEMIA   





Assessment/Plan





see problem list


did poorly with Physical therapy

## 2019-03-16 NOTE — PN
Progress Note (short form)





- Note


Progress Note: 








Problems


1. CKD


2. hx of hydronephrosis


3. hx of obstructive uropathy


4. cellulitis


5. HTN


6. hypokalemia


7. a-fib


8. CHF


9. AGUILA


10. hypercalcemia

















 Current Medications





Acetaminophen (Tylenol -)  650 mg PO Q6H PRN


   PRN Reason: PAIN OR FEVER


   Last Admin: 03/15/19 12:43 Dose:  650 mg


Cholecalciferol (Vitamin D3 -)  1,000 unit PO DAILY [facility]


   Last Admin: 03/16/19 10:45 Dose:  1,000 unit


Docusate Sodium (Colace -)  100 mg PO BID PRN


   PRN Reason: CONSTIPATION


Finasteride (Proscar -)  5 mg PO DAILY [facility]


   Last Admin: 03/16/19 10:45 Dose:  5 mg


Gabapentin (Neurontin -)  200 mg PO TID [facility]


   Last Admin: 03/16/19 14:13 Dose:  200 mg


Tigecycline 50 mg/ Dextrose  100 mls @ 100 mls/hr IVPB BID [facility]; Protocol


   Last Admin: 03/16/19 10:44 Dose:  100 mls/hr


Ketoconazole (Nizoral 2% Cream -)  1 applic TP DAILY [facility]


   Last Admin: 03/16/19 10:45 Dose:  1 appful


Magnesium Hydroxide (Milk Of Magnesia -)  30 ml PO Q8H PRN


   PRN Reason: INDIGESTION


   Last Admin: 03/12/19 11:10 Dose:  30 ml


Metoprolol Succinate (Toprol Xl -)  25 mg PO DAILY [facility]


   Last Admin: 03/16/19 10:45 Dose:  25 mg


Multivitamins/Minerals/Vitamin C (Tab-A-Vit -)  1 tab PO DAILY [facility]


   Last Admin: 03/16/19 10:45 Dose:  1 tab


Quetiapine Fumarate (Seroquel -)  25 mg PO BID [facility]


   Last Admin: 03/16/19 10:45 Dose:  25 mg


Ranitidine HCl (Zantac -)  150 mg PO BID [facility]


   Last Admin: 03/16/19 10:45 Dose:  150 mg


Rivaroxaban (Xarelto -)  20 mg PO DAILY@1800 [facility]


   Last Admin: 03/15/19 17:17 Dose:  20 mg


Rosuvastatin Calcium (Crestor -)  5 mg PO HS [facility]


   Last Admin: 03/15/19 22:03 Dose:  5 mg


Senna (Senna -)  2 tab PO HS [facility]


   Last Admin: 03/15/19 22:01 Dose:  2 tab


Tamsulosin HCl (Flomax -)  0.8 mg PO DAILY@0830 [facility]


   Last Admin: 03/16/19 10:45 Dose:  0.8 mg


Zinc Sulfate (Orazinc -)  220 mg PO DAILY@1200 [facility]


   Last Admin: 03/16/19 14:13 Dose:  220 mg





 Last Vital Signs











Temp Pulse Resp BP Pulse Ox


 


 97.7 F   101 H  20   132/69   95 


 


 03/16/19 05:58  03/16/19 05:58  03/16/19 05:58  03/16/19 05:58  03/15/19 21:00








Lungs clear


Heart reg


Abd soft


Ext no edema


 CBC, BMP





 03/15/19 06:30 





 03/15/19 06:30 





IMP- improved renal function








Plan- same rx

## 2019-03-17 NOTE — PN
Progress Note (short form)





- Note


Progress Note: 





CONSULT DICTATED








Pt comfortable s/p gerber insertion. Large post-void residual 600 cc. Urine is 

clear, abdomen soft nontender, gerber patent





Plan:


Will d/c gerber in am and give TOV. If unable to void, will need TUVP when 

medically cleared.

## 2019-03-17 NOTE — PN
Progress Note (short form)





- Note


Progress Note: 








Problems


1. CKD


2. hx of hydronephrosis


3. hx of obstructive uropathy


4. cellulitis


5. HTN


6. hypokalemia


7. a-fib


8. CHF


9. AGUILA


10. hypercalcemia





s/p urinary retention


s/p gerber insertion. 


Large post-void residual 600 cc. 


followed by 




















 Current Medications





Acetaminophen (Tylenol -)  650 mg PO Q6H PRN


   PRN Reason: PAIN OR FEVER


   Last Admin: 03/17/19 04:21 Dose:  650 mg


Cholecalciferol (Vitamin D3 -)  1,000 unit PO DAILY [facility]


   Last Admin: 03/17/19 10:02 Dose:  1,000 unit


Docusate Sodium (Colace -)  100 mg PO BID [facility]


   Last Admin: 03/17/19 10:02 Dose:  100 mg


Finasteride (Proscar -)  5 mg PO DAILY [facility]


   Last Admin: 03/17/19 10:02 Dose:  5 mg


Gabapentin (Neurontin -)  200 mg PO TID [facility]


   Last Admin: 03/17/19 13:54 Dose:  200 mg


Tigecycline 50 mg/ Dextrose  100 mls @ 100 mls/hr IVPB BID [facility]; Protocol


   Last Admin: 03/17/19 10:01 Dose:  100 mls/hr


Ketoconazole (Nizoral 2% Cream -)  1 applic TP DAILY [facility]


   Last Admin: 03/17/19 13:55 Dose:  1 appful


Magnesium Hydroxide (Milk Of Magnesia -)  30 ml PO Q8H PRN


   PRN Reason: INDIGESTION


   Last Admin: 03/16/19 20:03 Dose:  30 ml


Metoprolol Succinate (Toprol Xl -)  25 mg PO DAILY [facility]


   Last Admin: 03/17/19 10:07 Dose:  25 mg


Multivitamins/Minerals/Vitamin C (Tab-A-Vit -)  1 tab PO DAILY [facility]


   Last Admin: 03/17/19 10:02 Dose:  1 tab


Polyethylene Glycol (Miralax (For Daily Use) -)  17 gm PO DAILY [facility]


   PRN Reason: CONSTIPATION


Quetiapine Fumarate (Seroquel -)  25 mg PO BID [facility]


   Last Admin: 03/17/19 10:02 Dose:  25 mg


Ranitidine HCl (Zantac -)  150 mg PO BID [facility]


   Last Admin: 03/17/19 10:02 Dose:  150 mg


Rivaroxaban (Xarelto -)  20 mg PO DAILY@1800 [facility]


   Last Admin: 03/17/19 17:42 Dose:  20 mg


Rosuvastatin Calcium (Crestor -)  5 mg PO [facility]


   Last Admin: 03/16/19 21:16 Dose:  5 mg


Senna (Senna -)  2 tab PO HS [facility]


   Last Admin: 03/16/19 21:15 Dose:  2 tab


Tamsulosin HCl (Flomax -)  0.8 mg PO DAILY@0830 [facility]


   Last Admin: 03/17/19 10:02 Dose:  0.8 mg


Zinc Sulfate (Orazinc -)  220 mg PO DAILY@1200 [facility]


   Last Admin: 03/17/19 13:55 Dose:  220 mg





 Last Vital Signs











Temp Pulse Resp BP Pulse Ox


 


 97.4 F L  70   20   146/74   95 


 


 03/17/19 18:58  03/17/19 18:58  03/17/19 18:58  03/17/19 18:58  03/17/19 09:00











Lungs clear


Heart reg


Abd soft


Ext no edema


 CBC, BMP





 03/15/19 06:30 





 03/15/19 06:30 








 


 


 


 


 








IMP- Urinary Retention


underlying CKD











Plan- [facility] to monitor renal function

## 2019-03-17 NOTE — PN
Progress Note, Physician


Chief Complaint: 





Events noted


back pain


History of Present Illness: 





Patient was seen and examined. Awake and alert. Chart was reviewed


Denies chest pain, SOB or palpitations





- Current Medication List


Current Medications: 


Active Medications





Acetaminophen (Tylenol -)  650 mg PO Q6H PRN


   PRN Reason: PAIN OR FEVER


   Last Admin: 03/17/19 04:21 Dose:  650 mg


Cholecalciferol (Vitamin D3 -)  1,000 unit PO DAILY [facility]


   Last Admin: 03/16/19 10:45 Dose:  1,000 unit


Docusate Sodium (Colace -)  100 mg PO BID [facility]


   Last Admin: 03/16/19 21:15 Dose:  100 mg


Finasteride (Proscar -)  5 mg PO DAILY [facility]


   Last Admin: 03/16/19 10:45 Dose:  5 mg


Gabapentin (Neurontin -)  200 mg PO TID [facility]


   Last Admin: 03/17/19 05:58 Dose:  200 mg


Tigecycline 50 mg/ Dextrose  100 mls @ 100 mls/hr IVPB BID [facility]; Protocol


   Last Admin: 03/16/19 21:17 Dose:  100 mls/hr


Ketoconazole (Nizoral 2% Cream -)  1 applic TP DAILY [facility]


   Last Admin: 03/16/19 10:45 Dose:  1 appful


Magnesium Hydroxide (Milk Of Magnesia -)  30 ml PO Q8H PRN


   PRN Reason: INDIGESTION


   Last Admin: 03/16/19 20:03 Dose:  30 ml


Metoprolol Succinate (Toprol Xl -)  25 mg PO DAILY [facility]


   Last Admin: 03/16/19 10:45 Dose:  25 mg


Multivitamins/Minerals/Vitamin C (Tab-A-Vit -)  1 tab PO DAILY [facility]


   Last Admin: 03/16/19 10:45 Dose:  1 tab


Polyethylene Glycol (Miralax (For Daily Use) -)  17 gm PO DAILY [facility]


   PRN Reason: CONSTIPATION


Quetiapine Fumarate (Seroquel -)  25 mg PO BID [facility]


   Last Admin: 03/16/19 21:15 Dose:  25 mg


Ranitidine HCl (Zantac -)  150 mg PO BID [facility]


   Last Admin: 03/16/19 21:15 Dose:  150 mg


Rivaroxaban (Xarelto -)  20 mg PO DAILY@1800 [facility]


   Last Admin: 03/16/19 17:26 Dose:  20 mg


Rosuvastatin Calcium (Crestor -)  5 mg PO HS [facility]


   Last Admin: 03/16/19 21:16 Dose:  5 mg


Senna (Senna -)  2 tab PO [facility]


   Last Admin: 03/16/19 21:15 Dose:  2 tab


Tamsulosin HCl (Flomax -)  0.8 mg PO DAILY@0830 [facility]


   Last Admin: 03/16/19 10:45 Dose:  0.8 mg


Zinc Sulfate (Orazinc -)  220 mg PO DAILY@1200 [facility]


   Last Admin: 03/16/19 14:13 Dose:  220 mg











- Objective


Vital Signs: 


 Vital Signs











Temperature  98.7 F   03/17/19 06:00


 


Pulse Rate  71   03/17/19 06:00


 


Respiratory Rate  20   03/17/19 06:00


 


Blood Pressure  127/61   03/17/19 06:00


 


O2 Sat by Pulse Oximetry (%)  95   03/16/19 21:00











Neck: Yes: Supple


Cardiovascular: Yes: Regular Rate and Rhythm, S1, S2


Respiratory: Yes: CTA Bilaterally


Gastrointestinal: Yes: Normal Bowel Sounds, Soft, Abdomen, Obese.  No: 

Tenderness


Edema: No





Problem List





- Problems


(1) AGUILA (acute kidney injury)


Code(s): N17.9 - ACUTE KIDNEY FAILURE, UNSPECIFIED   





(2) Demand ischemia


Code(s): I24.8 - OTHER FORMS OF ACUTE ISCHEMIC HEART DISEASE   





(3) Atrial fibrillation


Code(s): I48.91 - UNSPECIFIED ATRIAL FIBRILLATION   


Qualifiers: 


   Atrial fibrillation type: chronic   Qualified Code(s): I48.2 - Chronic 

atrial fibrillation   





(4) CHF (congestive heart failure)


Code(s): I50.9 - HEART FAILURE, UNSPECIFIED   


Qualifiers: 


   Heart failure type: unspecified   Heart failure chronicity: unspecified   

Qualified Code(s): I50.9 - Heart failure, unspecified   





(5) PVD (peripheral vascular disease)


Code(s): I73.9 - PERIPHERAL VASCULAR DISEASE, UNSPECIFIED   





(6) Hypertension


Code(s): I10 - ESSENTIAL (PRIMARY) HYPERTENSION   


Qualifiers: 


   Hypertension type: essential hypertension   Qualified Code(s): I10 - 

Essential (primary) hypertension   





(7) Pacemaker


Code(s): Z95.0 - PRESENCE OF CARDIAC PACEMAKER   





(8) Cellulitis


Code(s): L03.90 - CELLULITIS, UNSPECIFIED   





Assessment/Plan





1. Recurrent cellulitis


2. Toxic metabolic encephelopathy


3. Chronic diastolic LV failure


4. AF with ventricular pacing on DOAC/Xarelto


5. HTN


6. Hyperlipidemia


7. AGUILA and hypokalemia 


8. CAD with demand ischemia


9. BPH





PLAN: 


1. Continue Crestor 5 mg QHS, Toprol XL 25 mg QD and Xarelto 20 mg QD


2. Diuretics held pending renal improvement


3. Eventual rehab then follow up in office





Further plans are to follow


Panfilo Foote MD

## 2019-03-17 NOTE — PN
Progress Note, Physician


Chief Complaint: 





Cellulitis


Acute Renal Insufficiency


Hypokalemia


History of Present Illness: 





much more awake and alert


NAD


Coughing during meals





- Current Medication List


Current Medications: 


Active Medications





Acetaminophen (Tylenol -)  650 mg PO Q6H PRN


   PRN Reason: PAIN OR FEVER


   Last Admin: 03/17/19 04:21 Dose:  650 mg


Cholecalciferol (Vitamin D3 -)  1,000 unit PO DAILY [facility]


   Last Admin: 03/17/19 10:02 Dose:  1,000 unit


Docusate Sodium (Colace -)  100 mg PO BID [facility]


   Last Admin: 03/17/19 10:02 Dose:  100 mg


Finasteride (Proscar -)  5 mg PO DAILY [facility]


   Last Admin: 03/17/19 10:02 Dose:  5 mg


Gabapentin (Neurontin -)  200 mg PO TID [facility]


   Last Admin: 03/17/19 05:58 Dose:  200 mg


Tigecycline 50 mg/ Dextrose  100 mls @ 100 mls/hr IVPB BID [facility]; Protocol


   Last Admin: 03/17/19 10:01 Dose:  100 mls/hr


Ketoconazole (Nizoral 2% Cream -)  1 applic TP DAILY [facility]


   Last Admin: 03/16/19 10:45 Dose:  1 appful


Magnesium Hydroxide (Milk Of Magnesia -)  30 ml PO Q8H PRN


   PRN Reason: INDIGESTION


   Last Admin: 03/16/19 20:03 Dose:  30 ml


Metoprolol Succinate (Toprol Xl -)  25 mg PO DAILY [facility]


   Last Admin: 03/17/19 10:07 Dose:  25 mg


Multivitamins/Minerals/Vitamin C (Tab-A-Vit -)  1 tab PO DAILY [facility]


   Last Admin: 03/17/19 10:02 Dose:  1 tab


Polyethylene Glycol (Miralax (For Daily Use) -)  17 gm PO DAILY [facility]


   PRN Reason: CONSTIPATION


Quetiapine Fumarate (Seroquel -)  25 mg PO BID [facility]


   Last Admin: 03/17/19 10:02 Dose:  25 mg


Ranitidine HCl (Zantac -)  150 mg PO BID [facility]


   Last Admin: 03/17/19 10:02 Dose:  150 mg


Rivaroxaban (Xarelto -)  20 mg PO DAILY@1800 [facility]


   Last Admin: 03/16/19 17:26 Dose:  20 mg


Rosuvastatin Calcium (Crestor -)  5 mg PO HS [facility]


   Last Admin: 03/16/19 21:16 Dose:  5 mg


Senna (Senna -)  2 tab PO HS [facility]


   Last Admin: 03/16/19 21:15 Dose:  2 tab


Tamsulosin HCl (Flomax -)  0.8 mg PO DAILY@0830 [facility]


   Last Admin: 03/17/19 10:02 Dose:  0.8 mg


Zinc Sulfate (Orazinc -)  220 mg PO DAILY@1200 [facility]


   Last Admin: 03/16/19 14:13 Dose:  220 mg











- Objective


Vital Signs: 


 Vital Signs











Temperature  96.8 F L  03/17/19 10:16


 


Pulse Rate  69   03/17/19 10:16


 


Respiratory Rate  18   03/17/19 10:16


 


Blood Pressure  132/75   03/17/19 10:16


 


O2 Sat by Pulse Oximetry (%)  95   03/16/19 21:00











Constitutional: Yes: Well Nourished, No Distress, Calm, Obese


Cardiovascular: Yes: Regular Rate and Rhythm


Respiratory: Yes: Regular


Gastrointestinal: Yes: Normal Bowel Sounds, Soft, Abdomen, Obese


Musculoskeletal: Yes: Muscle Weakness


Extremities: Yes: WNL


Edema: Yes (BLLE non pitting edema)


Peripheral Pulses WNL: Yes


Neurological: Yes: Alert, Oriented


Psychiatric: Yes: Alert, Oriented


Labs: 


 CBC, BMP





 03/15/19 06:30 





 03/15/19 06:30 











Problem List





- Problems


(1) AGUILA (acute kidney injury)


Assessment/Plan: 


-Nephrology on board


-Cr trending down


-monitor trend


Code(s): N17.9 - ACUTE KIDNEY FAILURE, UNSPECIFIED   





(2) Cellulitis of abdominal wall


Assessment/Plan: 


-ID on board


-IV abx


-afebrile


-+ leukocytosis


Code(s): L03.311 - CELLULITIS OF ABDOMINAL WALL   





(3) Atrial fibrillation


Assessment/Plan: 


-On eliquis


-Rate controlled


Code(s): I48.91 - UNSPECIFIED ATRIAL FIBRILLATION   


Qualifiers: 


   Atrial fibrillation type: chronic   Qualified Code(s): I48.2 - Chronic 

atrial fibrillation   





(4) Cellulitis


Assessment/Plan: 


-ID on board


-IV abx


-afebrile


-+ leukocytosis


Code(s): L03.90 - CELLULITIS, UNSPECIFIED   


Qualifiers: 


   Laterality: left 





(5) Hypercalcemia


Assessment/Plan: 


-nephrology on board


-PTH intact with Ca+


Code(s): E83.52 - HYPERCALCEMIA   





Assessment/Plan





see problem list


did poorly with Physical therapy

## 2019-03-18 NOTE — CONSULT
Admitting History and Physical





- Primary Care Physician


PCP: Genaro Fabian





- Admission


History of Present Illness: 





83 year old male with a pmhx of htn, afib on AC s/p ppm, bph, chronic diastolic 

chf, arthritis, and h/o LE cellulitis presents with recurrent cellulitis and 

erythema bilateral groin and confusion, was recently treated for groin 

cellulitis





Reported to be coughing with meals.


 Selected Entries











  03/17/19 03/17/19 03/17/19





  06:00 10:16 11:04


 


Breakfast   100%


 


Lunch   


 


Supper   


 


Temperature 98.7 F 96.8 F L 














  03/17/19 03/17/19 03/17/19





  14:43 18:58 23:00


 


Breakfast   


 


Lunch 50%  


 


Supper   


 


Temperature 97.8 F 97.4 F L 97.6 F














  03/17/19 03/18/19 03/18/19





  23:13 05:00 09:57


 


Breakfast   75%


 


Lunch   


 


Supper 75%  


 


Temperature  98.0 F 














  03/18/19





  10:00


 


Breakfast 75%


 


Lunch 


 


Supper 


 


Temperature 








 Laboratory Tests











  03/14/19 03/15/19





  06:15 06:30


 


WBC  13.9 H  12.2 H








Pt's wife reports h/o intermittent sticking of solids in pt's throat, at times 

unable to clear with liquid wash down. This has been ongoing, intermittently 

since he was home but reportedly has improved over the last 3 days.


History Source: Patient, Family Member


Limitations to Obtaining History: Clinical Condition





- Past Medical History


Cardiovascular: Yes: AFIB, CHF, HTN, Other (pacemaker)


Renal/: Yes: Renal Inusuff, BPH, Renal Calculi


Dermatology: Yes: Other





- Past Surgical History


Past Surgical History: Yes: Joint Replacement (knee), Permanent Pacemaker, 

Prostatectomy





- Smoking History


Smoking history: Never smoked


Have you smoked in the past 12 months: No


If you are a former smoker, when did you quit?: 1970





- Alcohol/Substance Use


Hx Alcohol Use: No


History of Substance Use: reports: None





- Social History


ADL: Family Assistance


History of Recent Travel: No





History





- Admission


Reason For Visit: ACUTE KIDNEY INJURY CELLULITIS OF ABDOMINAL WALL





- Diagnostics


X-ray: Report Reviewed





- General


Mental Status: Alert and Oriented, Awake and Alert, Able to Follow Commands


Attention: Intact


Ability to Follow Directions: Good


Head/Neck Control: WFL





- Hearing


Hearing: Impaired


Hearing Aide: No





Speech Evaluation





- Communication


Primary Language: ENGLISH


Communication: Yes: Within Normal Limits


Oral Expression Ability: Yes: No Impairment





- Speech Production


Able to Make Needs Known: Yes: WNL


Intelligibility: Yes: WNL





- Speech Characteristics


Voice Loudness: Normal


Voice Pitch: Yes: Normal


Voice Phonatory-based Quality: Yes: Normal


Speech Pattern: Normal


Speech Clarity: < 100%


Nasal Resonance: Normal


Articulation: Yes: Precise


Rate of Speech: Intact





- Language/Auditory Comprehension


Follows: Yes: 1 Stage Simple Commands





- Language/Verbal Expression


Able to Respond to Simple Queries: Yes: WNL


Able to Communicate Wants and Needs: Yes: WNL


Functional Communication Status: Yes: WNL





- Swallow Evaluation/Bedside Assessment


Current Nutritional Intake: Regular, Thin Liquids


Oral Secretions: Yes: WFL


Dentition: Yes: Edentulous, Dental Appliance Upper, Dental Appliance Lower


Facial Symmetry at Rest: Symmetrical


Facial Symmetry on Retraction: Symmetrical


Sensation: Normal


Against Resistance Opening: Normal


Against Resistance Closing: Normal


Pucker Lips: Normal


Smile: Normal


Lingual Movement: Normal, Symmetric


Lingual Speed of Movement: Normal


Lingual Movement Strgth Against Opposition: Normal


Lingual Movement Characteristics: Normal


Velopharyngeal Movement: Normal


Laryngeal Elevation: WFL


Rate of Intake: WFL


Bolus Size: WFL


Labial Seal: WFL


Chewing: WFL


Oral Prep Time: WFL


A-P Transit: WFL


Pocketing: None


Timing of Swallow: WFL


Coughing/Throat Clear: No


Change in Voice: No





Recommendations





- Speech Evaluation, Impression/Plan


Impression: h/o intermittent sticking of solids in pt's throat, at times unable 

to clear with liquid wash down. This has been ongoing, intermittently since he 

was home but reportedly has improved over the last 3 days.Goodf vocal quality. 

reports h/o GERD. Doesnt always wear dentures meal time.





- Dysphagia Impressions/Plan


Dysphagia Impressions: Ongoing Evaluation


*Silent aspiration: cannot be R/O at bedside


Dysphagia Treatment Plan: Small Bites, Chin Tuck/Down, Clear Pocket Food, Trial 

Feedings, Facilitative Feeding, 1/2 tsp. at a time, Elevate HOB during feed, 

OOB for meals, OOB for 1 h. after meals, Other (Use dentures meal time 

consistently.Chew well. Alternate solids with liquids. Complete meal with 

liquid.GERD precautions.)


Recommendations: Modified Barium Swallow (If difficulty re-occurs.)





- Recommendations


Diet Consistency: Regular (soft)


Medication Administration: Whole with water


Liquids: Thin Liquids

## 2019-03-18 NOTE — PN
Progress Note, Physician


History of Present Illness: 





AWAKE, ALERT


NO C/O PAIN


AFEBRILE


WBC SL ELEVATED


URINE C/S ESBL


WOUND C/S MIXED





- Current Medication List


Current Medications: 


Active Medications





Acetaminophen (Tylenol -)  650 mg PO Q6H PRN


   PRN Reason: PAIN OR FEVER


   Last Admin: 03/17/19 04:21 Dose:  650 mg


Cholecalciferol (Vitamin D3 -)  1,000 unit PO DAILY [facility]


   Last Admin: 03/18/19 10:40 Dose:  1,000 unit


Docusate Sodium (Colace -)  100 mg PO BID [facility]


   Last Admin: 03/18/19 10:41 Dose:  100 mg


Finasteride (Proscar -)  5 mg PO DAILY [facility]


   Last Admin: 03/18/19 10:40 Dose:  5 mg


Gabapentin (Neurontin -)  200 mg PO TID [facility]


   Last Admin: 03/18/19 13:52 Dose:  200 mg


Tigecycline 50 mg/ Dextrose  100 mls @ 100 mls/hr IVPB BID [facility]; Protocol


   Last Admin: 03/18/19 10:38 Dose:  100 mls/hr


Ketoconazole (Nizoral 2% Cream -)  1 applic TP DAILY [facility]


   Last Admin: 03/18/19 10:41 Dose:  1 appful


Magnesium Hydroxide (Milk Of Magnesia -)  30 ml PO Q8H PRN


   PRN Reason: INDIGESTION


   Last Admin: 03/16/19 20:03 Dose:  30 ml


Metoprolol Succinate (Toprol Xl -)  25 mg PO DAILY [facility]


   Last Admin: 03/18/19 10:40 Dose:  25 mg


Miscellaneous (Lidoderm Patch Removal)  1 each MC ONCE ONE


   Stop: 03/18/19 18:01


Multivitamins/Minerals/Vitamin C (Tab-A-Vit -)  1 tab PO DAILY [facility]


   Last Admin: 03/18/19 10:40 Dose:  1 tab


Polyethylene Glycol (Miralax (For Daily Use) -)  17 gm PO DAILY PRN


   PRN Reason: CONSTIPATION


Quetiapine Fumarate (Seroquel -)  25 mg PO BID [facility]


   Last Admin: 03/18/19 10:40 Dose:  25 mg


Ranitidine HCl (Zantac -)  150 mg PO BID [facility]


   Last Admin: 03/18/19 10:40 Dose:  150 mg


Rivaroxaban (Xarelto -)  20 mg PO DAILY@1800 [facility]


   Last Admin: 03/17/19 17:42 Dose:  20 mg


Rosuvastatin Calcium (Crestor -)  5 mg PO [facility]


   Last Admin: 03/17/19 21:41 Dose:  5 mg


Senna (Senna -)  2 tab PO [facility]


   Last Admin: 03/17/19 21:40 Dose:  2 tab


Tamsulosin HCl (Flomax -)  0.8 mg PO DAILY@0830 [facility]


   Last Admin: 03/18/19 10:41 Dose:  0.8 mg


Zinc Sulfate (Orazinc -)  220 mg PO DAILY@1200 [facility]


   Last Admin: 03/18/19 13:52 Dose:  220 mg











- Objective


Vital Signs: 


 Vital Signs











Temperature  98.1 F   03/18/19 13:50


 


Pulse Rate  71   03/18/19 13:50


 


Respiratory Rate  20   03/18/19 13:50


 


Blood Pressure  124/46 L  03/18/19 13:50


 


O2 Sat by Pulse Oximetry (%)  97   03/17/19 21:00











Constitutional: Yes: No Distress, Obese


Eyes: Yes: Conjunctiva Clear


Cardiovascular: Yes: Regular Rate and Rhythm, S1, S2


Respiratory: Yes: CTA Bilaterally


Gastrointestinal: Yes: Normal Bowel Sounds, Soft


Extremities: Yes: Other (ERYTHEMA RESOLVED; + CHR VENOUS STASIS DERMATITIS)


Edema: Yes


Integumentary: Yes: Other (CELLULITIS LOWER ABDOMEN MUCH IMPROVED)


Labs: 


 CBC, BMP





 03/15/19 06:30 





 03/15/19 06:30 











Assessment/Plan





CELLULITIS LOWER ABDOMEN / LE B/L


CEPHALOSPORIN ALLERGY


TOXIC METABOLIC ENCEPHALOPATHY


AZOTEMIA


CONTINUE TYGACIL ADDITIONAL 24H, TOPICAL ANTIFUNGAL

## 2019-03-18 NOTE — PN
Progress Note, Physician


Chief Complaint: 





Cellulitis


Acute Renal Insufficiency


Hypokalemia


History of Present Illness: 





Previous notes and events reviewed


alert and awake


NAD


complain of pain to b/l lower extremity





- Current Medication List


Current Medications: 


Active Medications





Acetaminophen (Tylenol -)  650 mg PO Q6H PRN


   PRN Reason: PAIN OR FEVER


   Last Admin: 03/17/19 04:21 Dose:  650 mg


Cholecalciferol (Vitamin D3 -)  1,000 unit PO DAILY [facility]


   Last Admin: 03/17/19 10:02 Dose:  1,000 unit


Docusate Sodium (Colace -)  100 mg PO BID [facility]


   Last Admin: 03/17/19 21:41 Dose:  100 mg


Finasteride (Proscar -)  5 mg PO DAILY [facility]


   Last Admin: 03/17/19 10:02 Dose:  5 mg


Gabapentin (Neurontin -)  200 mg PO TID [facility]


   Last Admin: 03/18/19 06:07 Dose:  200 mg


Tigecycline 50 mg/ Dextrose  100 mls @ 100 mls/hr IVPB BID [facility]; Protocol


   Last Admin: 03/17/19 21:40 Dose:  100 mls/hr


Ketoconazole (Nizoral 2% Cream -)  1 applic TP DAILY [facility]


   Last Admin: 03/17/19 13:55 Dose:  1 appful


Magnesium Hydroxide (Milk Of Magnesia -)  30 ml PO Q8H PRN


   PRN Reason: INDIGESTION


   Last Admin: 03/16/19 20:03 Dose:  30 ml


Metoprolol Succinate (Toprol Xl -)  25 mg PO DAILY [facility]


   Last Admin: 03/17/19 10:07 Dose:  25 mg


Miscellaneous (Lidoderm Patch Removal)  1 each MC ONCE ONE


   Stop: 03/18/19 18:01


Multivitamins/Minerals/Vitamin C (Tab-A-Vit -)  1 tab PO DAILY [facility]


   Last Admin: 03/17/19 10:02 Dose:  1 tab


Polyethylene Glycol (Miralax (For Daily Use) -)  17 gm PO DAILY [facility]


   PRN Reason: CONSTIPATION


Quetiapine Fumarate (Seroquel -)  25 mg PO BID [facility]


   Last Admin: 03/17/19 21:41 Dose:  25 mg


Ranitidine HCl (Zantac -)  150 mg PO BID [facility]


   Last Admin: 03/17/19 21:41 Dose:  150 mg


Rivaroxaban (Xarelto -)  20 mg PO DAILY@1800 [facility]


   Last Admin: 03/17/19 17:42 Dose:  20 mg


Rosuvastatin Calcium (Crestor -)  5 mg PO [facility]


   Last Admin: 03/17/19 21:41 Dose:  5 mg


Senna (Senna -)  2 tab PO [facility]


   Last Admin: 03/17/19 21:40 Dose:  2 tab


Tamsulosin HCl (Flomax -)  0.8 mg PO DAILY@0830 [facility]


   Last Admin: 03/17/19 10:02 Dose:  0.8 mg


Zinc Sulfate (Orazinc -)  220 mg PO DAILY@1200 [facility]


   Last Admin: 03/17/19 13:55 Dose:  220 mg











- Objective


Vital Signs: 


 Vital Signs











Temperature  98.0 F   03/18/19 05:00


 


Pulse Rate  69   03/18/19 05:00


 


Respiratory Rate  18   03/18/19 05:00


 


Blood Pressure  128/73   03/18/19 05:00


 


O2 Sat by Pulse Oximetry (%)  97   03/17/19 21:00











Constitutional: Yes: No Distress, Calm, Obese


Eyes: Yes: Conjunctiva Clear


HENT: Yes: Atraumatic


Cardiovascular: Yes: Regular Rate and Rhythm


Respiratory: Yes: Regular, CTA Bilaterally


Gastrointestinal: Yes: Normal Bowel Sounds, Soft, Abdomen, Obese, Other (non 

tender)


Genitourinary: Yes: Berry Present


Musculoskeletal: Yes: Muscle Weakness


Extremities: Yes: WNL


Edema: Yes (B/L lower extremity )


Neurological: Yes: Alert, Oriented


Psychiatric: Yes: Alert, Oriented


Labs: 


 CBC, BMP





 03/15/19 06:30 





 03/15/19 06:30 





 Microbiology





03/12/19 10:00   Blood - Peripheral Venous   Blood Culture - Final


                            NO GROWTH AFTER 5 DAYS INCUBATION


03/14/19 19:05   Urine - Urine Berry   Urine Culture - Final


                            Escherichia Coli Esbl 


03/11/19 23:50   Blood - Peripheral Venous   Blood Culture - Final


                            NO GROWTH AFTER 5 DAYS INCUBATION


03/11/19 23:10   Buttock - Right   Gram Stain - Final


03/11/19 23:10   Buttock - Right   Wound Culture - Final


                            Proteus Mirabilus - Esbl Produ


                            Mr S Aureus


                            Enterococcus Faecalis











Problem List





- Problems


(1) Elevated troponin


Assessment/Plan: 


-troponin 0.05


-cardiology on board


Code(s): R74.8 - ABNORMAL LEVELS OF OTHER SERUM ENZYMES   





(2) AGUILA (acute kidney injury)


Assessment/Plan: 


-renal on board


-monitor BUN/Cr daily for downtrend 


-current BUN/Cr 40/1.3 from 3/15


-Renal bladder US shows no intrinsic bladder abnormalities, prevoid bladder 

volume of 821cc, post void image demonstrates poor emptying of bladder with 

large post void residual of 571cc, kidneys are normal in size, mild 

hydronephrosis noted bilaterally 


-urology on board


-FC 


Code(s): N17.9 - ACUTE KIDNEY FAILURE, UNSPECIFIED   





(3) Cellulitis of abdominal wall


Assessment/Plan: 


-ID on board


-continue with tygacil 


-afebrile


-leukocytosis with WBC 12.2 from 3/15


Code(s): L03.311 - CELLULITIS OF ABDOMINAL WALL   





(4) Hypokalemia


Assessment/Plan: 


-resolved


-current K 3.5





Code(s): E87.6 - HYPOKALEMIA   





(5) Cellulitis


Assessment/Plan: 


-ID on board


-IV tygacil


-afebrile


-leukocytosis with WBC 12.2


-ketoconazile cream


Code(s): L03.90 - CELLULITIS, UNSPECIFIED   


Qualifiers: 


   Laterality: left 





(6) Hypertension


Assessment/Plan: 


-continue with metoprolol


-low Na diet


Code(s): I10 - ESSENTIAL (PRIMARY) HYPERTENSION   


Qualifiers: 


   Hypertension type: essential hypertension   Qualified Code(s): I10 - 

Essential (primary) hypertension   





(7) Pacemaker


Assessment/Plan: 


-cardiology on board


Code(s): Z95.0 - PRESENCE OF CARDIAC PACEMAKER   





(8) Atrial fibrillation


Assessment/Plan: 


-continue xarelto


Code(s): I48.91 - UNSPECIFIED ATRIAL FIBRILLATION   


Qualifiers: 


   Atrial fibrillation type: chronic   Qualified Code(s): I48.2 - Chronic 

atrial fibrillation   





(9) Urinary retention


Assessment/Plan: 


-urology on board


-will discontinue FC and monitor for trial void, repeat bladder US in 12hrs 

from removal, if >200cc re-insert FC 


-if unable to void after FC removal as per urology will need TUVP once 

medically cleared


Code(s): R33.9 - RETENTION OF URINE, UNSPECIFIED   





Assessment/Plan





see problem list


dvt ppx

## 2019-03-18 NOTE — PN
Progress Note (short form)





- Note


Progress Note: 








Problems


1. CKD


2. hx of hydronephrosis


3. hx of obstructive uropathy


4. cellulitis


5. HTN


6. hypokalemia


7. a-fib


8. CHF


9. AGUILA


10. hypercalcemia





s/p urinary retention


s/p gerber insertion. 


Large post-void residual 600 cc. 


followed by 





 Current Medications





Acetaminophen (Tylenol -)  650 mg PO Q6H PRN


   PRN Reason: PAIN OR FEVER


   Last Admin: 03/17/19 04:21 Dose:  650 mg


Cholecalciferol (Vitamin D3 -)  1,000 unit PO DAILY [facility]


   Last Admin: 03/18/19 10:40 Dose:  1,000 unit


Docusate Sodium (Colace -)  100 mg PO BID [facility]


   Last Admin: 03/18/19 22:18 Dose:  100 mg


Finasteride (Proscar -)  5 mg PO DAILY [facility]


   Last Admin: 03/18/19 10:40 Dose:  5 mg


Gabapentin (Neurontin -)  200 mg PO TID [facility]


   Last Admin: 03/18/19 22:18 Dose:  200 mg


Tigecycline 50 mg/ Dextrose  100 mls @ 100 mls/hr IVPB BID [facility]; Protocol


   Last Admin: 03/18/19 22:18 Dose:  100 mls/hr


Ketoconazole (Nizoral 2% Cream -)  1 applic TP DAILY [facility]


   Last Admin: 03/18/19 10:41 Dose:  1 appful


Magnesium Hydroxide (Milk Of Magnesia -)  30 ml PO Q8H PRN


   PRN Reason: INDIGESTION


   Last Admin: 03/16/19 20:03 Dose:  30 ml


Metoprolol Succinate (Toprol Xl -)  25 mg PO DAILY [facility]


   Last Admin: 03/18/19 10:40 Dose:  25 mg


Multivitamins/Minerals/Vitamin C (Tab-A-Vit -)  1 tab PO DAILY [facility]


   Last Admin: 03/18/19 10:40 Dose:  1 tab


Polyethylene Glycol (Miralax (For Daily Use) -)  17 gm PO DAILY [facility]


   PRN Reason: CONSTIPATION


Quetiapine Fumarate (Seroquel -)  25 mg PO BID [facility]


   Last Admin: 03/18/19 22:18 Dose:  25 mg


Ranitidine HCl (Zantac -)  150 mg PO BID [facility]


   Last Admin: 03/18/19 22:19 Dose:  150 mg


Rivaroxaban (Xarelto -)  20 mg PO DAILY@1800 [facility]


   Last Admin: 03/18/19 17:55 Dose:  20 mg


Rosuvastatin Calcium (Crestor -)  5 mg PO HS [facility]


   Last Admin: 03/18/19 22:19 Dose:  5 mg


Senna (Senna -)  2 tab PO HS [facility]


   Last Admin: 03/18/19 22:18 Dose:  2 tab


Tamsulosin HCl (Flomax -)  0.8 mg PO DAILY@0830 [facility]


   Last Admin: 03/18/19 10:41 Dose:  0.8 mg


Zinc Sulfate (Orazinc -)  220 mg PO DAILY@1200 [facility]


   Last Admin: 03/18/19 13:52 Dose:  220 mg





 Last Vital Signs











Temp Pulse Resp BP Pulse Ox


 


 97.5 F L  69   20   121/61   97 


 


 03/18/19 18:02  03/18/19 18:02  03/18/19 18:02  03/18/19 18:02  03/17/19 21:00














Lungs clear


Heart reg


Abd soft


Ext no edema


 CBC, BMP





 03/15/19 06:30 





 03/15/19 06:30 


IMP- Urinary Retention


underlying CKD


Recurrent cellulitis- abd wall/ Bucky LE


Toxic metabolic encephelopathy


Chronic diastolic LV failure


AF with ventricular pacing on DOAC/Xarelto


HTN


Hyperlipidemia


AGUILA and hypokalemia 


CAD with demand ischemia


BPH








Plan- labs ordered for tomorrow to monitor renal function

## 2019-03-18 NOTE — PN
Progress Note, Physician


Chief Complaint: 





Events noted


Complains of body ache and back pain


History of Present Illness: 





Patient was seen and examined. Awake and alert. Chart was reviewed


Denies chest pain, SOB or palpitations





- Current Medication List


Current Medications: 


Active Medications





Acetaminophen (Tylenol -)  650 mg PO Q6H PRN


   PRN Reason: PAIN OR FEVER


   Last Admin: 03/17/19 04:21 Dose:  650 mg


Cholecalciferol (Vitamin D3 -)  1,000 unit PO DAILY [facility]


   Last Admin: 03/18/19 10:40 Dose:  1,000 unit


Docusate Sodium (Colace -)  100 mg PO BID [facility]


   Last Admin: 03/18/19 10:41 Dose:  100 mg


Finasteride (Proscar -)  5 mg PO DAILY [facility]


   Last Admin: 03/18/19 10:40 Dose:  5 mg


Gabapentin (Neurontin -)  200 mg PO TID [facility]


   Last Admin: 03/18/19 06:07 Dose:  200 mg


Tigecycline 50 mg/ Dextrose  100 mls @ 100 mls/hr IVPB BID [facility]; Protocol


   Last Admin: 03/18/19 10:38 Dose:  100 mls/hr


Ketoconazole (Nizoral 2% Cream -)  1 applic TP DAILY [facility]


   Last Admin: 03/18/19 10:41 Dose:  1 appful


Magnesium Hydroxide (Milk Of Magnesia -)  30 ml PO Q8H PRN


   PRN Reason: INDIGESTION


   Last Admin: 03/16/19 20:03 Dose:  30 ml


Metoprolol Succinate (Toprol Xl -)  25 mg PO DAILY [facility]


   Last Admin: 03/18/19 10:40 Dose:  25 mg


Miscellaneous (Lidoderm Patch Removal)  1 each MC ONCE ONE


   Stop: 03/18/19 18:01


Multivitamins/Minerals/Vitamin C (Tab-A-Vit -)  1 tab PO DAILY [facility]


   Last Admin: 03/18/19 10:40 Dose:  1 tab


Polyethylene Glycol (Miralax (For Daily Use) -)  17 gm PO DAILY [facility]


   PRN Reason: CONSTIPATION


Quetiapine Fumarate (Seroquel -)  25 mg PO BID [facility]


   Last Admin: 03/18/19 10:40 Dose:  25 mg


Ranitidine HCl (Zantac -)  150 mg PO BID [facility]


   Last Admin: 03/18/19 10:40 Dose:  150 mg


Rivaroxaban (Xarelto -)  20 mg PO DAILY@1800 [facility]


   Last Admin: 03/17/19 17:42 Dose:  20 mg


Rosuvastatin Calcium (Crestor -)  5 mg PO [facility]


   Last Admin: 03/17/19 21:41 Dose:  5 mg


Senna (Senna -)  2 tab PO [facility]


   Last Admin: 03/17/19 21:40 Dose:  2 tab


Tamsulosin HCl (Flomax -)  0.8 mg PO DAILY@0830 [facility]


   Last Admin: 03/18/19 10:41 Dose:  0.8 mg


Zinc Sulfate (Orazinc -)  220 mg PO DAILY@1200 [facility]


   Last Admin: 03/17/19 13:55 Dose:  220 mg











- Objective


Vital Signs: 


 Vital Signs











Temperature  98.0 F   03/18/19 05:00


 


Pulse Rate  69   03/18/19 05:00


 


Respiratory Rate  18   03/18/19 05:00


 


Blood Pressure  128/73   03/18/19 05:00


 


O2 Sat by Pulse Oximetry (%)  97   03/17/19 21:00











Neck: Yes: Supple


Cardiovascular: Yes: Regular Rate and Rhythm, S1, S2


Respiratory: Yes: Diminished


Gastrointestinal: Yes: Normal Bowel Sounds, Soft, Abdomen, Obese.  No: 

Tenderness


Edema: No





Problem List





- Problems


(1) AGUILA (acute kidney injury)


Code(s): N17.9 - ACUTE KIDNEY FAILURE, UNSPECIFIED   





(2) Demand ischemia


Code(s): I24.8 - OTHER FORMS OF ACUTE ISCHEMIC HEART DISEASE   





(3) Atrial fibrillation


Code(s): I48.91 - UNSPECIFIED ATRIAL FIBRILLATION   


Qualifiers: 


   Atrial fibrillation type: chronic   Qualified Code(s): I48.2 - Chronic 

atrial fibrillation   





(4) CHF (congestive heart failure)


Code(s): I50.9 - HEART FAILURE, UNSPECIFIED   


Qualifiers: 


   Heart failure type: unspecified   Heart failure chronicity: unspecified   

Qualified Code(s): I50.9 - Heart failure, unspecified   





(5) PVD (peripheral vascular disease)


Code(s): I73.9 - PERIPHERAL VASCULAR DISEASE, UNSPECIFIED   





(6) Hypertension


Code(s): I10 - ESSENTIAL (PRIMARY) HYPERTENSION   


Qualifiers: 


   Hypertension type: essential hypertension   Qualified Code(s): I10 - 

Essential (primary) hypertension   





(7) Pacemaker


Code(s): Z95.0 - PRESENCE OF CARDIAC PACEMAKER   





(8) Cellulitis


Code(s): L03.90 - CELLULITIS, UNSPECIFIED   





Assessment/Plan





1. Recurrent cellulitis


2. Toxic metabolic encephelopathy


3. Chronic diastolic LV failure


4. AF with ventricular pacing on DOAC/Xarelto


5. HTN


6. Hyperlipidemia


7. AGUILA and hypokalemia 


8. CAD with demand ischemia


9. BPH





PLAN:


1. Empiric antibiotics and antifungal course per ID 


2. Continue Crestor 5 mg QHS, Toprol XL 25 mg QD and Xarelto 20 mg QD


3. Diuretics held pending renal improvement


4. Eventual rehab then follow up in office





Further plans are to follow


Panfilo Foote MD

## 2019-03-19 NOTE — PN
Progress Note, Physician


Chief Complaint: 





Cellulitis


Acute Renal Insufficiency


Hypokalemia


History of Present Illness: 





Previous notes and events reviewed


alert and awake


NAD


episode of vomiting this morning after breakfast, as per pt wife pt has been 

having intermittent episodes of vomiting at home as well


since FC patient has been voiding well





- Current Medication List


Current Medications: 


Active Medications





Acetaminophen (Tylenol -)  650 mg PO Q6H PRN


   PRN Reason: PAIN OR FEVER


   Last Admin: 03/19/19 05:23 Dose:  650 mg


Cholecalciferol (Vitamin D3 -)  1,000 unit PO DAILY Pending sale to [facility]


   Last Admin: 03/19/19 11:35 Dose:  1,000 unit


Docusate Sodium (Colace -)  100 mg PO BID [facility] to [facility]


   Last Admin: 03/19/19 11:34 Dose:  100 mg


Finasteride (Proscar -)  5 mg PO DAILY [facility] to [facility]


   Last Admin: 03/19/19 11:35 Dose:  5 mg


Gabapentin (Neurontin -)  200 mg PO TID [facility] to [facility]


   Last Admin: 03/19/19 14:02 Dose:  200 mg


Tigecycline 50 mg/ Dextrose  100 mls @ 100 mls/hr IVPB BID Pending sale to [facility]; Protocol


   Last Admin: 03/19/19 11:34 Dose:  100 mls/hr


Ketoconazole (Nizoral 2% Cream -)  1 applic TP DAILY [facility]ending [facility] to [facility]


   Last Admin: 03/19/19 11:36 Dose:  1 appful


Magnesium Hydroxide (Milk Of Magnesia -)  30 ml PO Q8H PRN


   PRN Reason: INDIGESTION


   Last Admin: 03/19/19 11:34 Dose:  30 ml


Metoprolol Succinate (Toprol Xl -)  25 mg PO DAILY [facility] to [facility]


   Last Admin: 03/19/19 11:37 Dose:  25 mg


Multivitamins/Minerals/Vitamin C (Tab-A-Vit -)  1 tab PO DAILY [facility] to [facility]


   Last Admin: 03/19/19 11:34 Dose:  1 tab


Polyethylene Glycol (Miralax (For Daily Use) -)  17 gm PO DAILY PRN


   PRN Reason: CONSTIPATION


Quetiapine Fumarate (Seroquel -)  25 mg PO BID Pending [facility] to [facility]


   Last Admin: 03/19/19 11:35 Dose:  25 mg


Ranitidine HCl (Zantac -)  150 mg PO BID [facility]ending [facility] to [facility]


   Last Admin: 03/19/19 11:35 Dose:  150 mg


Rivaroxaban (Xarelto -)  20 mg PO DAILY@1800 Pending [facility] to [facility]


   Last Admin: 03/18/19 17:55 Dose:  20 mg


Rosuvastatin Calcium (Crestor -)  5 mg PO [facility]


   Last Admin: 03/18/19 22:19 Dose:  5 mg


Senna (Senna -)  2 tab PO [facility]


   Last Admin: 03/18/19 22:18 Dose:  2 tab


Tamsulosin HCl (Flomax -)  0.8 mg PO DAILY@0830 Pending [facility] to [facility]


   Last Admin: 03/19/19 11:35 Dose:  0.8 mg


Zinc Sulfate (Orazinc -)  220 mg PO DAILY@1200 Pending [facility] to [facility]


   Last Admin: 03/19/19 11:34 Dose:  220 mg











- Objective


Vital Signs: 


 Vital Signs











Temperature  97.8 F   03/19/19 04:00


 


Pulse Rate  98 H  03/19/19 04:00


 


Respiratory Rate  18   03/19/19 04:00


 


Blood Pressure  132/60   03/18/19 22:00


 


O2 Sat by Pulse Oximetry (%)  96   03/18/19 21:00











Constitutional: Yes: No Distress, Calm, Obese


Eyes: Yes: Conjunctiva Clear


HENT: Yes: Atraumatic


Cardiovascular: Yes: Regular Rate and Rhythm


Respiratory: Yes: Regular, CTA Bilaterally


Gastrointestinal: Yes: Normal Bowel Sounds, Soft, Abdomen, Obese, Other (non 

tender)


Genitourinary: Yes: Incontinence


Musculoskeletal: Yes: Muscle Weakness


Extremities: Yes: WNL


Edema: Yes


Neurological: Yes: Alert, Pre-Existing Deficit


Psychiatric: Yes: Alert


Labs: 


 CBC, BMP





 03/19/19 06:00 





 03/19/19 06:00 





 Microbiology





03/12/19 10:00   Blood - Peripheral Venous   Blood Culture - Final


                            NO GROWTH AFTER 5 DAYS INCUBATION


03/14/19 19:05   Urine - Urine Berry   Urine Culture - Final


                            Escherichia Coli Esbl 


03/11/19 23:50   Blood - Peripheral Venous   Blood Culture - Final


                            NO GROWTH AFTER 5 DAYS INCUBATION


03/11/19 23:10   Buttock - Right   Gram Stain - Final


03/11/19 23:10   Buttock - Right   Wound Culture - Final


                            Proteus Mirabilus - Esbl Produ


                            Mr S Aureus


                            Enterococcus Faecalis











Problem List





- Problems


(1) Elevated troponin


Assessment/Plan: 


-troponin 0.05


-cardiology on board


Code(s): R74.8 - ABNORMAL LEVELS OF OTHER SERUM ENZYMES   





(2) AGUILA (acute kidney injury)


Assessment/Plan: 


-renal on board


-monitor BUN/Cr daily for downtrend 


-current BUN/Cr 23/1.1


-Renal bladder US shows no intrinsic bladder abnormalities, prevoid bladder 

volume of 821cc, post void image demonstrates poor emptying of bladder with 

large post void residual of 571cc, kidneys are normal in size, mild 

hydronephrosis noted bilaterally 


-urology on board


-FC discontinued and patient voiding well


Code(s): N17.9 - ACUTE KIDNEY FAILURE, UNSPECIFIED   





(3) Cellulitis of abdominal wall


Assessment/Plan: 


-ID on board


-continue with tygacil 


-afebrile


-no leukocytosis with WBC 8.8


Code(s): L03.311 - CELLULITIS OF ABDOMINAL WALL   





(4) Hypokalemia


Assessment/Plan: 


-resolved


-current K 4.7





Code(s): E87.6 - HYPOKALEMIA   





(5) Cellulitis


Assessment/Plan: 


-ID on board


-IV tygacil


-afebrile


-no leukocytosis with WBC 8.8


-ketoconazile cream


Code(s): L03.90 - CELLULITIS, UNSPECIFIED   


Qualifiers: 


   Laterality: left 





(6) Hypertension


Assessment/Plan: 


-continue with metoprolol


-low Na diet


Code(s): I10 - ESSENTIAL (PRIMARY) HYPERTENSION   


Qualifiers: 


   Hypertension type: essential hypertension   Qualified Code(s): I10 - 

Essential (primary) hypertension   





(7) Pacemaker


Assessment/Plan: 


-cardiology on board


Code(s): Z95.0 - PRESENCE OF CARDIAC PACEMAKER   





(8) Atrial fibrillation


Assessment/Plan: 


-continue xarelto


Code(s): I48.91 - UNSPECIFIED ATRIAL FIBRILLATION   


Qualifiers: 


   Atrial fibrillation type: chronic   Qualified Code(s): I48.2 - Chronic 

atrial fibrillation   





(9) Urinary retention


Assessment/Plan: 


-urology on board


-continue with tamsulosin


-FC discontinued, voiding well since removal 





Code(s): R33.9 - RETENTION OF URINE, UNSPECIFIED

## 2019-03-19 NOTE — PN
Progress Note, Physician


History of Present Illness: 





Pt seen and examined at bedside. He is awake and alert. He complains of lower 

ext edema. 





- Current Medication List


Current Medications: 


Active Medications





Acetaminophen (Tylenol -)  650 mg PO Q6H PRN


   PRN Reason: PAIN OR FEVER


   Last Admin: 03/19/19 05:23 Dose:  650 mg


Cholecalciferol (Vitamin D3 -)  1,000 unit PO DAILY [facility]


   Last Admin: 03/19/19 11:35 Dose:  1,000 unit


Docusate Sodium (Colace -)  100 mg PO BID [facility]


   Last Admin: 03/19/19 11:34 Dose:  100 mg


Finasteride (Proscar -)  5 mg PO DAILY [facility]


   Last Admin: 03/19/19 11:35 Dose:  5 mg


Gabapentin (Neurontin -)  200 mg PO TID [facility]


   Last Admin: 03/19/19 14:02 Dose:  200 mg


Tigecycline 50 mg/ Dextrose  100 mls @ 100 mls/hr IVPB BID [facility]; Protocol


   Last Admin: 03/19/19 11:34 Dose:  100 mls/hr


Ketoconazole (Nizoral 2% Cream -)  1 applic TP DAILY [facility]


   Last Admin: 03/19/19 11:36 Dose:  1 appful


Magnesium Hydroxide (Milk Of Magnesia -)  30 ml PO Q8H PRN


   PRN Reason: INDIGESTION


   Last Admin: 03/19/19 11:34 Dose:  30 ml


Metoprolol Succinate (Toprol Xl -)  25 mg PO DAILY [facility]


   Last Admin: 03/19/19 11:37 Dose:  25 mg


Multivitamins/Minerals/Vitamin C (Tab-A-Vit -)  1 tab PO DAILY [facility]


   Last Admin: 03/19/19 11:34 Dose:  1 tab


Polyethylene Glycol (Miralax (For Daily Use) -)  17 gm PO DAILY PRN


   PRN Reason: CONSTIPATION


Quetiapine Fumarate (Seroquel -)  25 mg PO BID [facility]


   Last Admin: 03/19/19 11:35 Dose:  25 mg


Ranitidine HCl (Zantac -)  150 mg PO BID [facility]


   Last Admin: 03/19/19 11:35 Dose:  150 mg


Rivaroxaban (Xarelto -)  20 mg PO DAILY@1800 [facility]


   Last Admin: 03/18/19 17:55 Dose:  20 mg


Rosuvastatin Calcium (Crestor -)  5 mg PO HS [facility]


   Last Admin: 03/18/19 22:19 Dose:  5 mg


Senna (Senna -)  2 tab PO HS [facility]


   Last Admin: 03/18/19 22:18 Dose:  2 tab


Tamsulosin HCl (Flomax -)  0.8 mg PO DAILY@0830 [facility]


   Last Admin: 03/19/19 11:35 Dose:  0.8 mg


Zinc Sulfate (Orazinc -)  220 mg PO DAILY@1200 [facility]


   Last Admin: 03/19/19 11:34 Dose:  220 mg











- Objective


Vital Signs: 


 Vital Signs











Temperature  98.5 F   03/19/19 13:30


 


Pulse Rate  68   03/19/19 13:30


 


Respiratory Rate  16   03/19/19 13:30


 


Blood Pressure  138/58 L  03/19/19 13:30


 


O2 Sat by Pulse Oximetry (%)  96   03/18/19 21:00











Constitutional: Yes: Calm


Eyes: Yes: Conjunctiva Clear


HENT: Yes: Atraumatic


Neck: Yes: Supple


Cardiovascular: Yes: S1, S2


Respiratory: Yes: CTA Bilaterally


Gastrointestinal: Yes: Soft


Edema: Yes


Edema: LLE: 2+, RLE: 2+


Neurological: Yes: Oriented


Psychiatric: Yes: Oriented


Labs: 


 CBC, BMP





 03/19/19 06:00 





 03/19/19 06:00 











Problem List





- Problems


(1) AGUILA (acute kidney injury)


Code(s): N17.9 - ACUTE KIDNEY FAILURE, UNSPECIFIED   





(2) Hypokalemia


Code(s): E87.6 - HYPOKALEMIA   





Assessment/Plan


 Current Medications











Generic Name Dose Route Start Last Admin





  Trade Name Freq  PRN Reason Stop Dose Admin


 


Acetaminophen  650 mg  03/13/19 11:31  03/19/19 05:23





  Tylenol -  PO   650 mg





  Q6H PRN   Administration





  PAIN OR FEVER   





     





     





     


 


Cholecalciferol  1,000 unit  03/12/19 10:00  03/19/19 11:35





  Vitamin D3 -  PO   1,000 unit





  DAILY [facility]   Administration





     





     





     





     


 


Docusate Sodium  100 mg  03/16/19 22:00  03/19/19 11:34





  Colace -  PO   100 mg





  BID [facility]   Administration





     





     





     





     


 


Finasteride  5 mg  03/12/19 10:00  03/19/19 11:35





  Proscar -  PO   5 mg





  DAILY [facility]   Administration





     





     





     





     


 


Gabapentin  200 mg  03/12/19 06:00  03/19/19 14:02





  Neurontin -  PO   200 mg





  TID [facility]   Administration





     





     





     





     


 


Tigecycline 50 mg/ Dextrose  100 mls @ 100 mls/hr  03/12/19 22:00  03/19/19 11:

34





  IVPB   100 mls/hr





  BID [facility]   Administration





     





     





  Protocol   





     


 


Ketoconazole  1 applic  03/11/19 23:13  03/19/19 11:36





  Nizoral 2% Cream -  TP   1 appful





  DAILY [facility]   Administration





     





     





     





     


 


Magnesium Hydroxide  30 ml  03/12/19 02:31  03/19/19 11:34





  Milk Of Magnesia -  PO   30 ml





  Q8H PRN   Administration





  INDIGESTION   





     





     





     


 


Metoprolol Succinate  25 mg  03/12/19 10:00  03/19/19 11:37





  Toprol Xl -  PO   25 mg





  DAILY [facility]   Administration





     





     





     





     


 


Multivitamins/Minerals/Vitamin C  1 tab  03/12/19 10:00  03/19/19 11:34





  Tab-A-Vit -  PO   1 tab





  DAILY [facility]   Administration





     





     





     





     


 


Polyethylene Glycol  17 gm  03/16/19 19:15  





  Miralax (For Daily Use) -  PO   





  DAILY PRN   





  CONSTIPATION   





     





     





     


 


Quetiapine Fumarate  25 mg  03/13/19 11:31  03/19/19 11:35





  Seroquel -  PO   25 mg





  BID [facility]   Administration





     





     





     





     


 


Ranitidine HCl  150 mg  03/12/19 10:00  03/19/19 11:35





  Zantac -  PO   150 mg





  BID [facility]   Administration





     





     





     





     


 


Rivaroxaban  20 mg  03/12/19 18:00  03/18/19 17:55





  Xarelto -  PO   20 mg





  DAILY@1800 [facility]   Administration





     





     





     





     


 


Rosuvastatin Calcium  5 mg  03/12/19 22:00  03/18/19 22:19





  Crestor -  PO   5 mg





  HS [facility]   Administration





     





     





     





     


 


Senna  2 tab  03/12/19 22:00  03/18/19 22:18





  Senna -  PO   2 tab





  HS [facility]   Administration





     





     





     





     


 


Tamsulosin HCl  0.8 mg  03/12/19 08:30  03/19/19 11:35





  Flomax -  PO   0.8 mg





  DAILY@0830 [facility]   Administration





     





     





     





     


 


Zinc Sulfate  220 mg  03/12/19 12:00  03/19/19 11:34





  Orazinc -  PO   220 mg





  DAILY@1200 [facility]   Administration





     





     





     





     














Impression


1. CKD


2. hx of hydronephrosis


3. hx of obstructive uropathy


4. cellulitis


5. HTN


6. hypokalemia


7. a-fib


8. CHF


9. AGUILA


10. hypercalcemia








Plan


- renal function stable


- can restart lasix


- will give 40 mg now and re-evaluate tomorrow


- called vascular eval per family request


- urology eval


- monitor volume status








Dr Teran

## 2019-03-19 NOTE — PN
Progress Note, Physician


Chief Complaint: 





Events noted


Patient was seen this morning. Not in distress


History of Present Illness: 





Awake and alert. Chart was reviewed


Denies chest pain, SOB or palpitations





- Current Medication List


Current Medications: 


Active Medications





Acetaminophen (Tylenol -)  650 mg PO Q6H PRN


   PRN Reason: PAIN OR FEVER


   Last Admin: 03/19/19 05:23 Dose:  650 mg


Cholecalciferol (Vitamin D3 -)  1,000 unit PO DAILY [facility]


   Last Admin: 03/19/19 11:35 Dose:  1,000 unit


Docusate Sodium (Colace -)  100 mg PO BID [facility]


   Last Admin: 03/19/19 11:34 Dose:  100 mg


Finasteride (Proscar -)  5 mg PO DAILY [facility]


   Last Admin: 03/19/19 11:35 Dose:  5 mg


Gabapentin (Neurontin -)  200 mg PO TID [facility]


   Last Admin: 03/19/19 14:02 Dose:  200 mg


Tigecycline 50 mg/ Dextrose  100 mls @ 100 mls/hr IVPB BID [facility]; Protocol


   Last Admin: 03/19/19 11:34 Dose:  100 mls/hr


Ketoconazole (Nizoral 2% Cream -)  1 applic TP DAILY [facility]


   Last Admin: 03/19/19 11:36 Dose:  1 appful


Magnesium Hydroxide (Milk Of Magnesia -)  30 ml PO Q8H PRN


   PRN Reason: INDIGESTION


   Last Admin: 03/19/19 11:34 Dose:  30 ml


Metoprolol Succinate (Toprol Xl -)  25 mg PO DAILY [facility]


   Last Admin: 03/19/19 11:37 Dose:  25 mg


Multivitamins/Minerals/Vitamin C (Tab-A-Vit -)  1 tab PO DAILY [facility]


   Last Admin: 03/19/19 11:34 Dose:  1 tab


Polyethylene Glycol (Miralax (For Daily Use) -)  17 gm PO DAILY PRN


   PRN Reason: CONSTIPATION


Quetiapine Fumarate (Seroquel -)  25 mg PO BID [facility]


   Last Admin: 03/19/19 11:35 Dose:  25 mg


Ranitidine HCl (Zantac -)  150 mg PO BID [facility]


   Last Admin: 03/19/19 11:35 Dose:  150 mg


Rivaroxaban (Xarelto -)  20 mg PO DAILY@1800 [facility]


   Last Admin: 03/19/19 18:21 Dose:  20 mg


Rosuvastatin Calcium (Crestor -)  5 mg PO HS [facility]


   Last Admin: 03/18/19 22:19 Dose:  5 mg


Senna (Senna -)  2 tab PO [facility]


   Last Admin: 03/18/19 22:18 Dose:  2 tab


Tamsulosin HCl (Flomax -)  0.8 mg PO DAILY@0830 [facility]


   Last Admin: 03/19/19 11:35 Dose:  0.8 mg


Zinc Sulfate (Orazinc -)  220 mg PO DAILY@1200 [facility]


   Last Admin: 03/19/19 11:34 Dose:  220 mg











- Objective


Vital Signs: 


 Vital Signs











Temperature  98.5 F   03/19/19 13:30


 


Pulse Rate  68   03/19/19 13:30


 


Respiratory Rate  16   03/19/19 13:30


 


Blood Pressure  138/58 L  03/19/19 13:30


 


O2 Sat by Pulse Oximetry (%)  96   03/18/19 21:00











Eyes: Yes: PERRL


HENT: Yes: Atraumatic


Neck: Yes: Supple


Cardiovascular: Yes: Regular Rate and Rhythm, S1, S2


Respiratory: Yes: Diminished


Gastrointestinal: Yes: Normal Bowel Sounds, Soft, Abdomen, Obese.  No: 

Tenderness


Edema: No


Labs: 


 CBC, BMP





 03/19/19 06:00 





 03/19/19 06:00 











Problem List





- Problems


(1) AGUILA (acute kidney injury)


Code(s): N17.9 - ACUTE KIDNEY FAILURE, UNSPECIFIED   





(2) Demand ischemia


Code(s): I24.8 - OTHER FORMS OF ACUTE ISCHEMIC HEART DISEASE   





(3) Atrial fibrillation


Code(s): I48.91 - UNSPECIFIED ATRIAL FIBRILLATION   


Qualifiers: 


   Atrial fibrillation type: chronic   Qualified Code(s): I48.2 - Chronic 

atrial fibrillation   





(4) CHF (congestive heart failure)


Code(s): I50.9 - HEART FAILURE, UNSPECIFIED   


Qualifiers: 


   Heart failure type: unspecified   Heart failure chronicity: unspecified   

Qualified Code(s): I50.9 - Heart failure, unspecified   





(5) PVD (peripheral vascular disease)


Code(s): I73.9 - PERIPHERAL VASCULAR DISEASE, UNSPECIFIED   





(6) Hypertension


Code(s): I10 - ESSENTIAL (PRIMARY) HYPERTENSION   


Qualifiers: 


   Hypertension type: essential hypertension   Qualified Code(s): I10 - 

Essential (primary) hypertension   





(7) Pacemaker


Code(s): Z95.0 - PRESENCE OF CARDIAC PACEMAKER   





(8) Cellulitis


Code(s): L03.90 - CELLULITIS, UNSPECIFIED   





Assessment/Plan





1. Cellulitis


2. Toxic metabolic encephelopathy


3. Chronic diastolic LV failure


4. AF with ventricular pacing on DOAC/Xarelto


5. HTN


6. Hyperlipidemia


7. AGUILA and hypokalemia 


8. CAD with demand ischemia


9. BPH





PLAN: 


1. Continue Crestor 5 mg QHS, Toprol XL 25 mg QD and Xarelto 20 mg QD


2. Diuretics held pending renal improvement


3. Empiric antibiotics


4. Eventual rehab then follow up in office





Further plans are to follow


Panfilo Foote MD

## 2019-03-19 NOTE — PN
Progress Note, SLP





- Note


Progress Note: 





Pt vomited after breakfast, but tolerated full regular lunch without vomiting. 

Vomiting has been intermittent at home as well.


Etiology unclear. Pt did not have meds on empty stomach before breakfast. 

Vomiting reported to happen after breakfast (Saturday and today).


MBS can be done however I suspect it will be inconclusive.





Consider GI consult.

## 2019-03-19 NOTE — PN
Progress Note (short form)





- Note


Progress Note: 





Urology follow up.


Berry was discontinued yesterday and the pt. is voiding well. Abd is soft 

nontender. Bladder is not palpable. According to the nurse he is voiding ok. No 

further urology intervention at present. Should there be any further urological 

issues please contact us and we will follow the pt.


Thank you

## 2019-03-19 NOTE — CON.GI
Consult


Referred by:: Eduarda Mendez NP


Reason for Consultation:: Vomiting





- History of Present Illness


Chief Complaint: I vomit after lunch


History of Present Illness: 





83M transferred from the NH for altered mental status and c/o bilateral groin 

pain. Sonogram reveals a large postvoid residual and bilateral hydronephrosis. 

No obstruction seen. His history is not reliable and his wife fills in 

regualrly. When I last saw him in consultation on 2/27/15 he gave a h/o 

prostate cancer but now denies it. He was advsied to followup on our office an 

an outpatient but failed to do so. His wife tells me that he is primarily 

followed in the [facility] but is now followed by Dr Fabian as he is in the rehab facility . He apparently was recently at [facility] for 

lower abdominal wall infection. 


Marko denies nausea or vomiting. The problem that he describes is that 

food seems to get stuck in his lower throat causing him to regurgitate it. He 

denies choking or coughing on clear liquids however. This problem reminds him 

of the time when he had to have laryngeal polyps removed. He has had colon 

polyps removed by his GI in the [place] but cannot recall his name or when he 

last had a colonoscopy done. He tells me that his appetite is good and that he 

moves his bowel regularly. He denies bleeding. NO abdominal surgery.      





- History Source


History Provided By: Patient, Family Member


Limitations to Obtaining History: Poor Historian





- Past Medical History


Cardio/Vascular: Yes: AFIB, CHF, HTN, Hyperlipdemia, Other (pacemaker)


Pulmonary: Yes: Pneumonia, Sleep Apnea


Gastrointestinal: Yes: Diverticulosis, Other (colon polyps)


Hepatobiliary: Yes: Other


Renal/: Yes: Renal Inusuff, BPH, Cancer (h/o prostate cancer), Renal Calculi


Dermatology: Yes: Cellulitis, Other


Additional Medical History: morbid obesity.  wound left big toe grade 1-2





- Past Surgical History


Past Surgical History: Yes: Colonoscopy, Joint Replacement (bilateral knee 

replacements), Permanent Pacemaker


Additional Surgical History: laryngeal polyp excisions





- Alcohol/Substance Use


Hx Alcohol Use: Yes (quit many years ago)


History of Substance Use: reports: None





- Smoking History


Smoking history: Former smoker


Have you smoked in the past 12 months: No


If you are a former smoker, when did you quit?: 





- Social History


Usual Living Arrangement: Nursing Home


ADL: Family Assistance


Occupation: retired [place] 


Place of Birth: [place]


History of Recent Travel: No





Home Medications





- Allergies


Allergies/Adverse Reactions: 


 Allergies











Allergy/AdvReac Type Severity Reaction Status Date / Time


 


cephalexin monohydrate Allergy Mild Hives Verified 19 20:59





[From Keflex]     


 


ertapenem Allergy   Verified 19 20:59














- Home Medications


Home Medications: 


Ambulatory Orders





Tamsulosin HCl [Flomax] 0.8 mg PO DAILY 17 


Sennosides [Senna -] 2 tab PO HS  tablet 03/15/17 


Acetaminophen [Tylenol .Regular Strength -] 650 mg PO Q6H PRN  tablet 17 


Clotrimazole [Antifungal] 30 gm TP DAILY 18 


Alprazolam [Xanax] 0.25 mg PO Q8H PRN  tablet MDD 3 18 


Finasteride [Proscar -] 5 mg PO DAILY #30 tablet 18 


Rosuvastatin [Crestor -] 5 mg PO HS #30 tablet 18 


Cholecalciferol (Vitamin D3) [Vitamin D3] 0 unit PO DAILY 18 


Metolazone 2.5 mg PO Q48H 18 


Ranitidine HCl 150 mg PO BID 18 


Bacitracin - [Bacitracin Topical Ointment -] 1 applic TP DAILY #90 tube  


Collagenase Clostridium Hist. [Santyl -] 1 applic TP DAILY #90 tube 18 


Docusate Sodium [Colace -] 100 mg PO BID PRN #60 capsule 18 


Doxycycline Hyclate [Vibramycin -] 100 mg PO BID@1000,1800 10 Days #20 capsule 

18 


Furosemide [Lasix -] 40 mg PO BID@0600,1400 #60 tablet 18 


Gabapentin [Neurontin -] 200 mg PO TID #90 capsule 18 


Magnesium Hydrox 2400MG/30Ml [Milk of Magnesia -] 30 ml PO Q8H PRN  cup  


Metolazone [Zaroxolyn -] 2.5 mg PO Q2D@0530  tablet 18 


Metoprolol Succinate [Toprol XL -] 25 mg PO DAILY  tab.sr.24h 18 


Multivitamins [Multivit ([facility] Formul[facility])] 1 tab PO DAILY  tab 18 


Mupirocin Cream [Bactroban 2% Cream -] 1 applic TP BID #90 tube 18 


Potassium Chloride [K-Dur -] 10 meq PO DAILY #30 tablet.er 18 


Ranitidine [Zantac -] 150 mg PO BID  tablet 18 


Rivaroxaban [Xarelto -] 20 mg PO DAILY@1800  tablet 18 


Zinc Sulfate [Orazinc -] 220 mg PO DAILY@1200 #30 capsule 18 











Family Disease History





- Family Disease History


Family Disease History: Heart Disease: Mother ( 69 MI), CA: Father ( 82 

lung cancer), Sister ( liver cancer), Other: Son (Crohn's Disease)





Review of Systems


Unable to obtain ROS, reason: unreliable





Physical Exam-GI


Vital Signs: 


 Vital Signs











Temperature  98.5 F   19 13:30


 


Pulse Rate  68   19 13:30


 


Respiratory Rate  16   19 13:30


 


Blood Pressure  138/58 L  19 13:30


 


O2 Sat by Pulse Oximetry (%)  96   19 21:00








CBC,CMP











WBC  8.8 K/mm3 (4.0-10.0)   19  06:00    


 


RBC  4.08 M/mm3 (4.00-5.60)   19  06:00    


 


Hgb  12.3 GM/dL (11.7-16.9)   19  06:00    


 


Hct  36.1 % (35.4-49)   19  06:00    


 


MCV  88.4 fl (80-96)   19  06:00    


 


MCH  30.1 pg (25.7-33.7)   19  06:00    


 


MCHC  34.0 g/dl (32.0-35.9)   19  06:00    


 


RDW  14.0 % (11.9-15.9)   19  06:00    


 


Plt Count  347 K/MM3 (134-434)  D 19  06:00    


 


MPV  7.9 fl (7.5-11.1)   19  06:00    


 


Absolute Neuts (auto)  9.9 K/mm3 (1.5-8.0)  H  19  06:15    


 


Neutrophils %  71.5 % (42.8-82.8)   19  06:15    


 


Lymphocytes %  12.1 % (8-40)   19  06:15    


 


Monocytes %  8.7 % (3.8-10.2)   19  06:15    


 


Eosinophils %  7.4 % (0-4.5)  H D 19  06:15    


 


Basophils %  0.3 % (0-2.0)   19  06:15    


 


Nucleated RBC %  0 % (0-0)   19  06:15    


 


Sodium  137 mmol/L (136-145)   19  06:00    


 


Potassium  4.7 mmol/L (3.5-5.1)   19  06:00    


 


Chloride  107 mmol/L ()   19  06:00    


 


Carbon Dioxide  25 mmol/L (21-32)   19  06:00    


 


Anion Gap  4 MMOL/L (8-16)  L  19  06:00    


 


BUN  23 mg/dL (7-18)  H  19  06:00    


 


Creatinine  1.1 mg/dL (0.55-1.3)   19  06:00    


 


Creat Clearance w eGFR  63.93  (>60)   19  06:00    


 


Random Glucose  88 mg/dL ()   19  06:00    


 


Lactic Acid  1.6 mmol/L (0.4-2.0)   19  06:30    


 


Calcium  8.1 mg/dL (8.5-10.1)  L  19  06:00    


 


Phosphorus  2.5 mg/dL (2.5-4.9)   19  06:45    


 


Magnesium  2.1 mg/dL (1.8-2.4)   19  06:30    


 


Total Bilirubin  0.7 mg/dL (0.2-1)   19  06:00    


 


AST  19 U/L (15-37)   19  06:00    


 


ALT  21 U/L (13-61)   19  06:00    


 


Alkaline Phosphatase  111 U/L ()   19  06:00    


 


Creatine Kinase  280 U/L ()   19  02:15    


 


Creatine Kinase Index  0.1 % (0.0-5.0)   19  02:15    


 


CK-MB (CK-2)  2.70 ng/mL (0.5-3.6)   19  02:15    


 


CK-BB (CK-1)  0 % TOTAL (0)   19  02:15    


 


CK/CKMB % Calc  0 % (0-3)   19  02:15    


 


Troponin I  0.05 ng/ml (0.00-0.05)   19  02:15    


 


Total Protein  5.2 g/dl (6.4-8.2)  L  19  06:00    


 


Albumin  2.6 g/dl (3.4-5.0)  L  19  06:00    


 


PTH Intact  23 pg/mL (15-65)   19  06:30    


 


PTH Intact Intraop 0 m    (.)   19  06:30    








 Current Medications











Generic Name Dose Route Start Last Admin





  Trade Name Freq  PRN Reason Stop Dose Admin


 


Acetaminophen  650 mg  19 11:31  19 05:23





  Tylenol -  PO   650 mg





  Q6H PRN   Administration





  PAIN OR FEVER   





     





     





     


 


Cholecalciferol  1,000 unit  19 10:00  19 11:35





  Vitamin D3 -  PO   1,000 unit





  DAILY KELVIN   Administration





     





     





     





     


 


Docusate Sodium  100 mg  19 22:00  19 11:34





  Colace -  PO   100 mg





  BID KELVIN   Administration





     





     





     





     


 


Finasteride  5 mg  19 10:00  19 11:35





  Proscar -  PO   5 mg





  DAILY KELVIN   Administration





     





     





     





     


 


Gabapentin  200 mg  19 06:00  19 14:02





  Neurontin -  PO   200 mg





  TID KELVIN   Administration





     





     





     





     


 


Tigecycline 50 mg/ Dextrose  100 mls @ 100 mls/hr  19 22:00  19 11:

34





  IVPB   100 mls/hr





  BID KELVIN   Administration





     





     





  Protocol   





     


 


Ketoconazole  1 applic  19 23:13  19 11:36





  Nizoral 2% Cream -  TP   1 appful





  DAILY KELVIN   Administration





     





     





     





     


 


Magnesium Hydroxide  30 ml  19 02:31  19 11:34





  Milk Of Magnesia -  PO   30 ml





  Q8H PRN   Administration





  INDIGESTION   





     





     





     


 


Metoprolol Succinate  25 mg  19 10:00  19 11:37





  Toprol Xl -  PO   25 mg





  DAILY KELVIN   Administration





     





     





     





     


 


Multivitamins/Minerals/Vitamin C  1 tab  19 10:00  19 11:34





  Tab-A-Vit -  PO   1 tab





  DAILY KELVIN   Administration





     





     





     





     


 


Polyethylene Glycol  17 gm  19 19:15  





  Miralax (For Daily Use) -  PO   





  DAILY PRN   





  CONSTIPATION   





     





     





     


 


Quetiapine Fumarate  25 mg  19 11:31  19 11:35





  Seroquel -  PO   25 mg





  BID KELVIN   Administration





     





     





     





     


 


Ranitidine HCl  150 mg  19 10:00  19 11:35





  Zantac -  PO   150 mg





  BID KELVIN   Administration





     





     





     





     


 


Rivaroxaban  20 mg  19 18:00  19 17:55





  Xarelto -  PO   20 mg





  DAILY@1800 KELVIN   Administration





     





     





     





     


 


Rosuvastatin Calcium  5 mg  19 22:00  19 22:19





  Crestor -  PO   5 mg





  HS KELVIN   Administration





     





     





     





     


 


Senna  2 tab  19 22:00  19 22:18





  Senna -  PO   2 tab





  HS KELVIN   Administration





     





     





     





     


 


Tamsulosin HCl  0.8 mg  19 08:30  19 11:35





  Flomax -  PO   0.8 mg





  DAILY@0830 KELVIN   Administration





     





     





     





     


 


Zinc Sulfate  220 mg  19 12:00  19 11:34





  Orazinc -  PO   220 mg





  DAILY@1200 KELVIN   Administration





     





     





     





     











Constitutional: Yes: Well Nourished


Eyes: Yes: Conjunctiva Clear


HENT: Yes: Atraumatic


Neck: Yes: Supple


Cardiovascular: Yes: Pulse Irregular, Other (left sided PPM)


Respiratory: Yes: CTA Bilaterally


Gastrointestinal Inspection: Yes: Distention, Hernia (nontender umbilical), 

Other (lower abdominal wall and inguinal cellulitis extending up form the lower 

extremities)


...Auscultate: Yes: Normoactive Bowel Sounds


...Palpate: Yes: Soft, Other (nontender)


...Rectal Exam: Yes: Guaiac Negative (no masses, 3+ prostate)


Edema: Yes


Edema: LLE: 4+, RLE: 4+


Integumentary: Yes: Venous Stasis Changes, Other (lower extremity ulcers)


Neurological: Yes: Alert, Other (forgetful)


Labs: 


 CBC, BMP





 19 06:00 





 19 06:00 











Problem List





- Problems


(1) Oropharyngeal dysphagia


Code(s): R13.12 - DYSPHAGIA, OROPHARYNGEAL PHASE   





(2) Diverticulosis


Code(s): K57.90 - DVRTCLOS OF INTEST, PART UNSP, W/O PERF OR ABSCESS W/O BLEED 

  





(3) Colon polyps


Code(s): K63.5 - POLYP OF COLON   





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


Code(s): Z87.09 - PERSONAL HISTORY OF OTHER DISEASES OF THE RESPIRATORY SYSTEM 

  





(5) Chronic anticoagulation


Code(s): Z79.01 - LONG TERM (CURRENT) USE OF ANTICOAGULANTS   





(6) Atrial fibrillation


Code(s): I48.91 - UNSPECIFIED ATRIAL FIBRILLATION   


Qualifiers: 


   Atrial fibrillation type: chronic   Qualified Code(s): I48.2 - Chronic 

atrial fibrillation   





(7) CHF (congestive heart failure)


Code(s): I50.9 - HEART FAILURE, UNSPECIFIED   


Qualifiers: 


   Heart failure type: unspecified   Heart failure chronicity: unspecified   

Qualified Code(s): I50.9 - Heart failure, unspecified   





(8) PVD (peripheral vascular disease)


Code(s): I73.9 - PERIPHERAL VASCULAR DISEASE, UNSPECIFIED   





(9) Venous insufficiency


Code(s): I87.2 - VENOUS INSUFFICIENCY (CHRONIC) (PERIPHERAL)   





(10) Fatty liver


Code(s): K76.0 - FATTY (CHANGE OF) LIVER, NOT ELSEWHERE CLASSIFIED   





(11) Hypertension


Code(s): I10 - ESSENTIAL (PRIMARY) HYPERTENSION   


Qualifiers: 


   Hypertension type: essential hypertension   Qualified Code(s): I10 - 

Essential (primary) hypertension   





(12) Lymphedema


Code(s): I89.0 - LYMPHEDEMA, NOT ELSEWHERE CLASSIFIED   





(13) Morbid obesity


Code(s): E66.01 - MORBID (SEVERE) OBESITY DUE TO EXCESS CALORIES   





(14) Pacemaker


Code(s): Z95.0 - PRESENCE OF CARDIAC PACEMAKER   





(15) Prostate cancer


Code(s): C61 - MALIGNANT NEOPLASM OF PROSTATE   





(16) Urinary retention


Code(s): R33.9 - RETENTION OF URINE, UNSPECIFIED   





(17) Cellulitis and abscess of left leg


Code(s): L03.116 - CELLULITIS OF LEFT LOWER LIMB; L02.416 - CUTANEOUS ABSCESS 

OF LEFT LOWER LIMB   





Assessment/Plan





Impression


The patient denies nausea or vomiting. He fells that food pools in his throat 

and has to be regurgitated semiregularly. He denies choking


Personal h/o colon polyps and diverticulosis





Plan: 


Advised ENT evaluation of the throat


Doubt true oropharyngeal dysphagia but will get MBS to be sure as the patient 

is a poor historian


I gave the patient's wife my card to get his endoscopy records transferred 

which I will place into the chart upon receipt

## 2019-03-20 NOTE — PN
Progress Note, Physician


History of Present Illness: 





Pt seen and examined at bedside. He is awake and alert. He denies shortness of 

breath. He complains of lower ext edema. 





- Current Medication List


Current Medications: 


Active Medications





Acetaminophen (Tylenol -)  650 mg PO Q6H PRN


   PRN Reason: PAIN OR FEVER


   Last Admin: 03/20/19 10:51 Dose:  650 mg


Cholecalciferol (Vitamin D3 -)  1,000 unit PO DAILY Replaced by [facility]


   Last Admin: 03/20/19 09:36 Dose:  1,000 unit


Docusate Sodium (Colace -)  100 mg PO BID Replaced by [facility]


   Last Admin: 03/20/19 09:36 Dose:  Not Given


Finasteride (Proscar -)  5 mg PO DAILY Replaced by [facility]


   Last Admin: 03/20/19 09:36 Dose:  5 mg


Gabapentin (Neurontin -)  200 mg PO TID Replaced by [facility]


   Last Admin: 03/20/19 05:36 Dose:  200 mg


Ketoconazole (Nizoral 2% Cream -)  1 applic TP DAILY Replaced by [facility]


   Last Admin: 03/20/19 09:37 Dose:  1 appful


Magnesium Hydroxide (Milk Of Magnesia -)  30 ml PO Q8H PRN


   PRN Reason: INDIGESTION


   Last Admin: 03/19/19 11:34 Dose:  30 ml


Metoprolol Succinate (Toprol Xl -)  25 mg PO DAILY Replaced by [facility]


   Last Admin: 03/20/19 09:36 Dose:  25 mg


Multivitamins/Minerals/Vitamin C (Tab-A-Vit -)  1 tab PO DAILY Replaced by [facility]


   Last Admin: 03/20/19 09:36 Dose:  1 tab


Polyethylene Glycol (Miralax (For Daily Use) -)  17 gm PO DAILY PRN


   PRN Reason: CONSTIPATION


Quetiapine Fumarate (Seroquel -)  25 mg PO BID Replaced by [facility]


   Last Admin: 03/20/19 09:36 Dose:  25 mg


Ranitidine HCl (Zantac -)  150 mg PO BID Replaced by [facility]


   Last Admin: 03/20/19 09:36 Dose:  150 mg


Rivaroxaban (Xarelto -)  20 mg PO DAILY@1800 Replaced by [facility]


   Last Admin: 03/19/19 18:21 Dose:  20 mg


Rosuvastatin Calcium (Crestor -)  5 mg PO HS Replaced by [facility]


   Last Admin: 03/19/19 22:08 Dose:  5 mg


Senna (Senna -)  2 tab PO HS Replaced by [facility]


   Last Admin: 03/19/19 21:10 Dose:  Not Given


Tamsulosin HCl (Flomax -)  0.8 mg PO DAILY@0830 Replaced by [facility]


   Last Admin: 03/20/19 08:31 Dose:  0.8 mg


Zinc Sulfate (Orazinc -)  220 mg PO DAILY@1200 Replaced by [facility]


   Last Admin: 03/20/19 12:00 Dose:  220 mg











- Objective


Vital Signs: 


 Vital Signs











Temperature  97.4 F L  03/20/19 10:00


 


Pulse Rate  69   03/20/19 10:00


 


Respiratory Rate  18   03/20/19 10:00


 


Blood Pressure  140/67   03/20/19 10:00


 


O2 Sat by Pulse Oximetry (%)  96   03/18/19 21:00











Constitutional: Yes: Calm


Eyes: Yes: Conjunctiva Clear


HENT: Yes: Atraumatic


Neck: Yes: Supple


Cardiovascular: Yes: S1, S2


Respiratory: Yes: CTA Bilaterally


Gastrointestinal: Yes: Normal Bowel Sounds, Soft


Genitourinary: Yes: WNL


Musculoskeletal: Yes: WNL


Edema: Yes


Edema: LLE: 1+, RLE: 1+


Neurological: Yes: Oriented


Psychiatric: Yes: Oriented


Labs: 


 CBC, BMP





 03/20/19 06:08 





 03/20/19 06:08 











Problem List





- Problems


(1) AGUILA (acute kidney injury)


Code(s): N17.9 - ACUTE KIDNEY FAILURE, UNSPECIFIED   





(2) Hypokalemia


Code(s): E87.6 - HYPOKALEMIA   





Assessment/Plan


 Current Medications











Generic Name Dose Route Start Last Admin





  Trade Name Freq  PRN Reason Stop Dose Admin


 


Acetaminophen  650 mg  03/13/19 11:31  03/20/19 10:51





  Tylenol -  PO   650 mg





  Q6H PRN   Administration





  PAIN OR FEVER   





     





     





     


 


Cholecalciferol  1,000 unit  03/12/19 10:00  03/20/19 09:36





  Vitamin D3 -  PO   1,000 unit





  DAILY Replaced by [facility]   Administration





     





     





     





     


 


Docusate Sodium  100 mg  03/16/19 22:00  03/20/19 09:36





  Colace -  PO   Not Given





  BID KELVIN   





     





     





     





     


 


Finasteride  5 mg  03/12/19 10:00  03/20/19 09:36





  Proscar -  PO   5 mg





  DAILY KELVIN   Administration





     





     





     





     


 


Gabapentin  200 mg  03/12/19 06:00  03/20/19 05:36





  Neurontin -  PO   200 mg





  TID Replaced by [facility]   Administration





     





     





     





     


 


Ketoconazole  1 applic  03/11/19 23:13  03/20/19 09:37





  Nizoral 2% Cream -  TP   1 appful





  DAILY KELVIN   Administration





     





     





     





     


 


Magnesium Hydroxide  30 ml  03/12/19 02:31  03/19/19 11:34





  Milk Of Magnesia -  PO   30 ml





  Q8H PRN   Administration





  INDIGESTION   





     





     





     


 


Metoprolol Succinate  25 mg  03/12/19 10:00  03/20/19 09:36





  Toprol Xl -  PO   25 mg





  DAILY KELVIN   Administration





     





     





     





     


 


Multivitamins/Minerals/Vitamin C  1 tab  03/12/19 10:00  03/20/19 09:36





  Tab-A-Vit -  PO   1 tab





  DAILY KELVIN   Administration





     





     





     





     


 


Polyethylene Glycol  17 gm  03/16/19 19:15  





  Miralax (For Daily Use) -  PO   





  DAILY PRN   





  CONSTIPATION   





     





     





     


 


Quetiapine Fumarate  25 mg  03/13/19 11:31  03/20/19 09:36





  Seroquel -  PO   25 mg





  BID KELVIN   Administration





     





     





     





     


 


Ranitidine HCl  150 mg  03/12/19 10:00  03/20/19 09:36





  Zantac -  PO   150 mg





  BID KELVIN   Administration





     





     





     





     


 


Rivaroxaban  20 mg  03/12/19 18:00  03/19/19 18:21





  Xarelto -  PO   20 mg





  DAILY@1800 KELVIN   Administration





     





     





     





     


 


Rosuvastatin Calcium  5 mg  03/12/19 22:00  03/19/19 22:08





  Crestor -  PO   5 mg





  HS KELVIN   Administration





     





     





     





     


 


Senna  2 tab  03/12/19 22:00  03/19/19 21:10





  Senna -  PO   Not Given





  HS KELVIN   





     





     





     





     


 


Tamsulosin HCl  0.8 mg  03/12/19 08:30  03/20/19 08:31





  Flomax -  PO   0.8 mg





  DAILY@0830 KELVIN   Administration





     





     





     





     


 


Zinc Sulfate  220 mg  03/12/19 12:00  03/20/19 12:00





  Orazinc -  PO   220 mg





  DAILY@1200 KELVIN   Administration





     





     





     





     














Impression


1. CKD


2. hx of hydronephrosis


3. hx of obstructive uropathy


4. cellulitis


5. HTN


6. hypokalemia


7. a-fib


8. CHF


9. AGUILA


10. hypercalcemia








Plan


- cont to monitor renal function


- cont lasix 40 mg


- monitor potassium


- called vascular eval per family request


- urology eval


- monitor volume status








Dr Teran

## 2019-03-20 NOTE — PN
Progress Note, Physician


History of Present Illness: 





AWAKE, ALERT


NO C/O PAIN


AFEBRILE


URINE C/S ESBL


WOUND C/S MIXED





- Current Medication List


Current Medications: 


Active Medications





Acetaminophen (Tylenol -)  650 mg PO Q6H PRN


   PRN Reason: PAIN OR FEVER


   Last Admin: 03/20/19 10:51 Dose:  650 mg


Cholecalciferol (Vitamin D3 -)  1,000 unit PO DAILY [facility]


   Last Admin: 03/20/19 09:36 Dose:  1,000 unit


Docusate Sodium (Colace -)  100 mg PO BID [facility]


   Last Admin: 03/20/19 09:36 Dose:  Not Given


Finasteride (Proscar -)  5 mg PO DAILY [facility]


   Last Admin: 03/20/19 09:36 Dose:  5 mg


Gabapentin (Neurontin -)  200 mg PO TID [facility]


   Last Admin: 03/20/19 05:36 Dose:  200 mg


Tigecycline 50 mg/ Dextrose  100 mls @ 100 mls/hr IVPB BID [facility]; Protocol


   Last Admin: 03/20/19 09:35 Dose:  100 mls/hr


Ketoconazole (Nizoral 2% Cream -)  1 applic TP DAILY [facility]


   Last Admin: 03/20/19 09:37 Dose:  1 appful


Magnesium Hydroxide (Milk Of Magnesia -)  30 ml PO Q8H PRN


   PRN Reason: INDIGESTION


   Last Admin: 03/19/19 11:34 Dose:  30 ml


Metoprolol Succinate (Toprol Xl -)  25 mg PO DAILY [facility]


   Last Admin: 03/20/19 09:36 Dose:  25 mg


Multivitamins/Minerals/Vitamin C (Tab-A-Vit -)  1 tab PO DAILY [facility]


   Last Admin: 03/20/19 09:36 Dose:  1 tab


Polyethylene Glycol (Miralax (For Daily Use) -)  17 gm PO DAILY PRN


   PRN Reason: CONSTIPATION


Quetiapine Fumarate (Seroquel -)  25 mg PO BID [facility]


   Last Admin: 03/20/19 09:36 Dose:  25 mg


Ranitidine HCl (Zantac -)  150 mg PO BID [facility]


   Last Admin: 03/20/19 09:36 Dose:  150 mg


Rivaroxaban (Xarelto -)  20 mg PO DAILY@1800 [facility]


   Last Admin: 03/19/19 18:21 Dose:  20 mg


Rosuvastatin Calcium (Crestor -)  5 mg PO HS [facility]


   Last Admin: 03/19/19 22:08 Dose:  5 mg


Senna (Senna -)  2 tab PO HS [facility]


   Last Admin: 03/19/19 21:10 Dose:  Not Given


Tamsulosin HCl (Flomax -)  0.8 mg PO DAILY@0830 [facility]


   Last Admin: 03/20/19 08:31 Dose:  0.8 mg


Zinc Sulfate (Orazinc -)  220 mg PO DAILY@1200 [facility]


   Last Admin: 03/20/19 12:00 Dose:  220 mg











- Objective


Vital Signs: 


 Vital Signs











Temperature  97.4 F L  03/20/19 10:00


 


Pulse Rate  69   03/20/19 10:00


 


Respiratory Rate  18   03/20/19 10:00


 


Blood Pressure  140/67   03/20/19 10:00


 


O2 Sat by Pulse Oximetry (%)  96   03/18/19 21:00











Constitutional: Yes: No Distress, Obese


Cardiovascular: Yes: Regular Rate and Rhythm, S1, S2


Respiratory: Yes: CTA Bilaterally


Gastrointestinal: Yes: Normal Bowel Sounds, Soft, Abdomen, Obese.  No: 

Tenderness


Edema: Yes


Integumentary: Yes: Other (ERYTHEMA LE, LOWER ABDOMEN RESOLVED)


Labs: 


 CBC, BMP





 03/20/19 06:08 





 03/20/19 06:08 











Assessment/Plan





CELLULITIS LOWER ABDOMEN / LE B/L


CEPHALOSPORIN ALLERGY


TOXIC METABOLIC ENCEPHALOPATHY


AZOTEMIA


D/C  TYGACIL ADDITIONAL


CONTINUE TOPICAL ANTIFUNGAL

## 2019-03-20 NOTE — PN
Progress Note, Physician


History of Present Illness: 


Afebrile, completed treatment for recurrent cellulitis.





Cardiologist: Dr. Ronnie Esposito, but wife wishes him to follow up in our office





- Current Medication List


Current Medications: 


Active Medications





Acetaminophen (Tylenol -)  650 mg PO Q6H PRN


   PRN Reason: PAIN OR FEVER


   Last Admin: 03/20/19 10:51 Dose:  650 mg


Cholecalciferol (Vitamin D3 -)  1,000 unit PO DAILY [facility]


   Last Admin: 03/20/19 09:36 Dose:  1,000 unit


Docusate Sodium (Colace -)  100 mg PO BID [facility]


   Last Admin: 03/20/19 09:36 Dose:  Not Given


Finasteride (Proscar -)  5 mg PO DAILY [facility]


   Last Admin: 03/20/19 09:36 Dose:  5 mg


Furosemide (Lasix -)  40 mg PO DAILY [facility]


   Last Admin: 03/20/19 14:28 Dose:  40 mg


Gabapentin (Neurontin -)  200 mg PO TID [facility]


   Last Admin: 03/20/19 14:28 Dose:  200 mg


Ketoconazole (Nizoral 2% Cream -)  1 applic TP DAILY [facility]


   Last Admin: 03/20/19 09:37 Dose:  1 appful


Magnesium Hydroxide (Milk Of Magnesia -)  30 ml PO Q8H PRN


   PRN Reason: INDIGESTION


   Last Admin: 03/19/19 11:34 Dose:  30 ml


Metoprolol Succinate (Toprol Xl -)  25 mg PO DAILY [facility]


   Last Admin: 03/20/19 09:36 Dose:  25 mg


Multivitamins/Minerals/Vitamin C (Tab-A-Vit -)  1 tab PO DAILY [facility]


   Last Admin: 03/20/19 09:36 Dose:  1 tab


Polyethylene Glycol (Miralax (For Daily Use) -)  17 gm PO DAILY PRN


   PRN Reason: CONSTIPATION


Quetiapine Fumarate (Seroquel -)  25 mg PO BID [facility]


   Last Admin: 03/20/19 09:36 Dose:  25 mg


Ranitidine HCl (Zantac -)  150 mg PO BID [facility]


   Last Admin: 03/20/19 09:36 Dose:  150 mg


Rivaroxaban (Xarelto -)  20 mg PO DAILY@1800 [facility]


   Last Admin: 03/20/19 17:19 Dose:  20 mg


Rosuvastatin Calcium (Crestor -)  5 mg PO HS [facility]


   Last Admin: 03/19/19 22:08 Dose:  5 mg


Senna (Senna -)  2 tab PO HS [facility]


   Last Admin: 03/19/19 21:10 Dose:  Not Given


Tamsulosin HCl (Flomax -)  0.8 mg PO DAILY@0830 [facility]


   Last Admin: 03/20/19 08:31 Dose:  0.8 mg


Zinc Sulfate (Orazinc -)  220 mg PO DAILY@1200 [facility]


   Last Admin: 03/20/19 12:00 Dose:  220 mg











- Objective


Vital Signs: 


 Vital Signs











Temperature  97.4 F L  03/20/19 10:00


 


Pulse Rate  69   03/20/19 10:00


 


Respiratory Rate  18   03/20/19 10:00


 


Blood Pressure  140/67   03/20/19 10:00


 


O2 Sat by Pulse Oximetry (%)  96   03/18/19 21:00











Constitutional: Yes: No Distress, Calm


Neck: Yes: Supple


Cardiovascular: Yes: Regular Rate and Rhythm


Respiratory: Yes: Regular, Diminished


Gastrointestinal: Yes: Normal Bowel Sounds, Soft


Edema: Yes


Edema: LLE: Trace, RLE: Trace


Integumentary: Yes: Venous Stasis Changes


Labs: 


 CBC, BMP





 03/20/19 06:08 





 03/20/19 06:08 











Problem List





- Problems


(1) Demand ischemia


Code(s): I24.8 - OTHER FORMS OF ACUTE ISCHEMIC HEART DISEASE   





(2) AGUILA (acute kidney injury)


Code(s): N17.9 - ACUTE KIDNEY FAILURE, UNSPECIFIED   





(3) Elevated troponin


Code(s): R74.8 - ABNORMAL LEVELS OF OTHER SERUM ENZYMES   





(4) Atrial fibrillation


Code(s): I48.91 - UNSPECIFIED ATRIAL FIBRILLATION   


Qualifiers: 


   Atrial fibrillation type: chronic   Qualified Code(s): I48.2 - Chronic 

atrial fibrillation   





(5) Cellulitis


Code(s): L03.90 - CELLULITIS, UNSPECIFIED   


Qualifiers: 


   Laterality: left 





(6) CHF (congestive heart failure)


Code(s): I50.9 - HEART FAILURE, UNSPECIFIED   


Qualifiers: 


   Heart failure type: diastolic   Heart failure chronicity: acute on chronic   

Qualified Code(s): I50.33 - Acute on chronic diastolic (congestive) heart 

failure   





(7) Hypertension


Code(s): I10 - ESSENTIAL (PRIMARY) HYPERTENSION   


Qualifiers: 


   Hypertension type: essential hypertension   Qualified Code(s): I10 - 

Essential (primary) hypertension   





(8) Lymphedema


Code(s): I89.0 - LYMPHEDEMA, NOT ELSEWHERE CLASSIFIED   





(9) Pacemaker


Code(s): Z95.0 - PRESENCE OF CARDIAC PACEMAKER   





(10) Chronic anticoagulation


Code(s): Z79.01 - LONG TERM (CURRENT) USE OF ANTICOAGULANTS   





Assessment/Plan


Echo: 03/13/2019 Echo: Normal LV size and fxn, mod OLULOU, tr-mild MR, mild TR, 

RVSP 40-50 mmHg





1. Cellulitis


2. Toxic metabolic encephelopathy since resolved


3. Chronic diastolic LV failure


4. AF with ventricular pacing on DOAC/Xarelto


5. HTN


6. Hyperlipidemia


7. AGUILA and hypokalemia 


8. CAD with demand ischemia


9. BPH





PLAN: 


1. Continue Crestor 5 mg QHS, Toprol XL 25 mg QD and Xarelto 20 mg QD


2. Resumed Lasix 40 qd as renal fxn stablized


3. Completed empiric antibiotics


4. Eventual rehab then follow up in office

## 2019-03-20 NOTE — DS
Physical Examination


Vital Signs: 


 Vital Signs











Temperature  97.4 F L  03/20/19 10:00


 


Pulse Rate  69   03/20/19 10:00


 


Respiratory Rate  18   03/20/19 10:00


 


Blood Pressure  140/67   03/20/19 10:00


 


O2 Sat by Pulse Oximetry (%)  96   03/18/19 21:00











Findings/Remarks: 





Patient is an 82 y/o male with past medical history of CHF, permanent pacemaker

, HTN, HLD, cellulitis.  Patient presented to ER with complaints of B/L lower 

extremity and lower abdomen edema, pain, and redness.  Patient was admitted for 

Cellulitis and was treated with IV Tygacil and received antifungal creams for B/

L lower extremities. Erythema and edema has improved since receiving IV ABT. 

Patient has difficulty with ambulation and will be discharged to SNF for PT 

rehab. 


Constitutional: Yes: No Distress, Calm, Obese


Eyes: Yes: Conjunctiva Clear


HENT: Yes: Atraumatic


Neck: Yes: Supple


Cardiovascular: Yes: Regular Rate and Rhythm


Respiratory: Yes: Regular, CTA Bilaterally


Gastrointestinal: Yes: Normal Bowel Sounds, Soft, Abdomen, Obese


Renal/: Yes: Incontinence


Musculoskeletal: Yes: Muscle Weakness


Extremities: Yes: WNL


Edema: Yes (B/L lower extremity)


Neurological: Yes: Alert, Oriented


Psychiatric: Yes: Alert


Labs: 


 CBC, BMP





 03/20/19 06:08 





 03/20/19 06:08 











Discharge Summary


Reason For Visit: ACUTE KIDNEY INJURY CELLULITIS OF ABDOMINAL WALL


Current Active Problems





AGUILA (acute kidney injury) (Acute)


Cellulitis of abdominal wall (Acute)


Chronic anticoagulation (Acute)


Colon polyps (Acute)


Demand ischemia (Acute)


Diverticulosis (Acute)


Elevated troponin (Acute)


History of benign neoplasm of larynx (Acute)


Hypercalcemia (Acute)


Hypokalemia (Acute)


Oropharyngeal dysphagia (Acute)








Hospital Course: 





see progress notes


 Laboratory Tests











  03/11/19 03/11/19 03/11/19





  23:20 23:20 23:20


 


WBC  10.8 H  


 


RBC  3.82 L  


 


Hgb  11.5 L  


 


Hct  33.9 L D  


 


MCV  88.7  


 


MCH  30.2  


 


MCHC  34.0  


 


RDW  14.0  


 


Plt Count  300  D  


 


MPV  7.6  


 


Absolute Neuts (auto)  7.8  


 


Neutrophils %  72.2  


 


Lymphocytes %  14.2  D  


 


Monocytes %  11.0 H  


 


Eosinophils %  2.0  


 


Basophils %  0.6  


 


Nucleated RBC %  0  


 


Sodium   136 


 


Potassium   2.9 L* 


 


Chloride   94 L 


 


Carbon Dioxide   32 


 


Anion Gap   10 


 


BUN   51 H 


 


Creatinine   1.9 H 


 


Creat Clearance w eGFR   34.02 


 


Random Glucose   110 H 


 


Lactic Acid   


 


Calcium   12.2 H 


 


Phosphorus   


 


Magnesium   


 


Total Bilirubin   1.1 H 


 


AST   30 


 


ALT   18 


 


Alkaline Phosphatase   67 


 


Creatine Kinase    344 H


 


Creatine Kinase Index    0.9


 


CK-MB (CK-2)    3.3


 


CK-BB (CK-1)   


 


CK/CKMB % Calc   


 


Troponin I    0.07 H


 


Total Protein   5.8 L 


 


Albumin   3.2 L 


 


PTH Intact   


 


PTH Intact Intraop 0 m   


 


Urine Color   


 


Urine Appearance   


 


Urine pH   


 


Ur Specific Gravity   


 


Urine Protein   


 


Urine Glucose (UA)   


 


Urine Ketones   


 


Urine Blood   


 


Urine Nitrite   


 


Urine Bilirubin   


 


Urine Urobilinogen   


 


Ur Leukocyte Esterase   


 


Urine WBC (Auto)   


 


Urine RBC (Auto)   


 


Ur Epithelial Cells   


 


Urine Bacteria   


 


Hyaline Casts   


 


Urine Creatinine   














  03/12/19 03/12/19 03/12/19





  06:45 06:45 18:30


 


WBC  11.2 H  


 


RBC  3.87 L  


 


Hgb  11.6 L  


 


Hct  33.8 L  


 


MCV  87.4  


 


MCH  29.9  


 


MCHC  34.2  


 


RDW  14.2  


 


Plt Count  316  


 


MPV  7.4 L  


 


Absolute Neuts (auto)   


 


Neutrophils %   


 


Lymphocytes %   


 


Monocytes %   


 


Eosinophils %   


 


Basophils %   


 


Nucleated RBC %   


 


Sodium   137  141


 


Potassium   2.8 L*  3.6


 


Chloride   96 L  101


 


Carbon Dioxide   34 H  34 H


 


Anion Gap   7 L  5 L


 


BUN   46 H  37 H


 


Creatinine   1.8 H  1.7 H


 


Creat Clearance w eGFR   36.21  38.68


 


Random Glucose   112 H  113 H


 


Lactic Acid   


 


Calcium   11.7 H  10.6 H


 


Phosphorus   2.5 


 


Magnesium   1.5 L 


 


Total Bilirubin   


 


AST   


 


ALT   


 


Alkaline Phosphatase   


 


Creatine Kinase   


 


Creatine Kinase Index   


 


CK-MB (CK-2)   


 


CK-BB (CK-1)   


 


CK/CKMB % Calc   


 


Troponin I   


 


Total Protein   


 


Albumin   


 


PTH Intact   


 


PTH Intact Intraop 0 m   


 


Urine Color   


 


Urine Appearance   


 


Urine pH   


 


Ur Specific Gravity   


 


Urine Protein   


 


Urine Glucose (UA)   


 


Urine Ketones   


 


Urine Blood   


 


Urine Nitrite   


 


Urine Bilirubin   


 


Urine Urobilinogen   


 


Ur Leukocyte Esterase   


 


Urine WBC (Auto)   


 


Urine RBC (Auto)   


 


Ur Epithelial Cells   


 


Urine Bacteria   


 


Hyaline Casts   


 


Urine Creatinine   














  03/12/19 03/13/19 03/13/19





  18:30 02:15 02:15


 


WBC   


 


RBC   


 


Hgb   


 


Hct   


 


MCV   


 


MCH   


 


MCHC   


 


RDW   


 


Plt Count   


 


MPV   


 


Absolute Neuts (auto)   


 


Neutrophils %   


 


Lymphocytes %   


 


Monocytes %   


 


Eosinophils %   


 


Basophils %   


 


Nucleated RBC %   


 


Sodium  144  


 


Potassium  3.6  


 


Chloride  102  


 


Carbon Dioxide  35 H  


 


Anion Gap  7 L  


 


BUN   


 


Creatinine   


 


Creat Clearance w eGFR   


 


Random Glucose   


 


Lactic Acid   


 


Calcium   


 


Phosphorus   


 


Magnesium   


 


Total Bilirubin   


 


AST   


 


ALT   


 


Alkaline Phosphatase   


 


Creatine Kinase   280 


 


Creatine Kinase Index   0.1 


 


CK-MB (CK-2)   2.7 


 


CK-BB (CK-1)    0


 


CK/CKMB % Calc    0


 


Troponin I  0.13 H  0.05 


 


Total Protein   


 


Albumin   


 


PTH Intact   


 


PTH Intact Intraop 0 m   


 


Urine Color   


 


Urine Appearance   


 


Urine pH   


 


Ur Specific Gravity   


 


Urine Protein   


 


Urine Glucose (UA)   


 


Urine Ketones   


 


Urine Blood   


 


Urine Nitrite   


 


Urine Bilirubin   


 


Urine Urobilinogen   


 


Ur Leukocyte Esterase   


 


Urine WBC (Auto)   


 


Urine RBC (Auto)   


 


Ur Epithelial Cells   


 


Urine Bacteria   


 


Hyaline Casts   


 


Urine Creatinine   














  03/13/19 03/13/19 03/13/19





  02:15 06:15 06:30


 


WBC   


 


RBC   


 


Hgb   


 


Hct   


 


MCV   


 


MCH   


 


MCHC   


 


RDW   


 


Plt Count   


 


MPV   


 


Absolute Neuts (auto)   


 


Neutrophils %   


 


Lymphocytes %   


 


Monocytes %   


 


Eosinophils %   


 


Basophils %   


 


Nucleated RBC %   


 


Sodium   


 


Potassium   


 


Chloride   


 


Carbon Dioxide   


 


Anion Gap   


 


BUN   


 


Creatinine   


 


Creat Clearance w eGFR   


 


Random Glucose   


 


Lactic Acid    1.6


 


Calcium   


 


Phosphorus   


 


Magnesium   


 


Total Bilirubin   


 


AST   


 


ALT   


 


Alkaline Phosphatase   


 


Creatine Kinase   


 


Creatine Kinase Index   


 


CK-MB (CK-2)  2.70  


 


CK-BB (CK-1)   


 


CK/CKMB % Calc   


 


Troponin I   


 


Total Protein   


 


Albumin   


 


PTH Intact   


 


PTH Intact Intraop 0 m   


 


Urine Color   


 


Urine Appearance   


 


Urine pH   


 


Ur Specific Gravity   


 


Urine Protein   


 


Urine Glucose (UA)   


 


Urine Ketones   


 


Urine Blood   


 


Urine Nitrite   


 


Urine Bilirubin   


 


Urine Urobilinogen   


 


Ur Leukocyte Esterase   


 


Urine WBC (Auto)   


 


Urine RBC (Auto)   


 


Ur Epithelial Cells   


 


Urine Bacteria   


 


Hyaline Casts   


 


Urine Creatinine   60.0 














  03/13/19 03/13/19 03/13/19





  06:30 06:30 06:30


 


WBC  13.7 H  


 


RBC  4.09  


 


Hgb  12.5  


 


Hct  36.4  


 


MCV  89.0  


 


MCH  30.5  


 


MCHC  34.2  


 


RDW  14.2  


 


Plt Count  306  


 


MPV  7.8  


 


Absolute Neuts (auto)   


 


Neutrophils %   


 


Lymphocytes %   


 


Monocytes %   


 


Eosinophils %   


 


Basophils %   


 


Nucleated RBC %   


 


Sodium   140 


 


Potassium   3.8 


 


Chloride   103 


 


Carbon Dioxide   31 


 


Anion Gap   6 L 


 


BUN   41 H 


 


Creatinine   1.6 H 


 


Creat Clearance w eGFR   41.49 


 


Random Glucose   97 


 


Lactic Acid   


 


Calcium   10.2 H  10.4 H


 


Phosphorus   


 


Magnesium   2.1 


 


Total Bilirubin   0.9 


 


AST   44 H 


 


ALT   22 


 


Alkaline Phosphatase   69 


 


Creatine Kinase   


 


Creatine Kinase Index   


 


CK-MB (CK-2)   


 


CK-BB (CK-1)   


 


CK/CKMB % Calc   


 


Troponin I   


 


Total Protein   5.8 L 


 


Albumin   3.0 L 


 


PTH Intact    23


 


PTH Intact Intraop 0 m    


 


Urine Color   


 


Urine Appearance   


 


Urine pH   


 


Ur Specific Gravity   


 


Urine Protein   


 


Urine Glucose (UA)   


 


Urine Ketones   


 


Urine Blood   


 


Urine Nitrite   


 


Urine Bilirubin   


 


Urine Urobilinogen   


 


Ur Leukocyte Esterase   


 


Urine WBC (Auto)   


 


Urine RBC (Auto)   


 


Ur Epithelial Cells   


 


Urine Bacteria   


 


Hyaline Casts   


 


Urine Creatinine   














  03/14/19 03/14/19 03/14/19





  04:00 06:15 06:15


 


WBC   13.9 H 


 


RBC   4.03 


 


Hgb   11.9 


 


Hct   35.7 


 


MCV   88.6 


 


MCH   29.7 


 


MCHC   33.5 


 


RDW   14.5 


 


Plt Count   307 


 


MPV   7.8 


 


Absolute Neuts (auto)   9.9 H 


 


Neutrophils %   71.5 


 


Lymphocytes %   12.1 


 


Monocytes %   8.7 


 


Eosinophils %   7.4 H D 


 


Basophils %   0.3 


 


Nucleated RBC %   0 


 


Sodium    140


 


Potassium    3.6


 


Chloride    103


 


Carbon Dioxide    31


 


Anion Gap    6 L


 


BUN    40 H


 


Creatinine    1.6 H


 


Creat Clearance w eGFR    41.49


 


Random Glucose    91


 


Lactic Acid   


 


Calcium    9.5


 


Phosphorus   


 


Magnesium   


 


Total Bilirubin    1.4 H


 


AST    29


 


ALT    19


 


Alkaline Phosphatase    81


 


Creatine Kinase   


 


Creatine Kinase Index   


 


CK-MB (CK-2)   


 


CK-BB (CK-1)   


 


CK/CKMB % Calc   


 


Troponin I   


 


Total Protein    5.8 L


 


Albumin    3.0 L


 


PTH Intact   


 


PTH Intact Intraop 0 m   


 


Urine Color  Yellow  


 


Urine Appearance  Slcloudy  


 


Urine pH  7.0  D  


 


Ur Specific Gravity  1.015  


 


Urine Protein  Negative  


 


Urine Glucose (UA)  Negative  


 


Urine Ketones  Negative  


 


Urine Blood  Negative  


 


Urine Nitrite  Negative  


 


Urine Bilirubin  Negative  


 


Urine Urobilinogen  Negative  


 


Ur Leukocyte Esterase  3+ H  


 


Urine WBC (Auto)  54  


 


Urine RBC (Auto)  2  


 


Ur Epithelial Cells  Few  


 


Urine Bacteria  Many  


 


Hyaline Casts  1  


 


Urine Creatinine   














  03/15/19 03/15/19 03/19/19





  06:30 06:30 06:00


 


WBC   12.2 H  8.8


 


RBC   3.66 L  4.08


 


Hgb   11.1 L  12.3


 


Hct   32.6 L  36.1


 


MCV   89.0  88.4


 


MCH   30.2  30.1


 


MCHC   34.0  34.0


 


RDW   14.3  14.0


 


Plt Count   278  347  D


 


MPV   7.9  7.9


 


Absolute Neuts (auto)   


 


Neutrophils %   


 


Lymphocytes %   


 


Monocytes %   


 


Eosinophils %   


 


Basophils %   


 


Nucleated RBC %   


 


Sodium  139  


 


Potassium  3.5  


 


Chloride  105  


 


Carbon Dioxide  28  


 


Anion Gap  6 L  


 


BUN  40 H  


 


Creatinine  1.3  


 


Creat Clearance w eGFR  52.72  


 


Random Glucose  90  


 


Lactic Acid   


 


Calcium  8.7  


 


Phosphorus   


 


Magnesium   


 


Total Bilirubin   


 


AST   


 


ALT   


 


Alkaline Phosphatase   


 


Creatine Kinase   


 


Creatine Kinase Index   


 


CK-MB (CK-2)   


 


CK-BB (CK-1)   


 


CK/CKMB % Calc   


 


Troponin I   


 


Total Protein   


 


Albumin   


 


PTH Intact   


 


PTH Intact Intraop 0 m   


 


Urine Color   


 


Urine Appearance   


 


Urine pH   


 


Ur Specific Gravity   


 


Urine Protein   


 


Urine Glucose (UA)   


 


Urine Ketones   


 


Urine Blood   


 


Urine Nitrite   


 


Urine Bilirubin   


 


Urine Urobilinogen   


 


Ur Leukocyte Esterase   


 


Urine WBC (Auto)   


 


Urine RBC (Auto)   


 


Ur Epithelial Cells   


 


Urine Bacteria   


 


Hyaline Casts   


 


Urine Creatinine   














  03/19/19 03/20/19 03/20/19





  06:00 06:08 06:08


 


WBC    9.0


 


RBC    4.29


 


Hgb    12.9


 


Hct    37.8


 


MCV    88.0


 


MCH    30.1


 


MCHC    34.1


 


RDW    14.8


 


Plt Count    382


 


MPV    7.8


 


Absolute Neuts (auto)   


 


Neutrophils %   


 


Lymphocytes %   


 


Monocytes %   


 


Eosinophils %   


 


Basophils %   


 


Nucleated RBC %   


 


Sodium  137  138 


 


Potassium  4.7  4.2 


 


Chloride  107  106 


 


Carbon Dioxide  25  26 


 


Anion Gap  4 L  5 L 


 


BUN  23 H  22 H 


 


Creatinine  1.1  1.2 


 


Creat Clearance w eGFR  63.93  57.82 


 


Random Glucose  88  86 


 


Lactic Acid   


 


Calcium  8.1 L  7.8 L 


 


Phosphorus   


 


Magnesium   


 


Total Bilirubin  0.7  0.6 


 


AST  19  22 


 


ALT  21  20 


 


Alkaline Phosphatase  111  128 H 


 


Creatine Kinase   


 


Creatine Kinase Index   


 


CK-MB (CK-2)   


 


CK-BB (CK-1)   


 


CK/CKMB % Calc   


 


Troponin I   


 


Total Protein  5.2 L  5.5 L 


 


Albumin  2.6 L  2.8 L 


 


PTH Intact   


 


PTH Intact Intraop 0 m   


 


Urine Color   


 


Urine Appearance   


 


Urine pH   


 


Ur Specific Gravity   


 


Urine Protein   


 


Urine Glucose (UA)   


 


Urine Ketones   


 


Urine Blood   


 


Urine Nitrite   


 


Urine Bilirubin   


 


Urine Urobilinogen   


 


Ur Leukocyte Esterase   


 


Urine WBC (Auto)   


 


Urine RBC (Auto)   


 


Ur Epithelial Cells   


 


Urine Bacteria   


 


Hyaline Casts   


 


Urine Creatinine   








Active Medications











Generic Name Dose Route Start Last Admin





  Trade Name Freq  PRN Reason Stop Dose Admin


 


Acetaminophen  650 mg  03/13/19 11:31  03/20/19 10:51





  Tylenol -  PO   650 mg





  Q6H PRN   Administration





  PAIN OR FEVER   





     





     





     


 


Cholecalciferol  1,000 unit  03/12/19 10:00  03/20/19 09:36





  Vitamin D3 -  PO   1,000 unit





  DAILY KELVIN   Administration





     





     





     





     


 


Docusate Sodium  100 mg  03/16/19 22:00  03/20/19 09:36





  Colace -  PO   Not Given





  BID KELVIN   





     





     





     





     


 


Finasteride  5 mg  03/12/19 10:00  03/20/19 09:36





  Proscar -  PO   5 mg





  DAILY KELVIN   Administration





     





     





     





     


 


Furosemide  40 mg  03/20/19 13:30  03/20/19 14:28





  Lasix -  PO   40 mg





  DAILY KELVIN   Administration





     





     





     





     


 


Gabapentin  200 mg  03/12/19 06:00  03/20/19 14:28





  Neurontin -  PO   200 mg





  TID KELVIN   Administration





     





     





     





     


 


Ketoconazole  1 applic  03/11/19 23:13  03/20/19 09:37





  Nizoral 2% Cream -  TP   1 appful





  DAILY KELVIN   Administration





     





     





     





     


 


Magnesium Hydroxide  30 ml  03/12/19 02:31  03/19/19 11:34





  Milk Of Magnesia -  PO   30 ml





  Q8H PRN   Administration





  INDIGESTION   





     





     





     


 


Metoprolol Succinate  25 mg  03/12/19 10:00  03/20/19 09:36





  Toprol Xl -  PO   25 mg





  DAILY KELVIN   Administration





     





     





     





     


 


Multivitamins/Minerals/Vitamin C  1 tab  03/12/19 10:00  03/20/19 09:36





  Tab-A-Vit -  PO   1 tab





  DAILY KELVIN   Administration





     





     





     





     


 


Polyethylene Glycol  17 gm  03/16/19 19:15  





  Miralax (For Daily Use) -  PO   





  DAILY PRN   





  CONSTIPATION   





     





     





     


 


Quetiapine Fumarate  25 mg  03/13/19 11:31  03/20/19 09:36





  Seroquel -  PO   25 mg





  BID KELVIN   Administration





     





     





     





     


 


Ranitidine HCl  150 mg  03/12/19 10:00  03/20/19 09:36





  Zantac -  PO   150 mg





  BID KELVIN   Administration





     





     





     





     


 


Rivaroxaban  20 mg  03/12/19 18:00  03/19/19 18:21





  Xarelto -  PO   20 mg





  DAILY@1800 [facility]   Administration





     





     





     





     


 


Rosuvastatin Calcium  5 mg  03/12/19 22:00  03/19/19 22:08





  Crestor -  PO   5 mg





  HS KELVIN   Administration





     





     





     





     


 


Senna  2 tab  03/12/19 22:00  03/19/19 21:10





  Senna -  PO   Not Given





  HS KELVIN   





     





     





     





     


 


Tamsulosin HCl  0.8 mg  03/12/19 08:30  03/20/19 08:31





  Flomax -  PO   0.8 mg





  DAILY@0830 [facility]   Administration





     





     





     





     


 


Zinc Sulfate  220 mg  03/12/19 12:00  03/20/19 12:00





  Orazinc -  PO   220 mg





  DAILY@1200 KELVIN   Administration





     





     





     





     








 Microbiology





03/12/19 10:00   Blood - Peripheral Venous   Blood Culture - Final


                            NO GROWTH AFTER 5 DAYS INCUBATION


03/14/19 19:05   Urine - Urine Berry   Urine Culture - Final


                            Escherichia Coli Esbl 


03/11/19 23:50   Blood - Peripheral Venous   Blood Culture - Final


                            NO GROWTH AFTER 5 DAYS INCUBATION


03/11/19 23:10   Buttock - Right   Gram Stain - Final


03/11/19 23:10   Buttock - Right   Wound Culture - Final


                            Proteus Mirabilus - Esbl Produ


                             S Aureus


                            Enterococcus Faecalis








Condition: Stable





- Instructions


Diet, Activity, Other Instructions: 


follow up with PMD


follow up with ENT Dr. Flores


Follow up with vascular Dr. Keith


continue with medication as per current regimen


return to ER if severe pain, chest pain, SOB


Referrals: 


Prasad Flores MD [Staff Physician] - 


Dionicio Keith MD [Non Staff, Medical] - 


Disposition: SKILLED NURSING FACILITY





- Home Medications


Comprehensive Discharge Medication List: 


Ambulatory Orders





Tamsulosin HCl [Flomax] 0.8 mg PO DAILY 03/11/17 


Sennosides [Senna -] 2 tab PO HS  tablet 03/15/17 


Acetaminophen [Tylenol .Regular Strength -] 650 mg PO Q6H PRN  tablet 12/23/17 


Clotrimazole [Antifungal] 30 gm TP DAILY 03/24/18 


Alprazolam [Xanax] 0.25 mg PO Q8H PRN  tablet MDD 3 07/11/18 


Finasteride [Proscar -] 5 mg PO DAILY #30 tablet 07/11/18 


Rosuvastatin [Crestor -] 5 mg PO HS #30 tablet 07/11/18 


Cholecalciferol (Vitamin D3) [Vitamin D3] 0 unit PO DAILY 09/08/18 


Metolazone 2.5 mg PO Q48H 09/08/18 


Ranitidine HCl 150 mg PO BID 09/09/18 


Bacitracin - [Bacitracin Topical Ointment -] 1 applic TP DAILY #90 tube 09/14/ 18 


Collagenase Clostridium Hist. [Santyl -] 1 applic TP DAILY #90 tube 09/14/18 


Docusate Sodium [Colace -] 100 mg PO BID PRN #60 capsule 09/14/18 


Doxycycline Hyclate [Vibramycin -] 100 mg PO BID@1000,1800 10 Days #20 capsule 

09/14/18 


Furosemide [Lasix -] 40 mg PO BID@0600,1400 #60 tablet 09/14/18 


Gabapentin [Neurontin -] 200 mg PO TID #90 capsule 09/14/18 


Magnesium Hydrox 2400MG/30Ml [Milk of Magnesia -] 30 ml PO Q8H PRN  cup 09/14/ 18 


Metolazone [Zaroxolyn -] 2.5 mg PO Q2D@0530  tablet 09/14/18 


Metoprolol Succinate [Toprol XL -] 25 mg PO DAILY  tab.sr.24h 09/14/18 


Multivitamins [Multivit ([facility] Formulary)] 1 tab PO DAILY  tab 09/14/18 


Mupirocin Cream [Bactroban 2% Cream -] 1 applic TP BID #90 tube 09/14/18 


Potassium Chloride [K-Dur -] 10 meq PO DAILY #30 tablet.er 09/14/18 


Ranitidine [Zantac -] 150 mg PO BID  tablet 09/14/18 


Rivaroxaban [Xarelto -] 20 mg PO DAILY@1800  tablet 09/14/18 


Zinc Sulfate [Orazinc -] 220 mg PO DAILY@1200 #30 capsule 09/14/18

## 2019-03-20 NOTE — PN
Progress Note, SLP





- Note


Progress Note: 





MBS ordered per Dr. Cabrera's recommendation. Pt and wife refused study today.

## 2019-11-13 NOTE — PDOC
Documentation entered by Shelby Cee SCRIBE, acting as scribe for 

Wild Altamirano MD.








Wild Altamirano MD:  This documentation has been prepared by the Coleman aragon Sammi, SCRIBE, under my direction and personally reviewed by me in 

its entirety.  I confirm that the documentation accurately reflects all work, 

treatment, procedures, and medical decision making performed by me.  





Attending Attestation





- Resident


Resident Name: Leandro Mcclelland





- ED Attending Attestation


I have performed the following: I have examined & evaluated the patient, The 

case was reviewed & discussed with the resident, I agree w/resident's findings 

& plan, Exceptions are as noted





- HPI


HPI: 





11/13/19 18:53


The patient is an 84 year old male who presents to the emergency department for 

evaluation of 1 week waxing and waning altered mental status, as per wife at 

bedside. Patient's wife also endorses worsening erythema of the lower 

extremities bilaterally with multiple excoriations as well as continues 

perineal erythema.











- Physicial Exam


PE: 





11/13/19 19:10


Patient is awake and alert, morbidly obese, in no distress





Normocephalic, atraumatic 


PERRLA, EOMI


CTA


RRR


Abdomen soft, nontender, nondistended


Pelvis is stable


Extensive+ erythema of the perineum with several superficial ulcerations to the 

left gluteal cleft without palpable crepitus, nontender to palpation;


+ Extensive erythroderma to the lower extremities bilaterally, well demarcated, 

tender and warm to touch, with multiple ulcerations and excoriations bilaterally

; + tinea pedis bilaterally; no focal neurological deficits, patient is 

oriented to self, place, month;











- Medical Decision Making





11/13/19 19:12


Patient is an 84-year-old with multiple comorbidities presents to the ER with 

transiently low blood pressure, waxing and waning mental status and worsening 

erythema of the lower extremities.  In the ER, patient is awake and alert, 

nontoxic-appearing, with extensive erythroderma of the perineum without 

evidence of Zelda's gangrene and significant erythema of the lower 

extremities with multiple excoriations consistent with acute cellulitis.  Given 

patient's history of MRSA, will administer vancomycin.  Will consider 

fluoroquinolone for gram-negative coverage given keflex allergy.  Will 

judiciously hydrate.  Will obtain CT of head to rule out acute intracranial 

pathology.  Will obtain CBC/CMP/lactic acid/blood cultures.  Likely admission.

## 2019-11-13 NOTE — PDOC
*Physical Exam





- Vital Signs


 Last Vital Signs











Temp Pulse Resp BP Pulse Ox


 


 97.3 F L  68   18   118/51 L  100 


 


 11/13/19 16:58  11/13/19 19:04  11/13/19 16:58  11/13/19 19:04  11/13/19 19:04














ED Treatment Course





- LABORATORY


CBC & Chemistry Diagram: 


 11/13/19 18:00





 11/13/19 18:00





- ADDITIONAL ORDERS


Additional order review: 


 Laboratory  Results











  11/13/19 11/13/19 11/13/19





  18:48 18:20 18:00


 


Sodium   


 


Potassium   


 


Chloride   


 


Carbon Dioxide   


 


Anion Gap   


 


BUN   


 


Creatinine   


 


Est GFR (CKD-EPI)AfAm   


 


Est GFR (CKD-EPI)NonAf   


 


Random Glucose   


 


Lactic Acid   2.1 H 


 


Calcium   


 


Magnesium    2.2


 


Total Bilirubin   


 


AST   


 


ALT   


 


Alkaline Phosphatase   


 


Creatine Kinase   


 


Creatine Kinase Index   


 


CK-MB (CK-2)   


 


Troponin I   


 


B-Natriuretic Peptide    1821.2 H


 


Total Protein   


 


Albumin   


 


Urine Color  Yellow  


 


Urine Appearance  Cloudy  


 


Urine pH  8.5 H D  


 


Ur Specific Gravity  1.010  


 


Urine Protein  Negative  


 


Urine Glucose (UA)  Negative  


 


Urine Ketones  Negative  


 


Urine Blood  Negative  


 


Urine Nitrite  Negative  


 


Urine Bilirubin  Negative  


 


Urine Urobilinogen  1.0  


 


Ur Leukocyte Esterase  3+ H  


 


Urine WBC (Auto)  130  


 


Urine RBC (Auto)  2  


 


Urine Casts (Auto)  9  


 


U Epithel Cells (Auto)  1.0  


 


Urine Bacteria (Auto)  3435.6  














  11/13/19 11/13/19





  18:00 18:00


 


Sodium  136 


 


Potassium  3.1 L 


 


Chloride  91 L 


 


Carbon Dioxide  36 H 


 


Anion Gap  9 


 


BUN  50.7 H 


 


Creatinine  2.7 H 


 


Est GFR (CKD-EPI)AfAm  24.00 


 


Est GFR (CKD-EPI)NonAf  20.71 


 


Random Glucose  109 H 


 


Lactic Acid  


 


Calcium  9.4 


 


Magnesium  


 


Total Bilirubin  1.0 


 


AST  25 


 


ALT  16 


 


Alkaline Phosphatase  78 


 


Creatine Kinase   298


 


Creatine Kinase Index   0.6


 


CK-MB (CK-2)   2.0


 


Troponin I   0.05


 


B-Natriuretic Peptide  


 


Total Protein  6.3 L 


 


Albumin  3.5 


 


Urine Color  


 


Urine Appearance  


 


Urine pH  


 


Ur Specific Gravity  


 


Urine Protein  


 


Urine Glucose (UA)  


 


Urine Ketones  


 


Urine Blood  


 


Urine Nitrite  


 


Urine Bilirubin  


 


Urine Urobilinogen  


 


Ur Leukocyte Esterase  


 


Urine WBC (Auto)  


 


Urine RBC (Auto)  


 


Urine Casts (Auto)  


 


U Epithel Cells (Auto)  


 


Urine Bacteria (Auto)  








 











  11/13/19





  18:00


 


RBC  4.11


 


MCV  85.0


 


MCHC  32.6


 


RDW  15.4


 


MPV  7.5


 


Neutrophils %  68.5


 


Lymphocytes %  17.0


 


Monocytes %  11.2 H


 


Eosinophils %  2.3


 


Basophils %  1.0














Medical Decision Making





- Medical Decision Making





11/13/19 20:09


Received on signout from Dr Mcclelland





84M with extensive medical history including prior hospitalization for fungal 

infection, CHF, afib p/w 3 days of waxing and waning altered mental status 

alongside erythematous, cellulitic areas on bilateral lower extremities, most 

consistent with delerium vs dementia worsened by infectious process. Patient 

also with positive UA. Started on IVF and levaquin/vanc





-will f/u CT, CXR, Mg





11/13/19 20:19


Mg wnl


CXR with no acute pathology after review with attending Dr Altamirano


CT head with no acute pathology





11/13/19 20:22


Will admit for cellulitis, uti, aguila, hypokalemia, ams


admitted under dr reyes








Discharge





- Discharge Information


Problems reviewed: Yes


Clinical Impression/Diagnosis: 


 AGUILA (acute kidney injury), Cellulitis, Hypokalemia, UTI (urinary tract 

infection), AMS (altered mental status)





Condition: Stable





- Admission


Yes





- Follow up/Referral


Referrals: 


Genaro Fabian MD [Primary Care Provider] - 





- Patient Discharge Instructions





- Post Discharge Activity

## 2019-11-13 NOTE — HP
Admitting History and Physical





- Primary Care Physician


PCP: Genaro Fabian





- Admission


Chief Complaint: Lower Extremity Redness and Swelling


History of Present Illness: 





This is a 83 y/o man with a PMHx of CAD, CHF, Afib (on Xarelto), recurrent 

Cellulitis. Who presents to the ED with his wife for increased erythema and 

edmea to bilateral lower extremities. Per ED record patient's home health nurse 

noted patient to be hypotensive and having increased erythema and swelling to b/

l LE.





History Source: Family Member


Limitations to Obtaining History: Clinical Condition





- Past Medical History


Cardiovascular: Yes: AFIB, CHF, HTN, Hyperlipdemia, Other (pacemaker)


Pulmonary: Yes: Pneumonia, Sleep Apnea


Gastrointestinal: Yes: Diverticulosis, Other (colon polyps)


Hepatobiliary: Yes: Other


Renal/: Yes: Renal Inusuff, BPH, Cancer (h/o prostate cancer), Renal Calculi


Dermatology: Yes: Cellulitis, Other





- Past Surgical History


Past Surgical History: Yes: Amputation (left great toe), Colonoscopy, Joint 

Replacement (bilateral knee replacements), Permanent Pacemaker





- Smoking History


Smoking history: Former smoker


Have you smoked in the past 12 months: No


If you are a former smoker, when did you quit?: 1970





- Alcohol/Substance Use


Hx Alcohol Use: No


History of Substance Use: reports: None





- Social History


Usual Living Arrangement: Yes: With Spouse


ADL: Family Assistance


Occupation: retired [place] 


History of Recent Travel: No





Home Medications





- Allergies


Allergies/Adverse Reactions: 


 Allergies











Allergy/AdvReac Type Severity Reaction Status Date / Time


 


cephalexin monohydrate Allergy Mild Hives Verified 11/13/19 16:58





[From Keflex]     


 


amoxicillin Allergy   Verified 11/13/19 16:58


 


ertapenem Allergy   Verified 11/13/19 16:58














- Home Medications


Home Medications: 


Ambulatory Orders





Cholecalciferol (Vitamin D3) [Vitamin D3] 1,000 unit PO DAILY 08/03/19 


Finasteride 5 mg PO DAILY 08/03/19 


Furosemide [Lasix] 80 mg PO DAILY 08/03/19 


Gabapentin [Neurontin] 300 mg PO TID 08/03/19 


Losartan Potassium [Cozaar] 25 mg PO DAILY 08/03/19 


Metolazone 2.5 mg PO DAILY 08/03/19 


Metoprolol Succinate 25 mg PO DAILY 08/03/19 


Potassium Chloride 20 meq PO DAILY 08/03/19 


Ranitidine [Zantac -] 150 mg PO AC 08/03/19 


Rivaroxaban [Xarelto] 20 mg PO HS 08/03/19 


Tamsulosin HCl [Flomax] 0.8 mg PO DAILY 08/03/19 


Furosemide [Lasix] 40 mg PO HS 08/04/19 


Nystatin Powder [Nystop Powder -] 1 applic TP DAILY 08/04/19 


Acetaminophen [Tylenol .Regular Strength -] 650 mg PO Q4H PRN  tablet 08/08/19 


Finasteride [Proscar -] 5 mg PO DAILY  tablet 08/08/19 


Furosemide [Lasix -] 40 mg PO BID@0600,1400  tablet 08/08/19 


Melatonin 5 mg PO HS PRN  tab 08/08/19 


Metolazone [Zaroxolyn -] 2.5 mg PO DAILY  tablet 08/08/19 


Nystatin Ointment [Mycostatin Ointment -] 1 applic TP BID #180 applic 08/08/19 


Potassium Chloride [K-Dur -] 40 meq PO BID  tablet.er 08/08/19 


Rosuvastatin [Crestor -] 5 mg PO HS #30 tablet 08/08/19 











Family Medical History


Family History: Unable to Obtain





Review of Systems


Unable to obtain ROS, reason: Clinical Condition





- Review of Systems


Constitutional: reports: No Symptoms


Eyes: reports: No Symptoms


HENT: reports: No Symptoms


Neck: reports: No Symptoms


Cardiovascular: reports: No Symptoms


Respiratory: reports: No Symptoms


Gastrointestinal: reports: Other (excorations to left gluteal)


Genitourinary: reports: Other (increased excoriations to perineum).  denies: 

Dysuria, Flank Pain, Hematuria, Testicular Pain


Integumentary: reports: Erythema, Lesions, Wound


Neurological: reports: Confusion


Endocrine: reports: No Symptoms


Hematology/Lymphatic: reports: No Symptoms


Psychiatric: reports: Anxiety


Pain Intensity: 4





Physical Examination


Vital Signs: 


 Vital Signs











Temperature  97.3 F L  11/13/19 16:58


 


Pulse Rate  68   11/13/19 19:04


 


Respiratory Rate  18   11/13/19 16:58


 


Blood Pressure  118/51 L  11/13/19 19:04


 


O2 Sat by Pulse Oximetry (%)  100   11/13/19 19:04











Constitutional: Yes: Well Nourished, Anxious, Obese


Eyes: Yes: WNL, Conjunctiva Clear, EOM Intact, PERRL


HENT: Yes: WNL, Atraumatic, Normocephalic


Neck: Yes: WNL, Supple, Trachea Midline


Respiratory: Yes: Regular, CTA Bilaterally


Gastrointestinal: Yes: Normal Bowel Sounds, Soft, Abdomen, Obese


...Rectal Exam: Yes: Other (superficial ulcerationsto the left gluteal cleft no 

palpable crepitus, nontender to palpation)


Renal/: Yes: Other (significant erythema to perineum)


Breast(s): Yes: WNL


Musculoskeletal: Yes: WNL


Extremities: Yes: Erythema (bilateral lower extremities with multiple 

excoriations)


Edema: Yes


Edema: LLE: 3+, RLE: 3+


Peripheral Pulses WNL: Yes


Integumentary: Yes: Erythema, Pressure Ulcer, Rash, Venous Stasis Changes


Wound/Incision: Yes: Open to air, Reddened, Excoriated


Neurological: Yes: Alert, Oriented (to name, person), Confusion, Cran Nerves II-

XII Intact


...Motor Strength: WNL


Psychiatric: Yes: Alert, Oriented (x2)


Labs: 


 CBC, BMP





 11/13/19 18:00 





 11/13/19 18:00 











Imaging





- Results


Chest X-ray: Image Reviewed


Cat Scan: Image Reviewed


EKG: Image Reviewed





Problem List





- Problems


(1) Cellulitis


Assessment/Plan: 


Hx of recurrent cellulitis to lower extremities


hx MRSA


Multiple drug allergies


Wound Culture sent in ED


Blood Cultures-pending


Vancomycin given in ED, will continue


Appreciate ID consult


Consider Vascular consult


Wells Score 2


Duplex b/l LE r/o DVT


Wound Care


Monitor CBC


Monitor vitals


Elevate extremities


Continue Neurovascular checks





Code(s): L03.90 - CELLULITIS, UNSPECIFIED   


Qualifiers: 


   Site of cellulitis: extremity   Site of cellulitis of extremity: lower 

extremity 





(2) UTI (urinary tract infection)


Assessment/Plan: 


Hx ESBL


UA- +3 leukocyte esterase, 130 WBC, 3435 Bacteria


Urine Culture pending


Levofloxacin given in ED, will not continue secondary to Resistance per [facility]


Gentamycin given in ED


Patient has multiple ABX allergies


Appreciate ID consult


Monitor CBC, BMP


Monitor vitals


Code(s): N39.0 - URINARY TRACT INFECTION, SITE NOT SPECIFIED   


Qualifiers: 


   Urinary tract infection type: site unspecified   Hematuria presence: without 

hematuria   Qualified Code(s): N39.0 - Urinary tract infection, site not 

specified   





(3) AGUILA (acute kidney injury)


Assessment/Plan: 


slightly above baseline


Appreciate Nephrology consult


Monitor BMP


Avoid nephrotoxic drugs


Hold IVF secondary to CHF hx





Code(s): N17.9 - ACUTE KIDNEY FAILURE, UNSPECIFIED   





(4) Metabolic encephalopathy


Assessment/Plan: 


Likely secondary to UTI


Head CT report - neg ICH, chronic right base infact, mild periventricular 

chronic microvascular ischemic changes noted


Neurochecks


Fall Precautions


Monitor vitals


Code(s): G93.41 - METABOLIC ENCEPHALOPATHY   





(5) Atrial fibrillation


Assessment/Plan: 


stable


EKG reviewed- ventricular paced with prolonged QTc 566


Avoid prolonged QTc medications


Continue Xarelto and BB when verified with pt's spouse in am


Code(s): I48.91 - UNSPECIFIED ATRIAL FIBRILLATION   


Qualifiers: 


   Atrial fibrillation type: chronic 





(6) Hypokalemia


Assessment/Plan: 


Likely due to UTI vs Dehydration


Repleted in ED


Monitor BMP


Code(s): E87.6 - HYPOKALEMIA   





(7) CHF (congestive heart failure)


Assessment/Plan: 


Chest Xray- reviewed


Lasix given in ED, will continue


Continue home meds when verified with spouse in am


Code(s): I50.9 - HEART FAILURE, UNSPECIFIED   


Qualifiers: 


   Heart failure type: diastolic   Heart failure chronicity: chronic   

Qualified Code(s): I50.32 - Chronic diastolic (congestive) heart failure   





(8) Hypertension


Assessment/Plan: 


stable


Monitor BP


Continue home meds when verified by spouse


Monitor renal function


Code(s): I10 - ESSENTIAL (PRIMARY) HYPERTENSION   


Qualifiers: 


   Hypertension type: essential hypertension   Qualified Code(s): I10 - 

Essential (primary) hypertension   





(9) PVD (peripheral vascular disease)


Assessment/Plan: 


Continue home medication when verified


Neurovascular checks


Code(s): I73.9 - PERIPHERAL VASCULAR DISEASE, UNSPECIFIED   





(10) Pacemaker


Assessment/Plan: 


EKG- ventricular paced


Code(s): Z95.0 - PRESENCE OF CARDIAC PACEMAKER   





Assessment/Plan





This patient is a 83 y/o man with a PMHx of HTN, CAD, CHF, Afib (on Xarelto), 

Recurrent Cellulitis. Admitted to [facility] for Cellulitis of B/L Lower Extremities, 

UTI, Hypokalemia, AGUILA, Metabolic Encephalopathy for further evaluation of their 

emergent condition.





Plan:


See Problem List





FEN


Fluid Restriction 1L


Replete lytes prn


Low Na Diet





DVT ppx


OOB


Continue Xarelto (when dose confirmed)





Dispo: Requires Inpatient Care














Visit type





- Emergency Visit


Emergency Visit: Yes


ED Registration Date: 11/13/19


Care time: The patient presented to the Emergency Department on the above date 

and was hospitalized for further evaluation of their emergent condition.





- New Patient


This patient is new to me today: Yes


Date on this admission: 11/13/19





- Critical Care


Critical Care patient: No

## 2019-11-13 NOTE — PDOC
History of Present Illness





- General


Chief Complaint: Lightheaded


Stated Complaint: LOW BP


Time Seen by Provider: 11/13/19 17:11


History Source: Patient, Spouse





- History of Present Illness


Initial Comments: 





11/13/19 18:58


Mr. Platt is an 83 y/o M with hx CHF, prior fungal infection, afib, implanted 

pacemaker, p/w three days of confusion, weakness. He has difficulty reporting 

why he is in the ED, but is accompanied by his wife who provides further 

history. She reports that since 11/10/19, he has had several hours-long 

episodes where he calls out for help, but is then unable to verbalize his 

concerns. She reports that this continues for 2-4 hours, and then he returns to 

his baseline mental status. She reports that this has occurred each day. He has 

a visiting nurse who noted a "low" blood pressure, and became concerned, 

calling for EMS transport. Per EMS, on arrival he was normotensive. He endorses 

weakness, confusion, and "not feeling right". He denies any pain, fevers. 











Past History





- Past Medical History


Allergies/Adverse Reactions: 


 Allergies











Allergy/AdvReac Type Severity Reaction Status Date / Time


 


cephalexin monohydrate Allergy Mild Hives Verified 11/13/19 16:58





[From Keflex]     


 


amoxicillin Allergy   Verified 11/13/19 16:58


 


ertapenem Allergy   Verified 11/13/19 16:58











Home Medications: 


Ambulatory Orders





Cholecalciferol (Vitamin D3) [Vitamin D3] 1,000 unit PO DAILY 08/03/19 


Finasteride 5 mg PO DAILY 08/03/19 


Furosemide [Lasix] 80 mg PO DAILY 08/03/19 


Gabapentin [Neurontin] 300 mg PO TID 08/03/19 


Losartan Potassium [Cozaar] 25 mg PO DAILY 08/03/19 


Metolazone 2.5 mg PO DAILY 08/03/19 


Metoprolol Succinate 25 mg PO DAILY 08/03/19 


Potassium Chloride 20 meq PO DAILY 08/03/19 


Ranitidine [Zantac -] 150 mg PO AC 08/03/19 


Rivaroxaban [Xarelto] 20 mg PO HS 08/03/19 


Tamsulosin HCl [Flomax] 0.8 mg PO DAILY 08/03/19 


Furosemide [Lasix] 40 mg PO HS 08/04/19 


Nystatin Powder [Nystop Powder -] 1 applic TP DAILY 08/04/19 


Acetaminophen [Tylenol .Regular Strength -] 650 mg PO Q4H PRN  tablet 08/08/19 


Finasteride [Proscar -] 5 mg PO DAILY  tablet 08/08/19 


Furosemide [Lasix -] 40 mg PO BID@0600,1400  tablet 08/08/19 


Melatonin 5 mg PO HS PRN  tab 08/08/19 


Metolazone [Zaroxolyn -] 2.5 mg PO DAILY  tablet 08/08/19 


Nystatin Ointment [Mycostatin Ointment -] 1 applic TP BID #180 applic 08/08/19 


Potassium Chloride [K-Dur -] 40 meq PO BID  tablet.er 08/08/19 


Rosuvastatin [Crestor -] 5 mg PO HS #30 tablet 08/08/19 








Anemia: No


Asthma: No


Cancer: No


Cardiac Disorders: Yes


CVA: No


COPD: No


CHF: Yes


Dementia: No


Diabetes: No


GI Disorders: Yes


 Disorders: No


HTN: Yes


Hypercholesterolemia: Yes


Liver Disease: No


Seizures: No


Thyroid Disease: No





- Surgical History


Abdominal Surgery: No


Appendectomy: No


Cardiac Surgery: Yes


Cholecystectomy: No


Lung Surgery: No


Neurologic Surgery: No


Orthopedic Surgery: Yes (bilateral knee sx 1965)





- Immunization History


Immunization Up to Date: Yes





- Psycho Social/Smoking Cessation Hx


Smoking History: Former smoker


Have you smoked in the past 12 months: No


If you are a former smoker, when did you quit?: 1970


Information on smoking cessation initiated: No


Hx Alcohol Use: No


Drug/Substance Use Hx: No


Substance Use Type: None


Hx Substance Use Treatment: No





**Review of Systems





- Review of Systems


Able to Perform ROS?: Yes


Comments:: 





11/14/19 07:39


ROS: 


GENERAL/CONSTITUTIONAL: Weakness, chills. No fever


HEAD, EYES, EARS, NOSE AND THROAT: No change in vision. No ear pain or 

discharge. No sore throat.


CARDIOVASCULAR: No chest pain or shortness of breath


RESPIRATORY: No cough, wheezing, or hemoptysis.


GASTROINTESTINAL: No nausea, vomiting, diarrhea or constipation.


GENITOURINARY: No dysuria, frequency, or change in urination.


MUSCULOSKELETAL: No joint or muscle swelling or pain. No neck or back pain.


SKIN: No rash


NEUROLOGIC: Confusion, vertigo. No headache, loss of consciousness, or change 

in strength/sensation.


ENDOCRINE: No increased thirst. No abnormal weight change


HEMATOLOGIC/LYMPHATIC: No anemia, easy bleeding, or history of blood clots.


ALLERGIC/IMMUNOLOGIC: No hives or skin allergy.








*Physical Exam





- Vital Signs


 Last Vital Signs











Temp Pulse Resp BP Pulse Ox


 


 97.3 F L  74   18   133/75   99 


 


 11/13/19 16:58  11/13/19 16:58  11/13/19 16:58  11/13/19 16:58  11/13/19 16:58














- Physical Exam


Comments: 





11/14/19 07:40


PE: 


GENERAL: Awake, alert, in no acute distress. Oriented to person, place, 

situation. Unable to report the year. 


HEAD: No signs of trauma, normocephalic, atraumatic 


EYES: PERRLA, EOMI, sclera anicteric, conjunctiva clear


ENT: Auricles normal inspection, hearing grossly normal, nares patent, 

oropharynx clear without exudates. Moist mucosa


NECK: Normal ROM, supple, no lymphadenopathy, JVD, or masses


LUNGS: No distress, speaks full sentences, clear to auscultation bilaterally 


HEART: Regular rate and rhythm, normal S1 and S2, no murmurs, rubs or gallops, 

peripheral pulses normal and equal bilaterally. 


ABDOMEN: Soft, nontender, normoactive bowel sounds.  No guarding, no rebound.  

No masses


EXTREMITIES : Bilateral lower extremity cellulitis, erythema, non-draining 

ulcerative lesions bilaterally. Normal range of motion.  No clubbing or cyanosis


NEUROLOGICAL: Cranial nerves II through XII grossly intact.  Normal speech, 

normal gait, no focal sensorimotor deficits 


SKIN: Warm, Dry, normal turgor, no rashes or lesions noted except as described 

above








ED Treatment Course





- LABORATORY


CBC & Chemistry Diagram: 


 11/13/19 18:00





 11/13/19 18:00





- ADDITIONAL ORDERS


Additional order review: 


 











  11/13/19





  18:00


 


RBC  4.11


 


MCV  85.0


 


MCHC  32.6


 


RDW  15.4


 


MPV  7.5


 


Neutrophils %  68.5


 


Lymphocytes %  17.0


 


Monocytes %  11.2 H


 


Eosinophils %  2.3


 


Basophils %  1.0














- RADIOLOGY


Radiology Studies Ordered: 














 Category Date Time Status


 


 HEAD CT WITHOUT CONTRAST [CT] Stat CT Scan  11/13/19 17:56 Ordered


 


 CHEST X-RAY PORTABLE* [RAD] Stat Radiology  11/13/19 17:31 Taken














Medical Decision Making





- Medical Decision Making





84M with extensive medical history including prior hospitalization for fungal 

infection, CHF, afib p/w 3 days of waxing and waning altered mental status 

alongside erythematous, cellulitic areas on bilateral lower extremities, most 

consistent with delerium vs dementia worsened by infectious process. 





Plan: 


CBC


CMP


Cardiac profile


EKG


CXR


UA


Urine culture


blood culture


Rectal temperature





Dispo: 


Admit





11/13/19 18:51


Rectal temp - 98.7





---


Given allergies to cephalosporins, carbapenams, plan for dosing of Vancomycin 

and Levofloxacin for coverage of MRSA, gram negatives respectively. 





---


K - 3.1, 40mg kdur ordered








Patient signed out to night team. 


11/13/19 19:13








Discharge





- Discharge Information


Problems reviewed: Yes


Clinical Impression/Diagnosis: 


 AGUILA (acute kidney injury), Hypokalemia





Cellulitis


Qualifiers:


 Site of cellulitis: extremity Site of cellulitis of extremity: lower extremity 

Laterality: unspecified laterality Qualified Code(s): L03.119 - Cellulitis of 

unspecified part of limb





UTI (urinary tract infection)


Qualifiers:


 Urinary tract infection type: site unspecified Hematuria presence: without 

hematuria Qualified Code(s): N39.0 - Urinary tract infection, site not specified





AMS (altered mental status)


Qualifiers:


 Altered mental status type: delirium Qualified Code(s): R41.0 - Disorientation

, unspecified





Condition: Stable





- Admission


Yes





- Follow up/Referral





- Patient Discharge Instructions





- Post Discharge Activity

## 2019-11-14 NOTE — CONSULT
Consultation: 


REQUESTING PROVIDER: Catalina





CONSULT REQUEST: We have been asked to medically evaluate this patient for CKD.





HISTORY OF PRESENT ILLNESS: Pt. is an 84 y.o. M w/ PMHx. of CKD(Stage 3), CAD, 

CHFpEF, SSS (s/p PPM) Afib(on Xarelto), recurrent UTIs and recurrent cellulitis 

presents form home because of hypotension, and bilateral lower extremity 

erythema and swelling. Pt. is confused at bedside and has difficulty finding 

words on examination. Pt. states that his legs "get better and then get worse 

and that he is on antibiotics and then comes off." Pt. unable to give specifics 

about last antibiotic use and if he takes NSAIDs. Pt. states that his girlfriend

/wife was here last night and will be coming in later to provide specific 

details. Pt. endorse dizziness and ?vertigo that comes and goes, not currently. 

Pt. endorses hip pain but had normal active ROM. Pt. endorses numbness and 

tingling of the lower extremities but is unable to say when it started. Pt. 

endorses anxiety especially at night. Pt. unsure if he was having fevers or 

chills at home recently. Pt. denies any current burning sensation with 

urination or any increased frequency of urination. Pt. denies any current chest 

pain, shortness of breath, diarrhea, or hematuria.  





Pt.'s wife endorses that the 's Lasix was increased 3 months ago to 40mg 

TID. 











REVIEW OF SYSTEMS:


As above 





PHYSICAL EXAMINATION





 Vital Signs - 24 hr











  11/13/19 11/13/19 11/13/19





  16:58 19:04 22:22


 


Temperature 97.3 F L  98.3 F


 


Pulse Rate 74  


 


Pulse Rate [  68 





Right Radial]   


 


Respiratory 18  





Rate   


 


Blood Pressure 133/75  


 


Blood Pressure  118/51 L 





[Right Arm]   


 


O2 Sat by Pulse 99 100 





Oximetry (%)   














  11/13/19 11/14/19 11/14/19





  23:16 06:00 08:10


 


Temperature 97.9 F 98.5 F 98.4 F


 


Pulse Rate 78 70 72


 


Pulse Rate [   





Right Radial]   


 


Respiratory 20 20 18





Rate   


 


Blood Pressure 124/56 L 99/33 L 120/60


 


Blood Pressure   





[Right Arm]   


 


O2 Sat by Pulse 97  





Oximetry (%)   














GENERAL: Awake, alert, and oriented to name and location, in no acute distress.


HEAD: Normal with no signs of trauma.


EYES: Extraocular movements intact, sclera anicteric, conjunctiva clear. 


EARS, NOSE, THROAT: Ears normal, nares patent, oropharynx clear without 

exudates. Moist mucous membranes.


NECK: Normal range of motion, supple without lymphadenopathy, or JVD


LUNGS: Breath sounds equal, clear to auscultation bilaterally. No wheezes, and 

no crackles. No accessory muscle use.


HEART: Regular rate and rhythm, normal S1 and S2 without murmur


ABDOMEN: Soft, nontender, not distended, normoactive bowel sounds, no guarding, 

no rebound, no masses.  


MUSCULOSKELETAL: Normal range of motion at all joints. No bony deformities or 

tenderness. No CVA tenderness.


UPPER EXTREMITIES: warm, well-perfused. No cyanosis. No clubbing. No peripheral 

edema.


LOWER EXTREMITIES: 2+ dorsal pedal pulses, warm, well-perfused. No calf 

tenderness. 1+ edema. b/l erythema with superficial skin peeling


NEUROLOGICAL:  Cranial nerves II-XII intact. Normal speech. Normal gait.


PSYCHIATRIC: Cooperative. Good eye contact. Appropriate mood and affect.


SKIN: Warm, dry











 Laboratory Results - last 24 hr











  11/13/19 11/13/19 11/13/19





  18:00 18:00 18:00


 


WBC   10.7 H 


 


RBC   4.11 


 


Hgb   11.4 L 


 


Hct   35.0 L 


 


MCV   85.0 


 


MCH   27.7 


 


MCHC   32.6 


 


RDW   15.4 


 


Plt Count   472 H D 


 


MPV   7.5 


 


Absolute Neuts (auto)   7.3 


 


Neutrophils %   68.5 


 


Lymphocytes %   17.0 


 


Monocytes %   11.2 H 


 


Eosinophils %   2.3 


 


Basophils %   1.0 


 


Nucleated RBC %   0 


 


Sodium    136


 


Potassium    3.1 L


 


Chloride    91 L


 


Carbon Dioxide    36 H


 


Anion Gap    9


 


BUN    50.7 H


 


Creatinine    2.7 H


 


Est GFR (CKD-EPI)AfAm    24.00


 


Est GFR (CKD-EPI)NonAf    20.71


 


Random Glucose    109 H


 


Lactic Acid   


 


Calcium    9.4


 


Magnesium   


 


Total Bilirubin    1.0


 


AST    25


 


ALT    16


 


Alkaline Phosphatase    78


 


Creatine Kinase  298  


 


Creatine Kinase Index  0.6  


 


CK-MB (CK-2)  2.0  


 


Troponin I  0.05  


 


B-Natriuretic Peptide   


 


Total Protein    6.3 L


 


Albumin    3.5


 


Urine Color   


 


Urine Appearance   


 


Urine pH   


 


Ur Specific Gravity   


 


Urine Protein   


 


Urine Glucose (UA)   


 


Urine Ketones   


 


Urine Blood   


 


Urine Nitrite   


 


Urine Bilirubin   


 


Urine Urobilinogen   


 


Ur Leukocyte Esterase   


 


Urine WBC (Auto)   


 


Urine RBC (Auto)   


 


Urine Casts (Auto)   


 


U Epithel Cells (Auto)   


 


Urine Bacteria (Auto)   


 


Vancomycin Pre-Dose   


 


Blood Type   


 


Antibody Screen   


 


Antibody Identification   


 


Antigen Identification   














  11/13/19 11/13/19 11/13/19





  18:00 18:00 18:20


 


WBC   


 


RBC   


 


Hgb   


 


Hct   


 


MCV   


 


MCH   


 


MCHC   


 


RDW   


 


Plt Count   


 


MPV   


 


Absolute Neuts (auto)   


 


Neutrophils %   


 


Lymphocytes %   


 


Monocytes %   


 


Eosinophils %   


 


Basophils %   


 


Nucleated RBC %   


 


Sodium   


 


Potassium   


 


Chloride   


 


Carbon Dioxide   


 


Anion Gap   


 


BUN   


 


Creatinine   


 


Est GFR (CKD-EPI)AfAm   


 


Est GFR (CKD-EPI)NonAf   


 


Random Glucose   


 


Lactic Acid    2.1 H


 


Calcium   


 


Magnesium   2.2 


 


Total Bilirubin   


 


AST   


 


ALT   


 


Alkaline Phosphatase   


 


Creatine Kinase   


 


Creatine Kinase Index   


 


CK-MB (CK-2)   


 


Troponin I   


 


B-Natriuretic Peptide   1821.2 H 


 


Total Protein   


 


Albumin   


 


Urine Color   


 


Urine Appearance   


 


Urine pH   


 


Ur Specific Gravity   


 


Urine Protein   


 


Urine Glucose (UA)   


 


Urine Ketones   


 


Urine Blood   


 


Urine Nitrite   


 


Urine Bilirubin   


 


Urine Urobilinogen   


 


Ur Leukocyte Esterase   


 


Urine WBC (Auto)   


 


Urine RBC (Auto)   


 


Urine Casts (Auto)   


 


U Epithel Cells (Auto)   


 


Urine Bacteria (Auto)   


 


Vancomycin Pre-Dose   


 


Blood Type  O POSITIVE  


 


Antibody Screen  Positive  


 


Antibody Identification  Neg  


 


Antigen Identification  No Result Required.  














  11/13/19 11/14/19 11/14/19





  18:48 07:00 07:00


 


WBC   10.4 H 


 


RBC   3.88 L 


 


Hgb   11.0 L 


 


Hct   33.0 L 


 


MCV   85.1 


 


MCH   28.3 


 


MCHC   33.3 


 


RDW   15.6 


 


Plt Count   447 H 


 


MPV   7.1 L 


 


Absolute Neuts (auto)   7.1 


 


Neutrophils %   68.1 


 


Lymphocytes %   14.6 


 


Monocytes %   12.1 H 


 


Eosinophils %   4.4  D 


 


Basophils %   0.8 


 


Nucleated RBC %   0 


 


Sodium    137


 


Potassium    3.4 L


 


Chloride    94 L


 


Carbon Dioxide    35 H


 


Anion Gap    9


 


BUN    47.4 H


 


Creatinine    2.5 H


 


Est GFR (CKD-EPI)AfAm    26.34


 


Est GFR (CKD-EPI)NonAf    22.73


 


Random Glucose    115 H


 


Lactic Acid   


 


Calcium    9.4


 


Magnesium    2.0


 


Total Bilirubin   


 


AST   


 


ALT   


 


Alkaline Phosphatase   


 


Creatine Kinase   


 


Creatine Kinase Index   


 


CK-MB (CK-2)   


 


Troponin I   


 


B-Natriuretic Peptide   


 


Total Protein   


 


Albumin   


 


Urine Color  Yellow  


 


Urine Appearance  Cloudy  


 


Urine pH  8.5 H D  


 


Ur Specific Gravity  1.010  


 


Urine Protein  Negative  


 


Urine Glucose (UA)  Negative  


 


Urine Ketones  Negative  


 


Urine Blood  Negative  


 


Urine Nitrite  Negative  


 


Urine Bilirubin  Negative  


 


Urine Urobilinogen  1.0  


 


Ur Leukocyte Esterase  3+ H  


 


Urine WBC (Auto)  130  


 


Urine RBC (Auto)  2  


 


Urine Casts (Auto)  9  


 


U Epithel Cells (Auto)  1.0  


 


Urine Bacteria (Auto)  3435.6  


 


Vancomycin Pre-Dose   


 


Blood Type   


 


Antibody Screen   


 


Antibody Identification   


 


Antigen Identification   














  11/14/19





  09:38


 


WBC 


 


RBC 


 


Hgb 


 


Hct 


 


MCV 


 


MCH 


 


MCHC 


 


RDW 


 


Plt Count 


 


MPV 


 


Absolute Neuts (auto) 


 


Neutrophils % 


 


Lymphocytes % 


 


Monocytes % 


 


Eosinophils % 


 


Basophils % 


 


Nucleated RBC % 


 


Sodium 


 


Potassium 


 


Chloride 


 


Carbon Dioxide 


 


Anion Gap 


 


BUN 


 


Creatinine 


 


Est GFR (CKD-EPI)AfAm 


 


Est GFR (CKD-EPI)NonAf 


 


Random Glucose 


 


Lactic Acid 


 


Calcium 


 


Magnesium 


 


Total Bilirubin 


 


AST 


 


ALT 


 


Alkaline Phosphatase 


 


Creatine Kinase 


 


Creatine Kinase Index 


 


CK-MB (CK-2) 


 


Troponin I 


 


B-Natriuretic Peptide 


 


Total Protein 


 


Albumin 


 


Urine Color 


 


Urine Appearance 


 


Urine pH 


 


Ur Specific Gravity 


 


Urine Protein 


 


Urine Glucose (UA) 


 


Urine Ketones 


 


Urine Blood 


 


Urine Nitrite 


 


Urine Bilirubin 


 


Urine Urobilinogen 


 


Ur Leukocyte Esterase 


 


Urine WBC (Auto) 


 


Urine RBC (Auto) 


 


Urine Casts (Auto) 


 


U Epithel Cells (Auto) 


 


Urine Bacteria (Auto) 


 


Vancomycin Pre-Dose  20.2


 


Blood Type 


 


Antibody Screen 


 


Antibody Identification 


 


Antigen Identification 








Active Medications





 Home Medications











 Medication  Instructions  Recorded


 


Cholecalciferol (Vitamin D3) 1,000 unit PO DAILY 08/03/19





[Vitamin D3]  


 


Finasteride 5 mg PO DAILY 08/03/19


 


Furosemide [Lasix] 80 mg PO DAILY 08/03/19


 


Gabapentin [Neurontin] 300 mg PO TID 08/03/19


 


Losartan Potassium [Cozaar] 25 mg PO DAILY 08/03/19


 


Metolazone 2.5 mg PO DAILY 08/03/19


 


Metoprolol Succinate 25 mg PO DAILY 08/03/19


 


Potassium Chloride 20 meq PO DAILY 08/03/19


 


Ranitidine [Zantac -] 150 mg PO AC 08/03/19


 


Rivaroxaban [Xarelto] 20 mg PO HS 08/03/19


 


Tamsulosin HCl [Flomax] 0.8 mg PO DAILY 08/03/19


 


Furosemide [Lasix] 40 mg PO HS 08/04/19


 


Nystatin Powder [Nystop Powder -] 1 applic TP DAILY 08/04/19


 


Acetaminophen [Tylenol .Regular 650 mg PO Q4H PRN  tablet 08/08/19





Strength -]  


 


Finasteride [Proscar -] 5 mg PO DAILY  tablet 08/08/19


 


Furosemide [Lasix -] 40 mg PO BID@0600,1400  tablet 08/08/19


 


Melatonin 5 mg PO HS PRN  tab 08/08/19


 


Metolazone [Zaroxolyn -] 2.5 mg PO DAILY  tablet 08/08/19


 


Nystatin Ointment [Mycostatin 1 applic TP BID #180 applic 08/08/19





Ointment -]  


 


Potassium Chloride [K-Dur -] 40 meq PO BID  tablet.er 08/08/19


 


Rosuvastatin [Crestor -] 5 mg PO HS #30 tablet 08/08/19








 Current Medications





Acetaminophen (Tylenol -)  650 mg PO Q6H PRN


   PRN Reason: PAIN LEVEL 6-10


   Last Admin: 11/14/19 04:33 Dose:  650 mg


Furosemide (Lasix Injection -)  40 mg IVPUSH DAILY KELVIN


   Last Admin: 11/14/19 10:30 Dose:  40 mg


Vancomycin HCl 1,500 mg/ (Dextrose)  500 mls @ 250 mls/hr IVPB Q24H KELVIN; 

Protocol


Potassium Chloride (K-Dur -)  20 meq PO DAILY KELVIN


   Last Admin: 11/14/19 10:30 Dose:  20 meq











ASSESSMENT/PLAN:


Pt. is an 84 y.o. M w/ PMHx. of CKD (Stage 3), CAD, CHFpEF, SSS (s/p PPM) Afib (

on Xarelto), recurrent UTIs and recurrent cellulitis presents from home because 

of hypotension, and bilateral lower extremity erythema and swelling.





#AGUILA on CKD


Cr. 2.5 (baseline is 1.2-1.5) likely 2/2 to hypotension 2/2 UTI


c/w renally dosed Abx. to treat UTI


f/u urine electrolytes and creatinine to calculate FeNA


hold Losartan


Hold Metalozone 


avoid Nephrotoxins and NSAIDs


received IV Lasix 40mg (Pt. takes 80 mg PO Lasix daily at home and 40mg at Night

)


Echo 3/13/19: LA + RA moderately dilated, RVSP 40-50 mmHG, LV size thickness 

and function wnl, EF: normal


evaluate fluid status daily for Lasix


CXR negative  


would suggest leg elevation


f/u Renal and Bladder US; as Pt. has Hx. of stones, obstruction and retention 

in the past 











Dispo: We will continue to follow the patient. Thank you for this consultative 

opportunity.











Visit type





- Emergency Visit


Emergency Visit: Yes


ED Registration Date: 11/13/19


Care time: The patient presented to the Emergency Department on the above date 

and was hospitalized for further evaluation of their emergent condition.





- New Patient


This patient is new to me today: Yes


Date on this admission: 11/14/19





- Critical Care


Critical Care patient: No





ATTENDING PHYSICIAN STATEMENT





I saw and evaluated the patient.


I reviewed the resident's note and discussed the case with the resident.


I agree with the resident's findings and plan as documented.








SUBJECTIVE:








OBJECTIVE:








ASSESSMENT AND PLAN:

## 2019-11-14 NOTE — HOSP
Subjective





- Review of Symptoms


General: Yes: Fatigue, Malaise


HEENT: Yes: Other


Cardiovascular: Yes: Light Headedness


Musculoskeletal: Yes: Muscle Pain, Muscle Weakness


Neurological: Yes: Other (anxiety)





Physical Examination


Vital Signs: 


 Vital Signs











Temperature  98.5 F   11/14/19 03:11


 


Pulse Rate  70   11/14/19 03:11


 


Respiratory Rate  20   11/14/19 03:11


 


Blood Pressure  99/33 L  11/14/19 03:11


 


O2 Sat by Pulse Oximetry (%)  97   11/13/19 23:16











Constitutional: Yes: Anxious


Eyes: Yes: WNL


HENT: Yes: Atraumatic


Cardiovascular: Yes: Regular Rate and Rhythm


Respiratory: Yes: Diminished


Gastrointestinal: Yes: Abdomen, Obese


...Rectal Exam: Yes: Deferred


Musculoskeletal: Yes: Muscle Weakness


Edema: Yes


Edema: LLE: 3+, RLE: 3+


Integumentary: Yes: Erythema


Wound/Incision: Yes: Reddened


Neurological: Yes: Alert, Confusion


Psychiatric: Yes: Agitated


Labs: 


 CBC, BMP





 11/14/19 07:00 





 11/14/19 07:00 











Hospitalist Encounter


Assessment: 





Was called to see patient for agitation, and yelling all night





Patient is an 85 y/o man with a PMHx of CAD, CHF, Afib (on Xarelto), recurrent 

cellulitis. Who presents to the ED with his wife for increased erythema and 

edema to bilateral lower extremities. 





Patient seen at the bedside as he was reported to be yelling all night, 

agitated and anxious


On exam, patient is awake, alert, yelling that he is having alot of pain of 

both his legs and that the pain is severe and unable to let him sleep





neuro: awake, alert, agitated, anxious


lungs: diminished clear, on room air


abdomen: obese, soft


skin: mild redness/raised rash on anterior chest


ekg: prolonged qtc @ 567





plan:


give po ativan 2mg x 1 now along with ofirmev x 1 dose for pain and agitation 

and re-evaluate.  he will likely need ongoing pain management prn for lower ext 

pain.





axel marcano np


[phone]

## 2019-11-14 NOTE — PN
Progress Note, Physician


Chief Complaint: 





patient seen and examined in bed came in for leg swelling and redness


got levaquin and one dose of vancomyin in the ER





- Current Medication List


Current Medications: 


Active Medications





Acetaminophen (Tylenol -)  650 mg PO Q6H PRN


   PRN Reason: PAIN LEVEL 6-10


   Last Admin: 11/14/19 04:33 Dose:  650 mg


Vancomycin HCl 1,500 mg/ (Dextrose)  500 mls @ 250 mls/hr IVPB Q24H KELVIN; 

Protocol


Levofloxacin (Levaquin 500 Mg Premixed Ivpb -)  500 mg in 100 mls @ 100 mls/hr 

IVPB ONCE ONE; Protocol


   Stop: 11/14/19 14:52


Lorazepam (Ativan)  2 mg PO BID PRN


   PRN Reason: ANXIETY


Potassium Chloride (K-Dur -)  20 meq PO DAILY KELVIN


   Last Admin: 11/14/19 10:30 Dose:  20 meq











- Objective


Vital Signs: 


 Vital Signs











Temperature  98.4 F   11/14/19 08:10


 


Pulse Rate  72   11/14/19 08:10


 


Respiratory Rate  18   11/14/19 08:10


 


Blood Pressure  120/60   11/14/19 08:10


 


O2 Sat by Pulse Oximetry (%)  97   11/13/19 23:16











Constitutional: Yes: Calm


Cardiovascular: Yes: Regular Rate and Rhythm, S1, S2


Respiratory: Yes: CTA Bilaterally


Gastrointestinal: Yes: Normal Bowel Sounds, Soft


Extremities: Yes: Erythema


Edema: Yes


Neurological: Yes: Alert


Labs: 


 CBC, BMP





 11/14/19 07:00 





 11/14/19 07:00 











Problem List





- Problems


(1) AGUILA (acute kidney injury)


Assessment/Plan: 


hold lasix nad losartan for now given increase in creatinine


renal eval


Code(s): N17.9 - ACUTE KIDNEY FAILURE, UNSPECIFIED   





(2) Anxiety


Assessment/Plan: 


ativan


Code(s): F41.9 - ANXIETY DISORDER, UNSPECIFIED   





(3) Atrial fibrillation


Assessment/Plan: 


metoprolol and xarelto


Code(s): I48.91 - UNSPECIFIED ATRIAL FIBRILLATION   


Qualifiers: 


   Atrial fibrillation type: chronic 





(4) Cellulitis


Assessment/Plan: 


ID eval


multiple allergies to PCN and carbipenem


javon give levaquin one dose 


vancomyin level noted 20.2


got iv vancomycin in ER


Code(s): L03.90 - CELLULITIS, UNSPECIFIED   


Qualifiers: 


   Site of cellulitis: extremity   Site of cellulitis of extremity: lower 

extremity 





(5) Urinary retention due to benign prostatic hyperplasia


Assessment/Plan: 


bladder  jameso


edward


Code(s): N40.1 - BENIGN PROSTATIC HYPERPLASIA WITH LOWER URINARY TRACT SYMP; 

R33.8 - OTHER RETENTION OF URINE

## 2019-11-14 NOTE — PN
Teaching Attending Note


Name of Resident: Thien Gamez (Nephrology)





ATTENDING PHYSICIAN STATEMENT





I saw and evaluated the patient.


I reviewed the resident's note and discussed the case with the resident.


I agree with the resident's findings and plan as documented.








Renal


Pt is an 84 year old male with pmhx of ckd, cad, chf, a-fib


and cellulitis who presents to the ER with lower ext 


erythema. He was also found to have a uti. His renal 


function had worsened so I was called to evaluate him. He


denies shortness of breath. His lasix was recently increased


to w120 mg. 


pmhx ckd cad chf afib


allergies cephalexin, amoxicillian, ertapentem





 Current Medications











Generic Name Dose Route Start Last Admin





  Trade Name Freq  PRN Reason Stop Dose Admin


 


Acetaminophen  650 mg  11/14/19 04:07  11/14/19 04:33





  Tylenol -  PO   650 mg





  Q6H PRN   Administration





  PAIN LEVEL 6-10   





     





     





     


 


Finasteride  5 mg  11/15/19 10:00  





  Proscar -  PO   





  DAILY KELVIN   





     





     





     





     


 


Vancomycin HCl 1,500 mg/  500 mls @ 250 mls/hr  11/14/19 10:00  





  Dextrose  IVPB   





  Q24H KELVIN   





     





     





  Protocol   





     


 


Lorazepam  2 mg  11/14/19 13:58  





  Ativan  PO   





  BID PRN   





  ANXIETY   





     





     





     


 


Metoprolol Tartrate  25 mg  11/14/19 14:00  





  Lopressor -  PO   





  DAILY KELVIN   





     





     





     





     


 


Potassium Chloride  20 meq  11/14/19 10:00  11/14/19 10:30





  K-Dur -  PO   20 meq





  DAILY KELVIN   Administration





     





     





     





     


 


Rivaroxaban  15 mg  11/14/19 18:00  





  Xarelto  PO   





  DAILY@1800 KELVIN   





     





     





     





     








 Laboratory Tests











  11/13/19 11/13/19 11/13/19





  18:00 18:00 18:48


 


WBC  10.7 H  


 


Hgb   


 


Sodium   


 


Potassium   


 


BUN   


 


Creatinine   


 


Magnesium   


 


B-Natriuretic Peptide   1821.2 H 


 


Urine Protein    Negative


 


Urine Blood    Negative














  11/14/19 11/14/19





  07:00 07:00


 


WBC  10.4 H 


 


Hgb  11.0 L 


 


Sodium   137


 


Potassium   3.4 L


 


BUN   47.4 H


 


Creatinine   2.5 H


 


Magnesium   2.0


 


B-Natriuretic Peptide  


 


Urine Protein  


 


Urine Blood  








cardo s1s2


pulm clear


gi soft, obese


ext obese, improved edema











Impression


1. CKD


2. hx of hydronephrosis


3. hx of obstructive uropathy


4. CHF


5. HTN


6. AGUILA


7. a-fib











Plan


- check renal ultrasound


- hold diuretics for now


- repeat labs in am


- replace potassium


- discussed with family


- likely in part pre-renal aguila

## 2019-11-14 NOTE — EKG
Test Reason : 

Blood Pressure : ***/*** mmHG

Vent. Rate : 069 BPM     Atrial Rate : 075 BPM

   P-R Int : 000 ms          QRS Dur : 168 ms

    QT Int : 530 ms       P-R-T Axes : 000 -52 095 degrees

   QTc Int : 567 ms

 

*** POOR DATA QUALITY, INTERPRETATION MAY BE ADVERSELY AFFECTED

Ventricular-paced rhythm

ABNORMAL ECG

WHEN COMPARED WITH ECG OF 03-AUG-2019 14:07,

VENT. RATE HAS DECREASED BY   8 BPM

Confirmed by MENG MAGANA MD ([id number]) on 11/14/2019 12:39:05 PM

 

Referred By:             Confirmed By:MENG MAGANA MD

## 2019-11-14 NOTE — CONS
DATE OF CONSULTATION:  

 

DATE OF DICTATION:  11/14/2019

 

HISTORY:  The patient is an 84-year-old male with a history of recurrent lower

extremity cellulitis, multiple antibiotic allergies, and history of MRSA now

readmitted with bilateral lower extremity cellulitis.  He was hospitalized on

November 13, 2019, after a 3-day history of worsening altered mentation.  Visiting

nurses reported that he was hypotensive at home.  He was evaluated in the emergency

room where he was noted to have bilateral lower extremity cellulitis.  He was

empirically treated with vancomycin and Levaquin.  At the present time, he is awake

and alert.  He denies any leg pain.  No associated fever or chills.

 

PAST MEDICAL HISTORY:  Positive for recurrent bilateral lower extremity cellulitis,

morbid obesity, obstructive sleep apnea, congestive heart failure, atrial

fibrillation, hypertension, hyperlipidemia, BPH.

 

PAST SURGICAL HISTORY:  Status post bilateral total knee replacement, permanent

pacemaker.

 

ALLERGIES:  AMOXICILLIN, CEPHALEXIN, ERTAPENEM.

 

MEDICATIONS:  Tylenol, Benadryl, Proscar, Levaquin, vancomycin.

 

SOCIAL HISTORY:  He resides in the community at home with his significant other. 

Nonsmoker.  History of tobacco use in the past.

 

SYSTEMS REVIEW:

Neurologic:  No loss of consciousness, seizure activity, focal weakness.

Cardiac:  Negative chest pain or palpitations.

Respiratory:  Negative cough or sputum production. 

Gastrointestinal:  Negative vomiting or diarrhea.

Genitourinary:  Negative for urinary tract infection.

 

LABORATORY DATA:  White count 10.4, hematocrit 33.0, platelets 447, BUN 47,

creatinine 2.5, lactic acid 2.1.  Urinalysis 135 white cells.

 

PHYSICAL EXAMINATION: 

General:  He is awake.  He is chronically ill appearing.  

Vital Signs:  Temperature 98.2, blood pressure 160/54, pulse 69 regular, respirations

20 per minute.

HEENT:  Sclerae anicteric.

Heart:  Sounds S1, S2.  

Lungs:  Clear.

Abdomen:  Obese, soft, nontender. 

Extremities:  Positive for bilateral lower extremity and confluent erythema

superimposed on chronic venostasis. 

 

IMPRESSION: 

1.  Recurrent bilateral lower extremity cellulitis.

2.  Rule out sepsis secondary to skin source.

3.  Azotemia.

4.  Urinary tract infection.

5.  History of multiple antibiotic allergies.

6.  Lactic acidosis.

 

PLAN:  Await cultures.  Empiric antibiotic coverage with Tygacil.  Local wound care. 

We will follow.

 

Thank you for the kind referral.

 

 

ELAYNE AMAYA M.D.

 

TIM/[id number]

DD: 11/14/2019 16:25

DT: 11/14/2019 16:49

Job #:  [id number]

## 2019-11-14 NOTE — PN
Progress Note (short form)





- Note


Progress Note: 


ID CONSULT DICTATED


RECURRENT B/L LE CELLULITIS


CHRONIC VENOUS STASIS DDERMATITIS


AZOTEMIA


LACTIC ACIDOSIS


HX  MRSA /ESBL


MULTIPLE ANTIBIOTIC ALLERGIES


? UTI


PENDING C/S EMPIRIC TYGACIL

## 2019-11-14 NOTE — CON.PSY
Psychiatry Consult


Chief Complaint: *4 michel[age] old male admitted with Cellulitis , seen for Psych 

eval for acutye anxiety attack. apparantly did well on ativan and became very 

cooperative. Case discussed with wife who reppots anxirty in new places and 

situations.


Symptoms: reports: Anxiety





- Previous Psychiatric Treatment


Outpatient: None


Inpatient: None





- Previous Substance Abuse Treatment


Outpatient: None


Inpatient: None





- Current Medications


Current Medications: 


Active Medications





Acetaminophen (Tylenol -)  650 mg PO Q6H PRN


   PRN Reason: PAIN LEVEL 6-10


   Last Admin: 19 04:33 Dose:  650 mg


Finasteride (Proscar -)  5 mg PO DAILY [facility]


Vancomycin HCl 1,500 mg/ (Dextrose)  500 mls @ 250 mls/hr IVPB Q24H [facility]; 

Protocol


Metoprolol Tartrate (Lopressor -)  25 mg PO DAILY [facility]


Potassium Chloride (K-Dur -)  [facility] meq PO [facility]ILY [facility]


   Last Admin: 19 10:30 Dose:  20 meq


Rivaroxaban (Xarelto)  15 mg PO DAILY@1800 [facility]











- Allergies


Allergies: 


 Allergies











Allergy/AdvReac Type Severity Reaction Status Date / Time


 


cephalexin monohydrate Allergy Mild Hives Verified 19 16:58





[From Keflex]     


 


amoxicillin Allergy   Verified 19 16:58


 


ertapenem Allergy   Verified 19 16:58














- Current Living Status


Usual Living Arrangement: With Spouse





- Current Mental Status Evaluation


Appearance: Well Groomed


Attitude: Cooperative





- Affect


Affect: Constrictive


Appropriateness: Appropriate to Content





- Mood


Mood: Anxious





- Speech/Language


Expressive: Coherent





- Psychomotor Activity


Psychomotor Activity: Normal





- Thought Process


Thought Process: Intact





- Thought Content


Hallucinations: Absent


Delusions: Absent


Type: Thought Insertion/Withdrawal





- Self Perception


Self Perception: No Impairment





- Cognition


Attention: Alert


Orientation: Time


Memory, Immediate Recall: Intact


Memory, Short Term: 2/3


Memory, Remote with Promptin/3





- Concentration


Serial Sevens Intact: No


Simple Calculations Intact: Yes





- Abstraction


Proverb Interpretation: Intact


Judgement: Minimally Impaired





- Insight


Insight: Intact





- Impulse Control


Impulse Control: Minimally Impaired





- Suicidal Ideation


Suicidal Ideation: No





- Homicidal Ideation


Homicidal Ideation: No





Assessment/Plan





1)start Ativan 1mg pop bid standing.

## 2019-11-15 NOTE — PN
Progress Note, Physician


Chief Complaint: 





Cellulitis


AGUILA


Afib


History of Present Illness: 





Previous notes and events reviewed


awake and alert


NAD


had period of anxiety earlier this morning


wound culture positive


leukocytosis


b/l lower extremities warm to touch 





- Current Medication List


Current Medications: 


Active Medications





Acetaminophen (Tylenol -)  650 mg PO Q6H PRN


   PRN Reason: PAIN LEVEL 6-10


   Last Admin: 11/15/19 06:40 Dose:  650 mg


Finasteride (Proscar -)  5 mg PO DAILY [facility]


   Last Admin: 11/15/19 09:08 Dose:  5 mg


Vancomycin HCl 1,500 mg/ (Dextrose)  500 mls @ 250 mls/hr IVPB Q24H [facility]; 

Protocol


Lorazepam (Ativan -)  1 mg PO BID [facility]


   Last Admin: 11/15/19 09:08 Dose:  1 mg


Metoprolol Tartrate (Lopressor -)  25 mg PO DAILY [facility]


   Last Admin: 11/15/19 09:08 Dose:  25 mg


Potassium Chloride (K-Dur -)  20 meq PO DAILY [facility]


   Last Admin: 11/15/19 09:08 Dose:  20 meq


Rivaroxaban (Xarelto)  15 mg PO DAILY@1800 [facility]


   Last Admin: 11/14/19 17:30 Dose:  15 mg











- Objective


Vital Signs: 


 Vital Signs











Temperature  97.4 F L  11/15/19 07:00


 


Pulse Rate  73   11/15/19 07:00


 


Respiratory Rate  20   11/15/19 09:00


 


Blood Pressure  122/54 L  11/15/19 07:00


 


O2 Sat by Pulse Oximetry (%)  97   11/15/19 09:00











Constitutional: Yes: No Distress, Calm


Eyes: Yes: Conjunctiva Clear


HENT: Yes: Atraumatic


Cardiovascular: Yes: Regular Rate and Rhythm


Respiratory: Yes: Regular, CTA Bilaterally


Gastrointestinal: Yes: Normal Bowel Sounds, Soft, Abdomen, Obese


Musculoskeletal: Yes: Muscle Weakness


Extremities: Yes: Erythema


Edema: Yes (lower extremities)


Integumentary: Yes: Erythema (b/l lower extremities)


Wound/Incision: Yes: Excoriated (B/L LE)


Neurological: Yes: Alert, Confusion


Psychiatric: Yes: Alert


Labs: 


 CBC, BMP





 11/15/19 06:30 





 11/15/19 06:30 





 Microbiology





11/13/19 19:48   Leg - Right Lower   Gram Stain - Final


11/13/19 19:48   Leg - Right Lower   Wound Culture - Preliminary


                            Presumptive Mrsa (Pbp2a Pos)


                            Pending Organism


11/13/19 18:00   Blood - Peripheral Venous   Blood Culture - Preliminary


                            NO GROWTH OBTAINED AFTER 24 HOURS, INCUBATION TO 

CONTINUE


                            FOR 4 DAYS.


11/13/19 18:30   Blood - Peripheral Venous   Blood Culture - Preliminary


                            NO GROWTH OBTAINED AFTER 24 HOURS, INCUBATION TO 

CONTINUE


                            FOR 4 DAYS.











Problem List





- Problems


(1) Metabolic encephalopathy


Assessment/Plan: 


-secondary to UTI


-Head CT scan shows no acute evidence of intracranial hemorrhage, punctate 

chronic right base infarct, mild periventricular chronic microvascular ischemic 

changes 


-neuro check q6h


Code(s): G93.41 - METABOLIC ENCEPHALOPATHY   





(2) AGUILA (acute kidney injury)


Assessment/Plan: 


-Renal on board


-BUN/Cr 42.1/2.2


-monitor renal function


Code(s): N17.9 - ACUTE KIDNEY FAILURE, UNSPECIFIED   





(3) Anxiety


Assessment/Plan: 


-Ativan BID


-Psychiatry consult


Code(s): F41.9 - ANXIETY DISORDER, UNSPECIFIED   





(4) Atrial fibrillation


Assessment/Plan: 


-Xarelto and Metoprolol


Code(s): I48.91 - UNSPECIFIED ATRIAL FIBRILLATION   


Qualifiers: 


   Atrial fibrillation type: chronic 





(5) Cellulitis


Assessment/Plan: 


-ID on board


-leukocytosis


-afebrile


-Tygacil


-wound culture positive


-contact precaution


-BC neg


-Vascular US B/L LE neg for DVT


Code(s): L03.90 - CELLULITIS, UNSPECIFIED   


Qualifiers: 


   Site of cellulitis: extremity   Site of cellulitis of extremity: lower 

extremity 





(6) CHF (congestive heart failure)


Assessment/Plan: 


-daily weights


-strict I&Os


-fluid restriction


Code(s): I50.9 - HEART FAILURE, UNSPECIFIED   


Qualifiers: 


   Heart failure type: diastolic   Heart failure chronicity: acute on chronic   

Qualified Code(s): I50.33 - Acute on chronic diastolic (congestive) heart 

failure   





(7) Hypertension


Assessment/Plan: 


-monitor BP


-low Na diet


Code(s): I10 - ESSENTIAL (PRIMARY) HYPERTENSION   


Qualifiers: 


   Hypertension type: essential hypertension   Qualified Code(s): I10 - 

Essential (primary) hypertension   





(8) Urinary retention due to benign prostatic hyperplasia


Assessment/Plan: 


-Finasteride


-Bladder/Renal US both kidneys appear unremarkable, significantly unlarged 

prostate with a volume 80cc, adequately distended urinary bladder without wall 

thickening, bilateral ureteral jets


-Urology consult 


Code(s): N40.1 - BENIGN PROSTATIC HYPERPLASIA WITH LOWER URINARY TRACT SYMP; 

R33.8 - OTHER RETENTION OF URINE   





Assessment/Plan





see problem list 


dvt ppx

## 2019-11-15 NOTE — PN
Progress Note, Physician


History of Present Illness: 





Pt seen and examined at bedside. He is agitated. He denies shortness of breath. 





- Current Medication List


Current Medications: 


Active Medications





Acetaminophen (Tylenol -)  650 mg PO Q6H PRN


   PRN Reason: PAIN LEVEL 6-10


   Last Admin: 11/15/19 06:40 Dose:  650 mg


Finasteride (Proscar -)  5 mg PO DAILY [facility]


   Last Admin: 11/15/19 09:08 Dose:  5 mg


Vancomycin HCl 1,500 mg/ (Dextrose)  500 mls @ 250 mls/hr IVPB Q24H [facility]; 

Protocol


Lorazepam (Ativan -)  1 mg PO TID [facility]


   Last Admin: 11/15/19 16:13 Dose:  1 mg


Metoprolol Tartrate (Lopressor -)  25 mg PO DAILY [facility]


   Last Admin: 11/15/19 09:08 Dose:  25 mg


Potassium Chloride (K-Dur -)  20 meq PO DAILY [facility]


   Last Admin: 11/15/19 09:08 Dose:  20 meq


Rivaroxaban (Xarelto)  15 mg PO DAILY@1800 [facility]


   Last Admin: 11/14/19 17:30 Dose:  15 mg











- Objective


Vital Signs: 


 Vital Signs











Temperature  97.4 F L  11/15/19 07:00


 


Pulse Rate  73   11/15/19 07:00


 


Respiratory Rate  20   11/15/19 09:00


 


Blood Pressure  122/54 L  11/15/19 07:00


 


O2 Sat by Pulse Oximetry (%)  97   11/15/19 09:00











Constitutional: Yes: Calm


Eyes: Yes: Conjunctiva Clear


HENT: Yes: Atraumatic


Cardiovascular: Yes: S1, S2


Respiratory: Yes: CTA Bilaterally


Gastrointestinal: Yes: Soft


Genitourinary: Yes: WNL


Extremities: Yes: WNL


Edema: Yes


Edema: LLE: 1+, RLE: 1+


Neurological: Yes: Oriented


Psychiatric: Yes: Oriented


Labs: 


 CBC, BMP





 11/15/19 06:30 





 11/15/19 06:30 











Problem List





- Problems


(1) AGUILA (acute kidney injury)


Code(s): N17.9 - ACUTE KIDNEY FAILURE, UNSPECIFIED   





(2) CHF (congestive heart failure)


Code(s): I50.9 - HEART FAILURE, UNSPECIFIED   


Qualifiers: 


   Heart failure type: diastolic   Heart failure chronicity: chronic   

Qualified Code(s): I50.32 - Chronic diastolic (congestive) heart failure   





(3) Cellulitis


Code(s): L03.90 - CELLULITIS, UNSPECIFIED   


Qualifiers: 


   Site of cellulitis: extremity   Site of cellulitis of extremity: lower 

extremity 





Assessment/Plan





 Current Medications











Generic Name Dose Route Start Last Admin





  Trade Name Freq  PRN Reason Stop Dose Admin


 


Acetaminophen  650 mg  11/14/19 04:07  11/15/19 06:40





  Tylenol -  PO   650 mg





  Q6H PRN   Administration





  PAIN LEVEL 6-10   





     





     





     


 


Finasteride  5 mg  11/15/19 10:00  11/15/19 09:08





  Proscar -  PO   5 mg





  DAILY [facility]   Administration





     





     





     





     


 


Vancomycin HCl 1,500 mg/  500 mls @ 250 mls/hr  11/14/19 10:00  





  Dextrose  IVPB   





  Q24H [facility]   





     





     





  Protocol   





     


 


Lorazepam  1 mg  11/15/19 15:45  11/15/19 16:13





  Ativan -  PO   1 mg





  TID [facility]   Administration





     





     





     





     


 


Metoprolol Tartrate  25 mg  11/14/19 14:00  11/15/19 09:08





  Lopressor -  PO   25 mg





  DAILY [facility]   Administration





     





     





     





     


 


Potassium Chloride  20 meq  11/14/19 10:00  11/15/19 09:08





  K-Dur -  PO   20 meq





  DAILY [facility]   Administration





     





     





     





     


 


Rivaroxaban  15 mg  11/14/19 18:00  11/14/19 17:30





  Xarelto  PO   15 mg





  DAILY@1800 [facility]   Administration





     





     





     





     











Impression


1. CKD


2. hx of hydronephrosis


3. hx of obstructive uropathy


4. CHF


5. HTN


6. AGUILA


7. a-fib











Plan


- renal ultrasound reviewed


- crt improving


- repeat labs in am


- hold diuretics for now


- potassium improved


- likely in part pre-renal aguila

## 2019-11-15 NOTE — PN
Progress Note, Physician


History of Present Illness: 





CONFUSED


NO C/O LEG PAIN


NO C/O F/C


WOUND C/S PRESUMED MRSA 


  + ? SECOND ORGANISM





- Current Medication List


Current Medications: 


Active Medications





Acetaminophen (Tylenol -)  650 mg PO Q6H PRN


   PRN Reason: PAIN LEVEL 6-10


   Last Admin: 11/15/19 06:40 Dose:  650 mg


Finasteride (Proscar -)  5 mg PO DAILY [facility]


   Last Admin: 11/15/19 09:08 Dose:  5 mg


Lorazepam (Ativan -)  1 mg PO TID [facility]


   Last Admin: 11/15/19 16:13 Dose:  1 mg


Metoprolol Tartrate (Lopressor -)  25 mg PO DAILY [facility]


   Last Admin: 11/15/19 09:08 Dose:  25 mg


Potassium Chloride (K-Dur -)  20 meq PO DAILY [facility]


   Last Admin: 11/15/19 09:08 Dose:  20 meq


Rivaroxaban (Xarelto)  15 mg PO DAILY@1800 [facility]


   Last Admin: 11/14/19 17:30 Dose:  15 mg











- Objective


Vital Signs: 


 Vital Signs











Temperature  97.4 F L  11/15/19 07:00


 


Pulse Rate  73   11/15/19 07:00


 


Respiratory Rate  20   11/15/19 09:00


 


Blood Pressure  122/54 L  11/15/19 07:00


 


O2 Sat by Pulse Oximetry (%)  97   11/15/19 09:00











Constitutional: Yes: Obese


Cardiovascular: Yes: Regular Rate and Rhythm, S1, S2


Respiratory: Yes: CTA Bilaterally


Gastrointestinal: Yes: Normal Bowel Sounds, Soft.  No: Tenderness


Extremities: Yes: Other (SL DECREASED ERYTHEMA LE BILAT)


Edema: Yes


Edema: LLE: 2+, RLE: 2+


Labs: 


 CBC, BMP





 11/15/19 06:30 





 11/15/19 06:30 











Assessment/Plan





RECURRENT BILATERAL LE CELLULITIS


+ WOUND C/S MRSA


MULTIPLE ANTIBIOTIC ALLERGIES


TOXIC METABOLIC ENCEPHALOPATHY


CONTINUE VANCOMYCIN/ TYGACIL

## 2019-11-15 NOTE — CON.CARD
Consult


Consult Specialty:: Cardiology


Referred by:: Hospitalist Medicine


Reason for Consultation:: Afib





- History of Present Illness


Chief Complaint: Bilateral LE edema and erythema


History of Present Illness: 





84 year old male with a pmhx of htn, afib on Xarelto s/p ppm, bph, chronic 

diastolic chf, arthritis, CKD recurrent groin cellulitis and fungal infections 

presents with recurrent cellulitis and erythema bilateral lower extremities, UTI

, most recently treated for groin cellulitis and demand ischemia 03/2019, 08/ 2019. The patient has been using Nystatin powder without improvement. Reports 

baseline LE edema, denies fever or chills, chest pain, dyspnea, near or true 

syncope, palpitations, orthopnea, PND or worsening LE edema.











- History Source


History Provided By: Medical Record


Limitations to Obtaining History: Poor Historian





- Past Medical History


Cardio/Vascular: Yes: AFIB, CHF, HTN, Hyperlipdemia, Other (pacemaker)


Pulmonary: Yes: Pneumonia, Sleep Apnea


Gastrointestinal: Yes: Diverticulosis, Other (colon polyps)


Hepatobiliary: Yes: Other


Renal/: Yes: Renal Inusuff, BPH, Cancer (h/o prostate cancer), Renal Calculi


Dermatology: Yes: Cellulitis, Other


Additional Medical History: morbid obesity.  wound left big toe grade 1-2





- Past Surgical History


Past Surgical History: Yes: Amputation (left great toe), Colonoscopy, Joint 

Replacement (bilateral knee replacements), Permanent Pacemaker





- Alcohol/Substance Use


Hx Alcohol Use: No


History of Substance Use: reports: None





- Smoking History


Smoking history: Former smoker


Have you smoked in the past 12 months: No


If you are a former smoker, when did you quit?: 1970





- Social History


Usual Living Arrangement: With Spouse


ADL: Family Assistance


Occupation: retired [place] 


History of Recent Travel: No





Home Medications





- Allergies


Allergies/Adverse Reactions: 


 Allergies











Allergy/AdvReac Type Severity Reaction Status Date / Time


 


cephalexin monohydrate Allergy Mild Hives Verified 11/13/19 16:58





[From Keflex]     


 


amoxicillin Allergy   Verified 11/13/19 16:58


 


ertapenem Allergy   Verified 11/13/19 16:58














- Home Medications


Home Medications: 


Ambulatory Orders





Cholecalciferol (Vitamin D3) [Vitamin D3] 1,000 unit PO DAILY 08/03/19 


Finasteride 5 mg PO DAILY 08/03/19 


Furosemide [Lasix] 80 mg PO DAILY 08/03/19 


Gabapentin [Neurontin] 300 mg PO TID 08/03/19 


Losartan Potassium [Cozaar] 25 mg PO DAILY 08/03/19 


Metolazone 2.5 mg PO DAILY 08/03/19 


Metoprolol Succinate 25 mg PO DAILY 08/03/19 


Potassium Chloride 20 meq PO DAILY 08/03/19 


Ranitidine [Zantac -] 150 mg PO AC 08/03/19 


Rivaroxaban [Xarelto] 20 mg PO HS 08/03/19 


Tamsulosin HCl [Flomax] 0.8 mg PO DAILY 08/03/19 


Furosemide [Lasix] 40 mg PO HS 08/04/19 


Nystatin Powder [Nystop Powder -] 1 applic TP DAILY 08/04/19 


Acetaminophen [Tylenol .Regular Strength -] 650 mg PO Q4H PRN  tablet 08/08/19 


Finasteride [Proscar -] 5 mg PO DAILY  tablet 08/08/19 


Furosemide [Lasix -] 40 mg PO BID@0600,1400  tablet 08/08/19 


Melatonin 5 mg PO HS PRN  tab 08/08/19 


Metolazone [Zaroxolyn -] 2.5 mg PO DAILY  tablet 08/08/19 


Nystatin Ointment [Mycostatin Ointment -] 1 applic TP BID #180 applic 08/08/19 


Potassium Chloride [K-Dur -] 40 meq PO BID  tablet.er 08/08/19 


Rosuvastatin [Crestor -] 5 mg PO HS #30 tablet 08/08/19 











Review of Systems





- Review of Systems


Integumentary: reports: Erythema, Rash


Vital Signs: 


 Vital Signs











Temperature  97.4 F L  11/15/19 07:00


 


Pulse Rate  73   11/15/19 07:00


 


Respiratory Rate  20   11/15/19 07:00


 


Blood Pressure  122/54 L  11/15/19 07:00


 


O2 Sat by Pulse Oximetry (%)  97   11/14/19 08:10











Constitutional: Yes: No Distress, Calm


Neck: Yes: Supple


Respiratory: Yes: Regular, CTA Bilaterally


Gastrointestinal: Yes: Normal Bowel Sounds, Soft


Cardiovascular: Yes: Regular Rate and Rhythm


JVD: No


Carotid Bruit: No


Heart Sounds: Yes: S1, S2


Murmur: Yes: Systolic Murmur, Grade 1


Edema: Yes


Edema: LLE: Trace, RLE: Trace


Integumentary: Yes: Erythema, Venous Stasis Changes





- Other Data


Labs, Other Data: 


 CBC, BMP





 11/15/19 06:30 





 11/15/19 06:30 








Afib v-pqced @ 69





Ejection Fraction %: LVEF > or = 40 %





Imaging





- Results


Chest X-ray: Report Reviewed (NAD)


Cat Scan: Report Reviewed (HCT: No acute changes)


Ultrasound: Report Reviewed (No DVT bilaterally Renal US: Normal kidneys, 

prostate hypertrophy with normal PVR)





Problem List





- Problems


(1) Allergy to multiple antibiotics


Code(s): Z88.1 - ALLERGY STATUS TO OTHER ANTIBIOTIC AGENTS STATUS   





(2) Anxiety


Code(s): F41.9 - ANXIETY DISORDER, UNSPECIFIED   





(3) Atrial fibrillation


Code(s): I48.91 - UNSPECIFIED ATRIAL FIBRILLATION   


Qualifiers: 


   Atrial fibrillation type: chronic 





(4) Cellulitis


Code(s): L03.90 - CELLULITIS, UNSPECIFIED   


Qualifiers: 


   Site of cellulitis: extremity   Site of cellulitis of extremity: lower 

extremity 





(5) Chronic anticoagulation


Code(s): Z79.01 - LONG TERM (CURRENT) USE OF ANTICOAGULANTS   





(6) Hypertension


Code(s): I10 - ESSENTIAL (PRIMARY) HYPERTENSION   


Qualifiers: 


   Hypertension type: essential hypertension   Qualified Code(s): I10 - 

Essential (primary) hypertension   





(7) Lymphedema


Code(s): I89.0 - LYMPHEDEMA, NOT ELSEWHERE CLASSIFIED   





(8) Pacemaker


Code(s): Z95.0 - PRESENCE OF CARDIAC PACEMAKER   





Assessment/Plan





ECHO 3/2018 showed normal LV systolic function, mild AS no other sig valvular 

abnl





1. Recurrent LE cellulitis with multiple allergies to PCN and carbipenem


2. Chronic venous stasis dermatitis


3. Acute on CKD with hx of hydronephrosis/obstructive uropathy likely pre-renal 

improving


4. Chronic diastolic heart failure


5. HTN cardiomyopathy


6. Afib on Xarelto


7. sss s/p PPM


8. OSAS


9. CAD h/o demand ischemia


10. Hyperlipidemia


11. Anxiety d/o


12. BPH





P:1. Empiric abx per C&S, wound care


2. Hold oral diuretics and zaroxolyn pending renal fxn recovery, monitor 

electrolytes, wrap legs


3. Continue Toprol XL 25 qd, Xarelto 15 qd,  Crestor 5 qd, hold losartan 

pending renal fxn stabilization


4. Eventual rehab then follow up in office [phone]


5. DVT and GI prophylaxis


6. Thank you for consultative opportunity

## 2019-11-16 NOTE — PN
Progress Note, Physician


History of Present Illness: 





Pt seen and examined at bedside. He is awake but confused. 





- Current Medication List


Current Medications: 


Active Medications





Acetaminophen (Tylenol -)  650 mg PO Q6H PRN


   PRN Reason: PAIN LEVEL 6-10


   Last Admin: 11/16/19 03:24 Dose:  650 mg


Clonazepam (Klonopin -)  2 mg PO Q12H PRN


   PRN Reason: ANXIETY


Finasteride (Proscar -)  5 mg PO DAILY [facility]


   Last Admin: 11/16/19 10:38 Dose:  5 mg


Vancomycin HCl (Vancomycin (Pre-Docked))  1,000 mg in 250 mls @ 166.667 mls/hr 

IVPB BID@0600,1800 [facility]; Protocol


   Last Admin: 11/16/19 17:50 Dose:  166.667 mls/hr


Tigecycline 50 mg/ Dextrose  100 mls @ 100 mls/hr IVPB BID [facility]; Protocol


   Last Admin: 11/16/19 10:35 Dose:  100 mls/hr


Metoprolol Tartrate (Lopressor -)  25 mg PO DAILY [facility]


   Last Admin: 11/16/19 10:39 Dose:  25 mg


Potassium Chloride (K-Dur -)  40 meq PO DAILY [facility]


Rivaroxaban (Xarelto)  15 mg PO DAILY@1800 [facility]


   Last Admin: 11/15/19 17:40 Dose:  15 mg


Tamsulosin HCl (Flomax -)  0.4 mg PO DAILY@0830 [facility]











- Objective


Vital Signs: 


 Vital Signs











Temperature  98.4 F   11/16/19 06:00


 


Pulse Rate  71   11/16/19 06:00


 


Respiratory Rate  20   11/16/19 06:00


 


Blood Pressure  119/67   11/16/19 06:00


 


O2 Sat by Pulse Oximetry (%)  97   11/15/19 09:00











Constitutional: Yes: Anxious


Eyes: Yes: Conjunctiva Clear


HENT: Yes: Atraumatic


Cardiovascular: Yes: S1, S2


Respiratory: Yes: CTA Bilaterally


Gastrointestinal: Yes: Normal Bowel Sounds, Soft


Genitourinary: Yes: WNL


Musculoskeletal: Yes: WNL


Edema: Yes


Edema: LLE: 1+, RLE: 1+


Integumentary: Yes: Venous Stasis Changes


Neurological: Yes: Confusion


Labs: 


 CBC, BMP





 11/16/19 07:35 





 11/16/19 07:35 











Problem List





- Problems


(1) AGUILA (acute kidney injury)


Code(s): N17.9 - ACUTE KIDNEY FAILURE, UNSPECIFIED   





(2) CHF (congestive heart failure)


Code(s): I50.9 - HEART FAILURE, UNSPECIFIED   


Qualifiers: 


   Heart failure type: diastolic   Heart failure chronicity: chronic   

Qualified Code(s): I50.32 - Chronic diastolic (congestive) heart failure   





(3) Cellulitis


Code(s): L03.90 - CELLULITIS, UNSPECIFIED   


Qualifiers: 


   Site of cellulitis: extremity   Site of cellulitis of extremity: lower 

extremity 





Assessment/Plan


 Current Medications











Generic Name Dose Route Start Last Admin





  Trade Name Freq  PRN Reason Stop Dose Admin


 


Acetaminophen  650 mg  11/14/19 04:07  11/16/19 03:24





  Tylenol -  PO   650 mg





  Q6H PRN   Administration





  PAIN LEVEL 6-10   





     





     





     


 


Clonazepam  2 mg  11/16/19 16:46  





  Klonopin -  PO   





  Q12H PRN   





  ANXIETY   





     





     





     


 


Finasteride  5 mg  11/15/19 10:00  11/16/19 10:38





  Proscar -  PO   5 mg





  DAILY [facility]   Administration





     





     





     





     


 


Vancomycin HCl  1,000 mg in 250 mls @ 166.667 mls/hr  11/15/19 18:00  11/16/19 

17:50





  Vancomycin (Pre-Docked)  IVPB   166.667 mls/hr





  BID@0600,1800 [facility]   Administration





     





     





  Protocol   





     


 


Tigecycline 50 mg/ Dextrose  100 mls @ 100 mls/hr  11/15/19 22:00  11/16/19 10:

35





  IVPB   100 mls/hr





  BID [facility]   Administration





     





     





  Protocol   





     


 


Metoprolol Tartrate  25 mg  11/14/19 14:00  11/16/19 10:39





  Lopressor -  PO   25 mg





  DAILY [facility]   Administration





     





     





     





     


 


Potassium Chloride  40 meq  11/16/19 17:49  





  K-Dur -  PO   





  DAILY [facility]   





     





     





     





     


 


Rivaroxaban  15 mg  11/14/19 18:00  11/15/19 17:40





  Xarelto  PO   15 mg





  DAILY@1800 [facility]   Administration





     





     





     





     


 


Tamsulosin HCl  0.4 mg  11/17/19 08:30  





  Flomax -  PO   





  DAILY@0830 [facility]   





     





     





     





     











Impression


1. CKD


2. hx of hydronephrosis


3. hx of obstructive uropathy


4. CHF


5. HTN


6. AGUILA


7. a-fib











Plan


- replace potassium


- will evaluate for diuretics in am


- urology input appreciated


- renal function is improving


- likely in part pre-renal aguila

## 2019-11-16 NOTE — PN
Progress Note, Physician


Chief Complaint: 





Cellulitis


AGUILA


Afib


History of Present Illness: 





NAD


Confused





- Current Medication List


Current Medications: 


Active Medications





Acetaminophen (Tylenol -)  650 mg PO Q6H PRN


   PRN Reason: PAIN LEVEL 6-10


   Last Admin: 11/16/19 03:24 Dose:  650 mg


Finasteride (Proscar -)  5 mg PO DAILY [facility]


   Last Admin: 11/15/19 09:08 Dose:  5 mg


Vancomycin HCl (Vancomycin (Pre-Docked))  1,000 mg in 250 mls @ 166.667 mls/hr 

IVPB BID@0600,1800 [facility]; Protocol


   Last Admin: 11/16/19 05:01 Dose:  166.667 mls/hr


Tigecycline 50 mg/ Dextrose  100 mls @ 100 mls/hr IVPB BID [facility]; Protocol


   Last Admin: 11/15/19 21:58 Dose:  100 mls/hr


Lorazepam (Ativan -)  1 mg PO TID [facility]


   Last Admin: 11/16/19 05:01 Dose:  1 mg


Metoprolol Tartrate (Lopressor -)  25 mg PO DAILY [facility]


   Last Admin: 11/15/19 09:08 Dose:  25 mg


Potassium Chloride (K-Dur -)  20 meq PO DAILY [facility]


   Last Admin: 11/15/19 09:08 Dose:  20 meq


Rivaroxaban (Xarelto)  15 mg PO DAILY@1800 [facility]


   Last Admin: 11/15/19 17:40 Dose:  15 mg











- Objective


Vital Signs: 


 Vital Signs











Temperature  98.4 F   11/16/19 03:46


 


Pulse Rate  71   11/16/19 03:46


 


Respiratory Rate  20   11/16/19 03:46


 


Blood Pressure  119/67   11/16/19 03:46


 


O2 Sat by Pulse Oximetry (%)  97   11/15/19 09:00











Constitutional: Yes: Well Nourished, No Distress, Calm, Obese


Cardiovascular: Yes: Regular Rate and Rhythm


Respiratory: Yes: Regular


Gastrointestinal: Yes: Normal Bowel Sounds, Soft, Abdomen, Obese


Musculoskeletal: Yes: Muscle Weakness


Extremities: Yes: WNL


Edema: No


Peripheral Pulses WNL: Yes


Integumentary: Yes: Other (generalized chronic urticaria)


Neurological: Yes: Alert, Confusion


Psychiatric: Yes: Alert


Labs: 


 CBC, BMP





 11/15/19 06:30 





 11/15/19 06:30 











Assessment/Plan





(1) Metabolic encephalopathy


Assessment/Plan: 


-secondary to UTI


-Head CT scan shows no acute evidence of intracranial hemorrhage, punctate 

chronic right base infarct, mild periventricular chronic microvascular ischemic 

changes 


Code(s): G93.41 - METABOLIC ENCEPHALOPATHY   





(2) AGUILA (acute kidney injury)


Assessment/Plan: 


-Renal on board


-monitor renal function


Code(s): N17.9 - ACUTE KIDNEY FAILURE, UNSPECIFIED   





(3) Anxiety


Assessment/Plan: 


-Ativan BID


-Psychiatry consult


Code(s): F41.9 - ANXIETY DISORDER, UNSPECIFIED   





(4) Atrial fibrillation


Assessment/Plan: 


-Xarelto and Metoprolol


Code(s): I48.91 - UNSPECIFIED ATRIAL FIBRILLATION   


Qualifiers: 


   Atrial fibrillation type: chronic 





(5) Cellulitis


Assessment/Plan: 


-ID on board


-leukocytosis


-afebrile


-Tygacil


-wound culture positive


-contact precaution


-BC neg


-Vascular US B/L LE neg for DVT


Code(s): L03.90 - CELLULITIS, UNSPECIFIED   


Qualifiers: 


   Site of cellulitis: extremity   Site of cellulitis of extremity: lower 

extremity 





(6) CHF (congestive heart failure)


Assessment/Plan: 


-daily weights


-strict I&Os


-fluid restriction


Code(s): I50.9 - HEART FAILURE, UNSPECIFIED   


Qualifiers: 


   Heart failure type: diastolic   Heart failure chronicity: acute on chronic   

Qualified Code(s): I50.33 - Acute on chronic diastolic (congestive) heart 

failure   





(7) Hypertension


Assessment/Plan: 


-monitor BP


-low Na diet


Code(s): I10 - ESSENTIAL (PRIMARY) HYPERTENSION   


Qualifiers: 


   Hypertension type: essential hypertension   Qualified Code(s): I10 - 

Essential (primary) hypertension   





(8) Urinary retention due to benign prostatic hyperplasia


Assessment/Plan: 


-Finasteride


-Bladder/Renal US both kidneys appear unremarkable, significantly unlarged 

prostate with a volume 80cc, adequately distended urinary bladder without wall 

thickening, bilateral ureteral jets


-Urology consult 


-Restart Tamsulosin 0.4 mg po daily


Code(s): N40.1 - BENIGN PROSTATIC HYPERPLASIA WITH LOWER URINARY TRACT SYMP; 

R33.8 - OTHER RETENTION OF URINE   





 Assessment/Plan 





see problem list 


dvt ppx

## 2019-11-16 NOTE — CON.GU
Consult


Consult Specialty:: urology


Referred by:: Leobardo Granados NP


Reason for Consultation:: bph with history of urinary retention





- History of Present Illness


Chief Complaint: bph with history of urinary retention


History of Present Illness: 





Patient is an 85 yo male with history of metabolic encephalopathy, AGUILA, atrial 

fibrillation, lower extremity cellulitis on  Tygecycline, with a uti and bph.  

Patient reported to have a history of bph and retention.  At home the patient 

was on flomax 0.8mg and proscar 5 mg daily.  Currently the patient is on 

proscar 5 mg daily.  The patient is unable to give a history.  The patient is 

confused. Chart and nursing staff are source of information.  U/S shows no 

evidence of hydronephrosis or current urinary retention.  Patient with positive 

ua however antibiotics are for lower extrimity cellulitis.  Patient denies 

dysuria and therer is not evdence of incontinence.





- History Source


History Provided By: Medical Record, Caregiver


Limitations to Obtaining History: Dementia





- Past Medical History


Cardio/Vascular: Yes: AFIB, CHF, HTN, Hyperlipdemia, Other (pacemaker)


Pulmonary: Yes: Pneumonia, Sleep Apnea


Gastrointestinal: Yes: Diverticulosis, Other (colon polyps)


Hepatobiliary: Yes: Other


Renal/: Yes: Renal Inusuff, BPH, Cancer (h/o prostate cancer), Renal Calculi


Dermatology: Yes: Cellulitis, Other


Additional Medical History: morbid obesity.  wound left big toe grade 1-2





- Past Surgical History


Past Surgical History: Yes: Amputation (left great toe), Colonoscopy, Joint 

Replacement (bilateral knee replacements), Permanent Pacemaker





- Alcohol/Substance Use


Hx Alcohol Use: No


History of Substance Use: reports: None





- Smoking History


Smoking history: Former smoker


Have you smoked in the past 12 months: No


If you are a former smoker, when did you quit?: 1970





- Social History


Usual Living Arrangement: With Spouse


ADL: Family Assistance


Occupation: retired [place] 


History of Recent Travel: No





Home Medications





- Allergies


Allergies/Adverse Reactions: 


 Allergies











Allergy/AdvReac Type Severity Reaction Status Date / Time


 


cephalexin monohydrate Allergy Mild Hives Verified 11/13/19 16:58





[From Keflex]     


 


amoxicillin Allergy   Verified 11/13/19 16:58


 


ertapenem Allergy   Verified 11/13/19 16:58














- Home Medications


Home Medications: 


Ambulatory Orders





Cholecalciferol (Vitamin D3) [Vitamin D3] 1,000 unit PO DAILY 08/03/19 


Finasteride 5 mg PO DAILY 08/03/19 


Furosemide [Lasix] 80 mg PO DAILY 08/03/19 


Gabapentin [Neurontin] 300 mg PO TID 08/03/19 


Losartan Potassium [Cozaar] 25 mg PO DAILY 08/03/19 


Metolazone 2.5 mg PO DAILY 08/03/19 


Metoprolol Succinate 25 mg PO DAILY 08/03/19 


Potassium Chloride 20 meq PO DAILY 08/03/19 


Ranitidine [Zantac -] 150 mg PO AC 08/03/19 


Rivaroxaban [Xarelto] 20 mg PO HS 08/03/19 


Tamsulosin HCl [Flomax] 0.8 mg PO DAILY 08/03/19 


Furosemide [Lasix] 40 mg PO HS 08/04/19 


Nystatin Powder [Nystop Powder -] 1 applic TP DAILY 08/04/19 


Acetaminophen [Tylenol .Regular Strength -] 650 mg PO Q4H PRN  tablet 08/08/19 


Finasteride [Proscar -] 5 mg PO DAILY  tablet 08/08/19 


Furosemide [Lasix -] 40 mg PO BID@0600,1400  tablet 08/08/19 


Melatonin 5 mg PO HS PRN  tab 08/08/19 


Metolazone [Zaroxolyn -] 2.5 mg PO DAILY  tablet 08/08/19 


Nystatin Ointment [Mycostatin Ointment -] 1 applic TP BID #180 applic 08/08/19 


Potassium Chloride [K-Dur -] 40 meq PO BID  tablet.er 08/08/19 


Rosuvastatin [Crestor -] 5 mg PO HS #30 tablet 08/08/19 











Physical Exam-


Vital Signs: 


 Vital Signs











Temperature  98.4 F   11/16/19 06:00


 


Pulse Rate  71   11/16/19 06:00


 


Respiratory Rate  20   11/16/19 06:00


 


Blood Pressure  119/67   11/16/19 06:00


 


O2 Sat by Pulse Oximetry (%)  97   11/15/19 09:00











Constitutional: Yes: Calm


Eyes: Yes: WNL, Conjunctiva Clear, EOM Intact


HENT: Yes: WNL, Atraumatic, Normocephalic


Neck: Yes: WNL, Supple, Trachea Midline


Respiratory: Yes: Regular


Gastrointestinal: Yes: WNL, Normal Bowel Sounds, Soft


Renal/: Yes: WNL


Kidneys: Yes: WNL


Pelvis: Yes: WNL, Bladder Non Palpable


Testicles: Yes: WNL


Scrotum: Yes: WNL


Penis: Yes: WNL (enl)


Prostate Exam: Yes: Swollen (enlarged prostate with asymmetry)


Labs: 


 CBC, BMP





 11/15/19 06:30 





 11/15/19 06:30 











Imaging





- Results


Ultrasound: Report Reviewed





Assessment/Plan





imp


lower extremity celluilitis


metabolic encephalopathy


AGUILA


bph





plan


continue current management


will restart flomax 0.4 mg daily while in hospital

## 2019-11-16 NOTE — CON.PSY
Psychiatry Consult


Chief Complaint: Asked to see this patient again for agitation, and going 

restlessness.


History of Present Problem: 





  Patient is currently on Atvian 1 mgs tid which was not effective. Also he had 

Seroquel 50 mgs this Am with no effect.


He is constantly seen 'thrashing around' and restlessness moving around the bed 

from side to side mumbling incoherently at times.


He is shouting, ' help' help' and wife is also very distressed and states that 

he has been like this at home intermittently but then he gets lucid again  for 

a couple of hours only to go back in a state of confusion.








Symptoms: reports: Hyperactivity (Patient is confused and hx obtained wife and 

RN)





- Current Medications


Current Medications: 


Active Medications





Acetaminophen (Tylenol -)  650 mg PO Q6H PRN


   PRN Reason: PAIN LEVEL 6-10


   Last Admin: 11/16/19 03:24 Dose:  650 mg


Finasteride (Proscar -)  5 mg PO DAILY [facility]


   Last Admin: 11/16/19 10:38 Dose:  5 mg


Haloperidol (Haldol Injection (Fast Acting) -)  5 mg IM ONCE ONE


   Stop: 11/16/19 16:33


Vancomycin HCl (Vancomycin (Pre-Docked))  1,000 mg in 250 mls @ 166.667 mls/hr 

IVPB BID@0600,1800 [facility]; Protocol


   Last Admin: 11/16/19 05:01 Dose:  166.667 mls/hr


Tigecycline 50 mg/ Dextrose  100 mls @ 100 mls/hr IVPB BID [facility]; Protocol


   Last Admin: 11/16/19 10:35 Dose:  100 mls/hr


Lorazepam (Ativan -)  1 mg PO TID [facility]


   Last Admin: 11/16/19 14:24 Dose:  1 mg


Metoprolol Tartrate (Lopressor -)  25 mg PO DAILY [facility]


   Last Admin: 11/16/19 10:39 Dose:  25 mg


Potassium Chloride (K-Dur -)  20 meq PO DAILY [facility]


   Last Admin: 11/16/19 10:38 Dose:  20 meq


Rivaroxaban (Xarelto)  15 mg PO DAILY@1800 [facility]


   Last Admin: 11/15/19 17:40 Dose:  15 mg


Tamsulosin HCl (Flomax -)  0.4 mg PO DAILY@0830 [facility]











- Allergies


Allergies: 


 Allergies











Allergy/AdvReac Type Severity Reaction Status Date / Time


 


cephalexin monohydrate Allergy Mild Hives Verified 11/13/19 16:58





[From Keflex]     


 


amoxicillin Allergy   Verified 11/13/19 16:58


 


ertapenem Allergy   Verified 11/13/19 16:58














- Current Living Status


Usual Living Arrangement: With Significant Other





- Current Mental Status Evaluation


Appearance: Disheveled


Attitude: Uncooperative, Belligerent





- Affect


Appropriateness: Not Appropriate





- Mood


Mood: Anxious, Angry, Irritable





- Speech/Language


Expressive: Slurred, Incoherent





- Thought Process


Thought Process: Other (not making any sense)





- Cognition


Attention: Diminished


Orientation: Place (responds to his name, only exam limited)





Assessment/Plan





Patient has infectious process . According to wife, it sounds like he has 

underlyiig Dementia and now with infection and hospital stay he is delirious.








Rec:


Haldol 5 mgs IM now


Increase Ativan to 2 mgs bid


Order EKG - check QT prolongation

## 2019-11-17 NOTE — PN
Progress Note, Physician


History of Present Illness: 





Pt seen and examined at bedside. He remains confused. He denies shortness of 

breath. 





- Current Medication List


Current Medications: 


Active Medications





Acetaminophen (Tylenol -)  650 mg PO Q6H PRN


   PRN Reason: PAIN LEVEL 6-10


   Last Admin: 11/16/19 03:24 Dose:  650 mg


Clonazepam (Klonopin -)  2 mg PO Q8H PRN


   PRN Reason: ANXIETY


Finasteride (Proscar -)  5 mg PO DAILY [facility]


   Last Admin: 11/17/19 11:29 Dose:  5 mg


Vancomycin HCl (Vancomycin (Pre-Docked))  1,000 mg in 250 mls @ 166.667 mls/hr 

IVPB BID@0600,1800 [facility]; Protocol


   Last Admin: 11/17/19 17:15 Dose:  166.667 mls/hr


Tigecycline 50 mg/ Dextrose  100 mls @ 100 mls/hr IVPB BID [facility]; Protocol


   Last Admin: 11/17/19 11:29 Dose:  100 mls/hr


Metoprolol Tartrate (Lopressor -)  25 mg PO DAILY [facility]


   Last Admin: 11/17/19 13:57 Dose:  Not Given


Olanzapine (Zyprexa -)  7.5 mg PO HS [facility]


Potassium Chloride (K-Dur -)  40 meq PO DAILY [facility]


   Last Admin: 11/17/19 13:57 Dose:  Not Given


Rivaroxaban (Xarelto)  15 mg PO DAILY@1800 [facility]


   Last Admin: 11/17/19 17:15 Dose:  15 mg


Tamsulosin HCl (Flomax -)  0.4 mg PO DAILY@0830 [facility]


   Last Admin: 11/17/19 09:27 Dose:  0.4 mg











- Objective


Vital Signs: 


 Vital Signs











Temperature  97.6 F   11/17/19 15:20


 


Pulse Rate  71   11/17/19 15:20


 


Respiratory Rate  20   11/17/19 15:20


 


Blood Pressure  131/53 L  11/17/19 15:20


 


O2 Sat by Pulse Oximetry (%)  95   11/16/19 21:00











Constitutional: Yes: Calm


Eyes: Yes: Conjunctiva Clear


HENT: Yes: Atraumatic


Cardiovascular: Yes: S1, S2


Respiratory: Yes: CTA Bilaterally


Gastrointestinal: Yes: Soft


Genitourinary: Yes: Incontinence


Edema: Yes


Edema: LLE: 1+, RLE: 1+


Integumentary: Yes: Venous Stasis Changes


Neurological: Yes: Confusion


Labs: 


 CBC, BMP





 11/16/19 07:35 





 11/16/19 07:35 











Problem List





- Problems


(1) AGUILA (acute kidney injury)


Code(s): N17.9 - ACUTE KIDNEY FAILURE, UNSPECIFIED   





(2) CHF (congestive heart failure)


Code(s): I50.9 - HEART FAILURE, UNSPECIFIED   


Qualifiers: 


   Heart failure type: diastolic   Heart failure chronicity: chronic   

Qualified Code(s): I50.32 - Chronic diastolic (congestive) heart failure   





(3) Cellulitis


Code(s): L03.90 - CELLULITIS, UNSPECIFIED   


Qualifiers: 


   Site of cellulitis: extremity   Site of cellulitis of extremity: lower 

extremity 





Assessment/Plan


 Current Medications











Generic Name Dose Route Start Last Admin





  Trade Name Freq  PRN Reason Stop Dose Admin


 


Acetaminophen  650 mg  11/14/19 04:07  11/16/19 03:24





  Tylenol -  PO   650 mg





  Q6H PRN   Administration





  PAIN LEVEL 6-10   





     





     





     


 


Clonazepam  2 mg  11/17/19 17:38  





  Klonopin -  PO   





  Q8H PRN   





  ANXIETY   





     





     





     


 


Finasteride  5 mg  11/15/19 10:00  11/17/19 11:29





  Proscar -  PO   5 mg





  DAILY [facility]   Administration





     





     





     





     


 


Vancomycin HCl  1,000 mg in 250 mls @ 166.667 mls/hr  11/15/19 18:00  11/17/19 

17:15





  Vancomycin (Pre-Docked)  IVPB   166.667 mls/hr





  BID@0600,1800 [facility]   Administration





     





     





  Protocol   





     


 


Tigecycline 50 mg/ Dextrose  100 mls @ 100 mls/hr  11/15/19 22:00  11/17/19 11:

29





  IVPB   100 mls/hr





  BID [facility]   Administration





     





     





  Protocol   





     


 


Metoprolol Tartrate  25 mg  11/14/19 14:00  11/17/19 13:57





  Lopressor -  PO   Not Given





  DAILY [facility]   





     





     





     





     


 


Olanzapine  7.5 mg  11/17/19 22:00  





  Zyprexa -  PO   





  [facility]   





     





     





     





     


 


Potassium Chloride  40 meq  11/16/19 17:49  11/17/19 13:57





  K-Dur -  PO   Not Given





  DAILY [facility]   





     





     





     





     


 


Rivaroxaban  15 mg  11/14/19 18:00  11/17/19 17:15





  Xarelto  PO   15 mg





  DAILY@1800 [facility]   Administration





     





     





     





     


 


Tamsulosin HCl  0.4 mg  11/17/19 08:30  11/17/19 09:27





  Flomax -  PO   0.4 mg





  DAILY@0830 [facility]   Administration





     





     





     





     














Impression


1. CKD


2. hx of hydronephrosis


3. hx of obstructive uropathy


4. CHF


5. HTN


6. AGUILA


7. a-fib











Plan


- check cmp


- check mag


- will evaluate for diuretics in the am


- renal function is improving


- likely in part pre-renal aguila

## 2019-11-17 NOTE — PN
Progress Note, Physician


Chief Complaint: 





Cellulitis


AGUILA


Afib


History of Present Illness: 





NAD


Confused


Nocturnal agitation as per nursing staff even with clonazepam 2 mg


Seen by Psych yesterday





- Current Medication List


Current Medications: 


Active Medications





Acetaminophen (Tylenol -)  650 mg PO Q6H PRN


   PRN Reason: PAIN LEVEL 6-10


   Last Admin: 11/16/19 03:24 Dose:  650 mg


Clonazepam (Klonopin -)  2 mg PO Q12H PRN


   PRN Reason: ANXIETY


   Last Admin: 11/16/19 22:33 Dose:  2 mg


Finasteride (Proscar -)  5 mg PO DAILY [facility]


   Last Admin: 11/16/19 10:38 Dose:  5 mg


Vancomycin HCl (Vancomycin (Pre-Docked))  1,000 mg in 250 mls @ 166.667 mls/hr 

IVPB BID@0600,1800 [facility]; Protocol


   Last Admin: 11/17/19 06:05 Dose:  166.667 mls/hr


Tigecycline 50 mg/ Dextrose  100 mls @ 100 mls/hr IVPB BID [facility]; Protocol


   Last Admin: 11/16/19 21:17 Dose:  100 mls/hr


Metoprolol Tartrate (Lopressor -)  25 mg PO DAILY [facility]


   Last Admin: 11/16/19 10:39 Dose:  25 mg


Potassium Chloride (K-Dur -)  40 meq PO DAILY [facility]


Rivaroxaban (Xarelto)  15 mg PO DAILY@1800 [facility]


   Last Admin: 11/16/19 19:52 Dose:  15 mg


Tamsulosin HCl (Flomax -)  0.4 mg PO DAILY@0830 [facility]











- Objective


Vital Signs: 


 Vital Signs











Temperature  97.8 F   11/16/19 10:30


 


Pulse Rate  79   11/16/19 10:30


 


Respiratory Rate  20   11/16/19 21:00


 


Blood Pressure  126/59 L  11/16/19 10:30


 


O2 Sat by Pulse Oximetry (%)  95   11/16/19 21:00











Constitutional: Yes: Well Nourished, No Distress, Calm, Obese


Cardiovascular: Yes: Regular Rate and Rhythm


Respiratory: Yes: Regular


Gastrointestinal: Yes: Normal Bowel Sounds, Soft, Abdomen, Obese


Genitourinary: Yes: Incontinence


Musculoskeletal: Yes: Muscle Weakness


Extremities: Yes: WNL


Edema: No


Peripheral Pulses WNL: Yes


Integumentary: Yes: Other (generalized urticaria)


Neurological: Yes: Alert, Confusion


Psychiatric: Yes: Alert


Labs: 


 CBC, BMP





 11/16/19 07:35 





 11/16/19 07:35 











Assessment/Plan





(1) Metabolic encephalopathy


Assessment/Plan: 


-secondary to UTI


-Head CT scan shows no acute evidence of intracranial hemorrhage, punctate 

chronic right base infarct, mild periventricular chronic microvascular ischemic 

changes 


Code(s): G93.41 - METABOLIC ENCEPHALOPATHY   





(2) AGUILA (acute kidney injury)


Assessment/Plan: 


-Renal on board


-monitor renal function


Code(s): N17.9 - ACUTE KIDNEY FAILURE, UNSPECIFIED   





(3) Anxiety


Assessment/Plan: 


-Clonazepam 2 mg po BID


-Psychiatry consult


-Add Olanzapine 7.5 mg po HS


Code(s): F41.9 - ANXIETY DISORDER, UNSPECIFIED   





(4) Atrial fibrillation


Assessment/Plan: 


-Xarelto and Metoprolol


Code(s): I48.91 - UNSPECIFIED ATRIAL FIBRILLATION   


Qualifiers: 


   Atrial fibrillation type: chronic 





(5) Cellulitis


Assessment/Plan: 


-ID on board


-leukocytosis


-afebrile


-Tygacil


-wound culture positive


-contact precaution


-BC neg


-Vascular US B/L LE neg for DVT


Code(s): L03.90 - CELLULITIS, UNSPECIFIED   


Qualifiers: 


   Site of cellulitis: extremity   Site of cellulitis of extremity: lower 

extremity 





(6) CHF (congestive heart failure)


Assessment/Plan: 


-daily weights


-strict I&Os


-fluid restriction


Code(s): I50.9 - HEART FAILURE, UNSPECIFIED   


Qualifiers: 


   Heart failure type: diastolic   Heart failure chronicity: acute on chronic   

Qualified Code(s): I50.33 - Acute on chronic diastolic (congestive) heart 

failure   





(7) Hypertension


Assessment/Plan: 


-monitor BP


-low Na diet


Code(s): I10 - ESSENTIAL (PRIMARY) HYPERTENSION   


Qualifiers: 


   Hypertension type: essential hypertension   Qualified Code(s): I10 - 

Essential (primary) hypertension   





(8) Urinary retention due to benign prostatic hyperplasia


Assessment/Plan: 


-Finasteride


-Bladder/Renal US both kidneys appear unremarkable, significantly unlarged 

prostate with a volume 80cc, adequately distended urinary bladder without wall 

thickening, bilateral ureteral jets


-Urology consult 


-Tamsulosin 0.4 mg po daily


Code(s): N40.1 - BENIGN PROSTATIC HYPERPLASIA WITH LOWER URINARY TRACT SYMP; 

R33.8 - OTHER RETENTION OF URINE   





 Assessment/Plan 





see problem list 


dvt ppx

## 2019-11-18 NOTE — CONSULT
Admitting History and Physical





- Primary Care Physician


PCP: Eduarda Mendez





- Admission


History of Present Illness: 





84 year old male with a pmhx of htn, afib on Xarelto s/p ppm, bph, chronic 

diastolic chf, arthritis, CKD recurrent groin cellulitis and fungal infections 

presents with recurrent cellulitis and erythema bilateral lower extremities, UTI





 Metabolic encephalopathy secondary to UTI


 


 Selected Entries











  11/17/19 11/17/19 11/17/19





  09:00 15:20 21:00


 


Temperature 97.2 F L 97.6 F 99.5 F














  11/18/19 11/18/19





  06:48 11:24


 


Temperature 97.3 F L 97.9 F








 Laboratory Tests











  11/14/19 11/15/19 11/16/19





  07:00 06:30 07:35


 


WBC  10.4 H  10.7 H  13.2 H














  11/18/19





  07:05


 


WBC  13.1 H








Refusing PO intake. Coughing on thin water with meds, expectorating thick clear 

phlegm.





Eyes closed, confused, thinks at "[facility]".


History Source: Family Member, Medical Record


Limitations to Obtaining History: Clinical Condition





- Past Medical History


Cardiovascular: Yes: AFIB, CHF, HTN, Hyperlipdemia, Other (pacemaker)


Pulmonary: Yes: Pneumonia, Sleep Apnea


Gastrointestinal: Yes: Diverticulosis, Other (colon polyps)


Hepatobiliary: Yes: Other


Renal/: Yes: Renal Inusuff, BPH, Cancer (h/o prostate cancer), Renal Calculi


Dermatology: Yes: Cellulitis, Other





- Past Surgical History


Past Surgical History: Yes: Amputation (left great toe), Colonoscopy, Joint 

Replacement (bilateral knee replacements), Permanent Pacemaker





- Smoking History


Smoking history: Former smoker


Have you smoked in the past 12 months: No


If you are a former smoker, when did you quit?: 1970





- Alcohol/Substance Use


Hx Alcohol Use: No


History of Substance Use: reports: None





- Social History


ADL: Family Assistance


Occupation: retired [place] 


History of Recent Travel: No





History





- Admission


Reason For Visit: ACUTE KIDNEY INJURY/UTI/CELLULITIS/ALTERED MENTAL





- Diagnostics


CT Scan: Report Reviewed (-Head CT scan shows no acute evidence of intracranial 

hemorrhage, punctate chronic right base infarct, mild periventricular chronic 

microvascular ischemic changes)





- General


Mental Status: Awake and Alert (but eyes closed), Able to Follow Commands, 

Confused (Agitates easily sec to confusion)


Ability to Follow Directions: Fair





- Hearing


Hearing: Functional


Hearing: Normal


Hearing Aide: No





Speech Evaluation





- Communication


Primary Language: ENGLISH


Communication: Yes: Simple Responses





- Speech Production


Able to Make Needs Known: Yes: WNL


Intelligibility: Yes: WNL





- Speech Characteristics


Voice Loudness: Normal


Voice Pitch: Yes: Normal


Voice Phonatory-based Quality: Yes: Normal


Speech Pattern: Normal


Speech Clarity: < 100%


Nasal Resonance: Normal


Articulation: Yes: Precise





- Language/Auditory Comprehension


Follows: Yes: 1 Stage Simple Commands





- Language/Verbal Expression


Able to Communicate Wants and Needs: Yes: WNL





- Swallow Evaluation/Bedside Assessment


Current Nutritional Intake: Regular, Thin Liquids


Dentition: Yes: Edentulous (lower), Dental Appliance Upper


Facial Symmetry at Rest: Symmetrical


Facial Symmetry on Retraction: Symmetrical


Sensation: Normal


Against Resistance Opening: Normal


Against Resistance Closing: Normal


Pucker Lips: Normal


Smile: Normal


Lingual Movement: Normal, Symmetric


Lingual Speed of Movement: Normal


Lingual Movement Strgth Against Opposition: Normal


Lingual Movement Characteristics: Normal


Laryngeal Movement: Labored,delay initiation


Rate of Intake: WFL


Bolus Size: WFL


Labial Seal: WFL


Oral Prep Time: WFL


A-P Transit: WFL


Coughing/Throat Clear: Yes (thin liquid)





Recommendations





- Speech Evaluation, Impression/Plan


Impression: Confused, eyes closed, disoriented, cough response with thin liquid 

c/w aspiration.





- Disposition


Discharge to: To be Determined





- Dysphagia Impressions/Plan


Swallowing Skills: Impaired


Dysphagia Impressions: Mild Impairment, Moderate Impairment, Suspect Aspiration


*Silent aspiration: cannot be R/O at bedside


Dysphagia Treatment Plan: Small Bites, Chin Tuck/Down, Facilitative Feeding, 1/

2 tsp. at a time, Elevate HOB during feed


Recommendations: Modified Barium Swallow





- Recommendations


Diet Consistency: Dysphagia Pureed


Medication Administration: Crushed with applesauce


Liquids: Nectar Thick


Supplement: Magic Cup, Ensure Pudding

## 2019-11-18 NOTE — PN
Progress Note, Physician


Chief Complaint: 





Pt denies chest pain or dyspnea.


History of Present Illness: 





Patient is an 84-year-old with multiple comorbidities presents to the ER with 

transiently low blood pressure, waxing and waning mental status and worsening 

erythema of the lower extremities.  In the ER, patient is awake and alert, 

nontoxic-appearing, with extensive erythroderma of the perineum without 

evidence of Zelda's gangrene and significant erythema of the lower 

extremities with multiple excoriations consistent with acute cellulitis.  Given 

patient's history of MRSA, will administer vancomycin.  Will consider 

fluoroquinolone for gram-negative coverage given keflex allergy.  Will 

judiciously hydrate.  Will obtain CT of head to rule out acute intracranial 

pathology.  Will obtain CBC/CMP/lactic acid/blood cultures.  Likely admission.








- Current Medication List


Current Medications: 


Active Medications





Acetaminophen (Tylenol -)  650 mg PO Q6H PRN


   PRN Reason: PAIN LEVEL 6-10


   Last Admin: 11/17/19 21:28 Dose:  650 mg


Clonazepam (Klonopin -)  2 mg PO Q8H PRN


   PRN Reason: ANXIETY


   Last Admin: 11/18/19 02:19 Dose:  2 mg


Finasteride (Proscar -)  5 mg PO DAILY [facility]


   Last Admin: 11/17/19 11:29 Dose:  5 mg


Vancomycin HCl (Vancomycin (Pre-Docked))  1,000 mg in 250 mls @ 166.667 mls/hr 

IVPB BID@0600,1800 [facility]; Protocol


   Last Admin: 11/18/19 06:11 Dose:  166.667 mls/hr


Tigecycline 50 mg/ Dextrose  100 mls @ 100 mls/hr IVPB BID [facility]; Protocol


   Last Admin: 11/17/19 21:27 Dose:  100 mls/hr


Metoprolol Tartrate (Lopressor -)  12.5 mg PO DAILY [facility]


Olanzapine (Zyprexa -)  7.5 mg PO HS [facility]


   Last Admin: 11/17/19 21:27 Dose:  7.5 mg


Potassium Chloride (K-Dur -)  40 meq PO DAILY [facility]


   Last Admin: 11/17/19 13:57 Dose:  Not Given


Rivaroxaban (Xarelto)  15 mg PO DAILY@1800 [facility]


   Last Admin: 11/17/19 17:15 Dose:  15 mg


Tamsulosin HCl (Flomax -)  0.4 mg PO DAILY@0830 [facility]


   Last Admin: 11/17/19 09:27 Dose:  0.4 mg











- Objective


Vital Signs: 


 Vital Signs











Temperature  97.3 F L  11/18/19 06:48


 


Pulse Rate  69   11/18/19 06:48


 


Respiratory Rate  18   11/18/19 06:48


 


Blood Pressure  130/60   11/18/19 06:48


 


O2 Sat by Pulse Oximetry (%)  96   11/17/19 21:00











Constitutional: Yes: Obese


Eyes: Yes: WNL


HENT: Yes: WNL


Neck: Yes: WNL


Cardiovascular: Yes: S1, S2 (split)


Respiratory: Yes: WNL


Gastrointestinal: Yes: Soft, Abdomen, Obese


...Rectal Exam: Yes: Deferred


Genitourinary: Yes: Other (cellulitis).  No: Anuria


Musculoskeletal: Yes: Muscle Weakness


Extremities: Yes: Cool


Edema: No


Peripheral Pulses WNL: Yes


Integumentary: Yes: Erythema


Neurological: Yes: Alert, Weakness


Psychiatric: Yes: Other





- ....Imaging


Chest X-ray: Image Reviewed


EKG: Image Reviewed





Problem List





- Problems


(1) Atrial fibrillation


Assessment/Plan: 


On metoprolol tartrate for HR control (recommend dividing the dose into twice 

daily for better 24 hour coverage).


On rivaroxaban 15 mg daily for anticoagulation.


Code(s): I48.91 - UNSPECIFIED ATRIAL FIBRILLATION   


Qualifiers: 


   Atrial fibrillation type: chronic 





(2) Cellulitis


Assessment/Plan: 


on antibiotics


Code(s): L03.90 - CELLULITIS, UNSPECIFIED   


Qualifiers: 


   Site of cellulitis: extremity   Site of cellulitis of extremity: lower 

extremity 





(3) Hypokalemia


Assessment/Plan: 


replete K at 4.0-4.5


Keep Mg 2.0-2.4, and PO4 of 2.5-4.9.


Code(s): E87.6 - HYPOKALEMIA   





(4) Pacemaker


Code(s): Z95.0 - PRESENCE OF CARDIAC PACEMAKER   





(5) Obesity


Code(s): E66.9 - OBESITY, UNSPECIFIED   





(6) Renal dysfunction


Assessment/Plan: 


losartan continues to be held 


Code(s): N28.9 - DISORDER OF KIDNEY AND URETER, UNSPECIFIED

## 2019-11-18 NOTE — PN
Progress Note, Physician


History of Present Illness: 





Improvement in tenderness and erythema bilateral lower extremities.








- Current Medication List


Current Medications: 


Active Medications





Acetaminophen (Tylenol -)  650 mg PO Q6H PRN


   PRN Reason: PAIN LEVEL 6-10


   Last Admin: 11/17/19 21:28 Dose:  650 mg


Clonazepam (Klonopin -)  2 mg PO Q8H PRN


   PRN Reason: ANXIETY


   Last Admin: 11/18/19 02:19 Dose:  2 mg


Finasteride (Proscar -)  5 mg PO DAILY [facility]


   Last Admin: 11/18/19 10:57 Dose:  5 mg


Vancomycin HCl (Vancomycin (Pre-Docked))  1,000 mg in 250 mls @ 166.667 mls/hr 

IVPB BID@0600,1800 [facility]; Protocol


   Last Admin: 11/18/19 06:11 Dose:  166.667 mls/hr


Tigecycline 50 mg/ Dextrose  100 mls @ 100 mls/hr IVPB BID [facility]; Protocol


   Last Admin: 11/18/19 10:55 Dose:  100 mls/hr


Metoprolol Succinate (Toprol Xl -)  12.5 mg PO DAILY [facility]


   Last Admin: 11/18/19 10:56 Dose:  12.5 mg


Olanzapine (Zyprexa -)  7.5 mg PO HS [facility]


   Last Admin: 11/17/19 21:27 Dose:  7.5 mg


Potassium Chloride (K-Dur -)  40 meq PO DAILY [facility]


   Last Admin: 11/18/19 10:58 Dose:  40 meq


Rivaroxaban (Xarelto)  15 mg PO DAILY@1800 [facility]


   Last Admin: 11/17/19 17:15 Dose:  15 mg


Tamsulosin HCl (Flomax -)  0.4 mg PO DAILY@0830 [facility]


   Last Admin: 11/18/19 10:56 Dose:  0.4 mg











- Objective


Vital Signs: 


 Vital Signs











Temperature  97.3 F L  11/18/19 06:48


 


Pulse Rate  69   11/18/19 06:48


 


Respiratory Rate  18   11/18/19 06:48


 


Blood Pressure  130/60   11/18/19 06:48


 


O2 Sat by Pulse Oximetry (%)  96   11/17/19 21:00











Constitutional: Yes: No Distress, Calm


Neck: Yes: Supple


Cardiovascular: Yes: Regular Rate and Rhythm


Respiratory: Yes: Regular, Diminished


Gastrointestinal: Yes: Soft, Hypoactive Bowel Sounds


Extremities: Yes: Erythema


Edema: Yes


Edema: LLE: Trace, RLE: Trace


Integumentary: Yes: Erythema, Venous Stasis Changes


Labs: 


 CBC, BMP





 11/18/19 07:05 





 11/18/19 07:05 











Problem List





- Problems


(1) Allergy to multiple antibiotics


Code(s): Z88.1 - ALLERGY STATUS TO OTHER ANTIBIOTIC AGENTS STATUS   





(2) Anxiety


Code(s): F41.9 - ANXIETY DISORDER, UNSPECIFIED   





(3) Atrial fibrillation


Code(s): I48.91 - UNSPECIFIED ATRIAL FIBRILLATION   


Qualifiers: 


   Atrial fibrillation type: chronic 





(4) Cellulitis


Code(s): L03.90 - CELLULITIS, UNSPECIFIED   


Qualifiers: 


   Site of cellulitis: extremity   Site of cellulitis of extremity: lower 

extremity 





(5) Chronic anticoagulation


Code(s): Z79.01 - LONG TERM (CURRENT) USE OF ANTICOAGULANTS   





(6) Hypertension


Code(s): I10 - ESSENTIAL (PRIMARY) HYPERTENSION   


Qualifiers: 


   Hypertension type: essential hypertension   Qualified Code(s): I10 - 

Essential (primary) hypertension   





(7) Lymphedema


Code(s): I89.0 - LYMPHEDEMA, NOT ELSEWHERE CLASSIFIED   





(8) Pacemaker


Code(s): Z95.0 - PRESENCE OF CARDIAC PACEMAKER   





Assessment/Plan





ECHO 3/2018 showed normal LV systolic function, mild AS no other sig valvular 

abnl





1. Recurrent LE cellulitis with multiple allergies to PCN and carbipenem


2. Chronic venous stasis dermatitis


3. Acute on CKD with hx of hydronephrosis/obstructive uropathy likely pre-renal 

improving


4. Chronic diastolic heart failure


5. HTN cardiomyopathy


6. Afib on Xarelto


7. sss s/p PPM


8. OSAS


9. CAD h/o demand ischemia


10. Hyperlipidemia


11. Anxiety d/o


12. BPH





P:1. Empiric abx per C&S, wound care


2. Hold oral diuretics and zaroxolyn pending renal fxn recovery, monitor 

electrolytes, wrap legs


3. Continue Toprol XL 12.5 qd, Xarelto 15 qd,  Crestor 5 qd, hold losartan 

pending renal fxn stabilization


4. Eventual rehab then follow up in office [phone]


5. DVT and GI prophylaxis

## 2019-11-18 NOTE — PN
Progress Note, Physician


Chief Complaint: 





Cellulitis


AGUILA


Afib


History of Present Illness: 





Previous notes and events reviewed


noted to be more lethargic today


arousable to tactile and verbal stimuli


NAD


RN sts patient noted with coughing after drinking liquids 





- Current Medication List


Current Medications: 


Active Medications





Acetaminophen (Tylenol -)  650 mg PO Q6H PRN


   PRN Reason: PAIN LEVEL 6-10


   Last Admin: 11/17/19 21:28 Dose:  650 mg


Clonazepam (Klonopin -)  2 mg PO Q8H PRN


   PRN Reason: ANXIETY


   Last Admin: 11/18/19 02:19 Dose:  2 mg


Finasteride (Proscar -)  5 mg PO DAILY [facility]


   Last Admin: 11/18/19 10:57 Dose:  5 mg


Vancomycin HCl (Vancomycin (Pre-Docked))  1,000 mg in 250 mls @ 166.667 mls/hr 

IVPB BID@0600,1800 [facility]; Protocol


   Last Admin: 11/18/19 06:11 Dose:  166.667 mls/hr


Tigecycline 50 mg/ Dextrose  100 mls @ 100 mls/hr IVPB BID [facility]; Protocol


   Last Admin: 11/18/19 10:55 Dose:  100 mls/hr


Metoprolol Succinate (Toprol Xl -)  12.5 mg PO DAILY [facility]


   Last Admin: 11/18/19 10:56 Dose:  12.5 mg


Olanzapine (Zyprexa -)  7.5 mg PO HS [facility]


   Last Admin: 11/17/19 21:27 Dose:  7.5 mg


Potassium Chloride (K-Dur -)  40 meq PO DAILY [facility]


   Last Admin: 11/18/19 10:58 Dose:  40 meq


Rivaroxaban (Xarelto)  15 mg PO DAILY@1800 [facility]


   Last Admin: 11/17/19 17:15 Dose:  15 mg


Rosuvastatin Calcium (Crestor -)  5 mg PO HS [facility]


Tamsulosin HCl (Flomax -)  0.4 mg PO DAILY@0830 [facility]


   Last Admin: 11/18/19 10:56 Dose:  0.4 mg











- Objective


Vital Signs: 


 Vital Signs











Temperature  97.3 F L  11/18/19 06:48


 


Pulse Rate  69   11/18/19 06:48


 


Respiratory Rate  18   11/18/19 06:48


 


Blood Pressure  130/60   11/18/19 06:48


 


O2 Sat by Pulse Oximetry (%)  96   11/17/19 21:00











Constitutional: Yes: No Distress, Calm


Eyes: Yes: Conjunctiva Clear


HENT: Yes: Atraumatic


Cardiovascular: Yes: Regular Rate and Rhythm


Respiratory: Yes: Regular, CTA Bilaterally


Gastrointestinal: Yes: Normal Bowel Sounds, Soft


Genitourinary: Yes: Berry Present


Musculoskeletal: Yes: Muscle Weakness


Extremities: Yes: WNL


Edema: Yes (b/l LE)


Integumentary: Yes: Erythema (lower extremities)


Wound/Incision: Yes: Open to air


Neurological: Yes: Lethargy


Labs: 


 CBC, BMP





 11/18/19 07:05 





 11/18/19 07:05 





 Microbiology





11/13/19 18:00   Blood - Peripheral Venous   Blood Culture - Preliminary


                            NO GROWTH OBTAINED AFTER 96 HOURS, INCUBATION TO 

CONTINUE


                            FOR 1 DAYS.


11/13/19 18:30   Blood - Peripheral Venous   Blood Culture - Preliminary


                            NO GROWTH OBTAINED AFTER 96 HOURS, INCUBATION TO 

CONTINUE


                            FOR 1 DAYS.


11/13/19 19:48   Leg - Right Lower   Gram Stain - Final


11/13/19 19:48   Leg - Right Lower   Wound Culture - Final


                             S Aureus


                            Staphylococcus Coagulase Neg











Problem List





- Problems


(1) Metabolic encephalopathy


Assessment/Plan: 


-secondary to UTI


-Head CT scan shows no acute evidence of intracranial hemorrhage, punctate 

chronic right base infarct, mild periventricular chronic microvascular ischemic 

changes 


-neuro check q6h


Code(s): G93.41 - METABOLIC ENCEPHALOPATHY   





(2) AGUILA (acute kidney injury)


Assessment/Plan: 


-Renal on board


-BUN/Cr 51.4/1.7


-monitor renal function


Code(s): N17.9 - ACUTE KIDNEY FAILURE, UNSPECIFIED   





(3) Anxiety


Assessment/Plan: 


-Ativan BID


-Psychiatry consult


Code(s): F41.9 - ANXIETY DISORDER, UNSPECIFIED   





(4) Atrial fibrillation


Assessment/Plan: 


-Xarelto and Metoprolol


Code(s): I48.91 - UNSPECIFIED ATRIAL FIBRILLATION   


Qualifiers: 


   Atrial fibrillation type: chronic 





(5) Cellulitis


Assessment/Plan: 


-ID on board


-leukocytosis


-afebrile


-Tygacil


-wound culture positive


-contact precaution


-BC neg


-Vascular US B/L LE neg for DVT


Code(s): L03.90 - CELLULITIS, UNSPECIFIED   


Qualifiers: 


   Site of cellulitis: extremity   Site of cellulitis of extremity: lower 

extremity 





(6) CHF (congestive heart failure)


Assessment/Plan: 


-daily weights


-strict I&Os


-fluid restriction


Code(s): I50.9 - HEART FAILURE, UNSPECIFIED   


Qualifiers: 


   Heart failure type: diastolic   Heart failure chronicity: acute on chronic   

Qualified Code(s): I50.33 - Acute on chronic diastolic (congestive) heart 

failure   





(7) Hypertension


Assessment/Plan: 


-monitor BP


-low Na diet


Code(s): I10 - ESSENTIAL (PRIMARY) HYPERTENSION   


Qualifiers: 


   Hypertension type: essential hypertension   Qualified Code(s): I10 - 

Essential (primary) hypertension   





(8) Urinary retention due to benign prostatic hyperplasia


Assessment/Plan: 


-Finasteride


-Bladder/Renal US both kidneys appear unremarkable, significantly unlarged 

prostate with a volume 80cc, adequately distended urinary bladder without wall 

thickening, bilateral ureteral jets


-Urology consult 


Code(s): N40.1 - BENIGN PROSTATIC HYPERPLASIA WITH LOWER URINARY TRACT SYMP; 

R33.8 - OTHER RETENTION OF URINE   





Assessment/Plan





see problem list 


dvt ppx

## 2019-11-18 NOTE — PN
Progress Note, Physician





- Current Medication List


Current Medications: 


Active Medications





Acetaminophen (Tylenol -)  650 mg PO Q6H PRN


   PRN Reason: PAIN LEVEL 6-10


   Last Admin: 11/17/19 21:28 Dose:  650 mg


Clonazepam (Klonopin -)  2 mg PO Q8H PRN


   PRN Reason: ANXIETY


   Last Admin: 11/18/19 02:19 Dose:  2 mg


Finasteride (Proscar -)  5 mg PO DAILY [facility]


   Last Admin: 11/18/19 10:57 Dose:  5 mg


Vancomycin HCl (Vancomycin (Pre-Docked))  1,000 mg in 250 mls @ 166.667 mls/hr 

IVPB BID@0600,1800 [facility]; Protocol


   Last Admin: 11/18/19 18:48 Dose:  166.667 mls/hr


Tigecycline 50 mg/ Dextrose  100 mls @ 100 mls/hr IVPB BID [facility]; Protocol


   Last Admin: 11/18/19 21:36 Dose:  100 mls/hr


Metoprolol Succinate (Toprol Xl -)  12.5 mg PO DAILY [facility]


   Last Admin: 11/18/19 10:56 Dose:  12.5 mg


Olanzapine (Zyprexa -)  7.5 mg PO HS [facility]


   Last Admin: 11/17/19 21:27 Dose:  7.5 mg


Potassium Chloride (K-Dur -)  40 meq PO DAILY [facility]


   Last Admin: 11/18/19 10:58 Dose:  40 meq


Rivaroxaban (Xarelto)  15 mg PO DAILY@1800 [facility]


   Last Admin: 11/18/19 18:48 Dose:  15 mg


Rosuvastatin Calcium (Crestor -)  5 mg PO HS [facility]


   Last Admin: 11/18/19 21:36 Dose:  5 mg


Tamsulosin HCl (Flomax -)  0.4 mg PO DAILY@0830 [facility]


   Last Admin: 11/18/19 10:56 Dose:  0.4 mg











- Objective


Vital Signs: 


 Vital Signs











Temperature  99.3 F   11/18/19 19:00


 


Pulse Rate  70   11/18/19 19:00


 


Respiratory Rate  20   11/18/19 19:00


 


Blood Pressure  150/52 L  11/18/19 19:00


 


O2 Sat by Pulse Oximetry (%)  96   11/18/19 09:00











Labs: 


 CBC, BMP





 11/18/19 07:05 





 11/18/19 07:05

## 2019-11-18 NOTE — PN
Progress Note, Physician


History of Present Illness: 





Pt seen and examined at bedside. He is awake but confused. He has poor po 

intake. 





- Current Medication List


Current Medications: 


Active Medications





Acetaminophen (Tylenol -)  650 mg PO Q6H PRN


   PRN Reason: PAIN LEVEL 6-10


   Last Admin: 11/17/19 21:28 Dose:  650 mg


Clonazepam (Klonopin -)  2 mg PO Q8H PRN


   PRN Reason: ANXIETY


   Last Admin: 11/18/19 02:19 Dose:  2 mg


Finasteride (Proscar -)  5 mg PO DAILY [facility]


   Last Admin: 11/18/19 10:57 Dose:  5 mg


Vancomycin HCl (Vancomycin (Pre-Docked))  1,000 mg in 250 mls @ 166.667 mls/hr 

IVPB BID@0600,1800 [facility]; Protocol


   Last Admin: 11/18/19 06:11 Dose:  166.667 mls/hr


Tigecycline 50 mg/ Dextrose  100 mls @ 100 mls/hr IVPB BID [facility]; Protocol


   Last Admin: 11/18/19 10:55 Dose:  100 mls/hr


Metoprolol Succinate (Toprol Xl -)  12.5 mg PO DAILY [facility]


   Last Admin: 11/18/19 10:56 Dose:  12.5 mg


Olanzapine (Zyprexa -)  7.5 mg PO HS [facility]


   Last Admin: 11/17/19 21:27 Dose:  7.5 mg


Potassium Chloride (K-Dur -)  40 meq PO DAILY [facility]


   Last Admin: 11/18/19 10:58 Dose:  40 meq


Rivaroxaban (Xarelto)  15 mg PO DAILY@1800 [facility]


   Last Admin: 11/17/19 17:15 Dose:  15 mg


Rosuvastatin Calcium (Crestor -)  5 mg PO HS [facility]


Tamsulosin HCl (Flomax -)  0.4 mg PO DAILY@0830 [facility]


   Last Admin: 11/18/19 10:56 Dose:  0.4 mg











- Objective


Vital Signs: 


 Vital Signs











Temperature  98.0 F   11/18/19 13:34


 


Pulse Rate  68   11/18/19 13:34


 


Respiratory Rate  20   11/18/19 13:34


 


Blood Pressure  151/69   11/18/19 13:34


 


O2 Sat by Pulse Oximetry (%)  96   11/18/19 09:00











Constitutional: Yes: Calm


Eyes: Yes: Conjunctiva Clear


HENT: Yes: Atraumatic


Neck: Yes: Supple


Cardiovascular: Yes: S1, S2


Respiratory: Yes: CTA Bilaterally


Gastrointestinal: Yes: Soft


Genitourinary: Yes: WNL


Musculoskeletal: Yes: WNL


Edema: Yes


Edema: LLE: 1+, RLE: 1+


Integumentary: Yes: Erythema, Venous Stasis Changes


Neurological: Yes: Confusion


Labs: 


 CBC, BMP





 11/18/19 07:05 





 11/18/19 07:05 











Problem List





- Problems


(1) AGUILA (acute kidney injury)


Code(s): N17.9 - ACUTE KIDNEY FAILURE, UNSPECIFIED   





(2) CHF (congestive heart failure)


Code(s): I50.9 - HEART FAILURE, UNSPECIFIED   


Qualifiers: 


   Heart failure type: diastolic   Heart failure chronicity: chronic   

Qualified Code(s): I50.32 - Chronic diastolic (congestive) heart failure   





(3) Cellulitis


Code(s): L03.90 - CELLULITIS, UNSPECIFIED   


Qualifiers: 


   Site of cellulitis: extremity   Site of cellulitis of extremity: lower 

extremity 





Assessment/Plan


 Current Medications











Generic Name Dose Route Start Last Admin





  Trade Name Freq  PRN Reason Stop Dose Admin


 


Acetaminophen  650 mg  11/14/19 04:07  11/17/19 21:28





  Tylenol -  PO   650 mg





  Q6H PRN   Administration





  PAIN LEVEL 6-10   





     





     





     


 


Clonazepam  2 mg  11/17/19 17:38  11/18/19 02:19





  Klonopin -  PO   2 mg





  Q8H PRN   Administration





  ANXIETY   





     





     





     


 


Finasteride  5 mg  11/15/19 10:00  11/18/19 10:57





  Proscar -  PO   5 mg





  DAILY KELVIN   Administration





     





     





     





     


 


Vancomycin HCl  1,000 mg in 250 mls @ 166.667 mls/hr  11/15/19 18:00  11/18/19 

06:11





  Vancomycin (Pre-Docked)  IVPB   166.667 mls/hr





  BID@0600,1800 [facility]   Administration





     





     





  Protocol   





     


 


Tigecycline 50 mg/ Dextrose  100 mls @ 100 mls/hr  11/15/19 22:00  11/18/19 10:

55





  IVPB   100 mls/hr





  BID [facility]   Administration





     





     





  Protocol   





     


 


Metoprolol Succinate  12.5 mg  11/18/19 10:00  11/18/19 10:56





  Toprol Xl -  PO   12.5 mg





  DAILY KELVIN   Administration





     





     





     





     


 


Olanzapine  7.5 mg  11/17/19 22:00  11/17/19 21:27





  Zyprexa -  PO   7.5 mg





  HS KELVIN   Administration





     





     





     





     


 


Potassium Chloride  40 meq  11/16/19 17:49  11/18/19 10:58





  K-Dur -  PO   40 meq





  DAILY [facility]EL[facility]   Administration





     





     





     





     


 


Rivaroxaban  15 mg  11/14/19 18:00  11/17/19 17:15





  Xarelto  PO   15 mg





  DAILY@1800 [facility]   Administration





     





     





     





     


 


Rosuvastatin Calcium  5 mg  11/18/19 22:00  





  Crestor -  PO   





  HS KELVIN   





     





     





     





     


 


Tamsulosin HCl  0.4 mg  11/17/19 08:30  11/18/19 10:56





  Flomax -  PO   0.4 mg





  DAILY@0830 [facility]   Administration





     





     





     





     














Impression


1. CKD


2. hx of hydronephrosis


3. hx of obstructive uropathy


4. CHF


5. HTN


6. AGUILA


7. a-fib





Plan


- renal function is improving


- follow swallow eval


- pt with poor po intake


- diuretics on hold


- repeat labs in am


- likely in part pre-renal aguila

## 2019-11-19 NOTE — EKG
Test Reason : 

Blood Pressure : ***/*** mmHG

Vent. Rate : 071 BPM     Atrial Rate : 058 BPM

   P-R Int : 000 ms          QRS Dur : 198 ms

    QT Int : 494 ms       P-R-T Axes : 000 -59 094 degrees

   QTc Int : 536 ms

 

Ventricular-paced rhythm WITH FREQUENT PREMATURE VENTRICULAR COMPLEXES

ATRIAL FIBRILLATION

ABNORMAL ECG

WHEN COMPARED WITH ECG OF 13-NOV-2019 16:51,

PREMATURE VENTRICULAR COMPLEXES ARE NOW PRESENT

VENT. RATE HAS INCREASED BY   2 BPM

Confirmed by Kingsley Magana ([id number]) on 11/19/2019 2:38:17 PM

 

Referred By: RIVER TEMPLE DR           Confirmed By:Kingsley Magana

## 2019-11-19 NOTE — PN
Progress Note, SLP





- Note


Progress Note: 





 Selected Entries











  11/18/19 11/18/19 11/18/19





  06:48 11:24 13:34


 


Breakfast   


 


Supper   


 


Temperature 97.3 F L 97.9 F 98.0 F














  11/18/19 11/19/19 11/19/19





  19:00 06:10 09:16


 


Breakfast   25%


 


Supper 25%  


 


Temperature 99.3 F 97 F L 














  11/19/19





  09:56


 


Breakfast 


 


Supper 


 


Temperature 98 F








 Laboratory Tests











  11/18/19 11/19/19





  07:05 07:10


 


WBC  13.1 H  11.6 H








On puree/nectar thick liquids. Tolerating diet.


Screaming, confused. Good vocal quality.


Pt not ready for mbs. He may be too large for the flouro equipment.

## 2019-11-19 NOTE — PN
Progress Note, Physician


Chief Complaint: 





Events noted


Generalized weakness


History of Present Illness: 





Patient was seen and examined. Awake and alert. Chart was reviewed





- Current Medication List


Current Medications: 


Active Medications





Acetaminophen (Tylenol -)  650 mg PO Q6H PRN


   PRN Reason: PAIN LEVEL 6-10


   Last Admin: 11/17/19 21:28 Dose:  650 mg


Clonazepam (Klonopin -)  2 mg PO Q8H PRN


   PRN Reason: ANXIETY


   Last Admin: 11/19/19 09:09 Dose:  2 mg


Finasteride (Proscar -)  5 mg PO DAILY [facility]


   Last Admin: 11/19/19 09:10 Dose:  5 mg


Vancomycin HCl (Vancomycin (Pre-Docked))  1,000 mg in 250 mls @ 166.667 mls/hr 

IVPB BID@0600,1800 [facility]; Protocol


   Last Admin: 11/19/19 06:18 Dose:  166.667 mls/hr


Tigecycline 50 mg/ Dextrose  100 mls @ 100 mls/hr IVPB BID [facility]; Protocol


   Last Admin: 11/18/19 21:36 Dose:  100 mls/hr


Metoprolol Succinate (Toprol Xl -)  12.5 mg PO DAILY [facility]


   Last Admin: 11/19/19 09:09 Dose:  12.5 mg


Olanzapine (Zyprexa -)  7.5 mg PO [facility]


   Last Admin: 11/18/19 22:00 Dose:  Not Given


Potassium Chloride (K-Dur -)  40 meq PO DAILY [facility]


   Last Admin: 11/19/19 09:09 Dose:  40 meq


Rivaroxaban (Xarelto)  15 mg PO DAILY@1800 [facility]


   Last Admin: 11/18/19 18:48 Dose:  15 mg


Rosuvastatin Calcium (Crestor -)  5 mg PO HS [facility]


   Last Admin: 11/18/19 23:00 Dose:  Not Given


Tamsulosin HCl (Flomax -)  0.4 mg PO DAILY@0830 [facility]


   Last Admin: 11/19/19 09:10 Dose:  0.4 mg











- Objective


Vital Signs: 


 Vital Signs











Temperature  98 F   11/19/19 09:56


 


Pulse Rate  72   11/19/19 09:56


 


Respiratory Rate  20   11/19/19 09:56


 


Blood Pressure  144/64   11/19/19 09:56


 


O2 Sat by Pulse Oximetry (%)  96   11/19/19 09:00











Eyes: Yes: PERRL


HENT: Yes: Atraumatic


Neck: Yes: Supple


Cardiovascular: Yes: Regular Rate and Rhythm, S1, S2


Respiratory: Yes: Diminished


Gastrointestinal: Yes: Normal Bowel Sounds, Soft, Abdomen, Obese.  No: 

Tenderness


Edema: Yes


Labs: 


 CBC, BMP





 11/19/19 07:10 





 11/19/19 07:10 











Problem List





- Problems


(1) Metabolic encephalopathy


Code(s): G93.41 - METABOLIC ENCEPHALOPATHY   





(2) Renal dysfunction


Code(s): N28.9 - DISORDER OF KIDNEY AND URETER, UNSPECIFIED   





(3) Anxiety


Code(s): F41.9 - ANXIETY DISORDER, UNSPECIFIED   





(4) Atrial fibrillation


Code(s): I48.91 - UNSPECIFIED ATRIAL FIBRILLATION   


Qualifiers: 


   Atrial fibrillation type: chronic 





(5) CHF (congestive heart failure)


Code(s): I50.9 - HEART FAILURE, UNSPECIFIED   


Qualifiers: 


   Heart failure type: diastolic   Heart failure chronicity: chronic   

Qualified Code(s): I50.32 - Chronic diastolic (congestive) heart failure   





(6) Cellulitis


Code(s): L03.90 - CELLULITIS, UNSPECIFIED   


Qualifiers: 


   Site of cellulitis: extremity   Site of cellulitis of extremity: lower 

extremity 





(7) Demand ischemia


Code(s): I24.8 - OTHER FORMS OF ACUTE ISCHEMIC HEART DISEASE   





(8) Hypertension


Code(s): I10 - ESSENTIAL (PRIMARY) HYPERTENSION   


Qualifiers: 


   Hypertension type: essential hypertension   Qualified Code(s): I10 - 

Essential (primary) hypertension   





(9) Lymphedema


Code(s): I89.0 - LYMPHEDEMA, NOT ELSEWHERE CLASSIFIED   





(10) Pacemaker


Code(s): Z95.0 - PRESENCE OF CARDIAC PACEMAKER   





Assessment/Plan





1. Recurrent LE cellulitis 


2. Chronic venous stasis dermatitis


3. Acute on CKD with hx of hydronephrosis/obstructive uropathy likely pre-renal 


4. Chronic diastolic heart failure


5. HTN 


6. AF on Xarelto


7. SSS s/p PPM


8. OSAS


9. CAD h/o demand ischemia


10. Hyperlipidemia


11. Anxiety 


12. BPH





PLAN:


1. Empiric antibiotics and wound care


2. Diuretics held and monitor renal function and electrolytes


3. Continue Toprol XL 12.5 mg QD, Xarelto 15 mg QD, Crestor 5 mg QHS and hold 

Losartan pending renal function stabilization


4. Eventual rehab then follow up in office [phone]


5. DVT and GI prophylaxis


6. PPM interrogation





Panfilo Foote MD

## 2019-11-19 NOTE — PN
Progress Note, Physician


History of Present Illness: 





Pt seen and examined at bedside. He is awake and alert. He denies shortness of 

breath. He remains confused. 





- Current Medication List


Current Medications: 


Active Medications





Acetaminophen (Tylenol -)  650 mg PO Q6H PRN


   PRN Reason: PAIN LEVEL 6-10


   Last Admin: 11/17/19 21:28 Dose:  650 mg


Clonazepam (Klonopin -)  2 mg PO Q8H PRN


   PRN Reason: ANXIETY


Finasteride (Proscar -)  5 mg PO DAILY [facility]


   Last Admin: 11/19/19 09:10 Dose:  5 mg


Vancomycin HCl (Vancomycin (Pre-Docked))  1,000 mg in 250 mls @ 166.667 mls/hr 

IVPB BID@0600,1800 [facility]; Protocol


   Last Admin: 11/19/19 06:18 Dose:  166.667 mls/hr


Tigecycline 50 mg/ Dextrose  100 mls @ 100 mls/hr IVPB BID [facility]; Protocol


   Last Admin: 11/19/19 10:31 Dose:  100 mls/hr


Lorazepam (Ativan -)  2 mg PO BID [facility]


   Last Admin: 11/19/19 13:00 Dose:  2 mg


Metoprolol Succinate (Toprol Xl -)  12.5 mg PO DAILY [facility]


   Last Admin: 11/19/19 09:09 Dose:  12.5 mg


Olanzapine (Zyprexa -)  7.5 mg PO HS [facility]


   Last Admin: 11/18/19 22:00 Dose:  Not Given


Potassium Chloride (K-Dur -)  40 meq PO DAILY [facility]


   Last Admin: 11/19/19 09:09 Dose:  40 meq


Rivaroxaban (Xarelto)  15 mg PO DAILY@1800 [facility]


   Last Admin: 11/18/19 18:48 Dose:  15 mg


Rosuvastatin Calcium (Crestor -)  5 mg PO HS [facility]


   Last Admin: 11/18/19 23:00 Dose:  Not Given


Tamsulosin HCl (Flomax -)  0.4 mg PO DAILY@0830 [facility]


   Last Admin: 11/19/19 09:10 Dose:  0.4 mg











- Objective


Vital Signs: 


 Vital Signs











Temperature  97.5 F L  11/19/19 13:30


 


Pulse Rate  71   11/19/19 13:30


 


Respiratory Rate  20   11/19/19 13:30


 


Blood Pressure  140/71   11/19/19 13:30


 


O2 Sat by Pulse Oximetry (%)  96   11/19/19 09:00











Constitutional: Yes: Anxious


Eyes: Yes: Conjunctiva Clear


HENT: Yes: Atraumatic


Cardiovascular: Yes: S1, S2


Respiratory: Yes: CTA Bilaterally


Gastrointestinal: Yes: Soft, Abdomen, Obese


Genitourinary: Yes: WNL


Musculoskeletal: Yes: WNL


Edema: Yes


Edema: LLE: 1+, RLE: 1+


Neurological: Yes: Confusion


Labs: 


 CBC, BMP





 11/19/19 07:10 





 11/19/19 07:10 











Problem List





- Problems


(1) AGUILA (acute kidney injury)


Code(s): N17.9 - ACUTE KIDNEY FAILURE, UNSPECIFIED   





(2) CHF (congestive heart failure)


Code(s): I50.9 - HEART FAILURE, UNSPECIFIED   


Qualifiers: 


   Heart failure type: diastolic   Heart failure chronicity: chronic   

Qualified Code(s): I50.32 - Chronic diastolic (congestive) heart failure   





(3) Cellulitis


Code(s): L03.90 - CELLULITIS, UNSPECIFIED   


Qualifiers: 


   Site of cellulitis: extremity   Site of cellulitis of extremity: lower 

extremity 





Assessment/Plan


 Current Medications











Generic Name Dose Route Start Last Admin





  Trade Name Freq  PRN Reason Stop Dose Admin


 


Acetaminophen  650 mg  11/14/19 04:07  11/17/19 21:28





  Tylenol -  PO   650 mg





  Q6H PRN   Administration





  PAIN LEVEL 6-10   





     





     





     


 


Clonazepam  2 mg  11/19/19 12:55  





  Klonopin -  PO   





  Q8H PRN   





  ANXIETY   





     





     





     


 


Finasteride  5 mg  11/15/19 10:00  11/19/19 09:10





  Proscar -  PO   5 mg





  DAILY KELVIN   Administration





     





     





     





     


 


Vancomycin HCl  1,000 mg in 250 mls @ 166.667 mls/hr  11/15/19 18:00  11/19/19 

06:18





  Vancomycin (Pre-Docked)  IVPB   166.667 mls/hr





  BID@0600,1800 [facility]   Administration





     





     





  Protocol   





     


 


Tigecycline 50 mg/ Dextrose  100 mls @ 100 mls/hr  11/15/19 22:00  11/19/19 10:

31





  IVPB   100 mls/hr





  BID K[facility]VIN   Administration





     





     





  Protocol   





     


 


Lorazepam  2 mg  11/19/19 12:45  11/19/19 13:00





  Ativan -  PO   2 mg





  BID [facility]IN   Administration





     





     





     





     


 


Metoprolol Succinate  12.5 mg  11/18/19 10:00  11/19/19 09:09





  Toprol Xl -  PO   12.5 mg





  DAILY KELVIN   Administration





     





     





     





     


 


Olanzapine  7.5 mg  11/17/19 22:00  11/18/19 22:00





  Zyprexa -  PO   Not Given





  HS KELVIN   





     





     





     





     


 


Potassium Chloride  40 meq  11/16/19 17:49  11/19/19 09:09





  K-Dur -  PO   40 meq





  DAILY KELVIN   Administration





     





     





     





     


 


Rivaroxaban  15 mg  11/14/19 18:00  11/18/19 18:48





  Xarelto  PO   15 mg





  DAILY@1800 [facility]   Administration





     





     





     





     


 


Rosuvastatin Calcium  5 mg  11/18/19 22:00  11/18/19 23:00





  Crestor -  PO   Not Given





  HS KELVIN   





     





     





     





     


 


Tamsulosin HCl  0.4 mg  11/17/19 08:30  11/19/19 09:10





  Flomax -  PO   0.4 mg





  DAILY@0830 [facility]   Administration





     





     





     





     

















Impression


1. CKD


2. hx of hydronephrosis


3. hx of obstructive uropathy


4. CHF


5. HTN


6. AGUILA


7. a-fib





Plan


- renal function unchanged


- pt with poor po intake


- will keep diuretics on hold for now


- monitor mental status


- repeat labs in am


- likely in part pre-renal aguila

## 2019-11-19 NOTE — PN
Progress Note, Physician


Chief Complaint: 





Cellulitis


AGUILA


Afib


History of Present Illness: 





Previous notes and events reviewed


sleeping arouseable to tactile and verbal stimuli


NAD


patient had period of agitation earlier 


Leukocytosis


afebrile





- Current Medication List


Current Medications: 


Active Medications





Acetaminophen (Tylenol -)  650 mg PO Q6H PRN


   PRN Reason: PAIN LEVEL 6-10


   Last Admin: 11/17/19 21:28 Dose:  650 mg


Clonazepam (Klonopin -)  2 mg PO Q8H PRN


   PRN Reason: ANXIETY


Finasteride (Proscar -)  5 mg PO DAILY [facility]


   Last Admin: 11/19/19 09:10 Dose:  5 mg


Vancomycin HCl (Vancomycin (Pre-Docked))  1,000 mg in 250 mls @ 166.667 mls/hr 

IVPB BID@0600,1800 [facility]; Protocol


   Last Admin: 11/19/19 06:18 Dose:  166.667 mls/hr


Tigecycline 50 mg/ Dextrose  100 mls @ 100 mls/hr IVPB BID [facility]; Protocol


   Last Admin: 11/19/19 10:31 Dose:  100 mls/hr


Lorazepam (Ativan -)  2 mg PO BID [facility]


   Last Admin: 11/19/19 13:00 Dose:  2 mg


Metoprolol Succinate (Toprol Xl -)  12.5 mg PO DAILY [facility]


   Last Admin: 11/19/19 09:09 Dose:  12.5 mg


Olanzapine (Zyprexa -)  7.5 mg PO HS [facility]


   Last Admin: 11/18/19 22:00 Dose:  Not Given


Potassium Chloride (K-Dur -)  40 meq PO DAILY [facility]


   Last Admin: 11/19/19 09:09 Dose:  40 meq


Rivaroxaban (Xarelto)  15 mg PO DAILY@1800 [facility]


   Last Admin: 11/18/19 18:48 Dose:  15 mg


Rosuvastatin Calcium (Crestor -)  5 mg PO HS [facility]


   Last Admin: 11/18/19 23:00 Dose:  Not Given


Tamsulosin HCl (Flomax -)  0.4 mg PO DAILY@0830 [facility]


   Last Admin: 11/19/19 09:10 Dose:  0.4 mg











- Objective


Vital Signs: 


 Vital Signs











Temperature  97.5 F L  11/19/19 13:30


 


Pulse Rate  71   11/19/19 13:30


 


Respiratory Rate  20   11/19/19 13:30


 


Blood Pressure  140/71   11/19/19 13:30


 


O2 Sat by Pulse Oximetry (%)  96   11/19/19 09:00











Constitutional: Yes: No Distress, Calm


Eyes: Yes: Conjunctiva Clear


HENT: Yes: Atraumatic


Cardiovascular: Yes: Regular Rate and Rhythm


Respiratory: Yes: Regular, Diminished


Gastrointestinal: Yes: Normal Bowel Sounds, Soft, Abdomen, Obese


Musculoskeletal: Yes: Muscle Weakness


Extremities: Yes: Erythema (lower extremities)


Edema: Yes


Integumentary: Yes: Erythema, Other (excoriation lower extremities)


Wound/Incision: Yes: Open to air


Neurological: Yes: Alert, Confusion


Psychiatric: Yes: Alert


Labs: 


 CBC, BMP





 11/19/19 07:10 





 11/19/19 07:10 





 Microbiology





11/13/19 18:00   Blood - Peripheral Venous   Blood Culture - Final


                            NO GROWTH AFTER 5 DAYS INCUBATION


11/13/19 18:30   Blood - Peripheral Venous   Blood Culture - Final


                            NO GROWTH AFTER 5 DAYS INCUBATION


11/13/19 19:48   Leg - Right Lower   Gram Stain - Final


11/13/19 19:48   Leg - Right Lower   Wound Culture - Final


                             S Aureus


                            Staphylococcus Coagulase Neg











Problem List





- Problems


(1) Metabolic encephalopathy


Assessment/Plan: 


-secondary to UTI


-Head CT scan shows no acute evidence of intracranial hemorrhage, punctate 

chronic right base infarct, mild periventricular chronic microvascular ischemic 

changes 


-neuro check q6h


Code(s): G93.41 - METABOLIC ENCEPHALOPATHY   





(2) AGUILA (acute kidney injury)


Assessment/Plan: 


-Renal on board


-BUN/Cr 47.5/1.7


-monitor renal function


Code(s): N17.9 - ACUTE KIDNEY FAILURE, UNSPECIFIED   





(3) Anxiety


Assessment/Plan: 


-Ativan BID


-Clonazepam


-Psychiatry on board


Code(s): F41.9 - ANXIETY DISORDER, UNSPECIFIED   





(4) Atrial fibrillation


Assessment/Plan: 


-Xarelto and Metoprolol


Code(s): I48.91 - UNSPECIFIED ATRIAL FIBRILLATION   


Qualifiers: 


   Atrial fibrillation type: chronic 





(5) Cellulitis


Assessment/Plan: 


-ID on board


-leukocytosis


-afebrile


-Tygacil, Vancomycin


-wound culture positive


-contact precaution


-BC neg


-Vascular US B/L LE neg for DVT


Code(s): L03.90 - CELLULITIS, UNSPECIFIED   


Qualifiers: 


   Site of cellulitis: extremity   Site of cellulitis of extremity: lower 

extremity 





(6) CHF (congestive heart failure)


Assessment/Plan: 


-daily weights


-strict I&Os


-fluid restriction


Code(s): I50.9 - HEART FAILURE, UNSPECIFIED   


Qualifiers: 


   Heart failure type: diastolic   Heart failure chronicity: acute on chronic   

Qualified Code(s): I50.33 - Acute on chronic diastolic (congestive) heart 

failure   





(7) Hypertension


Assessment/Plan: 


-monitor BP


-low Na diet


Code(s): I10 - ESSENTIAL (PRIMARY) HYPERTENSION   


Qualifiers: 


   Hypertension type: essential hypertension   Qualified Code(s): I10 - 

Essential (primary) hypertension   





(8) Urinary retention due to benign prostatic hyperplasia


Assessment/Plan: 


-Finasteride


-Bladder/Renal US both kidneys appear unremarkable, significantly unlarged 

prostate with a volume 80cc, adequately distended urinary bladder without wall 

thickening, bilateral ureteral jets


-Urology consult 


Code(s): N40.1 - BENIGN PROSTATIC HYPERPLASIA WITH LOWER URINARY TRACT SYMP; 

R33.8 - OTHER RETENTION OF URINE   





Assessment/Plan





see problem list 


dvt ppx

## 2019-11-19 NOTE — PN
Progress Note, Physician


History of Present Illness: 





AWAKE, CONFUSED


OFFERS NO COMPLAINTS


AFEBRILE


WBC IMPROVED





- Current Medication List


Current Medications: 


Active Medications





Acetaminophen (Tylenol -)  650 mg PO Q6H PRN


   PRN Reason: PAIN LEVEL 6-10


   Last Admin: 11/17/19 21:28 Dose:  650 mg


Clonazepam (Klonopin -)  2 mg PO Q8H PRN


   PRN Reason: ANXIETY


   Last Admin: 11/19/19 09:09 Dose:  2 mg


Finasteride (Proscar -)  5 mg PO DAILY [facility]


   Last Admin: 11/19/19 09:10 Dose:  5 mg


Vancomycin HCl (Vancomycin (Pre-Docked))  1,000 mg in 250 mls @ 166.667 mls/hr 

IVPB BID@0600,1800 [facility]; Protocol


   Last Admin: 11/19/19 06:18 Dose:  166.667 mls/hr


Tigecycline 50 mg/ Dextrose  100 mls @ 100 mls/hr IVPB BID [facility]; Protocol


   Last Admin: 11/19/19 10:31 Dose:  100 mls/hr


Metoprolol Succinate (Toprol Xl -)  12.5 mg PO DAILY [facility]


   Last Admin: 11/19/19 09:09 Dose:  12.5 mg


Olanzapine (Zyprexa -)  7.5 mg PO [facility]


   Last Admin: 11/18/19 22:00 Dose:  Not Given


Potassium Chloride (K-Dur -)  40 meq PO DAILY [facility]


   Last Admin: 11/19/19 09:09 Dose:  40 meq


Rivaroxaban (Xarelto)  15 mg PO DAILY@1800 [facility]


   Last Admin: 11/18/19 18:48 Dose:  15 mg


Rosuvastatin Calcium (Crestor -)  5 mg PO [facility]


   Last Admin: 11/18/19 23:00 Dose:  Not Given


Tamsulosin HCl (Flomax -)  0.4 mg PO DAILY@0830 [facility]


   Last Admin: 11/19/19 09:10 Dose:  0.4 mg











- Objective


Vital Signs: 


 Vital Signs











Temperature  98 F   11/19/19 09:56


 


Pulse Rate  72   11/19/19 09:56


 


Respiratory Rate  20   11/19/19 09:56


 


Blood Pressure  144/64   11/19/19 09:56


 


O2 Sat by Pulse Oximetry (%)  96   11/19/19 09:00











Constitutional: Yes: No Distress


Eyes: Yes: Conjunctiva Clear


Cardiovascular: Yes: Regular Rate and Rhythm, S1, S2


Respiratory: Yes: CTA Bilaterally


Gastrointestinal: Yes: Normal Bowel Sounds, Soft.  No: Tenderness


Edema: Yes


Edema: LLE: 2+, RLE: 2+


Integumentary: Yes: Other (DECREASED ERYTHEMA/WARMTH LE BILATERALLY)


Labs: 


 CBC, BMP





 11/19/19 07:10 





 11/19/19 07:10 











Assessment/Plan





RECURRENT BILATERAL LE CELLULITIS  IMPROVED


+ WOUND C/S MRSA


MULTIPLE ANTIBIOTIC ALLERGIES


TOXIC METABOLIC ENCEPHALOPATHY


CONTINUE VANCOMYCIN/ TYGACIL

## 2019-11-20 NOTE — PN
Progress Note, Physician


History of Present Illness: 


LETHARGIC, CONFUSED


OFFERS NO COMPLAINTS


AFEBRILE


WBC IMPROVED





- Current Medication List


Current Medications: 


Active Medications





Acetaminophen (Tylenol -)  650 mg PO Q6H PRN


   PRN Reason: PAIN LEVEL 6-10


   Last Admin: 11/20/19 08:23 Dose:  650 mg


Clonazepam (Klonopin -)  2 mg PO Q8H PRN


   PRN Reason: ANXIETY


Finasteride (Proscar -)  5 mg PO DAILY [facility]


   Last Admin: 11/20/19 11:00 Dose:  Not Given


Vancomycin HCl (Vancomycin (Pre-Docked))  1,000 mg in 250 mls @ 166.667 mls/hr 

IVPB BID@0600,1800 [facility]; Protocol


   Last Admin: 11/20/19 17:26 Dose:  166.667 mls/hr


Tigecycline 50 mg/ Dextrose  100 mls @ 100 mls/hr IVPB BID [facility]; Protocol


   Last Admin: 11/20/19 22:13 Dose:  100 mls/hr


Lorazepam (Ativan -)  2 mg PO BID [facility]


   Last Admin: 11/20/19 22:13 Dose:  Not Given


Metoprolol Succinate (Toprol Xl -)  12.5 mg PO DAILY [facility]


   Last Admin: 11/20/19 11:00 Dose:  Not Given


Olanzapine (Zyprexa -)  7.5 mg PO [facility]


   Last Admin: 11/20/19 22:13 Dose:  7.5 mg


Potassium Chloride (K-Dur -)  40 meq PO DAILY [facility]


   Last Admin: 11/20/19 11:00 Dose:  Not Given


Rivaroxaban (Xarelto)  15 mg PO DAILY@1800 [facility]


   Last Admin: 11/20/19 17:26 Dose:  15 mg


Rosuvastatin Calcium (Crestor -)  5 mg PO HS [facility]


   Last Admin: 11/20/19 22:13 Dose:  5 mg


Tamsulosin HCl (Flomax -)  0.4 mg PO DAILY@0830 [facility]


   Last Admin: 11/20/19 08:25 Dose:  0.4 mg











- Objective


Vital Signs: 


 Vital Signs











Temperature  98.8 F   11/20/19 17:50


 


Pulse Rate  75   11/20/19 17:50


 


Respiratory Rate  18   11/20/19 17:50


 


Blood Pressure  132/54 L  11/20/19 17:50


 


O2 Sat by Pulse Oximetry (%)  97   11/20/19 09:00











Constitutional: Yes: No Distress


Eyes: Yes: Conjunctiva Clear


Cardiovascular: Yes: Regular Rate and Rhythm, S1, S2


Respiratory: Yes: CTA Bilaterally


Gastrointestinal: Yes: Normal Bowel Sounds, Soft


Extremities: Yes: Other (DECREASED ERYTHEMA LE BILATERALLY; + CHRONIC VENOUS 

STASIS DERMATITIS)


Edema: Yes


Labs: 


 CBC, BMP





 11/20/19 07:35 





 11/20/19 07:35 











Assessment/Plan





RECURRENT BILATERAL LE CELLULITIS  IMPROVED


+ WOUND C/S MRSA


MULTIPLE ANTIBIOTIC ALLERGIES


TOXIC METABOLIC ENCEPHALOPATHY


CONTINUE VANCOMYCIN/ TYGACIL

## 2019-11-20 NOTE — PN
Progress Note, SLP





- Note


Progress Note: 





 Selected Entries











  11/19/19 11/19/19 11/19/19





  06:10 09:16 09:56


 


Breakfast  25% 


 


Lunch   


 


Supper   


 


Temperature 97 F L  98 F














  11/19/19 11/19/19 11/19/19





  13:30 19:00 22:00


 


Breakfast   


 


Lunch 0  


 


Supper   25%


 


Temperature 97.5 F L 97.7 F 














  11/20/19 11/20/19





  05:54 10:00


 


Breakfast  25%


 


Lunch  


 


Supper  


 


Temperature 98.4 F 








 Laboratory Tests











  11/18/19 11/19/19 11/20/19





  07:05 07:10 07:35


 


WBC  13.1 H  11.6 H  11.1 H








Not accepting a lot by mouth.Has been confused, given Ativan, sleeping now.


Coughed with meds this am with nectar thick via straw.


Suggest-


Avoid straw drinking, singlwe sips, no continuous drinking.,


Add 2 calHN, magic cup, ensure pudding for increased density of nutritional 

intake.

## 2019-11-20 NOTE — PN
Progress Note, Physician


Chief Complaint: 





Cellulitis


AGUILA


Afib


History of Present Illness: 





Previous notes and events reviewed


sleeping arouseable to tactile stimuli


NAD


no acute events overnight


renal function cont with Cr 1.7


erythema improving to b/l lower extremities


leukocytosis improving





- Current Medication List


Current Medications: 


Active Medications





Acetaminophen (Tylenol -)  650 mg PO Q6H PRN


   PRN Reason: PAIN LEVEL 6-10


   Last Admin: 11/20/19 08:23 Dose:  650 mg


Clonazepam (Klonopin -)  2 mg PO Q8H PRN


   PRN Reason: ANXIETY


Finasteride (Proscar -)  5 mg PO DAILY [facility]


   Last Admin: 11/20/19 08:25 Dose:  5 mg


Vancomycin HCl (Vancomycin (Pre-Docked))  1,000 mg in 250 mls @ 166.667 mls/hr 

IVPB BID@0600,1800 [facility]; Protocol


   Last Admin: 11/20/19 06:09 Dose:  166.667 mls/hr


Tigecycline 50 mg/ Dextrose  100 mls @ 100 mls/hr IVPB BID [facility]; Protocol


   Last Admin: 11/19/19 21:49 Dose:  100 mls/hr


Lorazepam (Ativan -)  2 mg PO BID [facility]


   Last Admin: 11/20/19 08:22 Dose:  2 mg


Metoprolol Succinate (Toprol Xl -)  12.5 mg PO DAILY [facility]


   Last Admin: 11/20/19 08:25 Dose:  12.5 mg


Olanzapine (Zyprexa -)  7.5 mg PO HS [facility]


   Last Admin: 11/19/19 21:50 Dose:  7.5 mg


Potassium Chloride (K-Dur -)  40 meq PO DAILY [facility]


   Last Admin: 11/20/19 08:24 Dose:  40 meq


Rivaroxaban (Xarelto)  15 mg PO DAILY@1800 [facility]


   Last Admin: 11/19/19 17:53 Dose:  15 mg


Rosuvastatin Calcium (Crestor -)  5 mg PO HS [facility]


   Last Admin: 11/19/19 21:49 Dose:  5 mg


Tamsulosin HCl (Flomax -)  0.4 mg PO DAILY@0830 [facility]


   Last Admin: 11/20/19 08:25 Dose:  0.4 mg











- Objective


Vital Signs: 


 Vital Signs











Temperature  98.4 F   11/20/19 05:54


 


Pulse Rate  84   11/20/19 05:54


 


Respiratory Rate  20   11/20/19 05:54


 


Blood Pressure  147/97   11/20/19 05:54


 


O2 Sat by Pulse Oximetry (%)  97   11/19/19 21:00











Constitutional: Yes: No Distress, Calm


Eyes: Yes: Conjunctiva Clear


HENT: Yes: Atraumatic


Cardiovascular: Yes: Pulse Irregular


Respiratory: Yes: Regular, CTA Bilaterally


Gastrointestinal: Yes: Normal Bowel Sounds, Soft, Abdomen, Obese


Musculoskeletal: Yes: Muscle Weakness


Extremities: Yes: Erythema


Edema: Yes


Wound/Incision: Yes: Open to air


Neurological: Yes: Confusion


Labs: 


 CBC, BMP





 11/20/19 07:35 





 11/20/19 07:35 





 Microbiology





11/13/19 18:00   Blood - Peripheral Venous   Blood Culture - Final


                            NO GROWTH AFTER 5 DAYS INCUBATION


11/13/19 18:30   Blood - Peripheral Venous   Blood Culture - Final


                            NO GROWTH AFTER 5 DAYS INCUBATION


11/13/19 19:48   Leg - Right Lower   Gram Stain - Final


11/13/19 19:48   Leg - Right Lower   Wound Culture - Final


                             S Aureus


                            Staphylococcus Coagulase Neg











Problem List





- Problems


(1) Metabolic encephalopathy


Assessment/Plan: 


I


-Head CT scan shows no acute evidence of intracranial hemorrhage, punctate 

chronic right base infarct, mild periventricular chronic microvascular ischemic 

changes 


-neuro check q6h


Code(s): G93.41 - METABOLIC ENCEPHALOPATHY   





(2) AGUILA (acute kidney injury)


Assessment/Plan: 


-Renal on board


-BUN/Cr 51.7/1.7


-monitor renal function


Code(s): N17.9 - ACUTE KIDNEY FAILURE, UNSPECIFIED   





(3) Anxiety


Assessment/Plan: 


-Ativan BID


-Zyprexa


-Psychiatry on board


Code(s): F41.9 - ANXIETY DISORDER, UNSPECIFIED   





(4) Atrial fibrillation


Assessment/Plan: 


-Xarelto and Metoprolol


-cardiology on board


Code(s): I48.91 - UNSPECIFIED ATRIAL FIBRILLATION   


Qualifiers: 


   Atrial fibrillation type: chronic 





(5) Cellulitis


Assessment/Plan: 


-ID on board


-leukocytosis wbc 11.1


-afebrile


-Tygacil, Vancomycin


-wound culture positive


-contact precaution


-BC neg


-Vascular US B/L LE neg for DVT


Code(s): L03.90 - CELLULITIS, UNSPECIFIED   


Qualifiers: 


   Site of cellulitis: extremity   Site of cellulitis of extremity: lower 

extremity 





(6) CHF (congestive heart failure)


Assessment/Plan: 


-daily weights


-strict I&Os


-fluid restriction


Code(s): I50.9 - HEART FAILURE, UNSPECIFIED   


Qualifiers: 


   Heart failure type: diastolic   Heart failure chronicity: acute on chronic   

Qualified Code(s): I50.33 - Acute on chronic diastolic (congestive) heart 

failure   





(7) Hypertension


Assessment/Plan: 


-monitor BP


-low Na diet


Code(s): I10 - ESSENTIAL (PRIMARY) HYPERTENSION   


Qualifiers: 


   Hypertension type: essential hypertension   Qualified Code(s): I10 - 

Essential (primary) hypertension   





(8) Urinary retention due to benign prostatic hyperplasia


Assessment/Plan: 


-Finasteride


-Bladder/Renal US both kidneys appear unremarkable, significantly unlarged 

prostate with a volume 80cc, adequately distended urinary bladder without wall 

thickening, bilateral ureteral jets


-Urology consult 


Code(s): N40.1 - BENIGN PROSTATIC HYPERPLASIA WITH LOWER URINARY TRACT SYMP; 

R33.8 - OTHER RETENTION OF URINE   





Assessment/Plan





see problem list 


dvt ppx


will need snf for discharge

## 2019-11-20 NOTE — PN
Progress Note, Physician


History of Present Illness: 





Improvement in tenderness and erythema bilateral lower extremities. Lethargic 

yet arousable.








- Current Medication List


Current Medications: 


Active Medications





Acetaminophen (Tylenol -)  650 mg PO Q6H PRN


   PRN Reason: PAIN LEVEL 6-10


   Last Admin: 11/20/19 08:23 Dose:  650 mg


Clonazepam (Klonopin -)  2 mg PO Q8H PRN


   PRN Reason: ANXIETY


Finasteride (Proscar -)  5 mg PO DAILY [facility]


   Last Admin: 11/20/19 11:00 Dose:  Not Given


Vancomycin HCl (Vancomycin (Pre-Docked))  1,000 mg in 250 mls @ 166.667 mls/hr 

IVPB BID@0600,1800 [facility]; Protocol


   Last Admin: 11/20/19 06:09 Dose:  166.667 mls/hr


Tigecycline 50 mg/ Dextrose  100 mls @ 100 mls/hr IVPB BID [facility]; Protocol


   Last Admin: 11/20/19 11:37 Dose:  100 mls/hr


Lorazepam (Ativan -)  2 mg PO BID [facility]


   Last Admin: 11/20/19 11:00 Dose:  Not Given


Metoprolol Succinate (Toprol Xl -)  12.5 mg PO DAILY [facility]


   Last Admin: 11/20/19 11:00 Dose:  Not Given


Olanzapine (Zyprexa -)  7.5 mg PO [facility]


   Last Admin: 11/19/19 21:50 Dose:  7.5 mg


Potassium Chloride (K-Dur -)  40 meq PO DAILY [facility]


   Last Admin: 11/20/19 11:00 Dose:  Not Given


Rivaroxaban (Xarelto)  15 mg PO DAILY@1800 [facility]


   Last Admin: 11/19/19 17:53 Dose:  15 mg


Rosuvastatin Calcium (Crestor -)  5 mg PO HS [facility]


   Last Admin: 11/19/19 21:49 Dose:  5 mg


Tamsulosin HCl (Flomax -)  0.4 mg PO DAILY@0830 [facility]


   Last Admin: 11/20/19 08:25 Dose:  0.4 mg











- Objective


Vital Signs: 


 Vital Signs











Temperature  98.4 F   11/20/19 05:54


 


Pulse Rate  84   11/20/19 05:54


 


Respiratory Rate  20   11/20/19 09:00


 


Blood Pressure  147/97   11/20/19 05:54


 


O2 Sat by Pulse Oximetry (%)  97   11/20/19 09:00











Constitutional: Yes: No Distress, Calm


Neck: Yes: Supple


Cardiovascular: Yes: Regular Rate and Rhythm


Respiratory: Yes: Regular, Diminished


Gastrointestinal: Yes: Soft, Hypoactive Bowel Sounds


Edema: No


Integumentary: Yes: Venous Stasis Changes


Labs: 


 CBC, BMP





 11/20/19 07:35 





 11/20/19 07:35 











- ....Imaging


EKG: Report Reviewed (Afib, v-paced, PVC)





Problem List





- Problems


(1) Allergy to multiple antibiotics


Code(s): Z88.1 - ALLERGY STATUS TO OTHER ANTIBIOTIC AGENTS STATUS   





(2) Anxiety


Code(s): F41.9 - ANXIETY DISORDER, UNSPECIFIED   





(3) Atrial fibrillation


Code(s): I48.91 - UNSPECIFIED ATRIAL FIBRILLATION   


Qualifiers: 


   Atrial fibrillation type: chronic 





(4) Cellulitis


Code(s): L03.90 - CELLULITIS, UNSPECIFIED   


Qualifiers: 


   Site of cellulitis: extremity   Site of cellulitis of extremity: lower 

extremity 





(5) Chronic anticoagulation


Code(s): Z79.01 - LONG TERM (CURRENT) USE OF ANTICOAGULANTS   





(6) Hypertension


Code(s): I10 - ESSENTIAL (PRIMARY) HYPERTENSION   


Qualifiers: 


   Hypertension type: essential hypertension   Qualified Code(s): I10 - 

Essential (primary) hypertension   





(7) Lymphedema


Code(s): I89.0 - LYMPHEDEMA, NOT ELSEWHERE CLASSIFIED   





(8) Pacemaker


Code(s): Z95.0 - PRESENCE OF CARDIAC PACEMAKER   





Assessment/Plan


ECHO 3/2018 showed normal LV systolic function, mild AS no other sig valvular 

abnl





1. Recurrent LE cellulitis with multiple allergies to PCN and carbipenem 

improving


2. Chronic venous stasis dermatitis


3. Acute on CKD with hx of hydronephrosis/obstructive uropathy likely pre-renal 

improving


4. Chronic diastolic heart failure


5. HTN cardiomyopathy


6. Afib on Xarelto


7. sss s/p PPM


8. OSAS


9. CAD h/o demand ischemia


10. Hyperlipidemia


11. Anxiety d/o


12. BPH





P:1. Empiric abx per C&S, wound care


2. Hold oral diuretics and zaroxolyn pending renal fxn recovery, monitor 

electrolytes, wrap legs


3. Continue Toprol XL 12.5 qd, Xarelto 15 qd,  Crestor 5 qd, hold losartan 

pending renal fxn stabilization


4. Eventual rehab then follow up in office [phone]


5. DVT and GI prophylaxis

## 2019-11-20 NOTE — PN
Progress Note, Physician


History of Present Illness: 





Pt seen and examined at bedside. He remains confused and altered. 





- Current Medication List


Current Medications: 


Active Medications





Acetaminophen (Tylenol -)  650 mg PO Q6H PRN


   PRN Reason: PAIN LEVEL 6-10


   Last Admin: 11/20/19 08:23 Dose:  650 mg


Clonazepam (Klonopin -)  2 mg PO Q8H PRN


   PRN Reason: ANXIETY


Finasteride (Proscar -)  5 mg PO DAILY [facility]


   Last Admin: 11/20/19 11:00 Dose:  Not Given


Vancomycin HCl (Vancomycin (Pre-Docked))  1,000 mg in 250 mls @ 166.667 mls/hr 

IVPB BID@0600,1800 [facility]; Protocol


   Last Admin: 11/20/19 17:26 Dose:  166.667 mls/hr


Tigecycline 50 mg/ Dextrose  100 mls @ 100 mls/hr IVPB BID [facility]; Protocol


   Last Admin: 11/20/19 11:37 Dose:  100 mls/hr


Influenza Virus Vaccine Quadrival (Flulaval Quad [id number])  60 mcg IM .ONCE ONE


   Stop: 11/20/19 18:55


Lorazepam (Ativan -)  2 mg PO BID [facility]


   Last Admin: 11/20/19 11:00 Dose:  Not Given


Metoprolol Succinate (Toprol Xl -)  12.5 mg PO DAILY [facility]


   Last Admin: 11/20/19 11:00 Dose:  Not Given


Olanzapine (Zyprexa -)  7.5 mg PO HS [facility]


   Last Admin: 11/19/19 21:50 Dose:  7.5 mg


Potassium Chloride (K-Dur -)  40 meq PO DAILY [facility]


   Last Admin: 11/20/19 11:00 Dose:  Not Given


Rivaroxaban (Xarelto)  15 mg PO DAILY@1800 [facility]


   Last Admin: 11/20/19 17:26 Dose:  15 mg


Rosuvastatin Calcium (Crestor -)  5 mg PO HS [facility]


   Last Admin: 11/19/19 21:49 Dose:  5 mg


Tamsulosin HCl (Flomax -)  0.4 mg PO DAILY@0830 [facility]


   Last Admin: 11/20/19 08:25 Dose:  0.4 mg











- Objective


Vital Signs: 


 Vital Signs











Temperature  98.1 F   11/20/19 15:32


 


Pulse Rate  69   11/20/19 15:32


 


Respiratory Rate  18   11/20/19 15:32


 


Blood Pressure  111/54 L  11/20/19 15:32


 


O2 Sat by Pulse Oximetry (%)  97   11/20/19 09:00











Constitutional: Yes: Calm


Eyes: Yes: Conjunctiva Clear


HENT: Yes: Atraumatic


Neck: Yes: Supple


Cardiovascular: Yes: S1, S2


Respiratory: Yes: CTA Bilaterally


Gastrointestinal: Yes: Soft, Abdomen, Obese


Musculoskeletal: Yes: Muscle Weakness


Edema: Yes


Edema: LLE: 1+, RLE: 1+


Neurological: Yes: Confusion


Labs: 


 CBC, BMP





 11/20/19 07:35 





 11/20/19 07:35 











Problem List





- Problems


(1) AGUILA (acute kidney injury)


Code(s): N17.9 - ACUTE KIDNEY FAILURE, UNSPECIFIED   





(2) CHF (congestive heart failure)


Code(s): I50.9 - HEART FAILURE, UNSPECIFIED   


Qualifiers: 


   Heart failure type: diastolic   Heart failure chronicity: chronic   

Qualified Code(s): I50.32 - Chronic diastolic (congestive) heart failure   





(3) Cellulitis


Code(s): L03.90 - CELLULITIS, UNSPECIFIED   


Qualifiers: 


   Site of cellulitis: extremity   Site of cellulitis of extremity: lower 

extremity 





Assessment/Plan


 Current Medications











Generic Name Dose Route Start Last Admin





  Trade Name Freq  PRN Reason Stop Dose Admin


 


Acetaminophen  650 mg  11/14/19 04:07  11/20/19 08:23





  Tylenol -  PO   650 mg





  Q6H PRN   Administration





  PAIN LEVEL 6-10   





     





     





     


 


Clonazepam  2 mg  11/19/19 12:55  





  Klonopin -  PO   





  Q8H PRN   





  ANXIETY   





     





     





     


 


Finasteride  5 mg  11/15/19 10:00  11/20/19 11:00





  Proscar -  PO   Not Given





  DAILY [facility] includes the [facility]   





     





     





     





     


 


Vancomycin HCl  1,000 mg in 250 mls @ 166.667 mls/hr  11/15/19 18:00  11/20/19 

17:26





  Vancomycin (Pre-Docked)  IVPB   166.667 mls/hr





  BID@0600,1800 Count includes the [facility]   Administration





     





     





  Protocol   





     


 


Tigecycline 50 mg/ Dextrose  100 mls @ 100 mls/hr  11/15/19 22:00  11/20/19 11:

37





  IVPB   100 mls/hr





  BID Count includes the [facility]   Administration





     





     





  Protocol   





     


 


Influenza Virus Vaccine Quadrival  60 mcg  11/20/19 18:54  





  Flulaval Quad [id number]  IM  11/20/19 18:55  





  .ONCE ONE   





     





     





     





     


 


Lorazepam  2 mg  11/19/19 12:45  11/20/19 11:00





  Ativan -  PO   Not Given





  BID [facility]   





     





     





     





     


 


Metoprolol Succinate  12.5 mg  11/18/19 10:00  11/20/19 11:00





  Toprol Xl -  PO   Not Given





  DAILY [facility]   





     





     





     





     


 


Olanzapine  7.5 mg  11/17/19 22:00  11/19/19 21:50





  Zyprexa -  PO   7.5 mg





  HS [facility]   Administration





     





     





     





     


 


Potassium Chloride  40 meq  11/16/19 17:49  11/20/19 11:00





  K-Dur -  PO   Not Given





  DAILY [facility]   





     





     





     





     


 


Rivaroxaban  15 mg  11/14/19 18:00  11/20/19 17:26





  Xarelto  PO   15 mg





  DAILY@1800 [facility]   Administration





     





     





     





     


 


Rosuvastatin Calcium  5 mg  11/18/19 22:00  11/19/19 21:49





  Crestor -  PO   5 mg





  HS [facility]   Administration





     





     





     





     


 


Tamsulosin HCl  0.4 mg  11/17/19 08:30  11/20/19 08:25





  Flomax -  PO   0.4 mg





  DAILY@0830 [facility]   Administration





     





     





     





     

















Impression


1. CKD


2. hx of hydronephrosis


3. hx of obstructive uropathy


4. CHF


5. HTN


6. AGUILA


7. a-fib





Plan


- crt has stayed at 1.7 for last few days


- diuretics on hold, pt has poor po intake


- mental status unchanged


- repeat labs in am


- likely in part pre-renal aguila

## 2019-11-21 NOTE — PN
Progress Note, SLP





- Note


Progress Note: 





 Selected Entries











  11/21/19 11/21/19





  06:00 09:40


 


Breakfast  25%


 


Temperature 99.4 F 








 Laboratory Tests











  11/18/19 11/19/19 11/20/19





  07:05 07:10 07:35


 


WBC  13.1 H  11.6 H  11.1 H








Pt with limited cognitive improvement, speaking, calling out. Follows commands.

  Unable to open eyes but briefly. No assymetry.


CT head 11/13.


Accepted nectar on tsp and cup, initially with brisk swallow but with delayed 

cough, sounded congested. Aspiration?


Discussed with PNP- considering neuro eval.





REC- downgrade to honey thick liquid


Encourage magic cup,ensure pudding.

## 2019-11-21 NOTE — PN
Progress Note, Physician


History of Present Illness: 





Pt seen and examined at bedside. He remains confused. He is not eating or 

drinking. 





- Current Medication List


Current Medications: 


Active Medications





Acetaminophen (Tylenol -)  650 mg PO Q6H PRN


   PRN Reason: PAIN LEVEL 6-10


   Last Admin: 11/21/19 08:24 Dose:  650 mg


Clonazepam (Klonopin -)  2 mg PO Q8H PRN


   PRN Reason: ANXIETY


Finasteride (Proscar -)  5 mg PO DAILY [facility]


   Last Admin: 11/21/19 10:08 Dose:  5 mg


Vancomycin HCl (Vancomycin (Pre-Docked))  1,000 mg in 250 mls @ 166.667 mls/hr 

IVPB BID@0600,1800 [facility]; Protocol


   Last Admin: 11/21/19 06:32 Dose:  166.667 mls/hr


Tigecycline 50 mg/ Dextrose  100 mls @ 100 mls/hr IVPB BID [facility]; Protocol


   Last Admin: 11/21/19 10:06 Dose:  100 mls/hr


Lorazepam (Ativan -)  2 mg PO BID [facility]


   Last Admin: 11/21/19 10:00 Dose:  Not Given


Metoprolol Succinate (Toprol Xl -)  12.5 mg PO DAILY [facility]


   Last Admin: 11/21/19 10:08 Dose:  12.5 mg


Olanzapine (Zyprexa -)  7.5 mg PO HS [facility]


   Last Admin: 11/20/19 22:13 Dose:  7.5 mg


Potassium Chloride (K-Dur -)  40 meq PO DAILY [facility]


   Last Admin: 11/21/19 10:08 Dose:  40 meq


Rivaroxaban (Xarelto)  15 mg PO DAILY@1800 [facility]


   Last Admin: 11/20/19 17:26 Dose:  15 mg


Rosuvastatin Calcium (Crestor -)  5 mg PO HS [facility]


   Last Admin: 11/20/19 22:13 Dose:  5 mg


Tamsulosin HCl (Flomax -)  0.4 mg PO DAILY@0830 [facility]


   Last Admin: 11/21/19 08:24 Dose:  0.4 mg











- Objective


Vital Signs: 


 Vital Signs











Temperature  99.4 F   11/21/19 06:00


 


Pulse Rate  68   11/21/19 09:07


 


Respiratory Rate  18   11/21/19 09:07


 


Blood Pressure  128/55 L  11/21/19 09:07


 


O2 Sat by Pulse Oximetry (%)  99   11/20/19 21:00











Constitutional: Yes: Calm


Eyes: Yes: Conjunctiva Clear


HENT: Yes: Atraumatic


Neck: Yes: Supple


Cardiovascular: Yes: S1, S2


Respiratory: Yes: CTA Bilaterally


Gastrointestinal: Yes: Soft, Abdomen, Obese


Musculoskeletal: Yes: Muscle Weakness


Edema: Yes


Edema: LLE: 1+, RLE: 1+


Neurological: Yes: Confusion


Labs: 


 CBC, BMP





 11/20/19 07:35 





 11/20/19 07:35 











Problem List





- Problems


(1) AGUILA (acute kidney injury)


Code(s): N17.9 - ACUTE KIDNEY FAILURE, UNSPECIFIED   





(2) CHF (congestive heart failure)


Code(s): I50.9 - HEART FAILURE, UNSPECIFIED   


Qualifiers: 


   Heart failure type: diastolic   Heart failure chronicity: chronic   

Qualified Code(s): I50.32 - Chronic diastolic (congestive) heart failure   





(3) Cellulitis


Code(s): L03.90 - CELLULITIS, UNSPECIFIED   


Qualifiers: 


   Site of cellulitis: extremity   Site of cellulitis of extremity: lower 

extremity 





Assessment/Plan


 Current Medications











Generic Name Dose Route Start Last Admin





  Trade Name Freq  PRN Reason Stop Dose Admin


 


Acetaminophen  650 mg  11/14/19 04:07  11/21/19 08:24





  Tylenol -  PO   650 mg





  Q6H PRN   Administration





  PAIN LEVEL 6-10   





     





     





     


 


Clonazepam  2 mg  11/19/19 12:55  





  Klonopin -  PO   





  Q8H PRN   





  ANXIETY   





     





     





     


 


Finasteride  5 mg  11/15/19 10:00  11/21/19 10:08





  Proscar -  PO   5 mg





  DAILY [facility]   Administration





     





     





     





     


 


Vancomycin HCl  1,000 mg in 250 mls @ 166.667 mls/hr  11/15/19 18:00  11/21/19 

06:32





  Vancomycin (Pre-Docked)  IVPB   166.667 mls/hr





  BID@0600,1800 [facility]   Administration





     





     





  Protocol   





     


 


Tigecycline 50 mg/ Dextrose  100 mls @ 100 mls/hr  11/15/19 22:00  11/21/19 10:

06





  IVPB   100 mls/hr





  BID [facility]   Administration





     





     





  Protocol   





     


 


Lorazepam  2 mg  11/19/19 12:45  11/21/19 10:00





  Ativan -  PO   Not Given





  BID [facility]   





     





     





     





     


 


Metoprolol Succinate  12.5 mg  11/18/19 10:00  11/21/19 10:08





  Toprol Xl -  PO   12.5 mg





  DAILY [facility]   Administration





     





     





     





     


 


Olanzapine  7.5 mg  11/17/19 22:00  11/20/19 22:13





  Zyprexa -  PO   7.5 mg





  HS [facility]   Administration





     





     





     





     


 


Potassium Chloride  40 meq  11/16/19 17:49  11/21/19 10:08





  K-Dur -  PO   40 meq





  DAILY [facility]   Administration





     





     





     





     


 


Rivaroxaban  15 mg  11/14/19 18:00  11/20/19 17:26





  Xarelto  PO   15 mg





  DAILY@1800 [facility]   Administration





     





     





     





     


 


Rosuvastatin Calcium  5 mg  11/18/19 22:00  11/20/19 22:13





  Crestor -  PO   5 mg





  HS [facility]   Administration





     





     





     





     


 


Tamsulosin HCl  0.4 mg  11/17/19 08:30  11/21/19 08:24





  Flomax -  PO   0.4 mg





  DAILY@0830 [facility]   Administration





     





     





     





     

















Impression


1. CKD


2. hx of hydronephrosis


3. hx of obstructive uropathy


4. CHF


5. HTN


6. AGUILA


7. a-fib





Plan


- will start clinimix


- repeat labs in am


- diuretics on hold for now


- mental status unchanged


- likely in part pre-renal aguila

## 2019-11-21 NOTE — PN
Progress Note, Physician


Chief Complaint: 





Cellulitis


AGUILA


Afib


History of Present Illness: 





Previous notes and events reviewed


awake and confused


responds to questions and follows commands


NAD


reports of poor appetite


edema noted to LUE~US ordered





- Current Medication List


Current Medications: 


Active Medications





Acetaminophen (Tylenol -)  650 mg PO Q6H PRN


   PRN Reason: PAIN LEVEL 6-10


   Last Admin: 11/21/19 08:24 Dose:  650 mg


Clonazepam (Klonopin -)  2 mg PO Q8H PRN


   PRN Reason: ANXIETY


Finasteride (Proscar -)  5 mg PO DAILY [facility]


   Last Admin: 11/21/19 10:08 Dose:  5 mg


Vancomycin HCl (Vancomycin (Pre-Docked))  1,000 mg in 250 mls @ 166.667 mls/hr 

IVPB BID@0600,1800 [facility]; Protocol


   Last Admin: 11/21/19 06:32 Dose:  166.667 mls/hr


Tigecycline 50 mg/ Dextrose  100 mls @ 100 mls/hr IVPB BID [facility]; Protocol


   Last Admin: 11/21/19 10:06 Dose:  100 mls/hr


Lorazepam (Ativan -)  2 mg PO BID [facility]


   Last Admin: 11/20/19 22:13 Dose:  Not Given


Metoprolol Succinate (Toprol Xl -)  12.5 mg PO DAILY [facility]


   Last Admin: 11/21/19 10:08 Dose:  12.5 mg


Olanzapine (Zyprexa -)  7.5 mg PO HS [facility]


   Last Admin: 11/20/19 22:13 Dose:  7.5 mg


Potassium Chloride (K-Dur -)  40 meq PO DAILY [facility]


   Last Admin: 11/21/19 10:08 Dose:  40 meq


Rivaroxaban (Xarelto)  15 mg PO DAILY@1800 [facility]


   Last Admin: 11/20/19 17:26 Dose:  15 mg


Rosuvastatin Calcium (Crestor -)  5 mg PO HS [facility]


   Last Admin: 11/20/19 22:13 Dose:  5 mg


Tamsulosin HCl (Flomax -)  0.4 mg PO DAILY@0830 [facility]


   Last Admin: 11/21/19 08:24 Dose:  0.4 mg











- Objective


Vital Signs: 


 Vital Signs











Temperature  99.4 F   11/21/19 06:00


 


Pulse Rate  68   11/21/19 09:07


 


Respiratory Rate  18   11/21/19 09:07


 


Blood Pressure  128/55 L  11/21/19 09:07


 


O2 Sat by Pulse Oximetry (%)  99   11/20/19 21:00











Constitutional: Yes: No Distress, Calm


Eyes: Yes: Conjunctiva Clear


HENT: Yes: Atraumatic


Cardiovascular: Yes: Regular Rate and Rhythm


Respiratory: Yes: Regular, Cough, Diminished, On Nasal O2


Gastrointestinal: Yes: Normal Bowel Sounds, Soft, Abdomen, Obese


Genitourinary: Yes: Berry Present


Musculoskeletal: Yes: Muscle Weakness


Extremities: Yes: WNL, Erythema


Edema: Yes (lower extremity)


Wound/Incision: Yes: Open to air


Neurological: Yes: Alert, Confusion


Psychiatric: Yes: Alert


Labs: 


 CBC, BMP





 11/20/19 07:35 





 11/20/19 07:35 





 Microbiology





11/13/19 18:00   Blood - Peripheral Venous   Blood Culture - Final


                            NO GROWTH AFTER 5 DAYS INCUBATION


11/13/19 18:30   Blood - Peripheral Venous   Blood Culture - Final


                            NO GROWTH AFTER 5 DAYS INCUBATION


11/13/19 19:48   Leg - Right Lower   Gram Stain - Final


11/13/19 19:48   Leg - Right Lower   Wound Culture - Final


                             S Aureus


                            Staphylococcus Coagulase Neg











Problem List





- Problems


(1) Metabolic encephalopathy


Assessment/Plan: 





-Head CT scan shows no acute evidence of intracranial hemorrhage, punctate 

chronic right base infarct, mild periventricular chronic microvascular ischemic 

changes 


-neuro check q6h


-Neurology consult


Code(s): G93.41 - METABOLIC ENCEPHALOPATHY   





(2) AGUILA (acute kidney injury)


Assessment/Plan: 


-Renal on board


-BUN/Cr 51.7/1.7


-monitor renal function


Code(s): N17.9 - ACUTE KIDNEY FAILURE, UNSPECIFIED   





(3) Anxiety


Assessment/Plan: 


-Ativan BID


-Zyprexa


-Psychiatry on board


Code(s): F41.9 - ANXIETY DISORDER, UNSPECIFIED   





(4) Atrial fibrillation


Assessment/Plan: 


-Xarelto and Metoprolol


-cardiology on board


Code(s): I48.91 - UNSPECIFIED ATRIAL FIBRILLATION   


Qualifiers: 


   Atrial fibrillation type: chronic 





(5) Cellulitis


Assessment/Plan: 


-ID on board


-leukocytosis wbc 11.1


-afebrile


-Tygacil, Vancomycin


-wound culture positive


-contact precaution


-BC neg


-Vascular US B/L LE neg for DVT


Code(s): L03.90 - CELLULITIS, UNSPECIFIED   


Qualifiers: 


   Site of cellulitis: extremity   Site of cellulitis of extremity: lower 

extremity 





(6) CHF (congestive heart failure)


Assessment/Plan: 


-daily weights


-strict I&Os


-fluid restriction


Code(s): I50.9 - HEART FAILURE, UNSPECIFIED   


Qualifiers: 


   Heart failure type: diastolic   Heart failure chronicity: acute on chronic   

Qualified Code(s): I50.33 - Acute on chronic diastolic (congestive) heart 

failure   





(7) Hypertension


Assessment/Plan: 


-monitor BP


-low Na diet


Code(s): I10 - ESSENTIAL (PRIMARY) HYPERTENSION   


Qualifiers: 


   Hypertension type: essential hypertension   Qualified Code(s): I10 - 

Essential (primary) hypertension   





(8) Urinary retention due to benign prostatic hyperplasia


Assessment/Plan: 


-Finasteride


-Bladder/Renal US both kidneys appear unremarkable, significantly unlarged 

prostate with a volume 80cc, adequately distended urinary bladder without wall 

thickening, bilateral ureteral jets


-Urology consult 


Code(s): N40.1 - BENIGN PROSTATIC HYPERPLASIA WITH LOWER URINARY TRACT SYMP; 

R33.8 - OTHER RETENTION OF URINE   





Assessment/Plan





see problem list 


dvt ppx


will need snf for discharge

## 2019-11-21 NOTE — CONSULT
Consult - text type





- Consultation


Consultation Note: 


NEUROLOGY CONSULTATION GREATLY APPRECIATED:





Events reviewed. Pt examined. 


This 85 yo man with hx of HTN, DM, renal insufficiency, HLD, ASHD, Afib s/p PPM/

Defibrillator is known to our service with chronic leg pains and insomnia, 

suggesting Restless Limbs Syndrome (RLS). Maintained on gabapentin 300mg q8H 

and multiple meds. 


Admitted with cellulitis, confusion and increased pain with agitation. 


On multiple antibiotics for cellulitis. 


Remains confused and agitated on various benzodiazepines and currently 

olanzapine 7.5 mg HS. 


C/O diffuse pains arms and legs. Asking "doctor, put me out of my misery." 





ARJUN: Obese. Neck supple. Severe atrophic skin changes both feet and calves with 

supervening cellulitis. 


NEURO: Moaning with eyes closed. Opens eyes on command. Perseverates "Doctor, 

Doctor!" 


           Unable to perform mental status assessment. Speech sparse, but 

fluent. Frontal release findings absent. 


           Full fields to threat. Full EOM's. Gag ok. 


           Motor: Elevates both arms without drift. Normal ankle DF B/L. 

Areflexic in legs. 


           Sensory: Withdraws all fours to pinch. 





Impression: Non-focal exam 


                Cannot assess mentation fully but doubt significant underlying 

OMS is present 


                Current level of anxiety and agitation may be due to severe pain


                Toxic-Metabolic Encephalopathy (due to infection)





Suggest: Simplify benzodiazepine regimen as follows: Clonazepam 2 mg po q8H 

standing dose. 


             Reduce zyprexa to 2.5 mg HS as it can dramatically increase pain 

from RLS 


             Agree with current medical regimen antibiotics and hydration


             CT of head (C-)  


             Check TSH, B12, ESR, CRP, Fe++, TIBC, Ferritin


             Resume gabapentin 300 mg q8H.


             Try pramipexole 0.25 mg PO q8H. 


             Give thiamine 250 mg IVPB q8H x 3 days 


             Begin Bedside PT to mobilize PT


             DVT prophylaxis.





Thank you very much,


Thien Hensley MD

## 2019-11-22 NOTE — PN
Progress Note, Physician


History of Present Illness: 





Pt seen and examined at bedside. He remains confused. 





- Current Medication List


Current Medications: 


Active Medications





Acetaminophen (Tylenol -)  650 mg PO Q6H PRN


   PRN Reason: PAIN LEVEL 6-10


   Last Admin: 11/21/19 21:34 Dose:  650 mg


Clonazepam (Klonopin -)  2 mg PO TID [facility]


   Last Admin: 11/22/19 14:54 Dose:  2 mg


Finasteride (Proscar -)  5 mg PO DAILY [facility]


   Last Admin: 11/22/19 10:54 Dose:  Not Given


Gabapentin (Neurontin -)  300 mg PO TID [facility]


   Last Admin: 11/22/19 14:54 Dose:  300 mg


Vancomycin HCl (Vancomycin (Pre-Docked))  1,000 mg in 250 mls @ 166.667 mls/hr 

IVPB BID@0600,1800 [facility]; Protocol


   Last Admin: 11/22/19 05:09 Dose:  166.667 mls/hr


Tigecycline 50 mg/ Dextrose  100 mls @ 100 mls/hr IVPB BID [facility]; Protocol


   Last Admin: 11/22/19 10:55 Dose:  100 mls/hr


Amino Acids (Clinimix -)  1,000 mls @ 42 mls/hr IV Q24H [facility]


   Last Admin: 11/21/19 18:01 Dose:  42 mls/hr


Metoprolol Succinate (Toprol Xl -)  12.5 mg PO DAILY [facility]


   Last Admin: 11/22/19 10:54 Dose:  Not Given


Olanzapine (Zyprexa -)  2.5 mg PO [facility]


   Last Admin: 11/21/19 21:34 Dose:  2.5 mg


Potassium Chloride (K-Dur -)  40 meq PO DAILY [facility]


   Last Admin: 11/22/19 10:54 Dose:  Not Given


Pramipexole Dihydrochloride (Mirapex -)  0.25 mg PO TID [facility]


   Last Admin: 11/22/19 14:54 Dose:  0.25 mg


Rivaroxaban (Xarelto)  15 mg PO DAILY@1800 [facility]


   Last Admin: 11/21/19 17:53 Dose:  15 mg


Rosuvastatin Calcium (Crestor -)  5 mg PO HS [facility]


   Last Admin: 11/21/19 21:34 Dose:  5 mg


Tamsulosin HCl (Flomax -)  0.4 mg PO DAILY@0830 [facility]


   Last Admin: 11/22/19 10:54 Dose:  Not Given


Thiamine HCl (Vitamin B1 Injection -)  250 mg IVPB TID [facility]


   Stop: 11/24/19 21:59


   Last Admin: 11/22/19 14:59 Dose:  250 mg











- Objective


Vital Signs: 


 Vital Signs











Temperature  97.8 F   11/22/19 05:10


 


Pulse Rate  68   11/22/19 05:10


 


Respiratory Rate  18   11/22/19 05:10


 


Blood Pressure  131/63   11/22/19 05:10


 


O2 Sat by Pulse Oximetry (%)  98   11/21/19 21:00











Constitutional: Yes: Calm


Eyes: Yes: Conjunctiva Clear


HENT: Yes: Atraumatic


Neck: Yes: Supple


Cardiovascular: Yes: S1, S2


Respiratory: Yes: CTA Bilaterally


Gastrointestinal: Yes: Soft, Abdomen, Obese


Genitourinary: Yes: WNL


Musculoskeletal: Yes: WNL


Edema: Yes


Edema: LLE: 1+, RLE: 1+


Neurological: Yes: Confusion


Labs: 


 CBC, BMP





 11/22/19 07:15 





 11/22/19 11:10 











Problem List





- Problems


(1) AGUILA (acute kidney injury)


Code(s): N17.9 - ACUTE KIDNEY FAILURE, UNSPECIFIED   





(2) CHF (congestive heart failure)


Code(s): I50.9 - HEART FAILURE, UNSPECIFIED   


Qualifiers: 


   Heart failure type: diastolic   Heart failure chronicity: chronic   

Qualified Code(s): I50.32 - Chronic diastolic (congestive) heart failure   





(3) Cellulitis


Code(s): L03.90 - CELLULITIS, UNSPECIFIED   


Qualifiers: 


   Site of cellulitis: extremity   Site of cellulitis of extremity: lower 

extremity 





Assessment/Plan


 Current Medications











Generic Name Dose Route Start Last Admin





  Trade Name Freq  PRN Reason Stop Dose Admin


 


Acetaminophen  650 mg  11/14/19 04:07  11/21/19 21:34





  Tylenol -  PO   650 mg





  Q6H PRN   Administration





  PAIN LEVEL 6-10   





     





     





     


 


Clonazepam  2 mg  11/21/19 22:00  11/22/19 14:54





  Klonopin -  PO   2 mg





  TID [facility]   Administration





     





     





     





     


 


Finasteride  5 mg  11/15/19 10:00  11/22/19 10:54





  Proscar -  PO   Not Given





  DAILY [facility]   





     





     





     





     


 


Gabapentin  300 mg  11/21/19 22:00  11/22/19 14:54





  Neurontin -  PO   300 mg





  TID [facility]   Administration





     





     





     





     


 


Vancomycin HCl  1,000 mg in 250 mls @ 166.667 mls/hr  11/15/19 18:00  11/22/19 

05:09





  Vancomycin (Pre-Docked)  IVPB   166.667 mls/hr





  BID@0600,1800 [facility]   Administration





     





     





  Protocol   





     


 


Tigecycline 50 mg/ Dextrose  100 mls @ 100 mls/hr  11/15/19 22:00  11/22/19 10:

55





  IVPB   100 mls/hr





  BID [facility]   Administration





     





     





  Protocol   





     


 


Amino Acids  1,000 mls @ 42 mls/hr  11/21/19 16:15  11/21/19 18:01





  Clinimix -  IV   42 mls/hr





  Q24H [facility]   Administration





     





     





     





     


 


Metoprolol Succinate  12.5 mg  11/18/19 10:00  11/22/19 10:54





  Toprol Xl -  PO   Not Given





  DAILY [facility]   





     





     





     





     


 


Olanzapine  2.5 mg  11/21/19 22:00  11/21/19 21:34





  Zyprexa -  PO   2.5 mg





  HS [facility]   Administration





     





     





     





     


 


Potassium Chloride  40 meq  11/16/19 17:49  11/22/19 10:54





  K-Dur -  PO   Not Given





  DAILY [facility]   





     





     





     





     


 


Pramipexole Dihydrochloride  0.25 mg  11/21/19 20:00  11/22/19 14:54





  Mirapex -  PO   0.25 mg





  TID [facility]   Administration





     





     





     





     


 


Rivaroxaban  15 mg  11/14/19 18:00  11/21/19 17:53





  Xarelto  PO   15 mg





  DAILY@1800 [facility]   Administration





     





     





     





     


 


Rosuvastatin Calcium  5 mg  11/18/19 22:00  11/21/19 21:34





  Crestor -  PO   5 mg





  HS [facility]   Administration





     





     





     





     


 


Tamsulosin HCl  0.4 mg  11/17/19 08:30  11/22/19 10:54





  Flomax -  PO   Not Given





  DAILY@0830 [facility]   





     





     





     





     


 


Thiamine HCl  250 mg  11/21/19 22:00  11/22/19 14:59





  Vitamin B1 Injection -  IVPB  11/24/19 21:59  250 mg





  TID [facility]   Administration





     





     





     





     

















Impression


1. CKD


2. hx of hydronephrosis


3. hx of obstructive uropathy


4. CHF


5. HTN


6. AGUILA


7. a-fib


8. hypernatreamia





Plan


- cont clinimix as he is not eating


- lasix on hold


- renal function is improving


- mental status unchanged


- likely in part pre-renal aguila

## 2019-11-22 NOTE — PN
Progress Note (short form)





- Note


Progress Note: 





NEUROLOGY PROGRESS:





Events reviewed and discussed with long-time Hina harmon. Also discussed with 

RN. 


Reviewed PM medications and found sleeping comfortably this AM, not requiring 

clonazepam. 


She describes progressive gait decline over 4 years with two falls backwards 

despite use of walker.


He was started on gabapentin 4x/day 2 weeks PTA and believed he was having 

periods of yelling and confusion attributed to med and "anxiety."


Hina admits she abruptly stopped the medication, believing this was "causing the 

anxiety."


She reports he was not previously on any medication such as clonazepam or 

lorazepam and provided a medication list for review. 


Also describes ongoing depression and chronic insomnia. 





Xray done in eval of R wrist complaints and found with arthritic changes, but 

no acute pathology. 





ARJUN: Neg SLR. +/- Topher's on the L. 


NEURO: Found sleeping comfortably. Awakens to name. States "Im in a room." "I'm 

doing okay." Follows simple commands. 


           Speech sparse, but fluent. Frontal release findings absent. 


           Full fields to threat. Full EOM's.  


           Motor: Symmetric grasps but pain w/ ROM R wrist. Areflexic in legs. 

Wiggles toes. 


           Sensory: Withdraws all fours to pinch. 





Impression: Non-focal exam


                Severe Restless Limbs Syndrome


                Depression/Insomnia 


                Toxic-Metabolic Encephalopathy (due to infection)





Suggest: Continue antibiotics and hydration


             Await CT of head (C-) and labs  


             Continue gabapentin 300 mg q8H and pramipexole 0.25 mg PO q8H. 


             Avoid oversedation with benzodiazepines. Would reduce clonazepam 

to 1 mg and begin Buproprion  mg po qAM.


             Begin Bedside PT to mobilize PT





Thank you very much,


Thien Hensley MD

## 2019-11-22 NOTE — PN
Progress Note, Physician


Chief Complaint: 





Cellulitis


AGUILA


Afib


History of Present Illness: 





Previous notes and events reviewed


sleeping but arousable to tactile and verbal stimuli


NAD


reports of poor appetite~started on Clinimix


WBC showed uptrend


afebrile


lower extremity erythema and edema improve





- Current Medication List


Current Medications: 


Active Medications





Acetaminophen (Tylenol -)  650 mg PO Q6H PRN


   PRN Reason: PAIN LEVEL 6-10


   Last Admin: 11/21/19 21:34 Dose:  650 mg


Clonazepam (Klonopin -)  2 mg PO TID [facility]


   Last Admin: 11/22/19 05:49 Dose:  Not Given


Finasteride (Proscar -)  5 mg PO DAILY [facility]


   Last Admin: 11/22/19 10:54 Dose:  Not Given


Gabapentin (Neurontin -)  300 mg PO TID [facility]


   Last Admin: 11/22/19 05:10 Dose:  300 mg


Vancomycin HCl (Vancomycin (Pre-Docked))  1,000 mg in 250 mls @ 166.667 mls/hr 

IVPB BID@0600,1800 [facility]; Protocol


   Last Admin: 11/22/19 05:09 Dose:  166.667 mls/hr


Tigecycline 50 mg/ Dextrose  100 mls @ 100 mls/hr IVPB BID [facility]; Protocol


   Last Admin: 11/22/19 10:55 Dose:  100 mls/hr


Amino Acids (Clinimix -)  1,000 mls @ 42 mls/hr IV Q24H [facility]


   Last Admin: 11/21/19 18:01 Dose:  42 mls/hr


Metoprolol Succinate (Toprol Xl -)  12.5 mg PO DAILY [facility]


   Last Admin: 11/22/19 10:54 Dose:  Not Given


Olanzapine (Zyprexa -)  2.5 mg PO HS [facility]


   Last Admin: 11/21/19 21:34 Dose:  2.5 mg


Potassium Chloride (K-Dur -)  40 meq PO DAILY [facility]


   Last Admin: 11/22/19 10:54 Dose:  Not Given


Pramipexole Dihydrochloride (Mirapex -)  0.25 mg PO TID [facility]


   Last Admin: 11/22/19 05:09 Dose:  0.25 mg


Rivaroxaban (Xarelto)  15 mg PO DAILY@1800 [facility]


   Last Admin: 11/21/19 17:53 Dose:  15 mg


Rosuvastatin Calcium (Crestor -)  5 mg PO HS [facility]


   Last Admin: 11/21/19 21:34 Dose:  5 mg


Tamsulosin HCl (Flomax -)  0.4 mg PO DAILY@0830 [facility]


   Last Admin: 11/22/19 10:54 Dose:  Not Given


Thiamine HCl (Vitamin B1 Injection -)  250 mg IVPB TID [facility]


   Stop: 11/24/19 21:59


   Last Admin: 11/22/19 05:09 Dose:  250 mg











- Objective


Vital Signs: 


 Vital Signs











Temperature  97.8 F   11/22/19 05:10


 


Pulse Rate  68   11/22/19 05:10


 


Respiratory Rate  18   11/22/19 05:10


 


Blood Pressure  131/63   11/22/19 05:10


 


O2 Sat by Pulse Oximetry (%)  98   11/21/19 21:00











Constitutional: Yes: No Distress, Calm


Eyes: Yes: Conjunctiva Clear


HENT: Yes: Atraumatic


Cardiovascular: Yes: Pulse Irregular


Respiratory: Yes: Regular, Diminished


Gastrointestinal: Yes: Normal Bowel Sounds, Soft


Genitourinary: Yes: Incontinence


Musculoskeletal: Yes: Muscle Weakness


Extremities: Yes: WNL


Edema: Yes


Integumentary: Yes: Erythema (improving, b/l lower extremity)


Neurological: Yes: Alert, Confusion


Labs: 


 CBC, BMP





 11/22/19 07:15 





 11/22/19 07:15 





 Microbiology





11/21/19 07:10   Urine - Urine Clean Catch   Urine Culture - Final


                            NO GROWTH OBTAINED


11/13/19 18:00   Blood - Peripheral Venous   Blood Culture - Final


                            NO GROWTH AFTER 5 DAYS INCUBATION


11/13/19 18:30   Blood - Peripheral Venous   Blood Culture - Final


                            NO GROWTH AFTER 5 DAYS INCUBATION


11/13/19 19:48   Leg - Right Lower   Gram Stain - Final


11/13/19 19:48   Leg - Right Lower   Wound Culture - Final


                            Mr S Aureus


                            Staphylococcus Coagulase Neg











Problem List





- Problems


(1) Metabolic encephalopathy


Assessment/Plan: 





-Head CT scan shows no acute evidence of intracranial hemorrhage, punctate 

chronic right base infarct, mild periventricular chronic microvascular ischemic 

changes 


-neuro check q6h


-Neurology on board


-repeat Head CT scan ordered





Code(s): G93.41 - METABOLIC ENCEPHALOPATHY   





(2) AGUILA (acute kidney injury)


Assessment/Plan: 


-Renal on board


-BUN/Cr 51.7/1.7


-monitor renal function


Code(s): N17.9 - ACUTE KIDNEY FAILURE, UNSPECIFIED   





(3) Anxiety


Assessment/Plan: 


-Clonazepam


-Zyprexa dose reduced


-Psychiatry on board


Code(s): F41.9 - ANXIETY DISORDER, UNSPECIFIED   





(4) Atrial fibrillation


Assessment/Plan: 


-Xarelto and Metoprolol


-cardiology on board


Code(s): I48.91 - UNSPECIFIED ATRIAL FIBRILLATION   


Qualifiers: 


   Atrial fibrillation type: chronic 





(5) Cellulitis


Assessment/Plan: 


-ID on board


-leukocytosis wbc 15.4, uptrend noted


-afebrile


-Tygacil, Vancomycin


-wound culture positive


-contact precaution


-BC neg


-Vascular US B/L LE neg for DVT


Code(s): L03.90 - CELLULITIS, UNSPECIFIED   


Qualifiers: 


   Site of cellulitis: extremity   Site of cellulitis of extremity: lower 

extremity 





(6) CHF (congestive heart failure)


Assessment/Plan: 


-daily weights


-strict I&Os


-fluid restriction


Code(s): I50.9 - HEART FAILURE, UNSPECIFIED   


Qualifiers: 


   Heart failure type: diastolic   Heart failure chronicity: acute on chronic   

Qualified Code(s): I50.33 - Acute on chronic diastolic (congestive) heart 

failure   





(7) Hypertension


Assessment/Plan: 


-monitor BP


-low Na diet


Code(s): I10 - ESSENTIAL (PRIMARY) HYPERTENSION   


Qualifiers: 


   Hypertension type: essential hypertension   Qualified Code(s): I10 - 

Essential (primary) hypertension   





(8) Urinary retention due to benign prostatic hyperplasia


Assessment/Plan: 


-Finasteride


-Bladder/Renal US both kidneys appear unremarkable, significantly unlarged 

prostate with a volume 80cc, adequately distended urinary bladder without wall 

thickening, bilateral ureteral jets


-Urology consult 


Code(s): N40.1 - BENIGN PROSTATIC HYPERPLASIA WITH LOWER URINARY TRACT SYMP; 

R33.8 - OTHER RETENTION OF URINE   





Assessment/Plan





see problem list 


dvt ppx


will need snf for discharge

## 2019-11-23 NOTE — PN
Progress Note, Physician


Chief Complaint: 





Cellulitis


AGUILA


Afib


History of Present Illness: 





Previous notes and events reviewed


sleeping but arousable to tactile and verbal stimuli


NAD


complain of chest pain, EKG and troponin ordered 


continue with poor appetite


wbc showing downtrend





- Current Medication List


Current Medications: 


Active Medications





Acetaminophen (Tylenol -)  650 mg PO Q6H PRN


   PRN Reason: PAIN LEVEL 6-10


   Last Admin: 11/21/19 21:34 Dose:  650 mg


Clonazepam (Klonopin -)  2 mg PO TID [facility]


   Last Admin: 11/23/19 14:18 Dose:  Not Given


Finasteride (Proscar -)  5 mg PO DAILY [facility]


   Last Admin: 11/23/19 10:28 Dose:  5 mg


Gabapentin (Neurontin -)  300 mg PO TID [facility]


   Last Admin: 11/23/19 14:24 Dose:  300 mg


Vancomycin HCl (Vancomycin (Pre-Docked))  1,000 mg in 250 mls @ 166.667 mls/hr 

IVPB BID@0600,1800 [facility]; Protocol


   Last Admin: 11/23/19 05:22 Dose:  166.667 mls/hr


Tigecycline 50 mg/ Dextrose  100 mls @ 100 mls/hr IVPB BID [facility]; Protocol


   Last Admin: 11/23/19 10:40 Dose:  100 mls/hr


Amino Acids (Clinimix -)  1,000 mls @ 60 mls/hr IV Q16H [facility]


   Last Admin: 11/23/19 05:21 Dose:  60 mls/hr


Metoprolol Succinate (Toprol Xl -)  12.5 mg PO DAILY [facility]


   Last Admin: 11/23/19 10:27 Dose:  12.5 mg


Olanzapine (Zyprexa -)  2.5 mg PO HS [facility]


   Last Admin: 11/22/19 22:18 Dose:  2.5 mg


Potassium Chloride (K-Dur -)  40 meq PO DAILY [facility]


   Last Admin: 11/23/19 10:27 Dose:  40 meq


Pramipexole Dihydrochloride (Mirapex -)  0.25 mg PO TID [facility]


   Last Admin: 11/23/19 14:24 Dose:  0.25 mg


Rivaroxaban (Xarelto)  15 mg PO DAILY@1800 [facility]


   Last Admin: 11/22/19 17:26 Dose:  15 mg


Rosuvastatin Calcium (Crestor -)  5 mg PO HS [facility]


   Last Admin: 11/22/19 22:17 Dose:  5 mg


Tamsulosin HCl (Flomax -)  0.4 mg PO DAILY@0830 [facility]


   Last Admin: 11/23/19 10:27 Dose:  0.4 mg


Thiamine HCl (Vitamin B1 Injection -)  250 mg IVPB TID [facility]


   Stop: 11/24/19 21:59


   Last Admin: 11/23/19 14:24 Dose:  250 mg











- Objective


Vital Signs: 


 Vital Signs











Temperature  98.4 F   11/23/19 05:00


 


Pulse Rate  68   11/23/19 11:00


 


Respiratory Rate  18   11/23/19 11:00


 


Blood Pressure  130/60   11/23/19 11:00


 


O2 Sat by Pulse Oximetry (%)  96   11/23/19 09:00











Constitutional: Yes: No Distress, Calm, Obese


Eyes: Yes: Conjunctiva Clear


HENT: Yes: Atraumatic


Cardiovascular: Yes: Regular Rate and Rhythm


Respiratory: Yes: Regular, On Nasal O2, Wheezes


Gastrointestinal: Yes: Normal Bowel Sounds, Soft


Genitourinary: Yes: Incontinence


Musculoskeletal: Yes: Muscle Weakness


Extremities: Yes: Erythema


Edema: Yes


Integumentary: Yes: Erythema (lower extremities)


Neurological: Yes: Alert, Confusion


Psychiatric: Yes: Alert


Labs: 


 CBC, BMP





 11/23/19 06:55 





 11/23/19 06:55 





 Microbiology





11/21/19 07:10   Urine - Urine Clean Catch   Urine Culture - Final


                            NO GROWTH OBTAINED


11/13/19 18:00   Blood - Peripheral Venous   Blood Culture - Final


                            NO GROWTH AFTER 5 DAYS INCUBATION


11/13/19 18:30   Blood - Peripheral Venous   Blood Culture - Final


                            NO GROWTH AFTER 5 DAYS INCUBATION


11/13/19 19:48   Leg - Right Lower   Gram Stain - Final


11/13/19 19:48   Leg - Right Lower   Wound Culture - Final


                             S Aureus


                            Staphylococcus Coagulase Neg











Problem List





- Problems


(1) Metabolic encephalopathy


Assessment/Plan: 





-Head CT scan shows no acute evidence of intracranial hemorrhage, punctate 

chronic right base infarct, mild periventricular chronic microvascular ischemic 

changes 


-neuro check q6h


-Neurology on board


-repeat Head CT scan shows moderate to marked volume loss without CT evidenceof 

acute intracranial pathology, calvarium is intact, mild chronic sinusitis


-Brain MRI without contrast


Code(s): G93.41 - METABOLIC ENCEPHALOPATHY   





(2) AGUILA (acute kidney injury)


Assessment/Plan: 


-Renal on board


-BUN/Cr 59.0/1.4


-monitor renal function


Code(s): N17.9 - ACUTE KIDNEY FAILURE, UNSPECIFIED   





(3) Anxiety


Assessment/Plan: 


-Clonazepam


-Zyprexa dose reduced


-Psychiatry on board


Code(s): F41.9 - ANXIETY DISORDER, UNSPECIFIED   





(4) Atrial fibrillation


Assessment/Plan: 


-Xarelto and Metoprolol


-cardiology on board


Code(s): I48.91 - UNSPECIFIED ATRIAL FIBRILLATION   


Qualifiers: 


   Atrial fibrillation type: chronic 





(5) Cellulitis


Assessment/Plan: 


-ID on board


-leukocytosis wbc 10.1


-afebrile


-Tygacil, Vancomycin


-wound culture positive


-contact precaution


-BC neg


-Vascular US B/L LE neg for DVT


Code(s): L03.90 - CELLULITIS, UNSPECIFIED   


Qualifiers: 


   Site of cellulitis: extremity   Site of cellulitis of extremity: lower 

extremity 





(6) CHF (congestive heart failure)


Assessment/Plan: 


-daily weights


-strict I&Os


-fluid restriction


Code(s): I50.9 - HEART FAILURE, UNSPECIFIED   


Qualifiers: 


   Heart failure type: diastolic   Heart failure chronicity: acute on chronic   

Qualified Code(s): I50.33 - Acute on chronic diastolic (congestive) heart 

failure   





(7) Hypertension


Assessment/Plan: 


-monitor BP


-low Na diet


Code(s): I10 - ESSENTIAL (PRIMARY) HYPERTENSION   


Qualifiers: 


   Hypertension type: essential hypertension   Qualified Code(s): I10 - 

Essential (primary) hypertension   





(8) Urinary retention due to benign prostatic hyperplasia


Assessment/Plan: 


-Finasteride


-Bladder/Renal US both kidneys appear unremarkable, significantly unlarged 

prostate with a volume 80cc, adequately distended urinary bladder without wall 

thickening, bilateral ureteral jets


-Urology consult 


Code(s): N40.1 - BENIGN PROSTATIC HYPERPLASIA WITH LOWER URINARY TRACT SYMP; 

R33.8 - OTHER RETENTION OF URINE   





Assessment/Plan





see problem list 


dvt ppx


will need snf for discharge

## 2019-11-23 NOTE — PN
Progress Note (short form)





- Note


Progress Note: 








1. CKD


2. hx of hydronephrosis


3. hx of obstructive uropathy


4. CHF


5. HTN


6. AGUILA


7. a-fib


8. hypernatreamia








 Current Medications





Acetaminophen (Tylenol -)  650 mg PO Q6H PRN


   PRN Reason: PAIN LEVEL 6-10


   Last Admin: 11/21/19 21:34 Dose:  650 mg


Clonazepam (Klonopin -)  2 mg PO TID [facility]


   Last Admin: 11/23/19 15:37 Dose:  2 mg


Finasteride (Proscar -)  5 mg PO DAILY [facility]


   Last Admin: 11/23/19 10:28 Dose:  5 mg


Gabapentin (Neurontin -)  300 mg PO TID [facility]


   Last Admin: 11/23/19 14:24 Dose:  300 mg


Vancomycin HCl (Vancomycin (Pre-Docked))  1,000 mg in 250 mls @ 166.667 mls/hr 

IVPB BID@0600,1800 [facility]; Protocol


   Last Admin: 11/23/19 05:22 Dose:  166.667 mls/hr


Tigecycline 50 mg/ Dextrose  100 mls @ 100 mls/hr IVPB BID [facility]; Protocol


   Last Admin: 11/23/19 10:40 Dose:  100 mls/hr


Amino Acids (Clinimix -)  1,000 mls @ 60 mls/hr IV Q16H [facility]


   Last Admin: 11/23/19 05:21 Dose:  60 mls/hr


Metoprolol Succinate (Toprol Xl -)  12.5 mg PO DAILY [facility]


   Last Admin: 11/23/19 10:27 Dose:  12.5 mg


Olanzapine (Zyprexa -)  2.5 mg PO HS [facility]


   Last Admin: 11/22/19 22:18 Dose:  2.5 mg


Potassium Chloride (K-Dur -)  40 meq PO DAILY [facility]


   Last Admin: 11/23/19 10:27 Dose:  40 meq


Pramipexole Dihydrochloride (Mirapex -)  0.25 mg PO TID [facility]


   Last Admin: 11/23/19 14:24 Dose:  0.25 mg


Rivaroxaban (Xarelto)  15 mg PO DAILY@1800 [facility]


   Last Admin: 11/22/19 17:26 Dose:  15 mg


Rosuvastatin Calcium (Crestor -)  5 mg PO HS [facility]


   Last Admin: 11/22/19 22:17 Dose:  5 mg


Tamsulosin HCl (Flomax -)  0.4 mg PO DAILY@0830 [facility]


   Last Admin: 11/23/19 10:27 Dose:  0.4 mg


Thiamine HCl (Vitamin B1 Injection -)  250 mg IVPB TID [facility]


   Stop: 11/24/19 21:59


   Last Admin: 11/23/19 14:24 Dose:  250 mg





 Last Vital Signs











Temp Pulse Resp BP Pulse Ox


 


 98.0 F   70   20   131/69   96 


 


 11/23/19 14:00  11/23/19 14:00  11/23/19 14:00  11/23/19 14:00  11/23/19 09:00

















 CBC, BMP





 11/23/19 06:55 





 11/23/19 06:55 





IMP- hypernatremia


cellulitis

















Plan


- cont clinimix as he is not eating


- lasix on hold


- renal function is improving


- mental status unchanged


- likely in part pre-renal aguila

## 2019-11-23 NOTE — EKG
Test Reason : 

Blood Pressure : ***/*** mmHG

Vent. Rate : 080 BPM     Atrial Rate : 068 BPM

   P-R Int : 000 ms          QRS Dur : 194 ms

    QT Int : 502 ms       P-R-T Axes : 000 -67 096 degrees

   QTc Int : 578 ms

 

Ventricular-paced rhythm WITH FREQUENT PREMATURE VENTRICULAR COMPLEXES

ATRIAL FIBRILLATION

ABNORMAL ECG

 

Confirmed by MD Paul, Tyrell ([id number]) on 11/23/2019 6:06:01 PM

 

Referred By: KALI DRAPER           Confirmed By:Tyrell Miller MD

## 2019-11-23 NOTE — PN
Progress Note (short form)





- Note


Progress Note: 


Chief Complaint: Events noted, notes reviewed, no reported chest pain or 

dyspnea  





History of Present Illness: 


Seen and examined. Events noted, notes reviewed, no reported chest pain or 

dyspnea  





- Current Medication List





 


 Current Medications





Acetaminophen (Tylenol -)  650 mg PO Q6H PRN


   PRN Reason: PAIN LEVEL 6-10


   Last Admin: 11/21/19 21:34 Dose:  650 mg


Clonazepam (Klonopin -)  2 mg PO TID [facility]


   Last Admin: 11/23/19 05:21 Dose:  2 mg


Finasteride (Proscar -)  5 mg PO DAILY [facility]


   Last Admin: 11/23/19 10:28 Dose:  5 mg


Gabapentin (Neurontin -)  300 mg PO TID [facility]


   Last Admin: 11/23/19 05:21 Dose:  300 mg


Vancomycin HCl (Vancomycin (Pre-Docked))  1,000 mg in 250 mls @ 166.667 mls/hr 

IVPB BID@0600,1800 [facility]; Protocol


   Last Admin: 11/23/19 05:22 Dose:  166.667 mls/hr


Tigecycline 50 mg/ Dextrose  100 mls @ 100 mls/hr IVPB BID [facility]; Protocol


   Last Admin: 11/23/19 10:40 Dose:  100 mls/hr


Amino Acids (Clinimix -)  1,000 mls @ 60 mls/hr IV Q16H [facility]


   Last Admin: 11/23/19 05:21 Dose:  60 mls/hr


Metoprolol Succinate (Toprol Xl -)  12.5 mg PO DAILY [facility]


   Last Admin: 11/23/19 10:27 Dose:  12.5 mg


Olanzapine (Zyprexa -)  2.5 mg PO [facility]


   Last Admin: 11/22/19 22:18 Dose:  2.5 mg


Potassium Chloride (K-Dur -)  40 meq PO DAILY [facility]


   Last Admin: 11/23/19 10:27 Dose:  40 meq


Pramipexole Dihydrochloride (Mirapex -)  0.25 mg PO TID [facility]


   Last Admin: 11/23/19 05:21 Dose:  0.25 mg


Rivaroxaban (Xarelto)  15 mg PO DAILY@1800 [facility]


   Last Admin: 11/22/19 17:26 Dose:  15 mg


Rosuvastatin Calcium (Crestor -)  5 mg PO HS [facility]


   Last Admin: 11/22/19 22:17 Dose:  5 mg


Tamsulosin HCl (Flomax -)  0.4 mg PO DAILY@0830 [facility]


   Last Admin: 11/23/19 10:27 Dose:  0.4 mg


Thiamine HCl (Vitamin B1 Injection -)  250 mg IVPB TID [facility]


   Stop: 11/24/19 21:59


   Last Admin: 11/23/19 05:23 Dose:  250 mg





- Objective


Vital Signs: 





 


 


 Last Vital Signs











Temp Pulse Resp BP Pulse Ox


 


 98.4 F   68   18   130/60   96 


 


 11/23/19 05:00  11/23/19 11:00  11/23/19 11:00  11/23/19 11:00  11/23/19 09:00








 Intake & Output











 11/20/19 11/21/19 11/22/19 11/23/19





 23:59 23:59 23:59 23:59


 


Intake Total  950


 


Balance  950


 


Weight 264 lb 257 lb 250 lb 265 lb 8 oz











Neck: Supple Negative JVD No Bruit


Respiratory: Diminished Breath Sounds Bilaterally 


Cardiovascular: S1 S2 Irregularly Irregular 


Gastrointestinal: Soft Benign Normal Bowel Sounds


Ext: Edema





Labs: 


 


 


 CBC, BMP





 11/23/19 06:55 





 11/23/19 06:55 





 Hepatic Panel











Total Bilirubin  0.6 mg/dL (0.2-1)   11/23/19  06:55    


 


AST  27 U/L (15-37)   11/23/19  06:55    


 


ALT  20 U/L (13-61)   11/23/19  06:55    


 


Alkaline Phosphatase  121 U/L ()  H  11/23/19  06:55    


 


Albumin  2.5 g/dl (3.4-5.0)  L  11/23/19  06:55    











Assessment/Plan





ASSESSMENT:





1. Recurrent left leg cellulitis, clinically resolving


2. Chronic venous stasis dermatitis


3. 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


4. Diastolic LV dysfunction with clinical class 0 NYHA classification LV failure


5. Persistent atrial fibrillation [id number] score of 5 on A/C/Xarelto- SSS/AV 

block post PPM


6. HTN


7. Hyperlipidemia


8. Acute on CKD with history of hydronephrosis/obstructive uropathy likely pre-

renal improving


9. Anxiety disorder


10. BPH





PLAN:





1. Continue to withhold diuretics/Lasix and Zaroxolyn pending renal function 

recovery


2. Continue to withhold ARBS pending renal function recovery


3. Continue Toprol XL 


4. Continue Xarelto at the current dosage 


5. Continue Crestor 


6. Antibiotics as per the primary team














Romain Coleman M.D.

## 2019-11-24 NOTE — PN
Progress Note, Physician


History of Present Illness: 


LETHARGIC, CONFUSED


OFFERS NO COMPLAINTS


AFEBRILE


WBC IMPROVED


LE REMAIN RED





- Current Medication List


Current Medications: 


Active Medications





Acetaminophen (Tylenol -)  650 mg PO Q6H PRN


   PRN Reason: PAIN LEVEL 6-10


   Last Admin: 11/21/19 21:34 Dose:  650 mg


Clonazepam (Klonopin -)  2 mg PO TID [facility]


   Last Admin: 11/24/19 15:01 Dose:  Not Given


Finasteride (Proscar -)  5 mg PO DAILY [facility]


   Last Admin: 11/24/19 10:36 Dose:  5 mg


Gabapentin (Neurontin -)  300 mg PO TID [facility]


   Last Admin: 11/24/19 15:02 Dose:  Not Given


Vancomycin HCl (Vancomycin (Pre-Docked))  1,000 mg in 250 mls @ 166.667 mls/hr 

IVPB BID@0600,1800 [facility]; Protocol


   Last Admin: 11/24/19 17:14 Dose:  166.667 mls/hr


Tigecycline 50 mg/ Dextrose  100 mls @ 100 mls/hr IVPB BID [facility]; Protocol


   Last Admin: 11/24/19 10:35 Dose:  100 mls/hr


Amino Acids (Clinimix -)  1,000 mls @ 60 mls/hr IV Q16H [facility]


   Last Admin: 11/24/19 17:17 Dose:  60 mls/hr


Metoprolol Succinate (Toprol Xl -)  12.5 mg PO DAILY [facility]


   Last Admin: 11/24/19 10:37 Dose:  12.5 mg


Olanzapine (Zyprexa -)  2.5 mg PO HS [facility]


   Last Admin: 11/23/19 22:26 Dose:  2.5 mg


Potassium Chloride (K-Dur -)  40 meq PO DAILY [facility]


   Last Admin: 11/24/19 10:36 Dose:  40 meq


Pramipexole Dihydrochloride (Mirapex -)  0.25 mg PO TID [facility]


   Last Admin: 11/24/19 15:02 Dose:  Not Given


Rivaroxaban (Xarelto)  15 mg PO DAILY@1800 [facility]


   Last Admin: 11/24/19 17:17 Dose:  15 mg


Rosuvastatin Calcium (Crestor -)  5 mg PO HS [facility]


   Last Admin: 11/23/19 22:26 Dose:  5 mg


Tamsulosin HCl (Flomax -)  0.4 mg PO DAILY@0830 [facility]


   Last Admin: 11/24/19 10:36 Dose:  0.4 mg


Thiamine HCl (Vitamin B1 Injection -)  250 mg IVPB TID [facility]


   Stop: 11/24/19 21:59


   Last Admin: 11/24/19 15:19 Dose:  250 mg











- Objective


Vital Signs: 


 Vital Signs











Temperature  100.3 F H  11/24/19 15:12


 


Pulse Rate  71   11/24/19 15:12


 


Respiratory Rate  20   11/24/19 15:12


 


Blood Pressure  142/57 L  11/24/19 15:12


 


O2 Sat by Pulse Oximetry (%)  97   11/24/19 09:00











Constitutional: Yes: No Distress


Cardiovascular: Yes: Regular Rate and Rhythm, S1, S2


Respiratory: Yes: CTA Bilaterally


Gastrointestinal: Yes: Normal Bowel Sounds, Abdomen, Obese


Extremities: Yes: Other (+ ERYTHEMA/WARMTH LE BILATERALLY)


Edema: Yes


Labs: 


 CBC, BMP





 11/24/19 08:13 





 11/24/19 08:13 











Assessment/Plan





RECURRENT BILATERAL LE CELLULITIS 


+ WOUND C/S MRSA


MULTIPLE ANTIBIOTIC ALLERGIES


TOXIC METABOLIC ENCEPHALOPATHY


CONTINUE VANCOMYCIN/ TYGACIL


DISCUSSED WITH PATIENT'S WIFE

## 2019-11-24 NOTE — PN
Progress Note, Physician


Chief Complaint: 





Cellulitis


AGUILA


Afib


History of Present Illness: 





Previous notes and events reviewed


more alert today but confused


NAD


Na level showing uptrend 


continue with poor appetite








- Current Medication List


Current Medications: 


Active Medications





Acetaminophen (Tylenol -)  650 mg PO Q6H PRN


   PRN Reason: PAIN LEVEL 6-10


   Last Admin: 11/21/19 21:34 Dose:  650 mg


Clonazepam (Klonopin -)  2 mg PO TID [facility]


   Last Admin: 11/24/19 05:28 Dose:  2 mg


Finasteride (Proscar -)  5 mg PO DAILY [facility]


   Last Admin: 11/24/19 10:36 Dose:  5 mg


Gabapentin (Neurontin -)  300 mg PO TID [facility]


   Last Admin: 11/24/19 05:31 Dose:  300 mg


Vancomycin HCl (Vancomycin (Pre-Docked))  1,000 mg in 250 mls @ 166.667 mls/hr 

IVPB BID@0600,1800 [facility]; Protocol


   Last Admin: 11/24/19 05:29 Dose:  166.667 mls/hr


Tigecycline 50 mg/ Dextrose  100 mls @ 100 mls/hr IVPB BID [facility]; Protocol


   Last Admin: 11/24/19 10:35 Dose:  100 mls/hr


Amino Acids (Clinimix -)  1,000 mls @ 60 mls/hr IV Q16H [facility]


   Last Admin: 11/23/19 23:30 Dose:  Not Given


Metoprolol Succinate (Toprol Xl -)  12.5 mg PO DAILY [facility]


   Last Admin: 11/24/19 10:37 Dose:  12.5 mg


Olanzapine (Zyprexa -)  2.5 mg PO HS [facility]


   Last Admin: 11/23/19 22:26 Dose:  2.5 mg


Potassium Chloride (K-Dur -)  40 meq PO DAILY [facility]


   Last Admin: 11/24/19 10:36 Dose:  40 meq


Pramipexole Dihydrochloride (Mirapex -)  0.25 mg PO TID [facility]


   Last Admin: 11/24/19 05:29 Dose:  0.25 mg


Rivaroxaban (Xarelto)  15 mg PO DAILY@1800 [facility]


   Last Admin: 11/23/19 17:33 Dose:  15 mg


Rosuvastatin Calcium (Crestor -)  5 mg PO HS [facility]


   Last Admin: 11/23/19 22:26 Dose:  5 mg


Tamsulosin HCl (Flomax -)  0.4 mg PO DAILY@0830 [facility]


   Last Admin: 11/24/19 10:36 Dose:  0.4 mg


Thiamine HCl (Vitamin B1 Injection -)  250 mg IVPB TID [facility]


   Stop: 11/24/19 21:59


   Last Admin: 11/24/19 05:30 Dose:  250 mg











- Objective


Vital Signs: 


 Vital Signs











Temperature  99.2 F   11/24/19 06:06


 


Pulse Rate  71   11/24/19 06:06


 


Respiratory Rate  20   11/24/19 06:06


 


Blood Pressure  143/64   11/24/19 06:06


 


O2 Sat by Pulse Oximetry (%)  97   11/23/19 21:00











Constitutional: Yes: No Distress, Calm, Obese


Eyes: Yes: Conjunctiva Clear


HENT: Yes: Atraumatic


Cardiovascular: Yes: Regular Rate and Rhythm


Respiratory: Yes: Regular, Diminished, On Nasal O2


Gastrointestinal: Yes: Normal Bowel Sounds, Soft, Abdomen, Obese


Genitourinary: Yes: Incontinence


Musculoskeletal: Yes: Muscle Weakness


Extremities: Yes: WNL, Erythema (lower extremities)


Edema: Yes


Integumentary: Yes: Erythema, Venous Stasis Changes


Neurological: Yes: Alert, Confusion


Psychiatric: Yes: Alert


Labs: 


 CBC, BMP





 11/24/19 08:13 





 11/24/19 08:13 











Problem List





- Problems


(1) Metabolic encephalopathy


Assessment/Plan: 





-Head CT scan shows no acute evidence of intracranial hemorrhage, punctate 

chronic right base infarct, mild periventricular chronic microvascular ischemic 

changes 


-neuro check q6h


-Neurology on board


-repeat Head CT scan shows moderate to marked volume loss without CT evidenceof 

acute intracranial pathology, calvarium is intact, mild chronic sinusitis





Code(s): G93.41 - METABOLIC ENCEPHALOPATHY   





(2) AGUILA (acute kidney injury)


Assessment/Plan: 


-Renal on board


-BUN/Cr 61.0/1.7


-monitor renal function


Code(s): N17.9 - ACUTE KIDNEY FAILURE, UNSPECIFIED   





(3) Anxiety


Assessment/Plan: 


-Clonazepam


-Zyprexa dose reduced


-Psychiatry on board


Code(s): F41.9 - ANXIETY DISORDER, UNSPECIFIED   





(4) Atrial fibrillation


Assessment/Plan: 


-Xarelto and Metoprolol


-cardiology on board


Code(s): I48.91 - UNSPECIFIED ATRIAL FIBRILLATION   


Qualifiers: 


   Atrial fibrillation type: chronic 





(5) Cellulitis


Assessment/Plan: 


-ID on board


-leukocytosis wbc 11.0


-afebrile


-Tygacil, Vancomycin


-wound culture positive


-contact precaution


-BC neg


-Vascular US B/L LE neg for DVT


Code(s): L03.90 - CELLULITIS, UNSPECIFIED   


Qualifiers: 


   Site of cellulitis: extremity   Site of cellulitis of extremity: lower 

extremity 





(6) CHF (congestive heart failure)


Assessment/Plan: 


-daily weights


-strict I&Os


-fluid restriction


Code(s): I50.9 - HEART FAILURE, UNSPECIFIED   


Qualifiers: 


   Heart failure type: diastolic   Heart failure chronicity: acute on chronic   

Qualified Code(s): I50.33 - Acute on chronic diastolic (congestive) heart 

failure   





(7) Hypertension


Assessment/Plan: 


-monitor BP


-low Na diet


Code(s): I10 - ESSENTIAL (PRIMARY) HYPERTENSION   


Qualifiers: 


   Hypertension type: essential hypertension   Qualified Code(s): I10 - 

Essential (primary) hypertension   





(8) Urinary retention due to benign prostatic hyperplasia


Assessment/Plan: 


-Finasteride


-Bladder/Renal US both kidneys appear unremarkable, significantly unlarged 

prostate with a volume 80cc, adequately distended urinary bladder without wall 

thickening, bilateral ureteral jets


-Urology consult 


Code(s): N40.1 - BENIGN PROSTATIC HYPERPLASIA WITH LOWER URINARY TRACT SYMP; 

R33.8 - OTHER RETENTION OF URINE   





Assessment/Plan





see problem list 


dvt ppx


will need snf for discharge when change to PO antibiotics

## 2019-11-24 NOTE — PN
Progress Note (short form)





- Note


Progress Note: 








1. CKD


2. hx of hydronephrosis


3. hx of obstructive uropathy


4. CHF


5. HTN


6. AGUILA


7. a-fib


8. hypernatreamia





 Current Medications





Acetaminophen (Tylenol -)  650 mg PO Q6H PRN


   PRN Reason: PAIN LEVEL 6-10


   Last Admin: 11/21/19 21:34 Dose:  650 mg


Clonazepam (Klonopin -)  2 mg PO TID [facility]


   Last Admin: 11/24/19 15:01 Dose:  Not Given


Finasteride (Proscar -)  5 mg PO DAILY [facility]


   Last Admin: 11/24/19 10:36 Dose:  5 mg


Gabapentin (Neurontin -)  300 mg PO TID [facility]


   Last Admin: 11/24/19 15:02 Dose:  Not Given


Vancomycin HCl (Vancomycin (Pre-Docked))  1,000 mg in 250 mls @ 166.667 mls/hr 

IVPB BID@0600,1800 [facility]; Protocol


   Last Admin: 11/24/19 17:14 Dose:  166.667 mls/hr


Tigecycline 50 mg/ Dextrose  100 mls @ 100 mls/hr IVPB BID [facility]; Protocol


   Last Admin: 11/24/19 10:35 Dose:  100 mls/hr


Amino Acids (Clinimix -)  1,000 mls @ 60 mls/hr IV Q16H [facility]


   Last Admin: 11/24/19 17:17 Dose:  60 mls/hr


Metoprolol Succinate (Toprol Xl -)  12.5 mg PO DAILY [facility]


   Last Admin: 11/24/19 10:37 Dose:  12.5 mg


Olanzapine (Zyprexa -)  2.5 mg PO HS [facility]


   Last Admin: 11/23/19 22:26 Dose:  2.5 mg


Potassium Chloride (K-Dur -)  40 meq PO DAILY [facility]


   Last Admin: 11/24/19 10:36 Dose:  40 meq


Pramipexole Dihydrochloride (Mirapex -)  0.25 mg PO TID [facility]


   Last Admin: 11/24/19 15:02 Dose:  Not Given


Rivaroxaban (Xarelto)  15 mg PO DAILY@1800 [facility]


   Last Admin: 11/24/19 17:17 Dose:  15 mg


Rosuvastatin Calcium (Crestor -)  5 mg PO HS [facility]


   Last Admin: 11/23/19 22:26 Dose:  5 mg


Tamsulosin HCl (Flomax -)  0.4 mg PO DAILY@0830 [facility]


   Last Admin: 11/24/19 10:36 Dose:  0.4 mg


Thiamine HCl (Vitamin B1 Injection -)  250 mg IVPB TID [facility]


   Stop: 11/24/19 21:59


   Last Admin: 11/24/19 15:19 Dose:  250 mg





 Last Vital Signs











Temp Pulse Resp BP Pulse Ox


 


 100.3 F H  71   20   142/57 L  97 


 


 11/24/19 15:12  11/24/19 15:12  11/24/19 15:12  11/24/19 15:12  11/24/19 09:00








Lungs clear


Heart reg


Abd soft 





 CBC, BMP





 11/24/19 08:13 





 11/24/19 08:13 

















                  CBC, BMP





 11/23/19 06:55 





 11/23/19 06:55 





IMP- hypernatremia, persistent


cellulitis

















Plan


- increase  clinimixrate


- lasix on hold


- renal function is improving


- mental status unchanged


- likely in part pre-renal aguila

## 2019-11-25 NOTE — CONSULT
Consult


Consult Specialty:: Podiatry





- History of Present Illness


Chief Complaint: Fungus toe nails and xerosis of skin.





- Past Medical History


Cardio/Vascular: Yes: AFIB, CHF, HTN, Hyperlipdemia, Other (pacemaker)


Pulmonary: Yes: Pneumonia, Sleep Apnea


Gastrointestinal: Yes: Diverticulosis, Other (colon polyps)


Hepatobiliary: Yes: Other


Renal/: Yes: Renal Inusuff, BPH, Cancer (h/o prostate cancer), Renal Calculi


Dermatology: Yes: Cellulitis, Other


Additional Medical History: morbid obesity.  wound left big toe grade 1-2





- Past Surgical History


Past Surgical History: Yes: Amputation (left great toe), Colonoscopy, Joint 

Replacement (bilateral knee replacements), Permanent Pacemaker





- Alcohol/Substance Use


Hx Alcohol Use: No


History of Substance Use: reports: None





- Smoking History


Smoking history: Former smoker


Have you smoked in the past 12 months: No


If you are a former smoker, when did you quit?: 1970





- Social History


Usual Living Arrangement: With Significant Other


ADL: Family Assistance


Occupation: retired [place] 


History of Recent Travel: No





Home Medications





- Allergies


Allergies/Adverse Reactions: 


 Allergies











Allergy/AdvReac Type Severity Reaction Status Date / Time


 


cephalexin monohydrate Allergy Mild Hives Verified 11/13/19 16:58





[From Keflex]     


 


amoxicillin Allergy   Verified 11/13/19 16:58


 


ertapenem Allergy   Verified 11/13/19 16:58














- Home Medications


Home Medications: 


Ambulatory Orders





Cholecalciferol (Vitamin D3) [Vitamin D3] 1,000 unit PO DAILY 08/03/19 


Finasteride 5 mg PO DAILY 08/03/19 


Furosemide [Lasix] 80 mg PO DAILY 08/03/19 


Gabapentin [Neurontin] 300 mg PO TID 08/03/19 


Losartan Potassium [Cozaar] 25 mg PO DAILY 08/03/19 


Metolazone 2.5 mg PO DAILY 08/03/19 


Metoprolol Succinate 25 mg PO DAILY 08/03/19 


Potassium Chloride 20 meq PO DAILY 08/03/19 


Ranitidine [Zantac -] 150 mg PO AC 08/03/19 


Rivaroxaban [Xarelto] 20 mg PO HS 08/03/19 


Tamsulosin HCl [Flomax] 0.8 mg PO DAILY 08/03/19 


Furosemide [Lasix] 40 mg PO HS 08/04/19 


Nystatin Powder [Nystop Powder -] 1 applic TP DAILY 08/04/19 


Acetaminophen [Tylenol .Regular Strength -] 650 mg PO Q4H PRN  tablet 08/08/19 


Finasteride [Proscar -] 5 mg PO DAILY  tablet 08/08/19 


Furosemide [Lasix -] 40 mg PO BID@0600,1400  tablet 08/08/19 


Melatonin 5 mg PO HS PRN  tab 08/08/19 


Metolazone [Zaroxolyn -] 2.5 mg PO DAILY  tablet 08/08/19 


Nystatin Ointment [Mycostatin Ointment -] 1 applic TP BID #180 applic 08/08/19 


Potassium Chloride [K-Dur -] 40 meq PO BID  tablet.er 08/08/19 


Rosuvastatin [Crestor -] 5 mg PO HS #30 tablet 08/08/19 











Physical Exam


Vital Signs: 


 Vital Signs











Temperature  98.6 F   11/25/19 05:50


 


Pulse Rate  71   11/25/19 05:50


 


Respiratory Rate  20   11/25/19 05:50


 


Blood Pressure  156/68   11/25/19 05:50


 


O2 Sat by Pulse Oximetry (%)  96   11/24/19 21:00











Extremities: Yes: Other (onychomycosis of toes, vsgi, +xerosis b/l feet)


Labs: 


 CBC, BMP





 11/25/19 08:00 





 11/25/19 12:10 











Assessment/Plan


onychomycosis


xerosis





Ammonium lactate BID to tx xerosis on feet and legs.  Debride nails PRN.  Foot 

care q8 weeks.

## 2019-11-25 NOTE — PN
Progress Note (short form)





- Note


Progress Note: 





temp is 102


repeat blood cultures ordered


spoke to wife in detail


living will read and DNR/DNI form signed





Problem List





- Problems


(1) Metabolic encephalopathy


Code(s): G93.41 - METABOLIC ENCEPHALOPATHY   





(2) Cellulitis


Code(s): L03.90 - CELLULITIS, UNSPECIFIED   


Qualifiers: 


   Site of cellulitis: extremity   Site of cellulitis of extremity: lower 

extremity 





(3) AGUILA (acute kidney injury)


Code(s): N17.9 - ACUTE KIDNEY FAILURE, UNSPECIFIED   





(4) Anxiety


Code(s): F41.9 - ANXIETY DISORDER, UNSPECIFIED   





(5) Atrial fibrillation


Code(s): I48.91 - UNSPECIFIED ATRIAL FIBRILLATION   


Qualifiers: 


   Atrial fibrillation type: chronic 





(6) Urinary retention due to benign prostatic hyperplasia


Code(s): N40.1 - BENIGN PROSTATIC HYPERPLASIA WITH LOWER URINARY TRACT SYMP; 

R33.8 - OTHER RETENTION OF URINE

## 2019-11-25 NOTE — PN
Progress Note, Physician


Chief Complaint: 





patient seen and examined for first encounter


in bed lethargic not talking responds to painful stimuli





- Current Medication List


Current Medications: 


Active Medications





Acetaminophen (Tylenol -)  650 mg PO Q6H PRN


   PRN Reason: PAIN LEVEL 6-10


   Last Admin: 11/21/19 21:34 Dose:  650 mg


Clonazepam (Klonopin -)  1 mg PO BID [facility]


Finasteride (Proscar -)  5 mg PO DAILY [facility]


   Last Admin: 11/25/19 10:58 Dose:  Not Given


Gabapentin (Neurontin -)  300 mg PO TID [facility]


   Last Admin: 11/25/19 05:59 Dose:  Not Given


Vancomycin HCl (Vancomycin (Pre-Docked))  1,000 mg in 250 mls @ 166.667 mls/hr 

IVPB BID@0600,1800 [facility]; Protocol


   Last Admin: 11/25/19 05:59 Dose:  166.667 mls/hr


Tigecycline 50 mg/ Dextrose  100 mls @ 100 mls/hr IVPB BID [facility]; Protocol


   Last Admin: 11/25/19 01:18 Dose:  100 mls/hr


Amino Acids (Clinimix -)  1,000 mls @ 100 mls/hr IV Q10H [facility]


Metoprolol Succinate (Toprol Xl -)  12.5 mg PO DAILY [facility]


   Last Admin: 11/25/19 10:57 Dose:  Not Given


Olanzapine (Zyprexa -)  2.5 mg PO HS [facility]


   Last Admin: 11/25/19 01:16 Dose:  2.5 mg


Potassium Chloride (K-Dur -)  40 meq PO DAILY [facility]


   Last Admin: 11/25/19 10:57 Dose:  Not Given


Pramipexole Dihydrochloride (Mirapex -)  0.25 mg PO TID [facility]


   Last Admin: 11/25/19 05:59 Dose:  Not Given


Rivaroxaban (Xarelto)  15 mg PO DAILY@1800 [facility]


   Last Admin: 11/24/19 17:17 Dose:  15 mg


Rosuvastatin Calcium (Crestor -)  5 mg PO HS [facility]


   Last Admin: 11/25/19 01:17 Dose:  5 mg


Tamsulosin HCl (Flomax -)  0.4 mg PO DAILY@0830 [facility]


   Last Admin: 11/25/19 10:56 Dose:  Not Given











- Objective


Vital Signs: 


 Vital Signs











Temperature  98.6 F   11/25/19 05:50


 


Pulse Rate  71   11/25/19 05:50


 


Respiratory Rate  20   11/25/19 05:50


 


Blood Pressure  156/68   11/25/19 05:50


 


O2 Sat by Pulse Oximetry (%)  96   11/24/19 21:00











Constitutional: Yes: Calm


Cardiovascular: Yes: Regular Rate and Rhythm, S1, S2


Respiratory: Yes: CTA Bilaterally


Gastrointestinal: Yes: Normal Bowel Sounds, Soft


Extremities: Yes: Erythema, Other (both lower extremities)


Edema: Yes


Neurological: Yes: Lethargy


Labs: 


 CBC, BMP





 11/25/19 08:00 





 11/25/19 08:00 











Problem List





- Problems


(1) Metabolic encephalopathy


Assessment/Plan: 


NPO


iv clinimix


repeat bmp 


maybe secondary to electrolyte abnormalities


palliative care consult regarding code status


tried calling wife brien no answer


Code(s): G93.41 - METABOLIC ENCEPHALOPATHY   





(2) Cellulitis


Assessment/Plan: 


ID on board


multiple allergies to PCN and carbipenem


on tigacycline and iv vancomycin


contact isolation for MRSA


Microbiology





11/13/19 19:48   Leg - Right Lower   Gram Stain - Final


11/13/19 19:48   Leg - Right Lower   Wound Culture - Final


                              Mr S Aureus


                              Staphylococcus Coagulase Neg








Code(s): L03.90 - CELLULITIS, UNSPECIFIED   


Qualifiers: 


   Site of cellulitis: extremity   Site of cellulitis of extremity: lower 

extremity 





(3) AGUILA (acute kidney injury)


Assessment/Plan: 


creatinine is increasing


clinimix now changed to D5


repeat BMP ordered


Code(s): N17.9 - ACUTE KIDNEY FAILURE, UNSPECIFIED   





(4) Anxiety


Assessment/Plan: 


neurology note appreciated 


clonazepam dosing reduced and changed to prn


Code(s): F41.9 - ANXIETY DISORDER, UNSPECIFIED   





(5) Atrial fibrillation


Assessment/Plan: 


metoprolol  dose reduced to 12.5mg 


and xarelto


Code(s): I48.91 - UNSPECIFIED ATRIAL FIBRILLATION   


Qualifiers: 


   Atrial fibrillation type: chronic 





(6) Urinary retention due to benign prostatic hyperplasia


Assessment/Plan: 


bladder  sono noted


finastride


Code(s): N40.1 - BENIGN PROSTATIC HYPERPLASIA WITH LOWER URINARY TRACT SYMP; 

R33.8 - OTHER RETENTION OF URINE   





Assessment/Plan





patient is ful code


tried callig wife brien at  [phone] no answer


javon get paliative team on board

## 2019-11-25 NOTE — PN
Progress Note, Physician


Chief Complaint: 





Events noted


Generalized weakness


failure to thrive


History of Present Illness: 





Patient was seen and examined. Lethargic. Chart was reviewed





- Current Medication List


Current Medications: 


Active Medications





Acetaminophen (Tylenol -)  650 mg PO Q6H PRN


   PRN Reason: PAIN LEVEL 6-10


   Last Admin: 11/21/19 21:34 Dose:  650 mg


Clonazepam (Klonopin -)  2 mg PO TID [facility]


   Last Admin: 11/25/19 05:59 Dose:  Not Given


Finasteride (Proscar -)  5 mg PO DAILY [facility]


   Last Admin: 11/24/19 10:36 Dose:  5 mg


Gabapentin (Neurontin -)  300 mg PO TID [facility]


   Last Admin: 11/25/19 05:59 Dose:  Not Given


Vancomycin HCl (Vancomycin (Pre-Docked))  1,000 mg in 250 mls @ 166.667 mls/hr 

IVPB BID@0600,1800 [facility]; Protocol


   Last Admin: 11/25/19 05:59 Dose:  166.667 mls/hr


Tigecycline 50 mg/ Dextrose  100 mls @ 100 mls/hr IVPB BID [facility]; Protocol


   Last Admin: 11/25/19 01:18 Dose:  100 mls/hr


Amino Acids (Clinimix -)  1,000 mls @ 100 mls/hr IV Q16H [facility]


Metoprolol Succinate (Toprol Xl -)  12.5 mg PO DAILY [facility]


   Last Admin: 11/24/19 10:37 Dose:  12.5 mg


Olanzapine (Zyprexa -)  2.5 mg PO [facility]


   Last Admin: 11/25/19 01:16 Dose:  2.5 mg


Potassium Chloride (K-Dur -)  40 meq PO DAILY [facility]


   Last Admin: 11/24/19 10:36 Dose:  40 meq


Pramipexole Dihydrochloride (Mirapex -)  0.25 mg PO TID [facility]


   Last Admin: 11/25/19 05:59 Dose:  Not Given


Rivaroxaban (Xarelto)  15 mg PO DAILY@1800 [facility]


   Last Admin: 11/24/19 17:17 Dose:  15 mg


Rosuvastatin Calcium (Crestor -)  5 mg PO HS [facility]


   Last Admin: 11/25/19 01:17 Dose:  5 mg


Tamsulosin HCl (Flomax -)  0.4 mg PO DAILY@0830 [facility]


   Last Admin: 11/24/19 10:36 Dose:  0.4 mg











- Objective


Vital Signs: 


 Vital Signs











Temperature  98.6 F   11/25/19 05:50


 


Pulse Rate  71   11/25/19 05:50


 


Respiratory Rate  20   11/25/19 05:50


 


Blood Pressure  156/68   11/25/19 05:50


 


O2 Sat by Pulse Oximetry (%)  96   11/24/19 21:00











Neck: Yes: Supple


Cardiovascular: Yes: Pulse Irregular, S1, S2


Respiratory: Yes: Diminished


Gastrointestinal: Yes: Normal Bowel Sounds, Soft, Abdomen, Obese.  No: 

Tenderness


Edema: Yes


Labs: 


 CBC, BMP





 11/25/19 08:00 





 11/25/19 08:00 











Problem List





- Problems


(1) Metabolic encephalopathy


Code(s): G93.41 - METABOLIC ENCEPHALOPATHY   





(2) Renal dysfunction


Code(s): N28.9 - DISORDER OF KIDNEY AND URETER, UNSPECIFIED   





(3) Anxiety


Code(s): F41.9 - ANXIETY DISORDER, UNSPECIFIED   





(4) Atrial fibrillation


Code(s): I48.91 - UNSPECIFIED ATRIAL FIBRILLATION   


Qualifiers: 


   Atrial fibrillation type: chronic 





(5) CHF (congestive heart failure)


Code(s): I50.9 - HEART FAILURE, UNSPECIFIED   


Qualifiers: 


   Heart failure type: diastolic   Heart failure chronicity: chronic   

Qualified Code(s): I50.32 - Chronic diastolic (congestive) heart failure   





(6) Cellulitis


Code(s): L03.90 - CELLULITIS, UNSPECIFIED   


Qualifiers: 


   Site of cellulitis: extremity   Site of cellulitis of extremity: lower 

extremity 





(7) Demand ischemia


Code(s): I24.8 - OTHER FORMS OF ACUTE ISCHEMIC HEART DISEASE   





(8) Hypertension


Code(s): I10 - ESSENTIAL (PRIMARY) HYPERTENSION   


Qualifiers: 


   Hypertension type: essential hypertension   Qualified Code(s): I10 - 

Essential (primary) hypertension   





(9) Lymphedema


Code(s): I89.0 - LYMPHEDEMA, NOT ELSEWHERE CLASSIFIED   





(10) Pacemaker


Code(s): Z95.0 - PRESENCE OF CARDIAC PACEMAKER   





Assessment/Plan





1. Recurrent left leg cellulitis


2. Chronic venous stasis dermatitis


3. 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


4. Diastolic LV dysfunction with clinical class 0 NYHA classification LV failure


5. Persistent atrial fibrillation [id number] score of 5 on A/C/Xarelto - SSS/

AV block post PPM


6. HTN


7. Hyperlipidemia


8. Acute on CKD with history of hydronephrosis/obstructive uropathy likely pre-

renal 


9. Anxiety disorder


10. BPH


11. Generalized weakness and failure to thrive





PLAN:


1. Continue to withhold diuretics/Lasix and Zaroxolyn pending renal function 

recovery


2. Continue to withhold ARBS pending renal function recovery


3. Continue Toprol XL as tolerated


4. Continue Xarelto at the current dosage 


5. Continue Crestor 


6. Antibiotics 





Supportive care


Panfilo Foote MD

## 2019-11-25 NOTE — PN
Progress Note, Physician


Chief Complaint: 





Pt seen and examined at bedside. He remains confused.





- Current Medication List


Current Medications: 


Active Medications





Acetaminophen (Tylenol -)  650 mg PO Q6H PRN


   PRN Reason: PAIN LEVEL 6-10


   Last Admin: 11/21/19 21:34 Dose:  650 mg


Clonazepam (Klonopin -)  2 mg PO TID [facility]


   Last Admin: 11/25/19 05:59 Dose:  Not Given


Finasteride (Proscar -)  5 mg PO DAILY [facility]


   Last Admin: 11/24/19 10:36 Dose:  5 mg


Gabapentin (Neurontin -)  300 mg PO TID [facility]


   Last Admin: 11/25/19 05:59 Dose:  Not Given


Vancomycin HCl (Vancomycin (Pre-Docked))  1,000 mg in 250 mls @ 166.667 mls/hr 

IVPB BID@0600,1800 [facility]; Protocol


   Last Admin: 11/25/19 05:59 Dose:  166.667 mls/hr


Tigecycline 50 mg/ Dextrose  100 mls @ 100 mls/hr IVPB BID [facility]; Protocol


   Last Admin: 11/25/19 01:18 Dose:  100 mls/hr


Amino Acids (Clinimix -)  1,000 mls @ 82 mls/hr IV Q16H [facility]


   Last Admin: 11/25/19 01:17 Dose:  Not Given


Metoprolol Succinate (Toprol Xl -)  12.5 mg PO DAILY [facility]


   Last Admin: 11/24/19 10:37 Dose:  12.5 mg


Olanzapine (Zyprexa -)  2.5 mg PO [facility]


   Last Admin: 11/25/19 01:16 Dose:  2.5 mg


Potassium Chloride (K-Dur -)  40 meq PO DAILY [facility]


   Last Admin: 11/24/19 10:36 Dose:  40 meq


Pramipexole Dihydrochloride (Mirapex -)  0.25 mg PO TID [facility]


   Last Admin: 11/25/19 05:59 Dose:  Not Given


Rivaroxaban (Xarelto)  15 mg PO DAILY@1800 [facility]


   Last Admin: 11/24/19 17:17 Dose:  15 mg


Rosuvastatin Calcium (Crestor -)  5 mg PO HS [facility]


   Last Admin: 11/25/19 01:17 Dose:  5 mg


Tamsulosin HCl (Flomax -)  0.4 mg PO DAILY@0830 [facility]


   Last Admin: 11/24/19 10:36 Dose:  0.4 mg











- Objective


Vital Signs: 


 Vital Signs











Temperature  98.6 F   11/25/19 05:50


 


Pulse Rate  71   11/25/19 05:50


 


Respiratory Rate  20   11/25/19 05:50


 


Blood Pressure  156/68   11/25/19 05:50


 


O2 Sat by Pulse Oximetry (%)  96   11/24/19 21:00











Constitutional: Yes: Calm


Eyes: Yes: Conjunctiva Clear


HENT: Yes: Atraumatic


Neck: Yes: Supple


Cardiovascular: Yes: S1, S2


Respiratory: Yes: Rales


Gastrointestinal: Yes: Soft, Abdomen, Obese


Genitourinary: Yes: Incontinence


Edema: Yes


Edema: LLE: 1+, RLE: 1+


Neurological: Yes: Confusion, Lethargy


Labs: 


 CBC, BMP





 11/25/19 08:00 





 11/25/19 08:00 











Problem List





- Problems


(1) AGUILA (acute kidney injury)


Code(s): N17.9 - ACUTE KIDNEY FAILURE, UNSPECIFIED   





(2) CHF (congestive heart failure)


Code(s): I50.9 - HEART FAILURE, UNSPECIFIED   


Qualifiers: 


   Heart failure type: diastolic   Heart failure chronicity: chronic   

Qualified Code(s): I50.32 - Chronic diastolic (congestive) heart failure   





(3) Cellulitis


Code(s): L03.90 - CELLULITIS, UNSPECIFIED   


Qualifiers: 


   Site of cellulitis: extremity   Site of cellulitis of extremity: lower 

extremity 





Assessment/Plan


 Current Medications











Generic Name Dose Route Start Last Admin





  Trade Name Freq  PRN Reason Stop Dose Admin


 


Acetaminophen  650 mg  11/14/19 04:07  11/21/19 21:34





  Tylenol -  PO   650 mg





  Q6H PRN   Administration





  PAIN LEVEL 6-10   





     





     





     


 


Clonazepam  2 mg  11/21/19 22:00  11/25/19 05:59





  Klonopin -  PO   Not Given





  TID [facility]   





     





     





     





     


 


Finasteride  5 mg  11/15/19 10:00  11/24/19 10:36





  Proscar -  PO   5 mg





  DAILY [facility]   Administration





     





     





     





     


 


Gabapentin  300 mg  11/21/19 22:00  11/25/19 05:59





  Neurontin -  PO   Not Given





  TID [facility]   





     





     





     





     


 


Vancomycin HCl  1,000 mg in 250 mls @ 166.667 mls/hr  11/15/19 18:00  11/25/19 

05:59





  Vancomycin (Pre-Docked)  IVPB   166.667 mls/hr





  BID@0600,1800 [facility]   Administration





     





     





  Protocol   





     


 


Tigecycline 50 mg/ Dextrose  100 mls @ 100 mls/hr  11/15/19 22:00  11/25/19 01:

18





  IVPB   100 mls/hr





  BID [facility]   Administration





     





     





  Protocol   





     


 


Amino Acids  1,000 mls @ 82 mls/hr  11/24/19 22:01  11/25/19 01:17





  Clinimix -  IV   Not Given





  Q16H [facility]   





     





     





     





     


 


Metoprolol Succinate  12.5 mg  11/18/19 10:00  11/24/19 10:37





  Toprol Xl -  PO   12.5 mg





  DAILY [facility]   Administration





     





     





     





     


 


Olanzapine  2.5 mg  11/21/19 22:00  11/25/19 01:16





  Zyprexa -  PO   2.5 mg





  HS [facility]   Administration





     





     





     





     


 


Potassium Chloride  40 meq  11/16/19 17:49  11/24/19 10:36





  K-Dur -  PO   40 meq





  DAILY [facility]   Administration





     





     





     





     


 


Pramipexole Dihydrochloride  0.25 mg  11/21/19 20:00  11/25/19 05:59





  Mirapex -  PO   Not Given





  TID [facility]   





     





     





     





     


 


Rivaroxaban  15 mg  11/14/19 18:00  11/24/19 17:17





  Xarelto  PO   15 mg





  DAILY@1800 [facility]   Administration





     





     





     





     


 


Rosuvastatin Calcium  5 mg  11/18/19 22:00  11/25/19 01:17





  Crestor -  PO   5 mg





  HS [facility]   Administration





     





     





     





     


 


Tamsulosin HCl  0.4 mg  11/17/19 08:30  11/24/19 10:36





  Flomax -  PO   0.4 mg





  DAILY@0830 [facility]   Administration





     





     





     





     














Impression


1. CKD


2. hx of hydronephrosis


3. hx of obstructive uropathy


4. CHF


5. HTN


6. AGUILA


7. a-fib


8. hypernatreamia





Plan


- cont clinimix


- sodium is rising


- repeat labs in am


- mental status not improved


- am cxr


- likely in part pre-renal aguila

## 2019-11-25 NOTE — PN
Progress Note, Physician


History of Present Illness: 


LETHARGIC, CONFUSED


 SPIKED TEMP 102


 WBC SL INCREASED


 LE ERYTHEMA IMPROVED


 





- Current Medication List


Current Medications: 


Active Medications





Acetaminophen (Tylenol -)  650 mg PO Q6H PRN


   PRN Reason: PAIN LEVEL 6-10


   Last Admin: 11/21/19 21:34 Dose:  650 mg


Acetaminophen (Tylenol Suppository -)  650 mg [place] Q6H PRN


   PRN Reason: FEVER


Clonazepam (Klonopin -)  1 mg PO BID [facility]


Finasteride (Proscar -)  5 mg PO DAILY [facility]


   Last Admin: 11/25/19 10:58 Dose:  Not Given


Gabapentin (Neurontin -)  300 mg PO TID [facility]


   Last Admin: 11/25/19 15:04 Dose:  Not Given


Amino Acids (Clinimix -)  1,000 mls @ 100 mls/hr IV Q10H [facility]


   Last Admin: 11/25/19 16:51 Dose:  100 mls/hr


Metoprolol Succinate (Toprol Xl -)  12.5 mg PO DAILY [facility]


   Last Admin: 11/25/19 10:57 Dose:  Not Given


Olanzapine (Zyprexa -)  2.5 mg PO [facility]


   Last Admin: 11/25/19 01:16 Dose:  2.5 mg


Potassium Chloride (K-Dur -)  40 meq PO DAILY [facility]


   Last Admin: 11/25/19 10:57 Dose:  Not Given


Pramipexole Dihydrochloride (Mirapex -)  0.25 mg PO TID [facility]


   Last Admin: 11/25/19 15:04 Dose:  Not Given


Rivaroxaban (Xarelto)  15 mg PO DAILY@1800 [facility]


   Last Admin: 11/24/19 17:17 Dose:  15 mg


Rosuvastatin Calcium (Crestor -)  5 mg PO [facility]


   Last Admin: 11/25/19 01:17 Dose:  5 mg


Tamsulosin HCl (Flomax -)  0.4 mg PO DAILY@0830 [facility]


   Last Admin: 11/25/19 10:56 Dose:  Not Given











- Objective


Vital Signs: 


 Vital Signs











Temperature  97.4 F L  11/25/19 09:00


 


Pulse Rate  62   11/25/19 09:00


 


Respiratory Rate  20   11/25/19 09:00


 


Blood Pressure  141/64   11/25/19 09:00


 


O2 Sat by Pulse Oximetry (%)  94 L  11/25/19 09:00











Constitutional: Yes: Other (CONFUSED)


Cardiovascular: Yes: Regular Rate and Rhythm, S1, S2


Respiratory: Yes: CTA Bilaterally


Gastrointestinal: Yes: Normal Bowel Sounds, Soft, Abdomen, Obese.  No: 

Tenderness


Extremities: Yes: Other (ERYTHEMA LE NEARLY ALL RESOLVED     + CHRONIC VENOUS 

STASIS DERMATITIS LE BILATERALLY)


Edema: Yes


Labs: 


 CBC, BMP





 11/25/19 08:00 





 11/25/19 12:10 











Assessment/Plan


HIGH GRADE FEVER ? SOURCE    ? LUNG  ?DRUG FEVER


RECURRENT BILATERAL LE CELLULITIS 


+ WOUND C/S MRSA


MULTIPLE ANTIBIOTIC ALLERGIES


TOXIC METABOLIC ENCEPHALOPATHY


REPEAT C/S    CXR


D/C ANTIBIOTICS


DISCUSSED WITH PATIENT'S WIFE

## 2019-11-26 NOTE — PN
Progress Note, Physician


History of Present Illness: 





Pt seen and examined at bedside. He remains confused. 





- Current Medication List


Current Medications: 


Active Medications





Acetaminophen (Tylenol -)  650 mg PO Q6H PRN


   PRN Reason: PAIN LEVEL 6-10


   Last Admin: 11/21/19 21:34 Dose:  650 mg


Acetaminophen (Tylenol Suppository -)  650 mg [place] Q6H PRN


   PRN Reason: FEVER


Clonazepam (Klonopin -)  1 mg PO BID [facility]


   Last Admin: 11/26/19 10:33 Dose:  Not Given


Finasteride (Proscar -)  5 mg PO DAILY [facility]


   Last Admin: 11/26/19 10:34 Dose:  Not Given


Gabapentin (Neurontin -)  300 mg PO TID [facility]


   Last Admin: 11/26/19 13:36 Dose:  Not Given


Amino Acids (Clinimix -)  1,000 mls @ 100 mls/hr IV Q10H [facility]


   Last Admin: 11/26/19 17:30 Dose:  100 mls/hr


Vancomycin HCl (Vancomycin (Pre-Docked))  1,000 mg in 250 mls @ 200 mls/hr IVPB 

Q24H [facility]; Protocol


Aztreonam 1 gm/ Dextrose  50 mls @ 100 mls/hr IVPB Q12H [facility]; Protocol


   Last Admin: 11/26/19 17:30 Dose:  100 mls/hr


Metoprolol Succinate (Toprol Xl -)  12.5 mg PO DAILY [facility]


   Last Admin: 11/26/19 10:34 Dose:  Not Given


Olanzapine (Zyprexa -)  2.5 mg PO HS [facility]


   Last Admin: 11/25/19 23:46 Dose:  Not Given


Potassium Chloride (K-Dur -)  40 meq PO DAILY [facility]


   Last Admin: 11/26/19 10:33 Dose:  Not Given


Pramipexole Dihydrochloride (Mirapex -)  0.25 mg PO TID [facility]


   Last Admin: 11/26/19 13:36 Dose:  Not Given


Rivaroxaban (Xarelto)  15 mg PO DAILY@1800 [facility]


   Last Admin: 11/26/19 17:31 Dose:  Not Given


Rosuvastatin Calcium (Crestor -)  5 mg PO HS [facility]


   Last Admin: 11/25/19 23:45 Dose:  Not Given


Tamsulosin HCl (Flomax -)  0.4 mg PO DAILY@0830 [facility]


   Last Admin: 11/26/19 09:06 Dose:  Not Given











- Objective


Vital Signs: 


 Vital Signs











Temperature  100.1 F H  11/26/19 15:12


 


Pulse Rate  72   11/26/19 15:12


 


Respiratory Rate  20   11/26/19 09:06


 


Blood Pressure  141/60   11/26/19 15:12


 


O2 Sat by Pulse Oximetry (%)  95   11/26/19 09:00











Constitutional: Yes: Calm, Mild Distress


Eyes: Yes: Conjunctiva Clear


HENT: Yes: Atraumatic


Cardiovascular: Yes: S1, S2


Respiratory: Yes: On Nasal O2


Gastrointestinal: Yes: Soft, Abdomen, Obese


Genitourinary: Yes: Incontinence


Musculoskeletal: Yes: Muscle Weakness


Edema: Yes


Edema: LLE: 1+, RLE: 1+


Integumentary: Yes: Venous Stasis Changes


Neurological: Yes: Confusion


Labs: 


 CBC, BMP





 11/25/19 08:00 





 11/26/19 07:00 











Problem List





- Problems


(1) AGUILA (acute kidney injury)


Code(s): N17.9 - ACUTE KIDNEY FAILURE, UNSPECIFIED   





(2) CHF (congestive heart failure)


Code(s): I50.9 - HEART FAILURE, UNSPECIFIED   


Qualifiers: 


   Heart failure type: diastolic   Heart failure chronicity: chronic   

Qualified Code(s): I50.32 - Chronic diastolic (congestive) heart failure   





(3) Cellulitis


Code(s): L03.90 - CELLULITIS, UNSPECIFIED   


Qualifiers: 


   Site of cellulitis: extremity   Site of cellulitis of extremity: lower 

extremity 





Assessment/Plan


 Current Medications











Generic Name Dose Route Start Last Admin





  Trade Name Freq  PRN Reason Stop Dose Admin


 


Acetaminophen  650 mg  11/14/19 04:07  11/21/19 21:34





  Tylenol -  PO   650 mg





  Q6H PRN   Administration





  PAIN LEVEL 6-10   





     





     





     


 


Acetaminophen  650 mg  11/25/19 16:13  





  Tylenol Suppository -  MD   





  Q6H PRN   





  FEVER   





     





     





     


 


Clonazepam  1 mg  11/25/19 22:00  11/26/19 10:33





  Klonopin -  PO   Not Given





  BID [facility]   





     





     





     





     


 


Finasteride  5 mg  11/15/19 10:00  11/26/19 10:34





  Proscar -  PO   Not Given





  DAILY [facility]   





     





     





     





     


 


Gabapentin  300 mg  11/21/19 22:00  11/26/19 13:36





  Neurontin -  PO   Not Given





  TID [facility]   





     





     





     





     


 


Amino Acids  1,000 mls @ 100 mls/hr  11/25/19 11:30  11/26/19 17:30





  Clinimix -  IV   100 mls/hr





  Q10H [facility]   Administration





     





     





     





     


 


Vancomycin HCl  1,000 mg in 250 mls @ 200 mls/hr  11/27/19 16:00  





  Vancomycin (Pre-Docked)  IVPB   





  Q24H [facility]   





     





     





  Protocol   





     


 


Aztreonam 1 gm/ Dextrose  50 mls @ 100 mls/hr  11/26/19 17:00  11/26/19 17:30





  IVPB   100 mls/hr





  Q12H [facility]   Administration





     





     





  Protocol   





     


 


Metoprolol Succinate  12.5 mg  11/18/19 10:00  11/26/19 10:34





  Toprol Xl -  PO   Not Given





  DAILY [facility]   





     





     





     





     


 


Olanzapine  2.5 mg  11/21/19 22:00  11/25/19 23:46





  Zyprexa -  PO   Not Given





  HS [facility]   





     





     





     





     


 


Potassium Chloride  40 meq  11/16/19 17:49  11/26/19 10:33





  K-Dur -  PO   Not Given





  DAILY [facility]   





     





     





     





     


 


Pramipexole Dihydrochloride  0.25 mg  11/21/19 20:00  11/26/19 13:36





  Mirapex -  PO   Not Given





  TID [facility]   





     





     





     





     


 


Rivaroxaban  15 mg  11/14/19 18:00  11/26/19 17:31





  Xarelto  PO   Not Given





  DAILY@1800 [facility]   





     





     





     





     


 


Rosuvastatin Calcium  5 mg  11/18/19 22:00  11/25/19 23:45





  Crestor -  PO   Not Given





  HS [facility]   





     





     





     





     


 


Tamsulosin HCl  0.4 mg  11/17/19 08:30  11/26/19 09:06





  Flomax -  PO   Not Given





  DAILY@0830 [facility]   





     





     





     





     

















Impression


1. CKD


2. hx of hydronephrosis


3. hx of obstructive uropathy


4. CHF


5. HTN


6. AGUILA


7. a-fib


8. hypernatreamia





Plan


- cont clinimic


- will add d5w


- abx per ID


- monitor monitor mental status


- am cxr reviewed


- likely in part pre-renal aguila

## 2019-11-26 NOTE — PN
Progress Note, Physician


History of Present Illness: 


LETHARGIC, CONFUSED


 LOW GRADE TEMP


 WBC  INCREASED


 LE ERYTHEMA IMPROVED


 CXR SHOWS NEW RLL INFILTRATE


 REPEAT BC PENDING


 





- Current Medication List


Current Medications: 


Active Medications





Acetaminophen (Tylenol -)  650 mg PO Q6H PRN


   PRN Reason: PAIN LEVEL 6-10


   Last Admin: 11/21/19 21:34 Dose:  650 mg


Acetaminophen (Tylenol Suppository -)  650 mg [place] Q6H PRN


   PRN Reason: FEVER


Clonazepam (Klonopin -)  1 mg PO BID [facility]


   Last Admin: 11/26/19 10:33 Dose:  Not Given


Finasteride (Proscar -)  5 mg PO DAILY [facility]


   Last Admin: 11/26/19 10:34 Dose:  Not Given


Gabapentin (Neurontin -)  300 mg PO TID [facility]


   Last Admin: 11/26/19 13:36 Dose:  Not Given


Amino Acids (Clinimix -)  1,000 mls @ 100 mls/hr IV Q10H [facility]


   Last Admin: 11/26/19 08:02 Dose:  Not Given


Vancomycin HCl (Vancomycin (Pre-Docked))  1,000 mg in 250 mls @ 200 mls/hr IVPB 

Q24H [facility]; Protocol


Aztreonam 1 gm/ Dextrose  50 mls @ 100 mls/hr IVPB Q12H [facility]; Protocol


Metoprolol Succinate (Toprol Xl -)  12.5 mg PO DAILY [facility]


   Last Admin: 11/26/19 10:34 Dose:  Not Given


Olanzapine (Zyprexa -)  2.5 mg PO HS [facility]


   Last Admin: 11/25/19 23:46 Dose:  Not Given


Potassium Chloride (K-Dur -)  40 meq PO DAILY [facility]


   Last Admin: 11/26/19 10:33 Dose:  Not Given


Pramipexole Dihydrochloride (Mirapex -)  0.25 mg PO TID [facility]


   Last Admin: 11/26/19 13:36 Dose:  Not Given


Rivaroxaban (Xarelto)  15 mg PO DAILY@1800 [facility]


   Last Admin: 11/25/19 18:14 Dose:  Not Given


Rosuvastatin Calcium (Crestor -)  5 mg PO HS [facility]


   Last Admin: 11/25/19 23:45 Dose:  Not Given


Tamsulosin HCl (Flomax -)  0.4 mg PO DAILY@0830 [facility]


   Last Admin: 11/26/19 09:06 Dose:  Not Given











- Objective


Vital Signs: 


 Vital Signs











Temperature  100.1 F H  11/26/19 15:12


 


Pulse Rate  72   11/26/19 15:12


 


Respiratory Rate  20   11/26/19 09:06


 


Blood Pressure  141/60   11/26/19 15:12


 


O2 Sat by Pulse Oximetry (%)  94 L  11/25/19 21:00











Constitutional: Yes: No Distress


Eyes: Yes: Conjunctiva Clear


Cardiovascular: Yes: Regular Rate and Rhythm, S1, S2


Respiratory: Yes: Diminished


Gastrointestinal: Yes: Normal Bowel Sounds, Soft, Abdomen, Obese.  No: 

Tenderness


Extremities: Yes: Other (CHRONIC VENOUS STASIS DERMATITIS/ CELLULITIS)


Labs: 


 CBC, BMP





 11/25/19 08:00 





 11/26/19 07:00 











Assessment/Plan


HIGH GRADE FEVER ?  LUNG SOURCE 


PROBABLE ASP PNEUMONIA   


RECURRENT BILATERAL LE CELLULITIS 


+ WOUND C/S MRSA


MULTIPLE ANTIBIOTIC ALLERGIES


TOXIC METABOLIC ENCEPHALOPATHY


REPEAT C/S     


EMPIRIC AZTREONAM/ VANCOMYCIN


DISCUSSED WITH PATIENT'S WIFE

## 2019-11-26 NOTE — CONSULT
- Consultation


REQUESTING PROVIDER: 





CONSULT REQUEST: We have been asked to surgically evaluate this patient for 

venous stasis.





PCP:Genaro Fabian





HISTORY OF PRESENT ILLNESS: 85 y/o man with a PMHx CAD, HTN, HLD, ASHD, CHF, 

Afib (on Xarelto) s/p PPM/defibrillator, lymphadema/venous stasis brought in 

from home by his wife for increasing LE erythema and edema to bilateral lower 

extremities. Pt does not respond clearly to questioning (?dementia).

















PMHx:          As above 





PSHx:        as above 


 Home Medications











 Medication  Instructions  Recorded


 


Cholecalciferol (Vitamin D3) 1,000 unit PO DAILY 08/03/19





[Vitamin D3]  


 


Finasteride 5 mg PO DAILY 08/03/19


 


Furosemide [Lasix] 80 mg PO DAILY 08/03/19


 


Gabapentin [Neurontin] 300 mg PO TID 08/03/19


 


Losartan Potassium [Cozaar] 25 mg PO DAILY 08/03/19


 


Metolazone 2.5 mg PO DAILY 08/03/19


 


Metoprolol Succinate 25 mg PO DAILY 08/03/19


 


Potassium Chloride 20 meq PO DAILY 08/03/19


 


Ranitidine [Zantac -] 150 mg PO AC 08/03/19


 


Rivaroxaban [Xarelto] 20 mg PO HS 08/03/19


 


Tamsulosin HCl [Flomax] 0.8 mg PO DAILY 08/03/19


 


Furosemide [Lasix] 40 mg PO HS 08/04/19


 


Nystatin Powder [Nystop Powder -] 1 applic TP DAILY 08/04/19


 


Acetaminophen [Tylenol .Regular 650 mg PO Q4H PRN  tablet 08/08/19





Strength -]  


 


Finasteride [Proscar -] 5 mg PO DAILY  tablet 08/08/19


 


Furosemide [Lasix -] 40 mg PO BID@0600,1400  tablet 08/08/19


 


Melatonin 5 mg PO HS PRN  tab 08/08/19


 


Metolazone [Zaroxolyn -] 2.5 mg PO DAILY  tablet 08/08/19


 


Nystatin Ointment [Mycostatin 1 applic TP BID #180 applic 08/08/19





Ointment -]  


 


Potassium Chloride [K-Dur -] 40 meq PO BID  tablet.er 08/08/19


 


Rosuvastatin [Crestor -] 5 mg PO HS #30 tablet 08/08/19








 Allergies











Allergy/AdvReac Type Severity Reaction Status Date / Time


 


cephalexin monohydrate Allergy Mild Hives Verified 11/13/19 16:58





[From Keflex]     


 


amoxicillin Allergy   Verified 11/13/19 16:58


 


ertapenem Allergy   Verified 11/13/19 16:58








REVIEW OF SYSTEMS: unable to obtain 





PHYSICAL EXAM:


GENERAL: Awake, alert, in no acute distress. 


HEAD: Normal with no signs of trauma.


LOWER EXTREMITIES: B/L les with 2+ pitting edema. B/l LEs with hyperpigmentation

, + scaling, + onychomycosis b/l feet, + xerosis R foot>L foot. Mild erythema 

RLE from ankle to mid calf. 


Vasc: Triphasic pt b/l L dp not appreciated with doppler, R dp signal biphasic. 








 Vital Signs











Temperature  97.8 F   11/26/19 09:06


 


Pulse Rate  69   11/26/19 09:06


 


Respiratory Rate  20   11/26/19 09:06


 


Blood Pressure  139/64   11/26/19 09:06


 


O2 Sat by Pulse Oximetry (%)  94 L  11/25/19 21:00








 Lab Results











WBC  12.4 K/mm3 (4.0-10.0)  H  11/25/19  08:00    


 


RBC  5.02 M/mm3 (4.00-5.60)   11/25/19  08:00    


 


Hgb  13.7 GM/dL (11.7-16.9)   11/25/19  08:00    


 


Hct  43.1 % (35.4-49)   11/25/19  08:00    


 


MCV  85.9 fl (80-96)   11/25/19  08:00    


 


MCHC  31.7 g/dl (32.0-35.9)  L  11/25/19  08:00    


 


RDW  16.2 % (11.9-15.9)  H  11/25/19  08:00    


 


Plt Count  393 K/MM3 (134-434)   11/25/19  08:00    


 


Sodium  152 mmol/L (136-145)  H  11/26/19  07:00    


 


Potassium  3.9 mmol/L (3.5-5.1)   11/26/19  07:00    


 


Chloride  124 mmol/L ()  H  11/26/19  07:00    


 


Carbon Dioxide  21 mmol/L (21-32)   11/26/19  07:00    


 


Anion Gap  7 MMOL/L (8-16)  L  11/26/19  07:00    


 


BUN  68.4 mg/dL (7-18)  H  11/26/19  07:00    


 


Creatinine  1.8 mg/dL (0.55-1.3)  H  11/26/19  07:00    


 


Random Glucose  109 mg/dL ()  H  11/26/19  07:00    


 


Calcium  8.4 mg/dL (8.5-10.1)  L  11/26/19  07:00    


 


Blood Type  O POSITIVE   11/13/19  18:00    


 


Antibody Screen  Positive   11/13/19  18:00    














A/P:  85 y/o man with a PMHx CAD, HTN, HLD, ASHD, CHF, Afib (on Xarelto) s/p PPM

/defibrillator, lymphadema/venous stasis brought in from home by his wife for 

increasing LE erythema and edema to bilateral lower extremities. Pt does not 

respond clearly to questioning (?dementia).





B/L Les with hyperpigmentation/venous stasis changes, no open wounds. 


Mild cellulitis RLE. 








-LE elevation while at rest


-Agree with Ammonium lactate BID to tx xerosis b/l les


-Remainder of care per podiatry


-Abx per primary team








d/w attending Dr Keith

## 2019-11-26 NOTE — PN
Progress Note, Physician


Chief Complaint: 





Cellulitis


AGUILA


Afib


History of Present Illness: 





NAD


Confused


Nocturnal agitation as per nursing staff even with clonazepam 2 mg


Seen by Psych yesterday





- Current Medication List


Current Medications: 


Active Medications





Acetaminophen (Tylenol -)  650 mg PO Q6H PRN


   PRN Reason: PAIN LEVEL 6-10


   Last Admin: 11/21/19 21:34 Dose:  650 mg


Acetaminophen (Tylenol Suppository -)  650 mg [place] Q6H PRN


   PRN Reason: FEVER


Clonazepam (Klonopin -)  1 mg PO BID [facility]


   Last Admin: 11/26/19 10:33 Dose:  Not Given


Finasteride (Proscar -)  5 mg PO DAILY [facility]


   Last Admin: 11/26/19 10:34 Dose:  Not Given


Gabapentin (Neurontin -)  300 mg PO TID [facility]


   Last Admin: 11/26/19 06:16 Dose:  Not Given


Amino Acids (Clinimix -)  1,000 mls @ 100 mls/hr IV Q10H [facility]


   Last Admin: 11/26/19 08:02 Dose:  Not Given


Metoprolol Succinate (Toprol Xl -)  12.5 mg PO DAILY [facility]


   Last Admin: 11/26/19 10:34 Dose:  Not Given


Olanzapine (Zyprexa -)  2.5 mg PO [facility]


   Last Admin: 11/25/19 23:46 Dose:  Not Given


Potassium Chloride (K-Dur -)  40 meq PO DAILY [facility]


   Last Admin: 11/26/19 10:33 Dose:  Not Given


Pramipexole Dihydrochloride (Mirapex -)  0.25 mg PO TID [facility]


   Last Admin: 11/26/19 06:16 Dose:  Not Given


Rivaroxaban (Xarelto)  15 mg PO DAILY@1800 [facility]


   Last Admin: 11/25/19 18:14 Dose:  Not Given


Rosuvastatin Calcium (Crestor -)  5 mg PO HS [facility]


   Last Admin: 11/25/19 23:45 Dose:  Not Given


Tamsulosin HCl (Flomax -)  0.4 mg PO DAILY@0830 [facility]


   Last Admin: 11/26/19 09:06 Dose:  Not Given











- Objective


Vital Signs: 


 Vital Signs











Temperature  97.8 F   11/26/19 09:06


 


Pulse Rate  69   11/26/19 09:06


 


Respiratory Rate  20   11/26/19 09:06


 


Blood Pressure  139/64   11/26/19 09:06


 


O2 Sat by Pulse Oximetry (%)  94 L  11/25/19 21:00











Constitutional: Yes: Well Nourished, No Distress, Calm


Cardiovascular: Yes: Regular Rate and Rhythm


Respiratory: Yes: Regular, Rhonchi (diffuse)


Gastrointestinal: Yes: Normal Bowel Sounds, Soft, Abdomen, Obese


Genitourinary: Yes: Incontinence


Musculoskeletal: Yes: Muscle Weakness


Extremities: Yes: WNL


Edema: No


Peripheral Pulses WNL: Yes


Neurological: Yes: Alert, Confusion


Psychiatric: Yes: Alert


Labs: 


 CBC, BMP





 11/25/19 08:00 





 11/26/19 07:00 











Assessment/Plan





(1) Metabolic encephalopathy


Assessment/Plan: 


-New RLL infiltrate


-Start IV abx


-ID to re-evaluate pt


-Head CT scan shows no acute evidence of intracranial hemorrhage, punctate 

chronic right base infarct, mild periventricular chronic microvascular ischemic 

changes 


-Repeat BC pending


Code(s): G93.41 - METABOLIC ENCEPHALOPATHY   





(2) AGUILA (acute kidney injury)


Assessment/Plan: 


-Renal on board


-monitor renal function


Code(s): N17.9 - ACUTE KIDNEY FAILURE, UNSPECIFIED   





(3) Anxiety


Assessment/Plan: 


-Clonazepam 2 mg po BID


-Psychiatry consult


-Add Olanzapine 7.5 mg po HS


Code(s): F41.9 - ANXIETY DISORDER, UNSPECIFIED   





(4) Atrial fibrillation


Assessment/Plan: 


-Xarelto and Metoprolol


Code(s): I48.91 - UNSPECIFIED ATRIAL FIBRILLATION   


Qualifiers: 


   Atrial fibrillation type: chronic 





(5) Cellulitis


Assessment/Plan: 


-ID on board


-leukocytosis


-afebrile


-wound culture positive


-contact precaution


-BC neg


-Vascular US B/L LE neg for DVT


-Seen by Vascular surgery as well


Code(s): L03.90 - CELLULITIS, UNSPECIFIED   


Qualifiers: 


   Site of cellulitis: extremity   Site of cellulitis of extremity: lower 

extremity 





(6) CHF (congestive heart failure)


Assessment/Plan: 


-daily weights


-strict I&Os


-fluid restriction


Code(s): I50.9 - HEART FAILURE, UNSPECIFIED   


Qualifiers: 


   Heart failure type: diastolic   Heart failure chronicity: acute on chronic   

Qualified Code(s): I50.33 - Acute on chronic diastolic (congestive) heart 

failure   





(7) Hypertension


Assessment/Plan: 


-monitor BP


-low Na diet


Code(s): I10 - ESSENTIAL (PRIMARY) HYPERTENSION   


Qualifiers: 


   Hypertension type: essential hypertension   Qualified Code(s): I10 - 

Essential (primary) hypertension   





(8) Urinary retention due to benign prostatic hyperplasia


Assessment/Plan: 


-Finasteride


-Bladder/Renal US both kidneys appear unremarkable, significantly unlarged 

prostate with a volume 80cc, adequately distended urinary bladder without wall 

thickening, bilateral ureteral jets


-Urology consult 


-Tamsulosin 0.4 mg po daily


Code(s): N40.1 - BENIGN PROSTATIC HYPERPLASIA WITH LOWER URINARY TRACT SYMP; 

R33.8 - OTHER RETENTION OF URINE   





 Assessment/Plan 





see problem list 


dvt ppx


Spoke to wife over the phone

## 2019-11-27 NOTE — PN
Progress Note, Physician


History of Present Illness: 





More interactive today.








- Current Medication List


Current Medications: 


Active Medications





Acetaminophen (Tylenol -)  650 mg PO Q6H PRN


   PRN Reason: PAIN LEVEL 6-10


   Last Admin: 11/21/19 21:34 Dose:  650 mg


Acetaminophen (Tylenol Suppository -)  650 mg [place] Q6H PRN


   PRN Reason: FEVER


Clonazepam (Klonopin -)  1 mg PO BID [facility]


   Last Admin: 11/27/19 09:37 Dose:  Not Given


Finasteride (Proscar -)  5 mg PO DAILY [facility]


   Last Admin: 11/27/19 09:37 Dose:  Not Given


Gabapentin (Neurontin -)  300 mg PO TID [facility]


   Last Admin: 11/27/19 06:23 Dose:  Not Given


Amino Acids (Clinimix -)  1,000 mls @ 100 mls/hr IV Q10H [facility]


   Last Admin: 11/27/19 04:17 Dose:  100 mls/hr


Vancomycin HCl (Vancomycin (Pre-Docked))  1,000 mg in 250 mls @ 200 mls/hr IVPB 

Q24H [facility]; Protocol


Aztreonam 1 gm/ Dextrose  50 mls @ 100 mls/hr IVPB Q12H [facility]; Protocol


   Last Admin: 11/27/19 04:17 Dose:  100 mls/hr


Dextrose (D5w -)  1,000 mls @ 42 mls/hr IV Q24H [facility]


   Last Admin: 11/26/19 18:42 Dose:  42 mls/hr


Metoprolol Succinate (Toprol Xl -)  12.5 mg PO DAILY [facility]


   Last Admin: 11/27/19 09:38 Dose:  Not Given


Olanzapine (Zyprexa -)  2.5 mg PO HS [facility]


   Last Admin: 11/26/19 22:15 Dose:  Not Given


Potassium Chloride (K-Dur -)  40 meq PO DAILY [facility]


   Last Admin: 11/27/19 09:37 Dose:  Not Given


Pramipexole Dihydrochloride (Mirapex -)  0.25 mg PO TID [facility]


   Last Admin: 11/27/19 06:23 Dose:  Not Given


Rivaroxaban (Xarelto)  15 mg PO DAILY@1800 [facility]


   Last Admin: 11/26/19 17:31 Dose:  Not Given


Rosuvastatin Calcium (Crestor -)  5 mg PO HS [facility]


   Last Admin: 11/26/19 22:15 Dose:  Not Given


Tamsulosin HCl (Flomax -)  0.4 mg PO DAILY@0830 [facility]


   Last Admin: 11/27/19 09:37 Dose:  Not Given











- Objective


Vital Signs: 


 Vital Signs











Temperature  99.6 F   11/27/19 04:59


 


Pulse Rate  78   11/27/19 04:59


 


Respiratory Rate  18   11/27/19 04:59


 


Blood Pressure  142/61   11/27/19 04:59


 


O2 Sat by Pulse Oximetry (%)  95   11/26/19 21:00











Constitutional: Yes: No Distress, Calm


Neck: Yes: Supple


Cardiovascular: Yes: Regular Rate and Rhythm


Respiratory: Yes: Regular, Diminished


Gastrointestinal: Yes: Soft, Hypoactive Bowel Sounds


Extremities: Yes: Erythema


Edema: Yes


Edema: LLE: Trace, RLE: Trace


Labs: 


 CBC, BMP





 11/27/19 07:10 





 11/27/19 07:10 











Problem List





- Problems


(1) Allergy to multiple antibiotics


Code(s): Z88.1 - ALLERGY STATUS TO OTHER ANTIBIOTIC AGENTS STATUS   





(2) Anxiety


Code(s): F41.9 - ANXIETY DISORDER, UNSPECIFIED   





(3) Atrial fibrillation


Code(s): I48.91 - UNSPECIFIED ATRIAL FIBRILLATION   


Qualifiers: 


   Atrial fibrillation type: chronic 





(4) Cellulitis


Code(s): L03.90 - CELLULITIS, UNSPECIFIED   


Qualifiers: 


   Site of cellulitis: extremity   Site of cellulitis of extremity: lower 

extremity 





(5) Chronic anticoagulation


Code(s): Z79.01 - LONG TERM (CURRENT) USE OF ANTICOAGULANTS   





(6) Hypertension


Code(s): I10 - ESSENTIAL (PRIMARY) HYPERTENSION   


Qualifiers: 


   Hypertension type: essential hypertension   Qualified Code(s): I10 - 

Essential (primary) hypertension   





(7) Lymphedema


Code(s): I89.0 - LYMPHEDEMA, NOT ELSEWHERE CLASSIFIED   





(8) Pacemaker


Code(s): Z95.0 - PRESENCE OF CARDIAC PACEMAKER   





Assessment/Plan


ECHO 3/2018 showed normal LV systolic function, mild AS no other sig valvular 

abnl





1. Recurrent LE cellulitis with multiple allergies to PCN and carbipenem 

improving


2. Chronic venous stasis dermatitis


3. Acute on CKD with hx of hydronephrosis/obstructive uropathy likely pre-renal 

improving


4. Chronic diastolic heart failure


5. HTN cardiomyopathy


6. Afib on Xarelto


7. sss s/p PPM


8. OSAS


9. CAD h/o demand ischemia


10. Hyperlipidemia


11. Anxiety d/o


12. BPH


13. Toxic metabolic encephelopathy





P:1. Empiric abx per C&S, wound care


2. Hold oral diuretics and zaroxolyn pending renal fxn recovery, monitor 

electrolytes, wrap legs


3. Continue Toprol XL 12.5 qd, Xarelto 15 qd,  Crestor 5 qd, hold losartan 

pending renal fxn stabilization


4. Eventual rehab then follow up in office [phone]


5. DVT and GI prophylaxis

## 2019-11-27 NOTE — PN
Progress Note, SLP





- Note


Progress Note: 


 Selected Entries











  11/23/19 11/23/19 11/23/19





  05:00 10:00 14:00


 


Breakfast  0 


 


Lunch  0 


 


Supper   


 


Temperature 98.4 F  98.0 F














  11/23/19 11/24/19 11/24/19





  22:00 00:03 01:47


 


Breakfast   


 


Lunch   


 


Supper 25% 25% 


 


Temperature   98 F














  11/24/19 11/24/19 11/24/19





  06:06 10:43 15:12


 


Breakfast  0 


 


Lunch   


 


Supper   


 


Temperature 99.2 F  100.3 F H














  11/24/19 11/24/19 11/24/19





  15:14 20:39 23:32


 


Breakfast   


 


Lunch 0  


 


Supper  25% 


 


Temperature   100.0 F H














  11/25/19 11/25/19 11/25/19





  05:50 09:00 21:00


 


Breakfast   


 


Lunch   


 


Supper   


 


Temperature 98.6 F 97.4 F L 97.6 F














  11/26/19 11/26/19 11/26/19





  06:13 09:06 15:12


 


Breakfast   


 


Lunch   


 


Supper   


 


Temperature 98.4 F 97.8 F 100.1 F H














  11/26/19 11/27/19 11/27/19





  23:26 04:59 09:00


 


Breakfast   


 


Lunch   


 


Supper   


 


Temperature 100.2 F H 99.6 F 98.2 F














  11/27/19





  11:18


 


Breakfast NPO


 


Lunch 


 


Supper 


 


Temperature 








 Laboratory Tests











  11/23/19 11/24/19 11/25/19





  06:55 08:13 08:00


 


WBC  10.1 H  11.0 H  12.4 H














  11/27/19





  07:10


 


WBC  11.7 H











11/21-Accepted nectar on tsp and cup, initially with brisk swallow but with 

delayed cough, sounded congested. Aspiration?


 REC- downgrade to honey thick liquid


Encourage magic cup,ensure pudding.


11/25  CXR SHOWS NEW RLL INFILTRATE


Made NPO as of 11/25





Pt was alert and talking/screaming earlier, per pt's wife.


Lethargic, needing repeat stimulation. Oral holding,no swallow.





High risk of aspiration at present.


Continue NPO for now due to congestion/infiltrate





Pt's wishes regarding TF? PEG? 


If improves, trial puree/no liquids PO?

## 2019-11-27 NOTE — PN
Progress Note, Physician


Chief Complaint: 





Cellulitis


AGUILA


Afib


History of Present Illness: 





NAD


Confused, lethargy improved from yesterday


Na improved


partner at bedside


restart on IV abx for possible aspiration pne


Is NPO for now





- Current Medication List


Current Medications: 


Active Medications





Acetaminophen (Tylenol -)  650 mg PO Q6H PRN


   PRN Reason: PAIN LEVEL 6-10


   Last Admin: 11/21/19 21:34 Dose:  650 mg


Acetaminophen (Tylenol Suppository -)  650 mg [place] Q6H PRN


   PRN Reason: FEVER


Clonazepam (Klonopin -)  1 mg PO BID Replaced by [facility]


   Last Admin: 11/27/19 09:37 Dose:  Not Given


Finasteride (Proscar -)  5 mg PO DAILY Replaced by [facility]


   Last Admin: 11/27/19 09:37 Dose:  Not Given


Gabapentin (Neurontin -)  300 mg PO TID Replaced by [facility]


   Last Admin: 11/27/19 06:23 Dose:  Not Given


Amino Acids (Clinimix -)  1,000 mls @ 100 mls/hr IV Q10H Replaced by [facility]


   Last Admin: 11/27/19 04:17 Dose:  100 mls/hr


Vancomycin HCl (Vancomycin (Pre-Docked))  1,000 mg in 250 mls @ 200 mls/hr IVPB 

Q24H [facility]; Protocol


Aztreonam 1 gm/ Dextrose  50 mls @ 100 mls/hr IVPB Q12H [facility]; Protocol


   Last Admin: 11/27/19 04:17 Dose:  100 mls/hr


Dextrose (D5w -)  1,000 mls @ 42 mls/hr IV Q24H [facility]


   Last Admin: 11/26/19 18:42 Dose:  42 mls/hr


Metoprolol Succinate (Toprol Xl -)  12.5 mg PO DAILY Replaced by [facility]


   Last Admin: 11/27/19 09:38 Dose:  Not Given


Olanzapine (Zyprexa -)  2.5 mg PO HS Replaced by [facility]


   Last Admin: 11/26/19 22:15 Dose:  Not Given


Potassium Chloride (K-Dur -)  40 meq PO DAILY Replaced by [facility]


   Last Admin: 11/27/19 09:37 Dose:  Not Given


Pramipexole Dihydrochloride (Mirapex -)  0.25 mg PO TID Replaced by [facility]


   Last Admin: 11/27/19 06:23 Dose:  Not Given


Rivaroxaban (Xarelto)  15 mg PO DAILY@1800 [facility]


   Last Admin: 11/26/19 17:31 Dose:  Not Given


Rosuvastatin Calcium (Crestor -)  5 mg PO HS Replaced by [facility]


   Last Admin: 11/26/19 22:15 Dose:  Not Given


Tamsulosin HCl (Flomax -)  0.4 mg PO DAILY@0830 Replaced by [facility]


   Last Admin: 11/27/19 09:37 Dose:  Not Given











- Objective


Vital Signs: 


 Vital Signs











Temperature  99.6 F   11/27/19 04:59


 


Pulse Rate  78   11/27/19 04:59


 


Respiratory Rate  18   11/27/19 04:59


 


Blood Pressure  142/61   11/27/19 04:59


 


O2 Sat by Pulse Oximetry (%)  95   11/26/19 21:00











Constitutional: Yes: Well Nourished, No Distress, Calm, Obese


Cardiovascular: Yes: Regular Rate and Rhythm


Respiratory: Yes: Regular


Gastrointestinal: Yes: WNL, Normal Bowel Sounds, Soft, Abdomen, Obese


Genitourinary: Yes: Incontinence


Musculoskeletal: Yes: Muscle Weakness


Extremities: Yes: WNL


Edema: No


Peripheral Pulses WNL: Yes


Neurological: Yes: Confusion


Labs: 


 CBC, BMP





 11/27/19 07:10 





 11/27/19 07:10 











Assessment/Plan





(1) Metabolic encephalopathy


Assessment/Plan: 


-New RLL infiltrate


-Started on IV abx


-ID on board


-Head CT scan shows no acute evidence of intracranial hemorrhage, punctate 

chronic right base infarct, mild periventricular chronic microvascular ischemic 

changes 


-Repeat BC pending


Code(s): G93.41 - METABOLIC ENCEPHALOPATHY   





(2) AGUILA (acute kidney injury)


Assessment/Plan: 


-Renal on board


-monitor renal function


Code(s): N17.9 - ACUTE KIDNEY FAILURE, UNSPECIFIED   





(3) Anxiety


Assessment/Plan: 


-Clonazepam 2 mg po BID-on Hold


-Psychiatry consult


-Add Olanzapine 7.5 mg po HS-on Hold


Code(s): F41.9 - ANXIETY DISORDER, UNSPECIFIED   





(4) Atrial fibrillation


Assessment/Plan: 


-Xarelto and Metoprolol


Code(s): I48.91 - UNSPECIFIED ATRIAL FIBRILLATION   


Qualifiers: 


   Atrial fibrillation type: chronic 





(5) Cellulitis


Assessment/Plan: 


-ID on board


-leukocytosis


-afebrile


-wound culture positive


-contact precaution


-BC neg


-Vascular US B/L LE neg for DVT


-Seen by Vascular surgery as well


Code(s): L03.90 - CELLULITIS, UNSPECIFIED   


Qualifiers: 


   Site of cellulitis: extremity   Site of cellulitis of extremity: lower 

extremity 





(6) CHF (congestive heart failure)


Assessment/Plan: 


-daily weights


-strict I&Os


-fluid restriction


Code(s): I50.9 - HEART FAILURE, UNSPECIFIED   


Qualifiers: 


   Heart failure type: diastolic   Heart failure chronicity: acute on chronic   

Qualified Code(s): I50.33 - Acute on chronic diastolic (congestive) heart 

failure   





(7) Hypertension


Assessment/Plan: 


-monitor BP


-low Na diet


Code(s): I10 - ESSENTIAL (PRIMARY) HYPERTENSION   


Qualifiers: 


   Hypertension type: essential hypertension   Qualified Code(s): I10 - 

Essential (primary) hypertension   





(8) Urinary retention due to benign prostatic hyperplasia


Assessment/Plan: 


-Finasteride


-Bladder/Renal US both kidneys appear unremarkable, significantly unlarged 

prostate with a volume 80cc, adequately distended urinary bladder without wall 

thickening, bilateral ureteral jets


-Urology consult 


-Tamsulosin 0.4 mg po daily


Code(s): N40.1 - BENIGN PROSTATIC HYPERPLASIA WITH LOWER URINARY TRACT SYMP; 

R33.8 - OTHER RETENTION OF URINE   





 Assessment/Plan 





see problem list 


dvt ppx


Spoke to partner at bedside

## 2019-11-27 NOTE — PN
Progress Note, Physician


History of Present Illness: 





Pt seen and examined at bedside. He is awake but remains confused. 





- Current Medication List


Current Medications: 


Active Medications





Acetaminophen (Tylenol -)  650 mg PO Q6H PRN


   PRN Reason: PAIN LEVEL 6-10


   Last Admin: 11/21/19 21:34 Dose:  650 mg


Acetaminophen (Tylenol Suppository -)  650 mg [place] Q6H PRN


   PRN Reason: FEVER


Clonazepam (Klonopin -)  1 mg PO BID [facility]


   Last Admin: 11/27/19 09:37 Dose:  Not Given


Finasteride (Proscar -)  5 mg PO DAILY [facility]


   Last Admin: 11/27/19 09:37 Dose:  Not Given


Gabapentin (Neurontin -)  300 mg PO TID [facility]


   Last Admin: 11/27/19 06:23 Dose:  Not Given


Amino Acids (Clinimix -)  1,000 mls @ 100 mls/hr IV Q10H [facility]


   Last Admin: 11/27/19 04:17 Dose:  100 mls/hr


Vancomycin HCl (Vancomycin (Pre-Docked))  1,000 mg in 250 mls @ 200 mls/hr IVPB 

Q24H [facility]; Protocol


Aztreonam 1 gm/ Dextrose  50 mls @ 100 mls/hr IVPB Q12H [facility]; Protocol


   Last Admin: 11/27/19 04:17 Dose:  100 mls/hr


Dextrose (D5w -)  1,000 mls @ 42 mls/hr IV Q24H [facility]


   Last Admin: 11/26/19 18:42 Dose:  42 mls/hr


Metoprolol Succinate (Toprol Xl -)  12.5 mg PO DAILY [facility]


   Last Admin: 11/27/19 09:38 Dose:  Not Given


Olanzapine (Zyprexa -)  2.5 mg PO HS [facility]


   Last Admin: 11/26/19 22:15 Dose:  Not Given


Potassium Chloride (K-Dur -)  40 meq PO DAILY [facility]


   Last Admin: 11/27/19 09:37 Dose:  Not Given


Pramipexole Dihydrochloride (Mirapex -)  0.25 mg PO TID [facility]


   Last Admin: 11/27/19 06:23 Dose:  Not Given


Rivaroxaban (Xarelto)  15 mg PO DAILY@1800 [facility]


   Last Admin: 11/26/19 17:31 Dose:  Not Given


Rosuvastatin Calcium (Crestor -)  5 mg PO HS [facility]


   Last Admin: 11/26/19 22:15 Dose:  Not Given


Tamsulosin HCl (Flomax -)  0.4 mg PO DAILY@0830 [facility]


   Last Admin: 11/27/19 09:37 Dose:  Not Given











- Objective


Vital Signs: 


 Vital Signs











Temperature  99.6 F   11/27/19 04:59


 


Pulse Rate  78   11/27/19 04:59


 


Respiratory Rate  18   11/27/19 04:59


 


Blood Pressure  142/61   11/27/19 04:59


 


O2 Sat by Pulse Oximetry (%)  95   11/26/19 21:00











Constitutional: Yes: Calm


Eyes: Yes: Conjunctiva Clear


HENT: Yes: Atraumatic


Neck: Yes: Supple


Cardiovascular: Yes: S1, S2


Respiratory: Yes: CTA Bilaterally


Gastrointestinal: Yes: Soft, Abdomen, Obese


Genitourinary: Yes: Incontinence


Edema: Yes


Edema: LLE: 1+, RLE: 1+


Neurological: Yes: Confusion


Labs: 


 CBC, BMP





 11/27/19 07:10 





 11/27/19 07:10 











Problem List





- Problems


(1) AGUILA (acute kidney injury)


Code(s): N17.9 - ACUTE KIDNEY FAILURE, UNSPECIFIED   





(2) CHF (congestive heart failure)


Code(s): I50.9 - HEART FAILURE, UNSPECIFIED   


Qualifiers: 


   Heart failure type: diastolic   Heart failure chronicity: chronic   

Qualified Code(s): I50.32 - Chronic diastolic (congestive) heart failure   





(3) Cellulitis


Code(s): L03.90 - CELLULITIS, UNSPECIFIED   


Qualifiers: 


   Site of cellulitis: extremity   Site of cellulitis of extremity: lower 

extremity 





Assessment/Plan


 Current Medications











Generic Name Dose Route Start Last Admin





  Trade Name Freq  PRN Reason Stop Dose Admin


 


Acetaminophen  650 mg  11/14/19 04:07  11/21/19 21:34





  Tylenol -  PO   650 mg





  Q6H PRN   Administration





  PAIN LEVEL 6-10   





     





     





     


 


Acetaminophen  650 mg  11/25/19 16:13  





  Tylenol Suppository -  [place]   





  Q6H PRN   





  FEVER   





     





     





     


 


Clonazepam  1 mg  11/25/19 22:00  11/27/19 09:37





  Klonopin -  PO   Not Given





  BID [facility]   





     





     





     





     


 


Finasteride  5 mg  11/15/19 10:00  11/27/19 09:37





  Proscar -  PO   Not Given





  DAILY [facility]   





     





     





     





     


 


Gabapentin  300 mg  11/21/19 22:00  11/27/19 06:23





  Neurontin -  PO   Not Given





  TID [facility]   





     





     





     





     


 


Amino Acids  1,000 mls @ 100 mls/hr  11/25/19 11:30  11/27/19 04:17





  Clinimix -  IV   100 mls/hr





  Q10H [facility]   Administration





     





     





     





     


 


Vancomycin HCl  1,000 mg in 250 mls @ 200 mls/hr  11/27/19 16:00  





  Vancomycin (Pre-Docked)  IVPB   





  Q24H [facility]   





     





     





  Protocol   





     


 


Aztreonam 1 gm/ Dextrose  50 mls @ 100 mls/hr  11/26/19 17:00  11/27/19 04:17





  IVPB   100 mls/hr





  Q12H [facility]   Administration





     





     





  Protocol   





     


 


Dextrose  1,000 mls @ 42 mls/hr  11/26/19 18:15  11/26/19 18:42





  D5w -  IV   42 mls/hr





  Q24H [facility]   Administration





     





     





     





     


 


Metoprolol Succinate  12.5 mg  11/18/19 10:00  11/27/19 09:38





  Toprol Xl -  PO   Not Given





  DAILY [facility]   





     





     





     





     


 


Olanzapine  2.5 mg  11/21/19 22:00  11/26/19 22:15





  Zyprexa -  PO   Not Given





  HS [facility]   





     





     





     





     


 


Potassium Chloride  40 meq  11/16/19 17:49  11/27/19 09:37





  K-Dur -  PO   Not Given





  DAILY [facility]   





     





     





     





     


 


Pramipexole Dihydrochloride  0.25 mg  11/21/19 20:00  11/27/19 06:23





  Mirapex -  PO   Not Given





  TID [facility]   





     





     





     





     


 


Rivaroxaban  15 mg  11/14/19 18:00  11/26/19 17:31





  Xarelto  PO   Not Given





  DAILY@1800 [facility]   





     





     





     





     


 


Rosuvastatin Calcium  5 mg  11/18/19 22:00  11/26/19 22:15





  Crestor -  PO   Not Given





  HS [facility]   





     





     





     





     


 


Tamsulosin HCl  0.4 mg  11/17/19 08:30  11/27/19 09:37





  Flomax -  PO   Not Given





  DAILY@0830 [facility]   





     





     





     





     

















Impression


1. CKD


2. hx of hydronephrosis


3. hx of obstructive uropathy


4. CHF


5. HTN


6. AGUILA


7. a-fib


8. hypernatreamia





Plan


- renal function improving


- sodium improving


- monitor mental status


- cont clinimix


- cont free water


- likely in part pre-renal aguila

## 2019-11-27 NOTE — PN
Progress Note, SLP





- Note


Progress Note: 





 Selected Entries











  11/21/19 11/21/19





  06:00 09:40


 


Breakfast  25%


 


Temperature 99.4 F 








 Laboratory Tests











  11/18/19 11/19/19 11/20/19





  07:05 07:10 07:35


 


WBC  13.1 H  11.6 H  11.1 H








Pt with limited cognitive improvement, speaking, calling out. Follows commands.

  Unable to open eyes but briefly. No assymetry.


CT head 11/13.


Accepted nectar on tsp and cup, initially with brisk swallow but with delayed 

cough, sounded congested. Aspiration?


Discussed with PNP- considering neuro eval.





REC- downgrade to honey thick liquid


Encourage magic cup,ensure pudding.





Made NPO as of 11/25

## 2019-11-28 NOTE — PN
Progress Note, Physician


History of Present Illness: 





More interactive today. Odynophagia, high risk of aspiration.








- Current Medication List


Current Medications: 


Active Medications





Acetaminophen (Tylenol -)  650 mg PO Q6H PRN


   PRN Reason: PAIN LEVEL 6-10


   Last Admin: 11/21/19 21:34 Dose:  650 mg


Acetaminophen (Tylenol Suppository -)  650 mg [place] Q6H PRN


   PRN Reason: FEVER


   Last Admin: 11/28/19 15:08 Dose:  650 mg


Clonazepam (Klonopin -)  1 mg PO BID [facility]


   Last Admin: 11/28/19 10:57 Dose:  Not Given


Finasteride (Proscar -)  5 mg PO DAILY [facility]


   Last Admin: 11/28/19 10:57 Dose:  Not Given


Gabapentin (Neurontin -)  300 mg PO TID [facility]


   Last Admin: 11/28/19 13:29 Dose:  Not Given


Amino Acids (Clinimix -)  1,000 mls @ 100 mls/hr IV Q10H [facility]


   Last Admin: 11/28/19 13:28 Dose:  100 mls/hr


Vancomycin HCl (Vancomycin (Pre-Docked))  1,000 mg in 250 mls @ 200 mls/hr IVPB 

Q24H [facility]; Protocol


   Last Admin: 11/28/19 15:07 Dose:  200 mls/hr


Aztreonam 1 gm/ Dextrose  50 mls @ 100 mls/hr IVPB Q12H [facility]; Protocol


   Last Admin: 11/28/19 05:57 Dose:  100 mls/hr


Dextrose (D5w -)  1,000 mls @ 42 mls/hr IV Q24H [facility]


   Last Admin: 11/27/19 21:00 Dose:  Not Given


Metoprolol Succinate (Toprol Xl -)  12.5 mg PO DAILY [facility]


   Last Admin: 11/28/19 10:58 Dose:  Not Given


Olanzapine (Zyprexa -)  2.5 mg PO HS [facility]


   Last Admin: 11/27/19 22:52 Dose:  Not Given


Potassium Chloride (K-Dur -)  40 meq PO DAILY [facility]


   Last Admin: 11/28/19 10:57 Dose:  Not Given


Pramipexole Dihydrochloride (Mirapex -)  0.25 mg PO TID [facility]


   Last Admin: 11/28/19 13:29 Dose:  Not Given


Rivaroxaban (Xarelto)  15 mg PO DAILY@1800 [facility]


   Last Admin: 11/27/19 17:32 Dose:  Not Given


Rosuvastatin Calcium (Crestor -)  5 mg PO HS [facility]


   Last Admin: 11/27/19 22:52 Dose:  Not Given


Tamsulosin HCl (Flomax -)  0.4 mg PO DAILY@0830 [facility]


   Last Admin: 11/28/19 10:57 Dose:  Not Given











- Objective


Vital Signs: 


 Vital Signs











Temperature  98.2 F   11/28/19 08:25


 


Pulse Rate  74   11/28/19 08:25


 


Respiratory Rate  14   11/28/19 08:25


 


Blood Pressure  124/70   11/28/19 08:25


 


O2 Sat by Pulse Oximetry (%)  96   11/28/19 09:00











Constitutional: Yes: No Distress, Calm


Neck: Yes: Supple


Cardiovascular: Yes: Regular Rate and Rhythm


Respiratory: Yes: Regular, Diminished


Gastrointestinal: Yes: Soft, Hypoactive Bowel Sounds


Extremities: Yes: Erythema


Edema: Yes


Edema: LLE: Trace, RLE: Trace


Integumentary: Yes: Venous Stasis Changes


Labs: 


 CBC, BMP





 11/27/19 07:10 





 11/28/19 10:10 











Problem List





- Problems


(1) Allergy to multiple antibiotics


Code(s): Z88.1 - ALLERGY STATUS TO OTHER ANTIBIOTIC AGENTS STATUS   





(2) Anxiety


Code(s): F41.9 - ANXIETY DISORDER, UNSPECIFIED   





(3) Atrial fibrillation


Code(s): I48.91 - UNSPECIFIED ATRIAL FIBRILLATION   


Qualifiers: 


   Atrial fibrillation type: chronic 





(4) Cellulitis


Code(s): L03.90 - CELLULITIS, UNSPECIFIED   


Qualifiers: 


   Site of cellulitis: extremity   Site of cellulitis of extremity: lower 

extremity 





(5) Chronic anticoagulation


Code(s): Z79.01 - LONG TERM (CURRENT) USE OF ANTICOAGULANTS   





(6) Hypertension


Code(s): I10 - ESSENTIAL (PRIMARY) HYPERTENSION   


Qualifiers: 


   Hypertension type: essential hypertension   Qualified Code(s): I10 - 

Essential (primary) hypertension   





(7) Lymphedema


Code(s): I89.0 - LYMPHEDEMA, NOT ELSEWHERE CLASSIFIED   





(8) Pacemaker


Code(s): Z95.0 - PRESENCE OF CARDIAC PACEMAKER   





Assessment/Plan


ECHO 3/2018 showed normal LV systolic function, mild AS no other sig valvular 

abnl





1. Recurrent LE cellulitis with multiple allergies to PCN and carbipenem 

improving


2. Chronic venous stasis dermatitis


3. Acute on CKD with hx of hydronephrosis/obstructive uropathy likely pre-renal 

improving


4. Chronic diastolic heart failure


5. HTN cardiomyopathy


6. Afib on Xarelto


7. sss s/p PPM


8. OSAS


9. CAD h/o demand ischemia


10. Hyperlipidemia


11. Anxiety d/o


12. BPH


13. Toxic metabolic encephelopathy with high risk of aspiration





P:1. Empiric abx per C&S, wound care, enteral feeds via NGT


2. Hold oral diuretics and zaroxolyn pending renal fxn recovery, monitor 

electrolytes, wrap legs


3. Continue Toprol XL 12.5 qd, Xarelto 15 qd,  Crestor 5 qd, hold losartan 

pending renal fxn stabilization


4. Eventual rehab then follow up in office [phone]


5. DVT and GI prophylaxis

## 2019-11-28 NOTE — PN
Progress Note, Physician


History of Present Illness: 





Pt seen and examined at bedside. His mental status is markedly improved. He 

does not remember the last few days. 





- Current Medication List


Current Medications: 


Active Medications





Acetaminophen (Tylenol -)  650 mg PO Q6H PRN


   PRN Reason: PAIN LEVEL 6-10


   Last Admin: 11/21/19 21:34 Dose:  650 mg


Acetaminophen (Tylenol Suppository -)  650 mg [place] Q6H PRN


   PRN Reason: FEVER


   Last Admin: 11/28/19 15:08 Dose:  650 mg


Clonazepam (Klonopin -)  1 mg PO BID [facility]


   Last Admin: 11/28/19 10:57 Dose:  Not Given


Finasteride (Proscar -)  5 mg PO DAILY [facility]


   Last Admin: 11/28/19 10:57 Dose:  Not Given


Gabapentin (Neurontin -)  300 mg PO TID [facility]


   Last Admin: 11/28/19 13:29 Dose:  Not Given


Amino Acids (Clinimix -)  1,000 mls @ 100 mls/hr IV Q10H [facility]


   Last Admin: 11/28/19 13:28 Dose:  100 mls/hr


Vancomycin HCl (Vancomycin (Pre-Docked))  1,000 mg in 250 mls @ 200 mls/hr IVPB 

Q24H [facility]; Protocol


   Last Admin: 11/28/19 15:07 Dose:  200 mls/hr


Aztreonam 1 gm/ Dextrose  50 mls @ 100 mls/hr IVPB Q12H [facility]; Protocol


   Last Admin: 11/28/19 17:23 Dose:  100 mls/hr


Dextrose (D5w -)  1,000 mls @ 42 mls/hr IV Q24H [facility]


   Last Admin: 11/27/19 21:00 Dose:  Not Given


Metoprolol Succinate (Toprol Xl -)  12.5 mg PO DAILY [facility]


   Last Admin: 11/28/19 10:58 Dose:  Not Given


Olanzapine (Zyprexa -)  2.5 mg PO HS [facility]


   Last Admin: 11/27/19 22:52 Dose:  Not Given


Potassium Chloride (K-Dur -)  40 meq PO DAILY [facility]


   Last Admin: 11/28/19 10:57 Dose:  Not Given


Pramipexole Dihydrochloride (Mirapex -)  0.25 mg PO TID [facility]


   Last Admin: 11/28/19 13:29 Dose:  Not Given


Rivaroxaban (Xarelto)  15 mg PO DAILY@1800 [facility]


   Last Admin: 11/28/19 17:05 Dose:  Not Given


Rosuvastatin Calcium (Crestor -)  5 mg PO HS [facility]


   Last Admin: 11/27/19 22:52 Dose:  Not Given


Tamsulosin HCl (Flomax -)  0.4 mg PO DAILY@0830 [facility]


   Last Admin: 11/28/19 10:57 Dose:  Not Given











- Objective


Vital Signs: 


 Vital Signs











Temperature  98.2 F   11/28/19 08:25


 


Pulse Rate  74   11/28/19 08:25


 


Respiratory Rate  14   11/28/19 08:25


 


Blood Pressure  124/70   11/28/19 08:25


 


O2 Sat by Pulse Oximetry (%)  96   11/28/19 09:00











Constitutional: Yes: Calm


Eyes: Yes: Conjunctiva Clear


HENT: Yes: Atraumatic


Neck: Yes: Supple


Cardiovascular: Yes: S1, S2


Respiratory: Yes: On Nasal O2


Gastrointestinal: Yes: Soft, Abdomen, Obese


Genitourinary: Yes: Incontinence


Edema: Yes


Edema: LLE: 1+, RLE: 1+


Integumentary: Yes: Venous Stasis Changes


Neurological: Yes: Other (more oriented however not back to baseline)


Labs: 


 CBC, BMP





 11/27/19 07:10 





 11/28/19 10:10 











Problem List





- Problems


(1) AGUILA (acute kidney injury)


Code(s): N17.9 - ACUTE KIDNEY FAILURE, UNSPECIFIED   





(2) CHF (congestive heart failure)


Code(s): I50.9 - HEART FAILURE, UNSPECIFIED   


Qualifiers: 


   Heart failure type: diastolic   Heart failure chronicity: chronic   

Qualified Code(s): I50.32 - Chronic diastolic (congestive) heart failure   





(3) Cellulitis


Code(s): L03.90 - CELLULITIS, UNSPECIFIED   


Qualifiers: 


   Site of cellulitis: extremity   Site of cellulitis of extremity: lower 

extremity 





Assessment/Plan


 Current Medications











Generic Name Dose Route Start Last Admin





  Trade Name Freq  PRN Reason Stop Dose Admin


 


Acetaminophen  650 mg  11/14/19 04:07  11/21/19 21:34





  Tylenol -  PO   650 mg





  Q6H PRN   Administration





  PAIN LEVEL 6-10   





     





     





     


 


Acetaminophen  650 mg  11/25/19 16:13  11/28/19 15:08





  Tylenol Suppository -  AR   650 mg





  Q6H PRN   Administration





  FEVER   





     





     





     


 


Clonazepam  1 mg  11/25/19 22:00  11/28/19 10:57





  Klonopin -  PO   Not Given





  BID [facility]   





     





     





     





     


 


Finasteride  5 mg  11/15/19 10:00  11/28/19 10:57





  Proscar -  PO   Not Given





  DAILY [facility]   





     





     





     





     


 


Gabapentin  300 mg  11/21/19 22:00  11/28/19 13:29





  Neurontin -  PO   Not Given





  TID [facility]   





     





     





     





     


 


Amino Acids  1,000 mls @ 100 mls/hr  11/25/19 11:30  11/28/19 13:28





  Clinimix -  IV   100 mls/hr





  Q10H [facility]   Administration





     





     





     





     


 


Vancomycin HCl  1,000 mg in 250 mls @ 200 mls/hr  11/27/19 16:00  11/28/19 15:07





  Vancomycin (Pre-Docked)  IVPB   200 mls/hr





  Q24H [facility]   Administration





     





     





  Protocol   





     


 


Aztreonam 1 gm/ Dextrose  50 mls @ 100 mls/hr  11/26/19 17:00  11/28/19 17:23





  IVPB   100 mls/hr





  Q12H [facility]   Administration





     





     





  Protocol   





     


 


Dextrose  1,000 mls @ 42 mls/hr  11/26/19 18:15  11/27/19 21:00





  D5w -  IV   Not Given





  Q24H [facility]   





     





     





     





     


 


Metoprolol Succinate  12.5 mg  11/18/19 10:00  11/28/19 10:58





  Toprol Xl -  PO   Not Given





  DAILY [facility]   





     





     





     





     


 


Olanzapine  2.5 mg  11/21/19 22:00  11/27/19 22:52





  Zyprexa -  PO   Not Given





  HS [facility]   





     





     





     





     


 


Potassium Chloride  40 meq  11/16/19 17:49  11/28/19 10:57





  K-Dur -  PO   Not Given





  DAILY [facility]   





     





     





     





     


 


Pramipexole Dihydrochloride  0.25 mg  11/21/19 20:00  11/28/19 13:29





  Mirapex -  PO   Not Given





  TID [facility]   





     





     





     





     


 


Rivaroxaban  15 mg  11/14/19 18:00  11/28/19 17:05





  Xarelto  PO   Not Given





  DAILY@1800 [facility]   





     





     





     





     


 


Rosuvastatin Calcium  5 mg  11/18/19 22:00  11/27/19 22:52





  Crestor -  PO   Not Given





  HS [facility]   





     





     





     





     


 


Tamsulosin HCl  0.4 mg  11/17/19 08:30  11/28/19 10:57





  Flomax -  PO   Not Given





  DAILY@0830 [facility]   





     





     





     





     














Impression


1. CKD


2. hx of hydronephrosis


3. hx of obstructive uropathy


4. CHF


5. HTN


6. AGUILA


7. a-fib


8. hypernatreamia





Plan


- cont fluids


- repeat labs in am


- renal function continues to improve


- cont d5w


- sodium improving


- mental status improved


- likely in part pre-renal aguila

## 2019-11-28 NOTE — PN
Progress Note, Physician





- Current Medication List


Current Medications: 


Active Medications





Acetaminophen (Tylenol -)  650 mg PO Q6H PRN


   PRN Reason: PAIN LEVEL 6-10


   Last Admin: 11/21/19 21:34 Dose:  650 mg


Acetaminophen (Tylenol Suppository -)  650 mg [place] Q6H PRN


   PRN Reason: FEVER


Clonazepam (Klonopin -)  1 mg PO BID [facility]


   Last Admin: 11/27/19 22:52 Dose:  Not Given


Finasteride (Proscar -)  5 mg PO DAILY [facility]


   Last Admin: 11/27/19 09:37 Dose:  Not Given


Gabapentin (Neurontin -)  300 mg PO TID [facility]


   Last Admin: 11/28/19 05:58 Dose:  Not Given


Amino Acids (Clinimix -)  1,000 mls @ 100 mls/hr IV Q10H [facility]


   Last Admin: 11/28/19 02:49 Dose:  100 mls/hr


Vancomycin HCl (Vancomycin (Pre-Docked))  1,000 mg in 250 mls @ 200 mls/hr IVPB 

Q24H [facility]; Protocol


   Last Admin: 11/27/19 15:08 Dose:  200 mls/hr


Aztreonam 1 gm/ Dextrose  50 mls @ 100 mls/hr IVPB Q12H [facility]; Protocol


   Last Admin: 11/28/19 05:57 Dose:  100 mls/hr


Dextrose (D5w -)  1,000 mls @ 42 mls/hr IV Q24H [facility]


   Last Admin: 11/27/19 21:00 Dose:  Not Given


Metoprolol Succinate (Toprol Xl -)  12.5 mg PO DAILY [facility]


   Last Admin: 11/27/19 09:38 Dose:  Not Given


Olanzapine (Zyprexa -)  2.5 mg PO [facility]


   Last Admin: 11/27/19 22:52 Dose:  Not Given


Potassium Chloride (K-Dur -)  40 meq PO DAILY [facility]


   Last Admin: 11/27/19 09:37 Dose:  Not Given


Pramipexole Dihydrochloride (Mirapex -)  0.25 mg PO TID [facility]


   Last Admin: 11/28/19 05:49 Dose:  Not Given


Rivaroxaban (Xarelto)  15 mg PO DAILY@1800 [facility]


   Last Admin: 11/27/19 17:32 Dose:  Not Given


Rosuvastatin Calcium (Crestor -)  5 mg PO [facility]


   Last Admin: 11/27/19 22:52 Dose:  Not Given


Tamsulosin HCl (Flomax -)  0.4 mg PO DAILY@0830 [facility]


   Last Admin: 11/27/19 09:37 Dose:  Not Given











- Objective


Vital Signs: 


 Vital Signs











Temperature  98.2 F   11/28/19 08:25


 


Pulse Rate  74   11/28/19 08:25


 


Respiratory Rate  14   11/28/19 08:25


 


Blood Pressure  124/70   11/28/19 08:25


 


O2 Sat by Pulse Oximetry (%)  96   11/27/19 21:00











Cardiovascular: Yes: S1, S2


Respiratory: Yes: Regular, CTA Bilaterally


Gastrointestinal: Yes: Normal Bowel Sounds, Soft


Labs: 


 CBC, BMP





 11/27/19 07:10 











Assessment/Plan








(1) Metabolic encephalopathy


Assessment/Plan: 


-New RLL infiltrate


-Started on IV abx


-ID on board


-Head CT scan shows no acute evidence of intracranial hemorrhage, punctate 

chronic right base infarct, mild periventricular chronic microvascular ischemic 

changes 


-Repeat BC pending


Code(s): G93.41 - METABOLIC ENCEPHALOPATHY   





(2) AGUILA (acute kidney injury)


Assessment/Plan: 


-Renal on board


-monitor renal function


Code(s): N17.9 - ACUTE KIDNEY FAILURE, UNSPECIFIED   





(3) Anxiety


Assessment/Plan: 


-Clonazepam 2 mg po BID-on Hold


-Psychiatry consult


-Add Olanzapine 7.5 mg po HS-on Hold


Code(s): F41.9 - ANXIETY DISORDER, UNSPECIFIED   





(4) Atrial fibrillation


Assessment/Plan: 


-Xarelto and Metoprolol


Code(s): I48.91 - UNSPECIFIED ATRIAL FIBRILLATION   


Qualifiers: 


   Atrial fibrillation type: chronic 





(5) Cellulitis


Assessment/Plan: 


-ID on board


-leukocytosis


-afebrile


-wound culture positive


-contact precaution


-BC neg


-Vascular US B/L LE neg for DVT


-Seen by Vascular surgery as well


Code(s): L03.90 - CELLULITIS, UNSPECIFIED   


Qualifiers: 


   Site of cellulitis: extremity   Site of cellulitis of extremity: lower 

extremity 





(6) CHF (congestive heart failure)


Assessment/Plan: 


-daily weights


-strict I&Os


-fluid restriction


Code(s): I50.9 - HEART FAILURE, UNSPECIFIED   


Qualifiers: 


   Heart failure type: diastolic   Heart failure chronicity: acute on chronic   

Qualified Code(s): I50.33 - Acute on chronic diastolic (congestive) heart 

failure   





(7) Hypertension


Assessment/Plan: 


-monitor BP


-low Na diet


Code(s): I10 - ESSENTIAL (PRIMARY) HYPERTENSION   


Qualifiers: 


   Hypertension type: essential hypertension   Qualified Code(s): I10 - 

Essential (primary) hypertension   





(8) Urinary retention due to benign prostatic hyperplasia


Assessment/Plan: 


-Finasteride


-Bladder/Renal US both kidneys appear unremarkable, significantly unlarged 

prostate with a volume 80cc, adequately distended urinary bladder without wall 

thickening, bilateral ureteral jets


-Urology consult 


-Tamsulosin 0.4 mg po daily


Code(s): N40.1 - BENIGN PROSTATIC HYPERPLASIA WITH LOWER URINARY TRACT SYMP; 

R33.8 - OTHER RETENTION OF URINE

## 2019-11-29 NOTE — PN
Progress Note, Physician





- Current Medication List


Current Medications: 


Active Medications





Acetaminophen (Tylenol -)  650 mg PO Q6H PRN


   PRN Reason: PAIN LEVEL 6-10


   Last Admin: 11/21/19 21:34 Dose:  650 mg


Acetaminophen (Tylenol Suppository -)  650 mg [place] Q6H PRN


   PRN Reason: FEVER


   Last Admin: 11/28/19 15:08 Dose:  650 mg


Clonazepam (Klonopin -)  1 mg PO BID [facility]


   Last Admin: 11/28/19 21:11 Dose:  Not Given


Finasteride (Proscar -)  5 mg PO DAILY [facility]


   Last Admin: 11/28/19 10:57 Dose:  Not Given


Gabapentin (Neurontin -)  300 mg PO TID [facility]


   Last Admin: 11/29/19 05:13 Dose:  Not Given


Amino Acids (Clinimix -)  1,000 mls @ 100 mls/hr IV Q10H [facility]


   Last Admin: 11/29/19 04:56 Dose:  Not Given


Vancomycin HCl (Vancomycin (Pre-Docked))  1,000 mg in 250 mls @ 200 mls/hr IVPB 

Q24H [facility]; Protocol


   Last Admin: 11/28/19 15:07 Dose:  200 mls/hr


Aztreonam 1 gm/ Dextrose  50 mls @ 100 mls/hr IVPB Q12H [facility]; Protocol


   Last Admin: 11/29/19 04:55 Dose:  100 mls/hr


Dextrose (D5w -)  1,000 mls @ 42 mls/hr IV Q24H [facility]


   Last Admin: 11/29/19 04:51 Dose:  42 mls/hr


Metoprolol Succinate (Toprol Xl -)  12.5 mg PO DAILY [facility]


   Last Admin: 11/28/19 10:58 Dose:  Not Given


Olanzapine (Zyprexa -)  2.5 mg PO HS [facility]


   Last Admin: 11/28/19 21:11 Dose:  Not Given


Potassium Chloride (K-Dur -)  40 meq PO DAILY [facility]


   Last Admin: 11/28/19 10:57 Dose:  Not Given


Pramipexole Dihydrochloride (Mirapex -)  0.25 mg PO TID [facility]


   Last Admin: 11/29/19 05:13 Dose:  Not Given


Rivaroxaban (Xarelto)  15 mg PO DAILY@1800 [facility]


   Last Admin: 11/28/19 17:05 Dose:  Not Given


Rosuvastatin Calcium (Crestor -)  5 mg PO HS [facility]


   Last Admin: 11/28/19 21:11 Dose:  Not Given


Tamsulosin HCl (Flomax -)  0.4 mg PO DAILY@0830 [facility]


   Last Admin: 11/28/19 10:57 Dose:  Not Given











- Objective


Vital Signs: 


 Vital Signs











Temperature  98.9 F   11/29/19 06:07


 


Pulse Rate  72   11/29/19 06:07


 


Respiratory Rate  20   11/29/19 06:07


 


Blood Pressure  127/70   11/29/19 06:07


 


O2 Sat by Pulse Oximetry (%)  96   11/28/19 21:00











Labs: 


 CBC, BMP





 11/27/19 07:10 





 11/29/19 07:55 











Problem List





- Problems


(1) Metabolic encephalopathy


Code(s): G93.41 - METABOLIC ENCEPHALOPATHY   





(2) AGUILA (acute kidney injury)


Code(s): N17.9 - ACUTE KIDNEY FAILURE, UNSPECIFIED   





(3) Anxiety


Code(s): F41.9 - ANXIETY DISORDER, UNSPECIFIED   





(4) Atrial fibrillation


Code(s): I48.91 - UNSPECIFIED ATRIAL FIBRILLATION   


Qualifiers: 


   Atrial fibrillation type: chronic 





(5) Cellulitis


Code(s): L03.90 - CELLULITIS, UNSPECIFIED   


Qualifiers: 


   Site of cellulitis: extremity   Site of cellulitis of extremity: lower 

extremity 





(6) CHF (congestive heart failure)


Code(s): I50.9 - HEART FAILURE, UNSPECIFIED   


Qualifiers: 


   Heart failure type: diastolic   Heart failure chronicity: acute on chronic   

Qualified Code(s): I50.33 - Acute on chronic diastolic (congestive) heart 

failure   





(7) Hypertension


Code(s): I10 - ESSENTIAL (PRIMARY) HYPERTENSION   


Qualifiers: 


   Hypertension type: essential hypertension   Qualified Code(s): I10 - 

Essential (primary) hypertension   





(8) Urinary retention due to benign prostatic hyperplasia


Code(s): N40.1 - BENIGN PROSTATIC HYPERPLASIA WITH LOWER URINARY TRACT SYMP; 

R33.8 - OTHER RETENTION OF URINE

## 2019-11-29 NOTE — PN
Progress Note, SLP





- Note


Progress Note: 


 Selected Entries











  11/28/19 11/28/19 11/28/19





  06:10 08:25 20:00


 


Supper   


 


Temperature 97.8 F 98.2 F 99.5 F














  11/28/19 11/29/19





  22:00 06:07


 


Supper NPO 


 


Temperature 99.1 F 98.9 F








 Laboratory Tests











  11/27/19





  07:10


 


WBC  11.7 H











Pt was reportedly alert, more responsive and verbal yesterday. Today, eyes open

, slow to respond, highly distractible. Oral holding with puree, delayed swallow


low, throat clear with nectar. Cough with thin liquids.





Poor nutrition since admitted- 0-25% intake, now NPO for a few days. Swallow 

still quite weak with aspiration risk.





Consider TF? If PEG, would need abdominal binder. Consider Palliative care re 

TF.


May have potential to tolerate Puree/honey if he improves.

## 2019-11-29 NOTE — PN
Progress Note, Physician


History of Present Illness: 





More interactive today. Odynophagia, high risk of aspiration. LE cellulitis 

much improved.








- Current Medication List


Current Medications: 


Active Medications





Acetaminophen (Tylenol -)  650 mg PO Q6H PRN


   PRN Reason: PAIN LEVEL 6-10


   Last Admin: 11/21/19 21:34 Dose:  650 mg


Acetaminophen (Tylenol Suppository -)  650 mg IN Q6H PRN


   PRN Reason: FEVER


   Last Admin: 11/28/19 15:08 Dose:  650 mg


Clonazepam (Klonopin -)  1 mg PO BID [facility]


   Last Admin: 11/29/19 12:30 Dose:  Not Given


Finasteride (Proscar -)  5 mg PO DAILY [facility]


   Last Admin: 11/29/19 12:30 Dose:  Not Given


Gabapentin (Neurontin -)  300 mg PO TID [facility]


   Last Admin: 11/29/19 05:13 Dose:  Not Given


Amino Acids (Clinimix -)  1,000 mls @ 100 mls/hr IV Q10H [facility]


   Last Admin: 11/29/19 04:56 Dose:  Not Given


Vancomycin HCl (Vancomycin (Pre-Docked))  1,000 mg in 250 mls @ 200 mls/hr IVPB 

Q24H [facility]; Protocol


   Last Admin: 11/28/19 15:07 Dose:  200 mls/hr


Aztreonam 1 gm/ Dextrose  50 mls @ 100 mls/hr IVPB Q12H [facility]; Protocol


   Last Admin: 11/29/19 04:55 Dose:  100 mls/hr


Dextrose (D5w -)  1,000 mls @ 42 mls/hr IV Q24H [facility]


   Last Admin: 11/29/19 04:51 Dose:  42 mls/hr


Metoprolol Succinate (Toprol Xl -)  12.5 mg PO DAILY [facility]


   Last Admin: 11/29/19 12:30 Dose:  Not Given


Olanzapine (Zyprexa -)  2.5 mg PO HS [facility]


   Last Admin: 11/28/19 21:11 Dose:  Not Given


Potassium Chloride (K-Dur -)  40 meq PO DAILY [facility]


   Last Admin: 11/29/19 12:29 Dose:  Not Given


Pramipexole Dihydrochloride (Mirapex -)  0.25 mg PO TID [facility]


   Last Admin: 11/29/19 05:13 Dose:  Not Given


Rivaroxaban (Xarelto)  15 mg PO DAILY@1800 [facility]


   Last Admin: 11/28/19 17:05 Dose:  Not Given


Rosuvastatin Calcium (Crestor -)  5 mg PO HS [facility]


   Last Admin: 11/28/19 21:11 Dose:  Not Given


Tamsulosin HCl (Flomax -)  0.4 mg PO DAILY@0830 [facility]


   Last Admin: 11/29/19 12:29 Dose:  Not Given











- Objective


Vital Signs: 


 Vital Signs











Temperature  98.9 F   11/29/19 06:07


 


Pulse Rate  72   11/29/19 06:07


 


Respiratory Rate  20   11/29/19 06:07


 


Blood Pressure  127/70   11/29/19 06:07


 


O2 Sat by Pulse Oximetry (%)  96   11/28/19 21:00











Constitutional: Yes: No Distress, Calm


Neck: Yes: Supple


Cardiovascular: Yes: Regular Rate and Rhythm


Respiratory: Yes: Regular, Diminished, On Nasal O2


Gastrointestinal: Yes: Soft, Hypoactive Bowel Sounds


Edema: No


Integumentary: Yes: Venous Stasis Changes


Labs: 


 CBC, BMP





 11/27/19 07:10 





 11/29/19 07:55 











Problem List





- Problems


(1) Allergy to multiple antibiotics


Code(s): Z88.1 - ALLERGY STATUS TO OTHER ANTIBIOTIC AGENTS STATUS   





(2) Anxiety


Code(s): F41.9 - ANXIETY DISORDER, UNSPECIFIED   





(3) Atrial fibrillation


Code(s): I48.91 - UNSPECIFIED ATRIAL FIBRILLATION   


Qualifiers: 


   Atrial fibrillation type: chronic 





(4) Cellulitis


Code(s): L03.90 - CELLULITIS, UNSPECIFIED   


Qualifiers: 


   Site of cellulitis: extremity   Site of cellulitis of extremity: lower 

extremity 





(5) Chronic anticoagulation


Code(s): Z79.01 - LONG TERM (CURRENT) USE OF ANTICOAGULANTS   





(6) Hypertension


Code(s): I10 - ESSENTIAL (PRIMARY) HYPERTENSION   


Qualifiers: 


   Hypertension type: essential hypertension   Qualified Code(s): I10 - 

Essential (primary) hypertension   





(7) Lymphedema


Code(s): I89.0 - LYMPHEDEMA, NOT ELSEWHERE CLASSIFIED   





(8) Pacemaker


Code(s): Z95.0 - PRESENCE OF CARDIAC PACEMAKER   





Assessment/Plan


ECHO 3/2018 showed normal LV systolic function, mild AS no other sig valvular 

abnl





1. Recurrent LE cellulitis with multiple allergies to PCN and carbipenem 

improving


2. Chronic venous stasis dermatitis


3. Acute on CKD with hx of hydronephrosis/obstructive uropathy likely pre-renal 

improving to baseline


4. Chronic diastolic heart failure


5. HTN cardiomyopathy


6. Afib on Xarelto


7. sss s/p PPM


8. OSAS


9. CAD h/o demand ischemia


10. Hyperlipidemia


11. Anxiety d/o


12. BPH


13. Toxic metabolic encephelopathy with high risk of aspiration





P:1. Empiric abx per C&S, wound care, enteral feeds via NGT


2. Hold oral diuretics and zaroxolyn pending renal fxn recovery, free water 

repletion, monitor electrolytes, wrap legs


3. Continue Toprol XL 12.5 qd, Xarelto 15 qd,  Crestor 5 qd, hold losartan 

pending renal fxn stabilization


4. Eventual rehab then follow up in office [phone]


5. DVT and GI prophylaxis


6. Wife requests pacemaker interrogation as it's difficult to bring him to 

office, will arrange for next week

## 2019-11-29 NOTE — PN
Progress Note, Physician


History of Present Illness: 


MORE AWAKE BUT STILL CONFUSED


 AFEBRILE


 WBC SL  INCREASED


 LE ERYTHEMA IMPROVED


 CXR SHOWS NEW RLL INFILTRATE


 REPEAT BC NO GROWTH


 





- Current Medication List


Current Medications: 


Active Medications





Acetaminophen (Tylenol -)  650 mg PO Q6H PRN


   PRN Reason: PAIN LEVEL 6-10


   Last Admin: 11/21/19 21:34 Dose:  650 mg


Acetaminophen (Tylenol Suppository -)  650 mg [place] Q6H PRN


   PRN Reason: FEVER


   Last Admin: 11/28/19 15:08 Dose:  650 mg


Clonazepam (Klonopin -)  1 mg PO BID [facility]


   Last Admin: 11/29/19 12:30 Dose:  Not Given


Finasteride (Proscar -)  5 mg PO DAILY [facility]


   Last Admin: 11/29/19 12:30 Dose:  Not Given


Gabapentin (Neurontin -)  300 mg PO TID [facility]


   Last Admin: 11/29/19 14:00 Dose:  Not Given


Heparin Sodium (Porcine) (Heparin -)  5,000 unit SQ BID [facility]


Vancomycin HCl (Vancomycin (Pre-Docked))  1,000 mg in 250 mls @ 200 mls/hr IVPB 

Q24H [facility]; Protocol


   Last Admin: 11/29/19 16:04 Dose:  200 mls/hr


Aztreonam 1 gm/ Dextrose  50 mls @ 100 mls/hr IVPB Q12H [facility]; Protocol


   Last Admin: 11/29/19 17:08 Dose:  100 mls/hr


Amino Acids (Clinimix -)  1,000 mls @ 100 mls/hr IV Q10H [facility]


   Last Admin: 11/29/19 16:05 Dose:  100 mls/hr


Metoprolol Succinate (Toprol Xl -)  12.5 mg PO DAILY [facility]


   Last Admin: 11/29/19 12:30 Dose:  Not Given


Metoprolol Tartrate (Lopressor Injection -)  5 mg IVPB Q4H PRN


   PRN Reason: TACHYCARDIA


Olanzapine (Zyprexa -)  2.5 mg PO HS [facility]


   Last Admin: 11/28/19 21:11 Dose:  Not Given


Potassium Chloride (K-Dur -)  40 meq PO DAILY [facility]


   Last Admin: 11/29/19 12:29 Dose:  Not Given


Pramipexole Dihydrochloride (Mirapex -)  0.25 mg PO TID [facility]


   Last Admin: 11/29/19 14:00 Dose:  Not Given


Rivaroxaban (Xarelto)  15 mg PO DAILY@1800 [facility]


   Last Admin: 11/29/19 17:09 Dose:  Not Given


Rosuvastatin Calcium (Crestor -)  5 mg PO HS [facility]


   Last Admin: 11/28/19 21:11 Dose:  Not Given


Tamsulosin HCl (Flomax -)  0.4 mg PO DAILY@0830 [facility]


   Last Admin: 11/29/19 12:29 Dose:  Not Given











- Objective


Vital Signs: 


 Vital Signs











Temperature  98.2 F   11/29/19 15:39


 


Pulse Rate  70   11/29/19 15:39


 


Respiratory Rate  20   11/29/19 15:39


 


Blood Pressure  128/45 L  11/29/19 15:39


 


O2 Sat by Pulse Oximetry (%)  96   11/28/19 21:00











Constitutional: Yes: No Distress


Cardiovascular: Yes: Regular Rate and Rhythm, S1, S2


Respiratory: Yes: Diminished


Gastrointestinal: Yes: Normal Bowel Sounds, Soft.  No: Tenderness


Extremities: Yes: Other (DECREASED ERYTHEMA LE B/L)


Edema: Yes


Labs: 


 CBC, BMP





 11/27/19 07:10 





 11/29/19 07:55 











Assessment/Plan


S/P HIGH GRADE FEVER ?  LUNG SOURCE 


PROBABLE ASP PNEUMONIA   


RECURRENT BILATERAL LE CELLULITIS 


+ WOUND C/S MRSA


MULTIPLE ANTIBIOTIC ALLERGIES


TOXIC METABOLIC ENCEPHALOPATHY


    


EMPIRIC AZTREONAM/ VANCOMYCIN


VANCO TROUGH

## 2019-11-29 NOTE — PN
Progress Note, Physician


Chief Complaint: 





Cellulitis


AGUILA


Afib


History of Present Illness: 





Previous notes and events reviewed


more alert and awake, but continue to be confused


NAD


Na level showing downtrend


continue with poor appetite on Clinimix








- Current Medication List


Current Medications: 


Active Medications





Acetaminophen (Tylenol -)  650 mg PO Q6H PRN


   PRN Reason: PAIN LEVEL 6-10


   Last Admin: 11/21/19 21:34 Dose:  650 mg


Acetaminophen (Tylenol Suppository -)  650 mg [place] Q6H PRN


   PRN Reason: FEVER


   Last Admin: 11/28/19 15:08 Dose:  650 mg


Clonazepam (Klonopin -)  1 mg PO BID [facility]


   Last Admin: 11/29/19 12:30 Dose:  Not Given


Finasteride (Proscar -)  5 mg PO DAILY [facility]


   Last Admin: 11/29/19 12:30 Dose:  Not Given


Gabapentin (Neurontin -)  300 mg PO TID [facility]


   Last Admin: 11/29/19 05:13 Dose:  Not Given


Amino Acids (Clinimix -)  1,000 mls @ 100 mls/hr IV Q10H [facility]


   Last Admin: 11/29/19 04:56 Dose:  Not Given


Vancomycin HCl (Vancomycin (Pre-Docked))  1,000 mg in 250 mls @ 200 mls/hr IVPB 

Q24H [facility]; Protocol


   Last Admin: 11/28/19 15:07 Dose:  200 mls/hr


Aztreonam 1 gm/ Dextrose  50 mls @ 100 mls/hr IVPB Q12H [facility]; Protocol


   Last Admin: 11/29/19 04:55 Dose:  100 mls/hr


Dextrose (D5w -)  1,000 mls @ 42 mls/hr IV Q24H [facility]


   Last Admin: 11/29/19 04:51 Dose:  42 mls/hr


Metoprolol Succinate (Toprol Xl -)  12.5 mg PO DAILY [facility]


   Last Admin: 11/29/19 12:30 Dose:  Not Given


Olanzapine (Zyprexa -)  2.5 mg PO HS [facility]


   Last Admin: 11/28/19 21:11 Dose:  Not Given


Potassium Chloride (K-Dur -)  40 meq PO DAILY [facility]


   Last Admin: 11/29/19 12:29 Dose:  Not Given


Pramipexole Dihydrochloride (Mirapex -)  0.25 mg PO TID [facility]


   Last Admin: 11/29/19 05:13 Dose:  Not Given


Rivaroxaban (Xarelto)  15 mg PO DAILY@1800 [facility]


   Last Admin: 11/28/19 17:05 Dose:  Not Given


Rosuvastatin Calcium (Crestor -)  5 mg PO HS [facility]


   Last Admin: 11/28/19 21:11 Dose:  Not Given


Tamsulosin HCl (Flomax -)  0.4 mg PO DAILY@0830 [facility]


   Last Admin: 11/29/19 12:29 Dose:  Not Given











- Objective


Vital Signs: 


 Vital Signs











Temperature  98.9 F   11/29/19 06:07


 


Pulse Rate  72   11/29/19 06:07


 


Respiratory Rate  20   11/29/19 06:07


 


Blood Pressure  127/70   11/29/19 06:07


 


O2 Sat by Pulse Oximetry (%)  96   11/28/19 21:00











Constitutional: Yes: No Distress, Calm


Eyes: Yes: Conjunctiva Clear


HENT: Yes: Atraumatic


Cardiovascular: Yes: Regular Rate and Rhythm


Respiratory: Yes: Regular, CTA Bilaterally


Gastrointestinal: Yes: Normal Bowel Sounds, Soft


Genitourinary: Yes: Incontinence


Musculoskeletal: Yes: Muscle Weakness


Extremities: Yes: WNL


Edema: No


Neurological: Yes: Alert, Confusion


Psychiatric: Yes: Alert


Labs: 


 CBC, BMP





 11/27/19 07:10 





 11/29/19 07:55 





 Microbiology











 11/25/19 17:00 Blood Culture - Preliminary





 Blood - Peripheral Venous    NO GROWTH OBTAINED AFTER 72 HOURS, INCUBATION TO 

CONTINUE





    FOR 2 DAYS.


 


 11/25/19 17:00 Blood Culture - Preliminary





 Blood - Peripheral Venous    NO GROWTH OBTAINED AFTER 72 HOURS, INCUBATION TO 

CONTINUE





    FOR 2 DAYS.


 


 11/26/19 18:00 Urine Culture - Final





 Urine - Urine Clean Catch    NO GROWTH OBTAINED














Problem List





- Problems


(1) Metabolic encephalopathy


Assessment/Plan: 





-Head CT scan shows no acute evidence of intracranial hemorrhage, punctate 

chronic right base infarct, mild periventricular chronic microvascular ischemic 

changes 


-neuro check q6h


-Neurology on board


-repeat Head CT scan shows moderate to marked volume loss without CT evidenceof 

acute intracranial pathology, calvarium is intact, mild chronic sinusitis


-repeat CXR on 11/25/19 shows some atelectasis/infiltrateat the right base


-Urine Legionella neg


-repeat BC neg


-Azactam and Vancomycin 


-poor PO intake and at risk for aspiration HOLD PO MEDICATION 


Code(s): G93.41 - METABOLIC ENCEPHALOPATHY   





(2) AGUILA (acute kidney injury)


Assessment/Plan: 


-Renal on board


-BUN/Cr 48.8/1.0


-monitor renal function


Code(s): N17.9 - ACUTE KIDNEY FAILURE, UNSPECIFIED   





(3) Anxiety


Assessment/Plan: 


-Clonazepam on hold 


-Zyprexa on hold


-Psychiatry on board


Code(s): F41.9 - ANXIETY DISORDER, UNSPECIFIED   





(4) Atrial fibrillation


Assessment/Plan: 


-Xarelto and Metoprolol-on hold


-Heparin SQ and Lopressor 5mg IVPB for HR >110bpm


-cardiology on board


Code(s): I48.91 - UNSPECIFIED ATRIAL FIBRILLATION   


Qualifiers: 


   Atrial fibrillation type: chronic 





(5) Cellulitis


Assessment/Plan: 


-ID on board


-leukocytosis wbc 11.7


-afebrile


-Tygacil, Vancomycin discontued


-wound culture positive


-contact precaution


-BC neg


-Vascular US B/L LE neg for DVT


Code(s): L03.90 - CELLULITIS, UNSPECIFIED   


Qualifiers: 


   Site of cellulitis: extremity   Site of cellulitis of extremity: lower 

extremity 





(6) CHF (congestive heart failure)


Assessment/Plan: 


-daily weights


-strict I&Os


-fluid restriction


Code(s): I50.9 - HEART FAILURE, UNSPECIFIED   


Qualifiers: 


   Heart failure type: diastolic   Heart failure chronicity: acute on chronic   

Qualified Code(s): I50.33 - Acute on chronic diastolic (congestive) heart 

failure   





(7) Hypertension


Assessment/Plan: 


-monitor BP


-low Na diet


Code(s): I10 - ESSENTIAL (PRIMARY) HYPERTENSION   


Qualifiers: 


   Hypertension type: essential hypertension   Qualified Code(s): I10 - 

Essential (primary) hypertension   





(8) Urinary retention due to benign prostatic hyperplasia


Assessment/Plan: 


-Finasteride


-Bladder/Renal US both kidneys appear unremarkable, significantly unlarged 

prostate with a volume 80cc, adequately distended urinary bladder without wall 

thickening, bilateral ureteral jets


-Urology consult 


Code(s): N40.1 - BENIGN PROSTATIC HYPERPLASIA WITH LOWER URINARY TRACT SYMP; 

R33.8 - OTHER RETENTION OF URINE   





Assessment/Plan





see problem list 


dvt ppx


will need snf for discharge when change to PO antibiotics

## 2019-11-29 NOTE — PN
Progress Note, Physician


History of Present Illness: 





Pt seen and examined at bedside. He is more awake today. He is asking for food. 





- Current Medication List


Current Medications: 


Active Medications





Acetaminophen (Tylenol -)  650 mg PO Q6H PRN


   PRN Reason: PAIN LEVEL 6-10


   Last Admin: 11/21/19 21:34 Dose:  650 mg


Acetaminophen (Tylenol Suppository -)  650 mg [place] Q6H PRN


   PRN Reason: FEVER


   Last Admin: 11/28/19 15:08 Dose:  650 mg


Clonazepam (Klonopin -)  1 mg PO BID [facility]


   Last Admin: 11/29/19 12:30 Dose:  Not Given


Finasteride (Proscar -)  5 mg PO DAILY [facility]


   Last Admin: 11/29/19 12:30 Dose:  Not Given


Gabapentin (Neurontin -)  300 mg PO TID [facility]


   Last Admin: 11/29/19 05:13 Dose:  Not Given


Amino Acids (Clinimix -)  1,000 mls @ 100 mls/hr IV Q10H [facility]


   Last Admin: 11/29/19 04:56 Dose:  Not Given


Vancomycin HCl (Vancomycin (Pre-Docked))  1,000 mg in 250 mls @ 200 mls/hr IVPB 

Q24H [facility]; Protocol


   Last Admin: 11/28/19 15:07 Dose:  200 mls/hr


Aztreonam 1 gm/ Dextrose  50 mls @ 100 mls/hr IVPB Q12H [facility]; Protocol


   Last Admin: 11/29/19 04:55 Dose:  100 mls/hr


Dextrose (D5w -)  1,000 mls @ 42 mls/hr IV Q24H [facility]


   Last Admin: 11/29/19 04:51 Dose:  42 mls/hr


Metoprolol Succinate (Toprol Xl -)  12.5 mg PO DAILY [facility]


   Last Admin: 11/29/19 12:30 Dose:  Not Given


Olanzapine (Zyprexa -)  2.5 mg PO HS [facility]


   Last Admin: 11/28/19 21:11 Dose:  Not Given


Potassium Chloride (K-Dur -)  40 meq PO DAILY [facility]


   Last Admin: 11/29/19 12:29 Dose:  Not Given


Pramipexole Dihydrochloride (Mirapex -)  0.25 mg PO TID [facility]


   Last Admin: 11/29/19 05:13 Dose:  Not Given


Rivaroxaban (Xarelto)  15 mg PO DAILY@1800 [facility]


   Last Admin: 11/28/19 17:05 Dose:  Not Given


Rosuvastatin Calcium (Crestor -)  5 mg PO HS [facility]


   Last Admin: 11/28/19 21:11 Dose:  Not Given


Tamsulosin HCl (Flomax -)  0.4 mg PO DAILY@0830 [facility]


   Last Admin: 11/29/19 12:29 Dose:  Not Given











- Objective


Vital Signs: 


 Vital Signs











Temperature  98.9 F   11/29/19 06:07


 


Pulse Rate  72   11/29/19 06:07


 


Respiratory Rate  20   11/29/19 06:07


 


Blood Pressure  127/70   11/29/19 06:07


 


O2 Sat by Pulse Oximetry (%)  96   11/28/19 21:00











Constitutional: Yes: Calm


Eyes: Yes: Conjunctiva Clear


HENT: Yes: Atraumatic


Neck: Yes: Supple


Cardiovascular: Yes: S1, S2


Respiratory: Yes: CTA Bilaterally


Gastrointestinal: Yes: Soft


Genitourinary: Yes: Incontinence


Musculoskeletal: Yes: Muscle Weakness


Edema: Yes


Edema: LLE: 1+, RLE: 1+


Integumentary: Yes: Venous Stasis Changes


Neurological: Yes: Confusion


Labs: 


 CBC, BMP





 11/27/19 07:10 





 11/29/19 07:55 











Problem List





- Problems


(1) AGUILA (acute kidney injury)


Code(s): N17.9 - ACUTE KIDNEY FAILURE, UNSPECIFIED   





(2) CHF (congestive heart failure)


Code(s): I50.9 - HEART FAILURE, UNSPECIFIED   


Qualifiers: 


   Qualified Code(s): I50.32 - Chronic diastolic (congestive) heart failure   





(3) Cellulitis


Code(s): L03.90 - CELLULITIS, UNSPECIFIED   





Assessment/Plan


 Current Medications











Generic Name Dose Route Start Last Admin





  Trade Name Freq  PRN Reason Stop Dose Admin


 


Acetaminophen  650 mg  11/14/19 04:07  11/21/19 21:34





  Tylenol -  PO   650 mg





  Q6H PRN   Administration





  PAIN LEVEL 6-10   





     





     





     


 


Acetaminophen  650 mg  11/25/19 16:13  11/28/19 15:08





  Tylenol Suppository -  CA   650 mg





  Q6H PRN   Administration





  FEVER   





     





     





     


 


Clonazepam  1 mg  11/25/19 22:00  11/29/19 12:30





  Klonopin -  PO   Not Given





  BID [facility]   





     





     





     





     


 


Finasteride  5 mg  11/15/19 10:00  11/29/19 12:30





  Proscar -  PO   Not Given





  DAILY [facility]   





     





     





     





     


 


Gabapentin  300 mg  11/21/19 22:00  11/29/19 05:13





  Neurontin -  PO   Not Given





  TID [facility]   





     





     





     





     


 


Amino Acids  1,000 mls @ 100 mls/hr  11/25/19 11:30  11/29/19 04:56





  Clinimix -  IV   Not Given





  Q10H [facility]   





     





     





     





     


 


Vancomycin HCl  1,000 mg in 250 mls @ 200 mls/hr  11/27/19 16:00  11/28/19 15:07





  Vancomycin (Pre-Docked)  IVPB   200 mls/hr





  Q24H [facility]   Administration





     





     





  Protocol   





     


 


Aztreonam 1 gm/ Dextrose  50 mls @ 100 mls/hr  11/26/19 17:00  11/29/19 04:55





  IVPB   100 mls/hr





  Q12H [facility]   Administration





     





     





  Protocol   





     


 


Dextrose  1,000 mls @ 42 mls/hr  11/26/19 18:15  11/29/19 04:51





  D5w -  IV   42 mls/hr





  Q24H [facility]   Administration





     





     





     





     


 


Metoprolol Succinate  12.5 mg  11/18/19 10:00  11/29/19 12:30





  Toprol Xl -  PO   Not Given





  DAILY [facility]   





     





     





     





     


 


Olanzapine  2.5 mg  11/21/19 22:00  11/28/19 21:11





  Zyprexa -  PO   Not Given





  HS [facility]   





     





     





     





     


 


Potassium Chloride  40 meq  11/16/19 17:49  11/29/19 12:29





  K-Dur -  PO   Not Given





  DAILY [facility]   





     





     





     





     


 


Pramipexole Dihydrochloride  0.25 mg  11/21/19 20:00  11/29/19 05:13





  Mirapex -  PO   Not Given





  TID [facility]   





     





     





     





     


 


Rivaroxaban  15 mg  11/14/19 18:00  11/28/19 17:05





  Xarelto  PO   Not Given





  DAILY@1800 [facility]   





     





     





     





     


 


Rosuvastatin Calcium  5 mg  11/18/19 22:00  11/28/19 21:11





  Crestor -  PO   Not Given





  HS [facility]   





     





     





     





     


 


Tamsulosin HCl  0.4 mg  11/17/19 08:30  11/29/19 12:29





  Flomax -  PO   Not Given





  DAILY@0830 [facility]   





     





     





     





     














Impression


1. CKD


2. hx of hydronephrosis


3. hx of obstructive uropathy


4. CHF


5. HTN


6. AGUILA


7. a-fib


8. hypernatreamia





Plan


- cont with clinimix for now


- speech and swallow follow up


- will stop d5w


- monitor sodium level


- diuretics are on hold as he is not eating


- mental status improved


- likely in part pre-renal aguila

## 2019-11-29 NOTE — PN
Progress Note (short form)





- Note


Progress Note: 


Onychomycosis, xerosis of the skin  Patient has painful thick dystrophic 

mycotic toenails in shoes and ambulation + subungual debrie, inflamed nail bed,

  and distal sepation of nail from  bed Tx debride thick mycotic nails 1-10  

Patient tolerated debridement well

## 2019-11-30 NOTE — PN
Progress Note, Physician





- Current Medication List


Current Medications: 


Active Medications





Acetaminophen (Tylenol -)  650 mg PO Q6H PRN


   PRN Reason: PAIN LEVEL 6-10


   Last Admin: 11/21/19 21:34 Dose:  650 mg


Acetaminophen (Tylenol Suppository -)  650 mg [place] Q6H PRN


   PRN Reason: FEVER


   Last Admin: 11/28/19 15:08 Dose:  650 mg


Clonazepam (Klonopin -)  1 mg PO BID [facility]


   Last Admin: 11/29/19 22:02 Dose:  Not Given


Finasteride (Proscar -)  5 mg PO DAILY [facility]


   Last Admin: 11/29/19 12:30 Dose:  Not Given


Gabapentin (Neurontin -)  300 mg PO TID [facility]


   Last Admin: 11/30/19 05:35 Dose:  Not Given


Heparin Sodium (Porcine) (Heparin -)  5,000 unit SQ BID [facility]


   Last Admin: 11/29/19 22:25 Dose:  5,000 unit


Vancomycin HCl (Vancomycin (Pre-Docked))  1,000 mg in 250 mls @ 200 mls/hr IVPB 

Q24H [facility]; Protocol


   Last Admin: 11/29/19 16:04 Dose:  200 mls/hr


Aztreonam 1 gm/ Dextrose  50 mls @ 100 mls/hr IVPB Q12H [facility]; Protocol


   Last Admin: 11/30/19 05:34 Dose:  100 mls/hr


Amino Acids (Clinimix -)  1,000 mls @ 100 mls/hr IV Q10H [facility]


   Last Admin: 11/30/19 03:14 Dose:  100 mls/hr


Metoprolol Succinate (Toprol Xl -)  12.5 mg PO DAILY [facility]


   Last Admin: 11/29/19 12:30 Dose:  Not Given


Metoprolol Tartrate (Lopressor Injection -)  5 mg IVPB Q4H PRN


   PRN Reason: TACHYCARDIA


Olanzapine (Zyprexa -)  2.5 mg PO HS [facility]


   Last Admin: 11/29/19 22:03 Dose:  Not Given


Potassium Chloride (K-Dur -)  40 meq PO DAILY [facility]


   Last Admin: 11/29/19 12:29 Dose:  Not Given


Pramipexole Dihydrochloride (Mirapex -)  0.25 mg PO TID [facility]


   Last Admin: 11/30/19 05:34 Dose:  Not Given


Rivaroxaban (Xarelto)  15 mg PO DAILY@1800 [facility]


   Last Admin: 11/29/19 17:09 Dose:  Not Given


Rosuvastatin Calcium (Crestor -)  5 mg PO HS [facility]


   Last Admin: 11/29/19 22:02 Dose:  Not Given


Tamsulosin HCl (Flomax -)  0.4 mg PO DAILY@0830 [facility]


   Last Admin: 11/29/19 12:29 Dose:  Not Given











- Objective


Vital Signs: 


 Vital Signs











Temperature  98.9 F   11/29/19 20:19


 


Pulse Rate  71   11/29/19 20:19


 


Respiratory Rate  19   11/30/19 08:51


 


Blood Pressure  116/43 L  11/29/19 20:19


 


O2 Sat by Pulse Oximetry (%)  98   11/30/19 08:51











Cardiovascular: Yes: S1, S2


Respiratory: Yes: Diminished, On Nasal O2


Gastrointestinal: Yes: Normal Bowel Sounds, Soft


Labs: 


 CBC, BMP





 11/30/19 07:30 





 11/30/19 07:30 











Assessment/Plan








(1) Metabolic encephalopathy


Assessment/Plan: 


-Head CT scan shows no acute evidence of intracranial hemorrhage, punctate 

chronic right base infarct, mild periventricular chronic microvascular ischemic 

changes 


-neuro check q6h


-Neurology on board


-repeat Head CT scan shows moderate to marked volume loss without CT evidenceof 

acute intracranial pathology, calvarium is intact, mild chronic sinusitis


-repeat CXR on 11/25/19 shows some atelectasis/infiltrateat the right base


-Urine Legionella neg


-repeat BC neg


-Azactam and Vancomycin 


-poor PO intake and at risk for aspiration HOLD PO MEDICATION 


Code(s): G93.41 - METABOLIC ENCEPHALOPATHY   





(2) AGUILA (acute kidney injury)


Assessment/Plan: 


-Renal on board


-monitor renal function


Code(s): N17.9 - ACUTE KIDNEY FAILURE, UNSPECIFIED   





(3) Anxiety


Assessment/Plan: 


-Clonazepam 2 mg po BID-on Hold


-Psychiatry consult


-Add Olanzapine 7.5 mg po HS-on Hold


Code(s): F41.9 - ANXIETY DISORDER, UNSPECIFIED   





(4) Atrial fibrillation


Assessment/Plan: 


-Xarelto and Metoprolol


Code(s): I48.91 - UNSPECIFIED ATRIAL FIBRILLATION   


Qualifiers: 


   Atrial fibrillation type: chronic 





(5) Cellulitis


Assessment/Plan: 


-ID on board


-leukocytosis


-afebrile


-wound culture positive


-contact precaution


-BC neg


-Vascular US B/L LE neg for DVT


-Seen by Vascular surgery as well


Code(s): L03.90 - CELLULITIS, UNSPECIFIED   


Qualifiers: 


   Site of cellulitis: extremity   Site of cellulitis of extremity: lower 

extremity 





(6) CHF (congestive heart failure)


Assessment/Plan: 


-daily weights


-strict I&Os


-fluid restriction


Code(s): I50.9 - HEART FAILURE, UNSPECIFIED   


Qualifiers: 


   Heart failure type: diastolic   Heart failure chronicity: acute on chronic   

Qualified Code(s): I50.33 - Acute on chronic diastolic (congestive) heart 

failure   





(7) Hypertension


Assessment/Plan: 


-monitor BP


-low Na diet


Code(s): I10 - ESSENTIAL (PRIMARY) HYPERTENSION   


Qualifiers: 


   Hypertension type: essential hypertension   Qualified Code(s): I10 - 

Essential (primary) hypertension   





(8) Urinary retention due to benign prostatic hyperplasia


Assessment/Plan: 


-Finasteride


-Bladder/Renal US both kidneys appear unremarkable, significantly unlarged 

prostate with a volume 80cc, adequately distended urinary bladder without wall 

thickening, bilateral ureteral jets


-Urology consult 


-Tamsulosin 0.4 mg po daily


Code(s): N40.1 - BENIGN PROSTATIC HYPERPLASIA WITH LOWER URINARY TRACT SYMP; 

R33.8 - OTHER RETENTION OF URINE

## 2019-11-30 NOTE — PN
Progress Note (short form)





- Note


Progress Note: 





Renal follow up for AGUILA


coverage for Dr. Teran





Seen and examined at the bedside


awake and alert


on IVF


no overnight events


making urine


failed swallow eval





 Vital Signs











Temperature  98.9 F   11/29/19 20:19


 


Pulse Rate  71   11/29/19 20:19


 


Respiratory Rate  19   11/30/19 08:51


 


Blood Pressure  116/43 L  11/29/19 20:19


 


O2 Sat by Pulse Oximetry (%)  98   11/30/19 08:51








 Intake & Output











 11/27/19 11/28/19 11/29/19 11/30/19





 23:59 23:59 23:59 23:59


 


Intake Total 1612 3674 1612 1754


 


Balance 1612 3674 1612 1754


 


Weight 117.055 kg 118.87 kg 117.934 kg 








NAD


awake and alert


neck supple, no JVD


RRR


Dec BS


soft, obese,NT/ND


no bladder distension


+ edema in LE 





 CBC, BMP





 11/30/19 07:30 





 11/30/19 07:30 





 Current Medications





Acetaminophen (Tylenol -)  650 mg PO Q6H PRN


   PRN Reason: PAIN LEVEL 6-10


   Last Admin: 11/21/19 21:34 Dose:  650 mg


Acetaminophen (Tylenol Suppository -)  650 mg IN Q6H PRN


   PRN Reason: FEVER


   Last Admin: 11/28/19 15:08 Dose:  650 mg


Clonazepam (Klonopin -)  1 mg PO BID [facility]


   Last Admin: 11/29/19 22:02 Dose:  Not Given


Finasteride (Proscar -)  5 mg PO DAILY [facility]


   Last Admin: 11/29/19 12:30 Dose:  Not Given


Gabapentin (Neurontin -)  300 mg PO TID [facility]


   Last Admin: 11/30/19 05:35 Dose:  Not Given


Heparin Sodium (Porcine) (Heparin -)  5,000 unit SQ BID [facility]


   Last Admin: 11/30/19 11:40 Dose:  5,000 unit


Vancomycin HCl (Vancomycin (Pre-Docked))  1,000 mg in 250 mls @ 200 mls/hr IVPB 

Q24H [facility]; Protocol


   Last Admin: 11/29/19 16:04 Dose:  200 mls/hr


Aztreonam 1 gm/ Dextrose  50 mls @ 100 mls/hr IVPB Q12H [facility]; Protocol


   Last Admin: 11/30/19 05:34 Dose:  100 mls/hr


Amino Acids (Clinimix -)  1,000 mls @ 100 mls/hr IV Q10H [facility]


   Last Admin: 11/30/19 03:14 Dose:  100 mls/hr


Metoprolol Succinate (Toprol Xl -)  12.5 mg PO DAILY [facility]


   Last Admin: 11/29/19 12:30 Dose:  Not Given


Metoprolol Tartrate (Lopressor Injection -)  5 mg IVPB Q4H PRN


   PRN Reason: TACHYCARDIA


Olanzapine (Zyprexa -)  2.5 mg PO HS [facility]


   Last Admin: 11/29/19 22:03 Dose:  Not Given


Potassium Chloride (K-Dur -)  40 meq PO DAILY [facility]


   Last Admin: 11/29/19 12:29 Dose:  Not Given


Pramipexole Dihydrochloride (Mirapex -)  0.25 mg PO TID [facility]


   Last Admin: 11/30/19 05:34 Dose:  Not Given


Rivaroxaban (Xarelto)  15 mg PO DAILY@1800 [facility]


   Last Admin: 11/29/19 17:09 Dose:  Not Given


Rosuvastatin Calcium (Crestor -)  5 mg PO HS [facility]


   Last Admin: 11/29/19 22:02 Dose:  Not Given


Tamsulosin HCl (Flomax -)  0.4 mg PO DAILY@0830 [facility]


   Last Admin: 11/29/19 12:29 Dose:  Not Given





Impression


1. CKD


2. hx of hydronephrosis


3. hx of obstructive uropathy


4. CHF


5. HTN


6. AGUILA


7. a-fib


8. hypernatreamia





Plan


Renal function improved and stable


would benefit form oral sodium bicarb if cleared to swallow


continue clinimix for now


trend renal function and electrolytes





Stevie Castro DO

## 2019-11-30 NOTE — PN
Progress Note, Physician


History of Present Illness: 





More interactive today. Odynophagia, high risk of aspiration. LE cellulitis 

much improved.








- Current Medication List


Current Medications: 


Active Medications





Acetaminophen (Tylenol -)  650 mg PO Q6H PRN


   PRN Reason: PAIN LEVEL 6-10


   Last Admin: 11/21/19 21:34 Dose:  650 mg


Acetaminophen (Tylenol Suppository -)  650 mg [place] Q6H PRN


   PRN Reason: FEVER


   Last Admin: 11/28/19 15:08 Dose:  650 mg


Clonazepam (Klonopin -)  1 mg PO BID [facility]


   Last Admin: 11/29/19 22:02 Dose:  Not Given


Finasteride (Proscar -)  5 mg PO DAILY [facility]


   Last Admin: 11/29/19 12:30 Dose:  Not Given


Gabapentin (Neurontin -)  300 mg PO TID [facility]


   Last Admin: 11/30/19 05:35 Dose:  Not Given


Heparin Sodium (Porcine) (Heparin -)  5,000 unit SQ BID [facility]


   Last Admin: 11/30/19 11:40 Dose:  5,000 unit


Vancomycin HCl (Vancomycin (Pre-Docked))  1,000 mg in 250 mls @ 200 mls/hr IVPB 

Q24H [facility]; Protocol


   Last Admin: 11/29/19 16:04 Dose:  200 mls/hr


Aztreonam 1 gm/ Dextrose  50 mls @ 100 mls/hr IVPB Q12H [facility]; Protocol


   Last Admin: 11/30/19 05:34 Dose:  100 mls/hr


Amino Acids (Clinimix -)  1,000 mls @ 100 mls/hr IV Q10H [facility]


   Last Admin: 11/30/19 03:14 Dose:  100 mls/hr


Metoprolol Succinate (Toprol Xl -)  12.5 mg PO DAILY [facility]


   Last Admin: 11/29/19 12:30 Dose:  Not Given


Metoprolol Tartrate (Lopressor Injection -)  5 mg IVPB Q4H PRN


   PRN Reason: TACHYCARDIA


Olanzapine (Zyprexa -)  2.5 mg PO HS [facility]


   Last Admin: 11/29/19 22:03 Dose:  Not Given


Potassium Chloride (K-Dur -)  40 meq PO DAILY [facility]


   Last Admin: 11/29/19 12:29 Dose:  Not Given


Pramipexole Dihydrochloride (Mirapex -)  0.25 mg PO TID [facility]


   Last Admin: 11/30/19 05:34 Dose:  Not Given


Rivaroxaban (Xarelto)  15 mg PO DAILY@1800 [facility]


   Last Admin: 11/29/19 17:09 Dose:  Not Given


Rosuvastatin Calcium (Crestor -)  5 mg PO HS [facility]


   Last Admin: 11/29/19 22:02 Dose:  Not Given


Tamsulosin HCl (Flomax -)  0.4 mg PO DAILY@0830 [facility]


   Last Admin: 11/29/19 12:29 Dose:  Not Given











- Objective


Vital Signs: 


 Vital Signs











Temperature  98.9 F   11/29/19 20:19


 


Pulse Rate  71   11/29/19 20:19


 


Respiratory Rate  19   11/30/19 08:51


 


Blood Pressure  116/43 L  11/29/19 20:19


 


O2 Sat by Pulse Oximetry (%)  98   11/30/19 08:51











Constitutional: Yes: No Distress, Calm


Neck: Yes: Supple


Cardiovascular: Yes: Pulse Irregular


Respiratory: Yes: Regular, Diminished, On Nasal O2


Gastrointestinal: Yes: Normal Bowel Sounds, Soft, Abdomen, Obese


Edema: No


Labs: 


 CBC, BMP





 11/30/19 07:30 





 11/30/19 07:30 











Problem List





- Problems


(1) Allergy to multiple antibiotics


Code(s): Z88.1 - ALLERGY STATUS TO OTHER ANTIBIOTIC AGENTS STATUS   





(2) Anxiety


Code(s): F41.9 - ANXIETY DISORDER, UNSPECIFIED   





(3) Atrial fibrillation


Code(s): I48.91 - UNSPECIFIED ATRIAL FIBRILLATION   


Qualifiers: 


   Atrial fibrillation type: chronic 





(4) Cellulitis


Code(s): L03.90 - CELLULITIS, UNSPECIFIED   


Qualifiers: 


   Site of cellulitis: extremity   Site of cellulitis of extremity: lower 

extremity 





(5) Chronic anticoagulation


Code(s): Z79.01 - LONG TERM (CURRENT) USE OF ANTICOAGULANTS   





(6) Hypertension


Code(s): I10 - ESSENTIAL (PRIMARY) HYPERTENSION   


Qualifiers: 


   Hypertension type: essential hypertension   Qualified Code(s): I10 - 

Essential (primary) hypertension   





(7) Lymphedema


Code(s): I89.0 - LYMPHEDEMA, NOT ELSEWHERE CLASSIFIED   





(8) Pacemaker


Code(s): Z95.0 - PRESENCE OF CARDIAC PACEMAKER   





Assessment/Plan


ECHO 3/2018 showed normal LV systolic function, mild AS no other sig valvular 

abnl





1. Recurrent LE cellulitis with multiple allergies to PCN and carbipenem 

improving


2. Chronic venous stasis dermatitis


3. Acute on CKD with hx of hydronephrosis/obstructive uropathy likely pre-renal 

improving to baseline


4. Chronic diastolic heart failure


5. HTN cardiomyopathy


6. Afib on Xarelto


7. sss s/p PPM


8. OSAS


9. CAD h/o demand ischemia


10. Hyperlipidemia


11. Anxiety d/o


12. BPH


13. Toxic metabolic encephelopathy with high risk of aspiration





P:1. Empiric abx per C&S, wound care, 


2. Hold oral diuretics and zaroxolyn pending renal fxn recovery, Clinimix, 

monitor electrolytes


3. Continue Toprol XL 12.5 qd, Xarelto 15 qd,  Crestor 5 qd, hold losartan 

pending renal fxn stabilization


4. Eventual rehab then follow up in office [phone]


5. DVT and GI prophylaxis


6. Wife requests pacemaker interrogation as it's difficult to bring him to 

office, will arrange for next week

## 2019-12-01 NOTE — PN
Progress Note, Physician





- Current Medication List


Current Medications: 


Active Medications





Acetaminophen (Tylenol -)  650 mg PO Q6H PRN


   PRN Reason: PAIN LEVEL 6-10


   Last Admin: 11/21/19 21:34 Dose:  650 mg


Acetaminophen (Tylenol Suppository -)  650 mg [place] Q6H PRN


   PRN Reason: FEVER


   Last Admin: 11/28/19 15:08 Dose:  650 mg


Clonazepam (Klonopin -)  1 mg PO BID [facility]


   Last Admin: 11/30/19 21:51 Dose:  Not Given


Finasteride (Proscar -)  5 mg PO DAILY [facility]


   Last Admin: 11/30/19 15:09 Dose:  Not Given


Gabapentin (Neurontin -)  300 mg PO TID [facility]


   Last Admin: 12/01/19 05:49 Dose:  Not Given


Heparin Sodium (Porcine) (Heparin -)  5,000 unit SQ BID [facility]


   Last Admin: 12/01/19 10:51 Dose:  5,000 unit


Vancomycin HCl (Vancomycin (Pre-Docked))  1,000 mg in 250 mls @ 200 mls/hr IVPB 

Q24H [facility]; Protocol


   Last Admin: 11/30/19 16:24 Dose:  200 mls/hr


Aztreonam 1 gm/ Dextrose  50 mls @ 100 mls/hr IVPB Q12H [facility]; Protocol


   Last Admin: 12/01/19 05:47 Dose:  100 mls/hr


Amino Acids (Clinimix -)  1,000 mls @ 100 mls/hr IV Q10H [facility]


   Last Admin: 12/01/19 06:32 Dose:  100 mls/hr


Metoprolol Succinate (Toprol Xl -)  12.5 mg PO DAILY [facility]


   Last Admin: 11/30/19 15:09 Dose:  Not Given


Metoprolol Tartrate (Lopressor Injection -)  5 mg IVPB Q4H PRN


   PRN Reason: TACHYCARDIA


Olanzapine (Zyprexa -)  2.5 mg PO HS [facility]


   Last Admin: 11/30/19 22:15 Dose:  Not Given


Potassium Chloride (K-Dur -)  40 meq PO DAILY [facility]


   Last Admin: 11/30/19 15:09 Dose:  Not Given


Pramipexole Dihydrochloride (Mirapex -)  0.25 mg PO TID [facility]


   Last Admin: 12/01/19 05:50 Dose:  Not Given


Rivaroxaban (Xarelto)  15 mg PO DAILY@1800 [facility]


   Last Admin: 11/30/19 19:41 Dose:  Not Given


Rosuvastatin Calcium (Crestor -)  5 mg PO HS [facility]


   Last Admin: 11/30/19 21:51 Dose:  Not Given


Tamsulosin HCl (Flomax -)  0.4 mg PO DAILY@0830 [facility]


   Last Admin: 11/30/19 15:11 Dose:  Not Given











- Objective


Vital Signs: 


 Vital Signs











Temperature  98.4 F   11/30/19 22:00


 


Pulse Rate  64   12/01/19 05:50


 


Respiratory Rate  18   12/01/19 08:53


 


Blood Pressure  136/56 L  12/01/19 05:50


 


O2 Sat by Pulse Oximetry (%)  99   12/01/19 08:53











Cardiovascular: Yes: S1, S2


Respiratory: Yes: Regular, CTA Bilaterally, Diminished


Gastrointestinal: Yes: Normal Bowel Sounds, Soft


Labs: 


 CBC, BMP





 11/30/19 07:30 





 11/30/19 07:30 











Assessment/Plan








(1) Metabolic encephalopathy


Assessment/Plan: 


-Head CT scan shows no acute evidence of intracranial hemorrhage, punctate 

chronic right base infarct, mild periventricular chronic microvascular ischemic 

changes 


-neuro check q6h


-Neurology on board


-repeat Head CT scan shows moderate to marked volume loss without CT evidenceof 

acute intracranial pathology, calvarium is intact, mild chronic sinusitis


-repeat CXR on 11/25/19 shows some atelectasis/infiltrateat the right base


-Urine Legionella neg


-repeat BC neg


-Azactam and Vancomycin 


-poor PO intake and at risk for aspiration HOLD PO MEDICATION 


Code(s): G93.41 - METABOLIC ENCEPHALOPATHY   





(2) AGUILA (acute kidney injury)


Assessment/Plan: 


-Renal on board


-monitor renal function


Code(s): N17.9 - ACUTE KIDNEY FAILURE, UNSPECIFIED   





(3) Anxiety


Assessment/Plan: 


-Clonazepam 2 mg po BID-on Hold


-Psychiatry consult


-Add Olanzapine 7.5 mg po HS-on Hold


Code(s): F41.9 - ANXIETY DISORDER, UNSPECIFIED   





(4) Atrial fibrillation


Assessment/Plan: 


-Xarelto and Metoprolol


Code(s): I48.91 - UNSPECIFIED ATRIAL FIBRILLATION   


Qualifiers: 


   Atrial fibrillation type: chronic 





(5) Cellulitis


Assessment/Plan: 


-ID on board


-leukocytosis


-afebrile


-wound culture positive


-contact precaution


-BC neg


-Vascular US B/L LE neg for DVT


-Seen by Vascular surgery as well


Code(s): L03.90 - CELLULITIS, UNSPECIFIED   


Qualifiers: 


   Site of cellulitis: extremity   Site of cellulitis of extremity: lower 

extremity 





(6) CHF (congestive heart failure)


Assessment/Plan: 


-daily weights


-strict I&Os


-fluid restriction


Code(s): I50.9 - HEART FAILURE, UNSPECIFIED   


Qualifiers: 


   Heart failure type: diastolic   Heart failure chronicity: acute on chronic   

Qualified Code(s): I50.33 - Acute on chronic diastolic (congestive) heart 

failure   





(7) Hypertension


Assessment/Plan: 


-monitor BP


-low Na diet


Code(s): I10 - ESSENTIAL (PRIMARY) HYPERTENSION   


Qualifiers: 


   Hypertension type: essential hypertension   Qualified Code(s): I10 - 

Essential (primary) hypertension   





(8) Urinary retention due to benign prostatic hyperplasia


Assessment/Plan: 


-Finasteride


-Bladder/Renal US both kidneys appear unremarkable, significantly unlarged 

prostate with a volume 80cc, adequately distended urinary bladder without wall 

thickening, bilateral ureteral jets


-Urology consult 


-Tamsulosin 0.4 mg po daily


Code(s): N40.1 - BENIGN PROSTATIC HYPERPLASIA WITH LOWER URINARY TRACT SYMP; 

R33.8 - OTHER RETENTION OF URINE

## 2019-12-02 NOTE — PN
Progress Note (short form)





- Note


Progress Note: 








NEUROLOGY PROGRESS





Events reviewed, and dicussed with RN's. Pt. examined.


Staff reports waxing and waning Level of consciousness and cognition, 

alternating between poor responsiveness and confusion.


Now NPO due to presumed aspiration with WBC stable > 10 K and C Xray -> RLL 

Pneumonia. 


On antibiotics but NOT receiving Clonazepam, Olanzepine, and pain meds: 

Pramipexole and gabapentin.





CT of head (reviewed): Moderately severe, diffuse, atrophy.


TSH=0.26 (Low) and B12= 778 pg%.





Pt is awake, confused. Ox "I dont F'n know where I am."


C/O Pain all over his body.





Exam: Severe atrophic skin changes/chronic cellulitis both legs.


         Neck supple. No bruits.


         Sparse fluent speech (not dysarthric).


         Gag present.


         Can't elevate either leg off the bed but ankle dorsiflexion near 

normal.


         Areflexic in the legs.


         No vibration below the knees.





IMP: Moderate, B/L cerebral dysfunction (Underlying OMS + Toxic metabolic 

encephalopathy)


       Disuse myopathy


       Dysphasia





SUGGEST: Elevate HOB and try H2O sips and apple sauce when patient is alert


              Should resume Rx ASAP to avoid benzodiazepine withdrawal.


              Continue antibiotics/ hydration


              Repeat TSH. Check CK, T4.


              Bedside PT/ swallowing Rx





Thank you very much,


Thien Hensley MD

## 2019-12-02 NOTE — PN
Progress Note, Physician


History of Present Illness: 


MORE AWAKE, LESS CONFUSED


 AFEBRILE


 BREATHING NON LABORED ON NASAL CANNULA


 NO COUGH NOTED


 WBC SL  INCREASED  11.6


 LE ERYTHEMA RESOLVED


 CXR  RLL INFILTRATE


 REPEAT BC NO GROWTH


 





- Current Medication List


Current Medications: 


Active Medications





Acetaminophen (Tylenol -)  650 mg PO Q6H PRN


   PRN Reason: PAIN LEVEL 6-10


   Last Admin: 11/21/19 21:34 Dose:  650 mg


Acetaminophen (Tylenol Suppository -)  650 mg [place] Q6H PRN


   PRN Reason: FEVER


   Last Admin: 11/28/19 15:08 Dose:  650 mg


Clonazepam (Klonopin -)  1 mg PO BID [facility]


   Last Admin: 12/02/19 09:55 Dose:  Not Given


Finasteride (Proscar -)  5 mg PO DAILY [facility]


   Last Admin: 12/02/19 09:55 Dose:  Not Given


Gabapentin (Neurontin -)  300 mg PO TID [facility]


   Last Admin: 12/02/19 06:34 Dose:  Not Given


Heparin Sodium (Porcine) (Heparin -)  5,000 unit SQ BID [facility]


   Last Admin: 12/02/19 09:57 Dose:  5,000 unit


Vancomycin HCl (Vancomycin (Pre-Docked))  1,000 mg in 250 mls @ 200 mls/hr IVPB 

Q24H [facility]; Protocol


   Last Admin: 12/01/19 16:30 Dose:  200 mls/hr


Aztreonam 1 gm/ Dextrose  50 mls @ 100 mls/hr IVPB Q12H [facility]; Protocol


   Last Admin: 12/02/19 05:33 Dose:  100 mls/hr


Amino Acids (Clinimix -)  1,000 mls @ 100 mls/hr IV Q10H [facility]


   Last Admin: 12/02/19 12:31 Dose:  100 mls/hr


Metoprolol Succinate (Toprol Xl -)  12.5 mg PO DAILY [facility]


   Last Admin: 12/02/19 09:55 Dose:  Not Given


Metoprolol Tartrate (Lopressor Injection -)  5 mg IVPB Q4H PRN


   PRN Reason: TACHYCARDIA


Olanzapine (Zyprexa -)  2.5 mg PO HS [facility]


   Last Admin: 12/01/19 21:35 Dose:  Not Given


Potassium Chloride (K-Dur -)  40 meq PO DAILY [facility]


   Last Admin: 12/02/19 09:55 Dose:  Not Given


Pramipexole Dihydrochloride (Mirapex -)  0.25 mg PO TID [facility]


   Last Admin: 12/02/19 06:34 Dose:  Not Given


Rivaroxaban (Xarelto)  15 mg PO DAILY@1800 [facility]


   Last Admin: 12/01/19 18:48 Dose:  Not Given


Rosuvastatin Calcium (Crestor -)  5 mg PO HS [facility]


   Last Admin: 12/01/19 21:35 Dose:  Not Given


Tamsulosin HCl (Flomax -)  0.4 mg PO DAILY@0830 [facility]


   Last Admin: 12/02/19 08:25 Dose:  Not Given











- Objective


Vital Signs: 


 Vital Signs











Temperature  98.6 F   12/02/19 06:29


 


Pulse Rate  68   12/02/19 06:29


 


Respiratory Rate  20   12/02/19 06:29


 


Blood Pressure  125/82   12/02/19 06:29


 


O2 Sat by Pulse Oximetry (%)  99   12/01/19 08:53











Constitutional: Yes: No Distress, Obese


Eyes: Yes: Conjunctiva Clear


Cardiovascular: Yes: Regular Rate and Rhythm, S1, S2


Respiratory: Yes: Rhonchi


Gastrointestinal: Yes: Normal Bowel Sounds, Soft, Abdomen, Obese.  No: 

Tenderness


Edema: Yes


Integumentary: Yes: Venous Stasis Changes


Labs: 


 CBC, BMP





 12/02/19 07:05 





 12/02/19 07:05 











Assessment/Plan


S/P HIGH GRADE FEVER ?  LUNG SOURCE 


PROBABLE ASP PNEUMONIA   


RECURRENT BILATERAL LE CELLULITIS   RESOLVED


+ WOUND C/S MRSA


MULTIPLE ANTIBIOTIC ALLERGIES


TOXIC METABOLIC ENCEPHALOPATHY


    


EMPIRIC AZTREONAM/ VANCOMYCIN


DISCUSSED WITH WIFE AT BEDSIDE

## 2019-12-02 NOTE — PN
Progress Note, Physician


Chief Complaint: 





Events noted


Generalized weakness


More awake today


History of Present Illness: 





Patient was seen and examined.  Chart was reviewed


Denies chest pain or palpitations








- Current Medication List


Current Medications: 


Active Medications





Acetaminophen (Tylenol -)  650 mg PO Q6H PRN


   PRN Reason: PAIN LEVEL 6-10


   Last Admin: 11/21/19 21:34 Dose:  650 mg


Acetaminophen (Tylenol Suppository -)  650 mg [place] Q6H PRN


   PRN Reason: FEVER


   Last Admin: 11/28/19 15:08 Dose:  650 mg


Clonazepam (Klonopin -)  1 mg PO BID [facility]


   Last Admin: 12/02/19 09:55 Dose:  Not Given


Finasteride (Proscar -)  5 mg PO DAILY [facility]


   Last Admin: 12/02/19 09:55 Dose:  Not Given


Gabapentin (Neurontin -)  300 mg PO TID [facility]


   Last Admin: 12/02/19 06:34 Dose:  Not Given


Heparin Sodium (Porcine) (Heparin -)  5,000 unit SQ BID [facility]


   Last Admin: 12/02/19 09:57 Dose:  5,000 unit


Vancomycin HCl (Vancomycin (Pre-Docked))  1,000 mg in 250 mls @ 200 mls/hr IVPB 

Q24H [facility]; Protocol


   Last Admin: 12/01/19 16:30 Dose:  200 mls/hr


Aztreonam 1 gm/ Dextrose  50 mls @ 100 mls/hr IVPB Q12H [facility]; Protocol


   Last Admin: 12/02/19 05:33 Dose:  100 mls/hr


Amino Acids (Clinimix -)  1,000 mls @ 100 mls/hr IV Q10H [facility]


   Last Admin: 12/02/19 06:34 Dose:  Not Given


Metoprolol Succinate (Toprol Xl -)  12.5 mg PO DAILY [facility]


   Last Admin: 12/02/19 09:55 Dose:  Not Given


Metoprolol Tartrate (Lopressor Injection -)  5 mg IVPB Q4H PRN


   PRN Reason: TACHYCARDIA


Olanzapine (Zyprexa -)  2.5 mg PO HS [facility]


   Last Admin: 12/01/19 21:35 Dose:  Not Given


Potassium Chloride (K-Dur -)  40 meq PO DAILY [facility]


   Last Admin: 12/02/19 09:55 Dose:  Not Given


Pramipexole Dihydrochloride (Mirapex -)  0.25 mg PO TID [facility]


   Last Admin: 12/02/19 06:34 Dose:  Not Given


Rivaroxaban (Xarelto)  15 mg PO DAILY@1800 [facility]


   Last Admin: 12/01/19 18:48 Dose:  Not Given


Rosuvastatin Calcium (Crestor -)  5 mg PO HS [facility]


   Last Admin: 12/01/19 21:35 Dose:  Not Given


Tamsulosin HCl (Flomax -)  0.4 mg PO DAILY@0830 [facility]


   Last Admin: 12/02/19 08:25 Dose:  Not Given











- Objective


Vital Signs: 


 Vital Signs











Temperature  98.6 F   12/02/19 06:29


 


Pulse Rate  68   12/02/19 06:29


 


Respiratory Rate  20   12/02/19 06:29


 


Blood Pressure  125/82   12/02/19 06:29


 


O2 Sat by Pulse Oximetry (%)  99   12/01/19 08:53











HENT: Yes: Atraumatic


Neck: Yes: Supple


Cardiovascular: Yes: Pulse Irregular, S1, S2


Respiratory: Yes: Diminished


Gastrointestinal: Yes: Normal Bowel Sounds, Soft, Abdomen, Obese.  No: 

Tenderness


Edema: Yes


Labs: 


 CBC, BMP





 12/02/19 07:05 





 12/02/19 07:05 











Problem List





- Problems


(1) Metabolic encephalopathy


Code(s): G93.41 - METABOLIC ENCEPHALOPATHY   





(2) Renal dysfunction


Code(s): N28.9 - DISORDER OF KIDNEY AND URETER, UNSPECIFIED   





(3) Anxiety


Code(s): F41.9 - ANXIETY DISORDER, UNSPECIFIED   





(4) Atrial fibrillation


Code(s): I48.91 - UNSPECIFIED ATRIAL FIBRILLATION   


Qualifiers: 


   Atrial fibrillation type: chronic 





(5) CHF (congestive heart failure)


Code(s): I50.9 - HEART FAILURE, UNSPECIFIED   


Qualifiers: 


   Heart failure type: diastolic   Heart failure chronicity: chronic   

Qualified Code(s): I50.32 - Chronic diastolic (congestive) heart failure   





(6) Cellulitis


Code(s): L03.90 - CELLULITIS, UNSPECIFIED   


Qualifiers: 


   Site of cellulitis: extremity   Site of cellulitis of extremity: lower 

extremity 





(7) Demand ischemia


Code(s): I24.8 - OTHER FORMS OF ACUTE ISCHEMIC HEART DISEASE   





(8) Hypertension


Code(s): I10 - ESSENTIAL (PRIMARY) HYPERTENSION   


Qualifiers: 


   Hypertension type: essential hypertension   Qualified Code(s): I10 - 

Essential (primary) hypertension   





(9) Lymphedema


Code(s): I89.0 - LYMPHEDEMA, NOT ELSEWHERE CLASSIFIED   





(10) Pacemaker


Code(s): Z95.0 - PRESENCE OF CARDIAC PACEMAKER   





Assessment/Plan





1. Recurrent LE cellulitis with multiple allergies to PCN and carbipenem


2. Chronic venous stasis dermatitis


3. Acute on CKD with hx of hydronephrosis/obstructive uropathy likely pre-renal 


4. Chronic diastolic heart failure


5. HTN


6. AF on DOAC/Xarelto


7. Sick sinus syndrome s/p PPM


8. OSAS


9. CAD h/o demand ischemia


10. Hyperlipidemia


11. Anxiety


12. BPH


13. Toxic metabolic encephelopathy with high risk of aspiration





PLAN:


1. Empiric antibiotic and wound care 


2. Oral diuretics held pending renal function recovery. Monitor electrolytes


3. Continue Toprol XL 12.5 mg QD, Xarelto 15 mg QD and Crestor 5 mg QD. 

Losartan held pending renal function stabilization


4. Eventual rehab then follow up in office [phone]


5. DVT and GI prophylaxis


6. Pacemaker interrogation to be arranged while inpatient





Panfilo Foote MD

## 2019-12-02 NOTE — PN
Progress Note, Physician


Chief Complaint: 





Cellulitis


AGUILA


Afib


History of Present Illness: 





Previous notes and events reviewed


alert and awake, but continue to be confused


NAD


leukocytosis, wbc show uptrend 11.6


continue with poor appetite on Clinimix








- Current Medication List


Current Medications: 


Active Medications





Acetaminophen (Tylenol -)  650 mg PO Q6H PRN


   PRN Reason: PAIN LEVEL 6-10


   Last Admin: 11/21/19 21:34 Dose:  650 mg


Acetaminophen (Tylenol Suppository -)  650 mg [place] Q6H PRN


   PRN Reason: FEVER


   Last Admin: 11/28/19 15:08 Dose:  650 mg


Clonazepam (Klonopin -)  1 mg PO BID [facility]


   Last Admin: 12/02/19 09:55 Dose:  Not Given


Finasteride (Proscar -)  5 mg PO DAILY [facility]


   Last Admin: 12/02/19 09:55 Dose:  Not Given


Gabapentin (Neurontin -)  300 mg PO TID [facility]


   Last Admin: 12/02/19 06:34 Dose:  Not Given


Heparin Sodium (Porcine) (Heparin -)  5,000 unit SQ BID [facility]


   Last Admin: 12/02/19 09:57 Dose:  5,000 unit


Vancomycin HCl (Vancomycin (Pre-Docked))  1,000 mg in 250 mls @ 200 mls/hr IVPB 

Q24H [facility]; Protocol


   Last Admin: 12/01/19 16:30 Dose:  200 mls/hr


Aztreonam 1 gm/ Dextrose  50 mls @ 100 mls/hr IVPB Q12H [facility]; Protocol


   Last Admin: 12/02/19 05:33 Dose:  100 mls/hr


Amino Acids (Clinimix -)  1,000 mls @ 100 mls/hr IV Q10H [facility]


   Last Admin: 12/02/19 06:34 Dose:  Not Given


Metoprolol Succinate (Toprol Xl -)  12.5 mg PO DAILY [facility]


   Last Admin: 12/02/19 09:55 Dose:  Not Given


Metoprolol Tartrate (Lopressor Injection -)  5 mg IVPB Q4H PRN


   PRN Reason: TACHYCARDIA


Olanzapine (Zyprexa -)  2.5 mg PO HS [facility]


   Last Admin: 12/01/19 21:35 Dose:  Not Given


Potassium Chloride (K-Dur -)  40 meq PO DAILY [facility]


   Last Admin: 12/02/19 09:55 Dose:  Not Given


Pramipexole Dihydrochloride (Mirapex -)  0.25 mg PO TID [facility]


   Last Admin: 12/02/19 06:34 Dose:  Not Given


Rivaroxaban (Xarelto)  15 mg PO DAILY@1800 [facility]


   Last Admin: 12/01/19 18:48 Dose:  Not Given


Rosuvastatin Calcium (Crestor -)  5 mg PO HS [facility]


   Last Admin: 12/01/19 21:35 Dose:  Not Given


Tamsulosin HCl (Flomax -)  0.4 mg PO DAILY@0830 [facility]


   Last Admin: 12/02/19 08:25 Dose:  Not Given











- Objective


Vital Signs: 


 Vital Signs











Temperature  98.6 F   12/02/19 06:29


 


Pulse Rate  68   12/02/19 06:29


 


Respiratory Rate  20   12/02/19 06:29


 


Blood Pressure  125/82   12/02/19 06:29


 


O2 Sat by Pulse Oximetry (%)  99   12/01/19 08:53











Constitutional: Yes: No Distress, Calm


Eyes: Yes: Conjunctiva Clear


HENT: Yes: Atraumatic


Cardiovascular: Yes: Regular Rate and Rhythm


Respiratory: Yes: Regular, On Nasal O2, Rhonchi


Gastrointestinal: Yes: Normal Bowel Sounds, Soft


Genitourinary: Yes: Incontinence


Musculoskeletal: Yes: Muscle Weakness


Extremities: Yes: WNL


Edema: Yes


Integumentary: Yes: Venous Stasis Changes


Neurological: Yes: Alert, Confusion


Psychiatric: Yes: Alert


Labs: 


 CBC, BMP





 12/02/19 07:05 





 12/02/19 07:05 











Problem List





- Problems


(1) Metabolic encephalopathy


Assessment/Plan: 





-Head CT scan shows no acute evidence of intracranial hemorrhage, punctate 

chronic right base infarct, mild periventricular chronic microvascular ischemic 

changes 


-neuro check q6h


-Neurology on board


-repeat Head CT scan shows moderate to marked volume loss without CT evidenceof 

acute intracranial pathology, calvarium is intact, mild chronic sinusitis


-repeat CXR on 11/25/19 shows some atelectasis/infiltrateat the right base


-Urine Legionella neg


-repeat BC neg


-Azactam and Vancomycin 


-poor PO intake and at risk for aspiration HOLD PO MEDICATION 


Code(s): G93.41 - METABOLIC ENCEPHALOPATHY   





(2) AGUILA (acute kidney injury)


Assessment/Plan: 


-Renal on board


-BUN/Cr 39.4/0.8


-monitor renal function


Code(s): N17.9 - ACUTE KIDNEY FAILURE, UNSPECIFIED   





(3) Anxiety


Assessment/Plan: 


-Clonazepam on hold 


-Zyprexa on hold


-Psychiatry on board


Code(s): F41.9 - ANXIETY DISORDER, UNSPECIFIED   





(4) Atrial fibrillation


Assessment/Plan: 


-Xarelto and Metoprolol-on hold


-Heparin SQ and Lopressor 5mg IVPB for HR >110bpm


-cardiology on board


Code(s): I48.91 - UNSPECIFIED ATRIAL FIBRILLATION   


Qualifiers: 


   Atrial fibrillation type: chronic 





(5) Cellulitis


Assessment/Plan: 


-ID on board


-leukocytosis wbc 11.6


-afebrile


-Tygacil, Vancomycin discontued


-wound culture positive


-contact precaution


-BC neg


-Vascular US B/L LE neg for DVT


Code(s): L03.90 - CELLULITIS, UNSPECIFIED   


Qualifiers: 


   Site of cellulitis: extremity   Site of cellulitis of extremity: lower 

extremity 





(6) CHF (congestive heart failure)


Assessment/Plan: 


-daily weights


-strict I&Os


-fluid restriction


Code(s): I50.9 - HEART FAILURE, UNSPECIFIED   


Qualifiers: 


   Heart failure type: diastolic   Heart failure chronicity: acute on chronic   

Qualified Code(s): I50.33 - Acute on chronic diastolic (congestive) heart 

failure   





(7) Hypertension


Assessment/Plan: 


-monitor BP


-low Na diet





Code(s): I10 - ESSENTIAL (PRIMARY) HYPERTENSION   


Qualifiers: 


   Hypertension type: essential hypertension   Qualified Code(s): I10 - 

Essential (primary) hypertension   





(8) Urinary retention due to benign prostatic hyperplasia


Assessment/Plan: 


-Finasteride


-Bladder/Renal US both kidneys appear unremarkable, significantly unlarged 

prostate with a volume 80cc, adequately distended urinary bladder without wall 

thickening, bilateral ureteral jets


-Urology consult 


Code(s): N40.1 - BENIGN PROSTATIC HYPERPLASIA WITH LOWER URINARY TRACT SYMP; 

R33.8 - OTHER RETENTION OF URINE   





(9) Poor appetite


Assessment/Plan: 


-Clinimix


-SLP on board


-at risk for aspiration due to metabolic encephalopathy


-palliative consult for possible PEG?


Code(s): R63.0 - ANOREXIA   





Assessment/Plan





see problem list


dvt ppx

## 2019-12-02 NOTE — PN
Progress Note, Physician


History of Present Illness: 





Pt seen and examined at bedside. He is awake but still confused. 





- Current Medication List


Current Medications: 


Active Medications





Acetaminophen (Tylenol -)  650 mg PO Q6H PRN


   PRN Reason: PAIN LEVEL 6-10


   Last Admin: 11/21/19 21:34 Dose:  650 mg


Acetaminophen (Tylenol Suppository -)  650 mg CT Q6H PRN


   PRN Reason: FEVER


   Last Admin: 11/28/19 15:08 Dose:  650 mg


Clonazepam (Klonopin -)  1 mg PO BID [facility]


   Last Admin: 12/02/19 09:55 Dose:  Not Given


Finasteride (Proscar -)  5 mg PO DAILY [facility]


   Last Admin: 12/02/19 09:55 Dose:  Not Given


Gabapentin (Neurontin -)  300 mg PO TID [facility]


   Last Admin: 12/02/19 14:58 Dose:  Not Given


Heparin Sodium (Porcine) (Heparin -)  5,000 unit SQ BID [facility]


   Last Admin: 12/02/19 09:57 Dose:  5,000 unit


Vancomycin HCl (Vancomycin (Pre-Docked))  1,000 mg in 250 mls @ 200 mls/hr IVPB 

Q24H [facility]; Protocol


   Last Admin: 12/02/19 15:18 Dose:  200 mls/hr


Aztreonam 1 gm/ Dextrose  50 mls @ 100 mls/hr IVPB Q12H [facility]; Protocol


   Last Admin: 12/02/19 17:24 Dose:  100 mls/hr


Amino Acids (Clinimix -)  1,000 mls @ 100 mls/hr IV Q10H [facility]


   Last Admin: 12/02/19 12:31 Dose:  100 mls/hr


Metoprolol Succinate (Toprol Xl -)  12.5 mg PO DAILY [facility]


   Last Admin: 12/02/19 09:55 Dose:  Not Given


Metoprolol Tartrate (Lopressor Injection -)  5 mg IVPB Q4H PRN


   PRN Reason: TACHYCARDIA


Nystatin (Mycostatin Cream -)  1 applic TP BID [facility]


Olanzapine (Zyprexa -)  2.5 mg PO HS [facility]


   Last Admin: 12/01/19 21:35 Dose:  Not Given


Potassium Chloride (K-Dur -)  40 meq PO DAILY [facility]


   Last Admin: 12/02/19 09:55 Dose:  Not Given


Pramipexole Dihydrochloride (Mirapex -)  0.25 mg PO TID [facility]


   Last Admin: 12/02/19 14:58 Dose:  Not Given


Rivaroxaban (Xarelto)  15 mg PO DAILY@1800 [facility]


   Last Admin: 12/02/19 17:24 Dose:  Not Given


Rosuvastatin Calcium (Crestor -)  5 mg PO HS [facility]


   Last Admin: 12/01/19 21:35 Dose:  Not Given


Tamsulosin HCl (Flomax -)  0.4 mg PO DAILY@0830 [facility]


   Last Admin: 12/02/19 08:25 Dose:  Not Given











- Objective


Vital Signs: 


 Vital Signs











Temperature  98.3 F   12/02/19 15:25


 


Pulse Rate  65   12/02/19 15:25


 


Respiratory Rate  20   12/02/19 15:25


 


Blood Pressure  157/46 L  12/02/19 15:25


 


O2 Sat by Pulse Oximetry (%)  99   12/01/19 08:53











Constitutional: Yes: Calm


Eyes: Yes: Conjunctiva Clear


HENT: Yes: Atraumatic


Neck: Yes: Supple


Cardiovascular: Yes: S1, S2


Respiratory: Yes: CTA Bilaterally, On Nasal O2


Gastrointestinal: Yes: Normal Bowel Sounds, Soft, Abdomen, Obese


Genitourinary: Yes: Incontinence


Musculoskeletal: Yes: Muscle Weakness


Edema: Yes


Edema: LLE: 1+, RLE: 1+


Neurological: Yes: Confusion


Labs: 


 CBC, BMP





 12/02/19 07:05 





 12/02/19 07:05 











Problem List





- Problems


(1) AGUILA (acute kidney injury)


Code(s): N17.9 - ACUTE KIDNEY FAILURE, UNSPECIFIED   





(2) CHF (congestive heart failure)


Code(s): I50.9 - HEART FAILURE, UNSPECIFIED   


Qualifiers: 


   Heart failure type: diastolic   Heart failure chronicity: chronic   

Qualified Code(s): I50.32 - Chronic diastolic (congestive) heart failure   





(3) Cellulitis


Code(s): L03.90 - CELLULITIS, UNSPECIFIED   


Qualifiers: 


   Site of cellulitis: extremity   Site of cellulitis of extremity: lower 

extremity 





Assessment/Plan


 Current Medications











Generic Name Dose Route Start Last Admin





  Trade Name Freq  PRN Reason Stop Dose Admin


 


Acetaminophen  650 mg  11/14/19 04:07  11/21/19 21:34





  Tylenol -  PO   650 mg





  Q6H PRN   Administration





  PAIN LEVEL 6-10   





     





     





     


 


Acetaminophen  650 mg  11/25/19 16:13  11/28/19 15:08





  Tylenol Suppository -  CT   650 mg





  Q6H PRN   Administration





  FEVER   





     





     





     


 


Clonazepam  1 mg  11/25/19 22:00  12/02/19 09:55





  Klonopin -  PO   Not Given





  BID [facility]   





     





     





     





     


 


Finasteride  5 mg  11/15/19 10:00  12/02/19 09:55





  Proscar -  PO   Not Given





  DAILY [facility]   





     





     





     





     


 


Gabapentin  300 mg  11/21/19 22:00  12/02/19 14:58





  Neurontin -  PO   Not Given





  TID [facility]   





     





     





     





     


 


Heparin Sodium (Porcine)  5,000 unit  11/29/19 22:00  12/02/19 09:57





  Heparin -  SQ   5,000 unit





  BID [facility]   Administration





     





     





     





     


 


Vancomycin HCl  1,000 mg in 250 mls @ 200 mls/hr  11/27/19 16:00  12/02/19 15:18





  Vancomycin (Pre-Docked)  IVPB   200 mls/hr





  Q24H [facility]   Administration





     





     





  Protocol   





     


 


Aztreonam 1 gm/ Dextrose  50 mls @ 100 mls/hr  11/26/19 17:00  12/02/19 17:24





  IVPB   100 mls/hr





  Q12H [facility]   Administration





     





     





  Protocol   





     


 


Amino Acids  1,000 mls @ 100 mls/hr  11/29/19 13:32  12/02/19 12:31





  Clinimix -  IV   100 mls/hr





  Q10H [facility]   Administration





     





     





     





     


 


Metoprolol Succinate  12.5 mg  11/18/19 10:00  12/02/19 09:55





  Toprol Xl -  PO   Not Given





  DAILY [facility]   





     





     





     





     


 


Metoprolol Tartrate  5 mg  11/29/19 13:36  





  Lopressor Injection -  IVPB   





  Q4H PRN   





  TACHYCARDIA   





     





     





     


 


Nystatin  1 applic  12/02/19 22:00  





  Mycostatin Cream -  TP   





  BID [facility]   





     





     





     





     


 


Olanzapine  2.5 mg  11/21/19 22:00  12/01/19 21:35





  Zyprexa -  PO   Not Given





  HS [facility]   





     





     





     





     


 


Potassium Chloride  40 meq  11/16/19 17:49  12/02/19 09:55





  K-Dur -  PO   Not Given





  DAILY [facility]   





     





     





     





     


 


Pramipexole Dihydrochloride  0.25 mg  11/21/19 20:00  12/02/19 14:58





  Mirapex -  PO   Not Given





  TID [facility]   





     





     





     





     


 


Rivaroxaban  15 mg  11/14/19 18:00  12/02/19 17:24





  Xarelto  PO   Not Given





  DAILY@1800 [facility]   





     





     





     





     


 


Rosuvastatin Calcium  5 mg  11/18/19 22:00  12/01/19 21:35





  Crestor -  PO   Not Given





  HS [facility]   





     





     





     





     


 


Tamsulosin HCl  0.4 mg  11/17/19 08:30  12/02/19 08:25





  Flomax -  PO   Not Given





  DAILY@0830 [facility]   





     





     





     





     














Impression


1. CKD


2. hx of hydronephrosis


3. hx of obstructive uropathy


4. CHF


5. HTN


6. AGUILA


7. a-fib


8. hypernatreamia





Plan


- increase rate of clinimix


- repeat labs in am


- renal function is improved


- abx per ID


- diuretics are on hold as he is not eating


- mental status improved


- likely in part pre-renal aguila

## 2019-12-03 NOTE — PN
Progress Note, Physician


Chief Complaint: 





Cellulitis


AGUILA


Afib


History of Present Illness: 





NAD


still confused and agitated


on IV abx


Seen by Neurology


On clinimix day 10





- Current Medication List


Current Medications: 


Active Medications





Acetaminophen (Tylenol -)  650 mg PO Q6H PRN


   PRN Reason: PAIN LEVEL 6-10


   Last Admin: 11/21/19 21:34 Dose:  650 mg


Acetaminophen (Tylenol Suppository -)  650 mg NC Q6H PRN


   PRN Reason: FEVER


   Last Admin: 11/28/19 15:08 Dose:  650 mg


Clonazepam (Klonopin -)  1 mg PO BID [facility]


   Last Admin: 12/03/19 09:32 Dose:  Not Given


Finasteride (Proscar -)  5 mg PO DAILY [facility]


   Last Admin: 12/03/19 09:32 Dose:  Not Given


Gabapentin (Neurontin -)  300 mg PO TID [facility]


   Last Admin: 12/03/19 14:03 Dose:  Not Given


Heparin Sodium (Porcine) (Heparin -)  5,000 unit SQ BID [facility]


   Last Admin: 12/03/19 09:31 Dose:  5,000 unit


Vancomycin HCl (Vancomycin (Pre-Docked))  1,000 mg in 250 mls @ 200 mls/hr IVPB 

Q24H [facility]; Protocol


   Last Admin: 12/03/19 16:55 Dose:  200 mls/hr


Aztreonam 1 gm/ Dextrose  50 mls @ 100 mls/hr IVPB Q12H [facility]; Protocol


   Last Admin: 12/03/19 05:54 Dose:  100 mls/hr


Amino Acids (Clinimix -)  1,000 mls @ 125 mls/hr IV Q8H [facility]


   Last Admin: 12/03/19 13:27 Dose:  125 mls/hr


Fat Emulsion Intravenous (Intralipid -)  250 mls @ 20.833 mls/hr IV DAILY@2200 

[facility]


Dextrose (D5w -)  1,000 mls @ 42 mls/hr IV Q24H [facility]


Metoprolol Succinate (Toprol Xl -)  12.5 mg PO DAILY [facility]


   Last Admin: 12/03/19 09:32 Dose:  Not Given


Metoprolol Tartrate (Lopressor Injection -)  5 mg IVPB Q4H PRN


   PRN Reason: TACHYCARDIA


Nystatin (Mycostatin Cream -)  1 applic TP BID [facility]


   Last Admin: 12/03/19 09:33 Dose:  1 appful


Olanzapine (Zyprexa -)  2.5 mg PO HS [facility]


   Last Admin: 12/02/19 22:16 Dose:  Not Given


Potassium Chloride (K-Dur -)  40 meq PO DAILY [facility]


   Last Admin: 12/03/19 09:32 Dose:  Not Given


Pramipexole Dihydrochloride (Mirapex -)  0.25 mg PO TID [facility]


   Last Admin: 12/03/19 14:03 Dose:  Not Given


Rivaroxaban (Xarelto)  15 mg PO DAILY@1800 [facility]


   Last Admin: 12/03/19 17:27 Dose:  Not Given


Rosuvastatin Calcium (Crestor -)  5 mg PO HS [facility]


   Last Admin: 12/02/19 22:15 Dose:  Not Given


Tamsulosin HCl (Flomax -)  0.4 mg PO DAILY@0830 [facility]


   Last Admin: 12/03/19 08:41 Dose:  Not Given


Thiamine HCl (Vitamin B1 Injection -)  100 mg IVPB BID [facility]


   Stop: 12/07/19 22:01


   Last Admin: 12/03/19 13:26 Dose:  100 mg











- Objective


Vital Signs: 


 Vital Signs











Temperature  97.2 F L  12/03/19 15:32


 


Pulse Rate  69   12/03/19 10:29


 


Respiratory Rate  20   12/03/19 15:32


 


Blood Pressure  130/52 L  12/03/19 15:32


 


O2 Sat by Pulse Oximetry (%)  99   12/03/19 09:00











Constitutional: Yes: Well Nourished, No Distress, Obese


Cardiovascular: Yes: Regular Rate and Rhythm


Respiratory: Yes: Regular, On Nasal O2


Gastrointestinal: Yes: Normal Bowel Sounds, Soft, Abdomen, Obese


Genitourinary: Yes: Incontinence


Musculoskeletal: Yes: Muscle Weakness


Extremities: Yes: WNL


Edema: No


Peripheral Pulses WNL: Yes


Neurological: Yes: Alert, Confusion


Psychiatric: Yes: Agitated


Labs: 


 CBC, BMP





 12/03/19 07:20 





 12/03/19 07:20 











Assessment/Plan





(1) Metabolic encephalopathy


Assessment/Plan: 


-New RLL infiltrate


-Started on IV abx


-ID on board


-Head CT scan shows no acute evidence of intracranial hemorrhage, punctate 

chronic right base infarct, mild periventricular chronic microvascular ischemic 

changes 


-Repeat BC:


 Microbiology





11/25/19 17:00   Blood - Peripheral Venous   Blood Culture - Final


                            NO GROWTH AFTER 5 DAYS INCUBATION


11/25/19 17:00   Blood - Peripheral Venous   Blood Culture - Final


                            NO GROWTH AFTER 5 DAYS INCUBATION


11/26/19 18:00   Urine - Urine Clean Catch   Urine Culture - Final


                            NO GROWTH OBTAINED


11/26/19 18:00   Urine For Antigen Detection   Legionella Antigen - Final


11/26/19 18:00   Urine For Antigen Detection   Streptococcus pneumoniae Antigen 

(M - Final








Code(s): G93.41 - METABOLIC ENCEPHALOPATHY   





(2) AGUILA (acute kidney injury)


Assessment/Plan: 


-Renal on board


-monitor renal function


Code(s): N17.9 - ACUTE KIDNEY FAILURE, UNSPECIFIED   





(3) Anxiety


Assessment/Plan: 


-Clonazepam 2 mg po BID-on Hold


-Psychiatry consult


-Add Olanzapine 7.5 mg po HS-on Hold


Code(s): F41.9 - ANXIETY DISORDER, UNSPECIFIED   





(4) Atrial fibrillation


Assessment/Plan: 


-Xarelto and Metoprolol- on hold


Code(s): I48.91 - UNSPECIFIED ATRIAL FIBRILLATION   


Qualifiers: 


   Atrial fibrillation type: chronic 





(5) Cellulitis


Assessment/Plan: 


-ID on board


-leukocytosis


-afebrile


-wound culture positive


-contact precaution


-BC neg


-Vascular US B/L LE neg for DVT


-Seen by Vascular surgery as well


Code(s): L03.90 - CELLULITIS, UNSPECIFIED   


Qualifiers: 


   Site of cellulitis: extremity   Site of cellulitis of extremity: lower 

extremity 





(6) CHF (congestive heart failure)


Assessment/Plan: 


-daily weights


-strict I&Os


-fluid restriction


Code(s): I50.9 - HEART FAILURE, UNSPECIFIED   


Qualifiers: 


   Heart failure type: diastolic   Heart failure chronicity: acute on chronic   

Qualified Code(s): I50.33 - Acute on chronic diastolic (congestive) heart 

failure   





(7) Hypertension


Assessment/Plan: 


-monitor BP


-low Na diet


Code(s): I10 - ESSENTIAL (PRIMARY) HYPERTENSION   


Qualifiers: 


   Hypertension type: essential hypertension   Qualified Code(s): I10 - 

Essential (primary) hypertension   





(8) Urinary retention due to benign prostatic hyperplasia


Assessment/Plan: 


-Finasteride


-Bladder/Renal US both kidneys appear unremarkable, significantly unlarged 

prostate with a volume 80cc, adequately distended urinary bladder without wall 

thickening, bilateral ureteral jets


-Urology consult 


-Tamsulosin 0.4 mg po daily


Code(s): N40.1 - BENIGN PROSTATIC HYPERPLASIA WITH LOWER URINARY TRACT SYMP; 

R33.8 - OTHER RETENTION OF URINE   





 Assessment/Plan 





see problem list 


dvt ppx


GT placement under consideration to optimize patient's nutritional status

## 2019-12-03 NOTE — PN
Progress Note, SLP





- Note


Progress Note: 





 Selected Entries











  12/03/19 12/03/19 12/03/19





  05:20 09:36 10:29


 


Breakfast  NPO 


 


Temperature 98 F  98.2 F








 Laboratory Tests











  11/30/19 12/02/19 12/03/19





  07:30 07:05 07:20


 


WBC  10.3 H  11.6 H  11.0 H








NPO


Poor nutrition since admission- 0-25% intake, now NPO for several days. Swallow 

still quite weak with aspiration risk.


Swallowing reassessed- 1/2 tsn applesauce with weak laryngeal swallow- c/o "I 

cant breath"


Suctioned and cleared some of thick dry mucous on solft palate/tongue. Second 

trial with similar result.


IMP-High risk of aspiration


Poor nutrition


Discussed with pt's wife consideration of GT.


Pt would need abdominal binder to prevent it from being pulled out.





Consider Palliative care re TF.

## 2019-12-03 NOTE — PN
Progress Note, Physician


Chief Complaint: 





Events noted


Generalized weakness


Lethargic today (wax and wanes)


History of Present Illness: 





Patient was seen and examined.  Chart was reviewed











- Current Medication List


Current Medications: 


Active Medications





Acetaminophen (Tylenol -)  650 mg PO Q6H PRN


   PRN Reason: PAIN LEVEL 6-10


   Last Admin: 11/21/19 21:34 Dose:  650 mg


Acetaminophen (Tylenol Suppository -)  650 mg CT Q6H PRN


   PRN Reason: FEVER


   Last Admin: 11/28/19 15:08 Dose:  650 mg


Clonazepam (Klonopin -)  1 mg PO BID [facility]


   Last Admin: 12/03/19 09:32 Dose:  Not Given


Finasteride (Proscar -)  5 mg PO DAILY [facility]


   Last Admin: 12/03/19 09:32 Dose:  Not Given


Gabapentin (Neurontin -)  300 mg PO TID [facility]


   Last Admin: 12/03/19 06:02 Dose:  Not Given


Heparin Sodium (Porcine) (Heparin -)  5,000 unit SQ BID [facility]


   Last Admin: 12/03/19 09:31 Dose:  5,000 unit


Vancomycin HCl (Vancomycin (Pre-Docked))  1,000 mg in 250 mls @ 200 mls/hr IVPB 

Q24H [facility]; Protocol


   Last Admin: 12/02/19 15:18 Dose:  200 mls/hr


Aztreonam 1 gm/ Dextrose  50 mls @ 100 mls/hr IVPB Q12H [facility]; Protocol


   Last Admin: 12/03/19 05:54 Dose:  100 mls/hr


Amino Acids (Clinimix -)  1,000 mls @ 125 mls/hr IV Q8H [facility]


   Last Admin: 12/03/19 10:39 Dose:  Not Given


Metoprolol Succinate (Toprol Xl -)  12.5 mg PO DAILY [facility]


   Last Admin: 12/03/19 09:32 Dose:  Not Given


Metoprolol Tartrate (Lopressor Injection -)  5 mg IVPB Q4H PRN


   PRN Reason: TACHYCARDIA


Nystatin (Mycostatin Cream -)  1 applic TP BID [facility]


   Last Admin: 12/03/19 09:33 Dose:  1 appful


Olanzapine (Zyprexa -)  2.5 mg PO HS [facility]


   Last Admin: 12/02/19 22:16 Dose:  Not Given


Potassium Chloride (K-Dur -)  40 meq PO DAILY [facility]


   Last Admin: 12/03/19 09:32 Dose:  Not Given


Pramipexole Dihydrochloride (Mirapex -)  0.25 mg PO TID [facility]


   Last Admin: 12/03/19 06:02 Dose:  Not Given


Rivaroxaban (Xarelto)  15 mg PO DAILY@1800 [facility]


   Last Admin: 12/02/19 17:24 Dose:  Not Given


Rosuvastatin Calcium (Crestor -)  5 mg PO HS [facility]


   Last Admin: 12/02/19 22:15 Dose:  Not Given


Tamsulosin HCl (Flomax -)  0.4 mg PO DAILY@0830 [facility]


   Last Admin: 12/03/19 08:41 Dose:  Not Given











- Objective


Vital Signs: 


 Vital Signs











Temperature  98.2 F   12/03/19 10:29


 


Pulse Rate  69   12/03/19 10:29


 


Respiratory Rate  21 H  12/03/19 10:29


 


Blood Pressure  130/65   12/03/19 10:29


 


O2 Sat by Pulse Oximetry (%)  99   12/03/19 09:00











Neck: Yes: Supple


Cardiovascular: Yes: Pulse Irregular, S1, S2


Respiratory: Yes: Diminished


Gastrointestinal: Yes: Normal Bowel Sounds, Soft, Abdomen, Obese.  No: 

Tenderness


Edema: Yes


Labs: 


 CBC, BMP





 12/03/19 07:20 





 12/03/19 07:20 











Problem List





- Problems


(1) Metabolic encephalopathy


Code(s): G93.41 - METABOLIC ENCEPHALOPATHY   





(2) Renal dysfunction


Code(s): N28.9 - DISORDER OF KIDNEY AND URETER, UNSPECIFIED   





(3) Anxiety


Code(s): F41.9 - ANXIETY DISORDER, UNSPECIFIED   





(4) Atrial fibrillation


Code(s): I48.91 - UNSPECIFIED ATRIAL FIBRILLATION   


Qualifiers: 


   Atrial fibrillation type: chronic 





(5) CHF (congestive heart failure)


Code(s): I50.9 - HEART FAILURE, UNSPECIFIED   


Qualifiers: 


   Heart failure type: diastolic   Heart failure chronicity: chronic   

Qualified Code(s): I50.32 - Chronic diastolic (congestive) heart failure   





(6) Cellulitis


Code(s): L03.90 - CELLULITIS, UNSPECIFIED   


Qualifiers: 


   Site of cellulitis: extremity   Site of cellulitis of extremity: lower 

extremity 





(7) Demand ischemia


Code(s): I24.8 - OTHER FORMS OF ACUTE ISCHEMIC HEART DISEASE   





(8) Hypertension


Code(s): I10 - ESSENTIAL (PRIMARY) HYPERTENSION   


Qualifiers: 


   Hypertension type: essential hypertension   Qualified Code(s): I10 - 

Essential (primary) hypertension   





(9) Lymphedema


Code(s): I89.0 - LYMPHEDEMA, NOT ELSEWHERE CLASSIFIED   





(10) Pacemaker


Code(s): Z95.0 - PRESENCE OF CARDIAC PACEMAKER   





Assessment/Plan





1. Recurrent LE cellulitis with multiple allergies to PCN and carbipenem


2. Chronic venous stasis dermatitis


3. Acute on CKD with hx of hydronephrosis/obstructive uropathy likely pre-renal 


4. Chronic diastolic heart failure


5. HTN


6. AF on DOAC/Xarelto


7. Sick sinus syndrome s/p PPM


8. OSAS


9. CAD h/o demand ischemia


10. Hyperlipidemia


11. Anxiety


12. BPH


13. Toxic metabolic encephelopathy with high risk of aspiration





PLAN:


1. Empiric antibiotic and wound care 


2. Oral diuretics held pending renal function recovery. Monitor electrolytes


3. Continue Toprol XL 12.5 mg QD, Xarelto 15 mg QD and Crestor 5 mg QD. 

Losartan held pending renal function stabilization


4. Neuro input noted


5. DVT and GI prophylaxis


6. Pacemaker interrogation to be arranged while inpatient





Panfilo Foote MD

## 2019-12-03 NOTE — PN
Progress Note, Physician


History of Present Illness: 





Pt seen and examined at bedside. He remains agitated. 





- Current Medication List


Current Medications: 


Active Medications





Acetaminophen (Tylenol -)  650 mg PO Q6H PRN


   PRN Reason: PAIN LEVEL 6-10


   Last Admin: 11/21/19 21:34 Dose:  650 mg


Acetaminophen (Tylenol Suppository -)  650 mg [place] Q6H PRN


   PRN Reason: FEVER


   Last Admin: 11/28/19 15:08 Dose:  650 mg


Clonazepam (Klonopin -)  1 mg PO BID [facility]


   Last Admin: 12/03/19 09:32 Dose:  Not Given


Finasteride (Proscar -)  5 mg PO DAILY [facility]


   Last Admin: 12/03/19 09:32 Dose:  Not Given


Gabapentin (Neurontin -)  300 mg PO TID [facility]


   Last Admin: 12/03/19 14:03 Dose:  Not Given


Heparin Sodium (Porcine) (Heparin -)  5,000 unit SQ BID [facility]


   Last Admin: 12/03/19 09:31 Dose:  5,000 unit


Vancomycin HCl (Vancomycin (Pre-Docked))  1,000 mg in 250 mls @ 200 mls/hr IVPB 

Q24H [facility]; Protocol


   Last Admin: 12/02/19 15:18 Dose:  200 mls/hr


Aztreonam 1 gm/ Dextrose  50 mls @ 100 mls/hr IVPB Q12H [facility]; Protocol


   Last Admin: 12/03/19 05:54 Dose:  100 mls/hr


Amino Acids (Clinimix -)  1,000 mls @ 125 mls/hr IV Q8H [facility]


   Last Admin: 12/03/19 13:27 Dose:  125 mls/hr


Fat Emulsion Intravenous (Intralipid -)  250 mls @ 20.833 mls/hr IV DAILY@2200 

[facility]


Metoprolol Succinate (Toprol Xl -)  12.5 mg PO DAILY [facility]


   Last Admin: 12/03/19 09:32 Dose:  Not Given


Metoprolol Tartrate (Lopressor Injection -)  5 mg IVPB Q4H PRN


   PRN Reason: TACHYCARDIA


Nystatin (Mycostatin Cream -)  1 applic TP BID [facility]


   Last Admin: 12/03/19 09:33 Dose:  1 appful


Olanzapine (Zyprexa -)  2.5 mg PO HS [facility]


   Last Admin: 12/02/19 22:16 Dose:  Not Given


Potassium Chloride (K-Dur -)  40 meq PO DAILY [facility]


   Last Admin: 12/03/19 09:32 Dose:  Not Given


Pramipexole Dihydrochloride (Mirapex -)  0.25 mg PO TID [facility]


   Last Admin: 12/03/19 14:03 Dose:  Not Given


Rivaroxaban (Xarelto)  15 mg PO DAILY@1800 [facility]


   Last Admin: 12/02/19 17:24 Dose:  Not Given


Rosuvastatin Calcium (Crestor -)  5 mg PO HS [facility]


   Last Admin: 12/02/19 22:15 Dose:  Not Given


Tamsulosin HCl (Flomax -)  0.4 mg PO DAILY@0830 [facility]


   Last Admin: 12/03/19 08:41 Dose:  Not Given


Thiamine HCl (Vitamin B1 Injection -)  100 mg IVPB BID [facility]


   Stop: 12/07/19 22:01


   Last Admin: 12/03/19 13:26 Dose:  100 mg











- Objective


Vital Signs: 


 Vital Signs











Temperature  97.2 F L  12/03/19 15:32


 


Pulse Rate  69   12/03/19 10:29


 


Respiratory Rate  20   12/03/19 15:32


 


Blood Pressure  130/52 L  12/03/19 15:32


 


O2 Sat by Pulse Oximetry (%)  99   12/03/19 09:00











Constitutional: Yes: Anxious, Mild Distress


Eyes: Yes: Conjunctiva Clear


Cardiovascular: Yes: S1, S2


Respiratory: Yes: CTA Bilaterally, On Nasal O2


Gastrointestinal: Yes: Soft


Musculoskeletal: Yes: WNL


Edema: Yes


Edema: LLE: Trace, RLE: Trace


Integumentary: Yes: Venous Stasis Changes


Neurological: Yes: Confusion


Psychiatric: Yes: Agitated


Labs: 


 CBC, BMP





 12/03/19 07:20 





 12/03/19 07:20 











Problem List





- Problems


(1) AGUILA (acute kidney injury)


Code(s): N17.9 - ACUTE KIDNEY FAILURE, UNSPECIFIED   





(2) CHF (congestive heart failure)


Code(s): I50.9 - HEART FAILURE, UNSPECIFIED   


Qualifiers: 


   Qualified Code(s): I50.32 - Chronic diastolic (congestive) heart failure   





(3) Cellulitis


Code(s): L03.90 - CELLULITIS, UNSPECIFIED   





Assessment/Plan


 Current Medications











Generic Name Dose Route Start Last Admin





  Trade Name Freq  PRN Reason Stop Dose Admin


 


Acetaminophen  650 mg  11/14/19 04:07  11/21/19 21:34





  Tylenol -  PO   650 mg





  Q6H PRN   Administration





  PAIN LEVEL 6-10   





     





     





     


 


Acetaminophen  650 mg  11/25/19 16:13  11/28/19 15:08





  Tylenol Suppository -  IA   650 mg





  Q6H PRN   Administration





  FEVER   





     





     





     


 


Clonazepam  1 mg  11/25/19 22:00  12/03/19 09:32





  Klonopin -  PO   Not Given





  BID [facility]   





     





     





     





     


 


Finasteride  5 mg  11/15/19 10:00  12/03/19 09:32





  Proscar -  PO   Not Given





  DAILY [facility]   





     





     





     





     


 


Gabapentin  300 mg  11/21/19 22:00  12/03/19 14:03





  Neurontin -  PO   Not Given





  TID [facility]   





     





     





     





     


 


Heparin Sodium (Porcine)  5,000 unit  11/29/19 22:00  12/03/19 09:31





  Heparin -  SQ   5,000 unit





  BID [facility]   Administration





     





     





     





     


 


Vancomycin HCl  1,000 mg in 250 mls @ 200 mls/hr  11/27/19 16:00  12/02/19 15:18





  Vancomycin (Pre-Docked)  IVPB   200 mls/hr





  Q24H [facility]   Administration





     





     





  Protocol   





     


 


Aztreonam 1 gm/ Dextrose  50 mls @ 100 mls/hr  11/26/19 17:00  12/03/19 05:54





  IVPB   100 mls/hr





  Q12H [facility]   Administration





     





     





  Protocol   





     


 


Amino Acids  1,000 mls @ 125 mls/hr  12/02/19 18:37  12/03/19 13:27





  Clinimix -  IV   125 mls/hr





  Q8H [facility]   Administration





     





     





     





     


 


Fat Emulsion Intravenous  250 mls @ 20.833 mls/hr  12/03/19 22:00  





  Intralipid -  IV   





  DAILY@2200 [facility]   





     





     





     





     


 


Metoprolol Succinate  12.5 mg  11/18/19 10:00  12/03/19 09:32





  Toprol Xl -  PO   Not Given





  DAILY [facility]   





     





     





     





     


 


Metoprolol Tartrate  5 mg  11/29/19 13:36  





  Lopressor Injection -  IVPB   





  Q4H PRN   





  TACHYCARDIA   





     





     





     


 


Nystatin  1 applic  12/02/19 22:00  12/03/19 09:33





  Mycostatin Cream -  TP   1 appful





  BID [facility]   Administration





     





     





     





     


 


Olanzapine  2.5 mg  11/21/19 22:00  12/02/19 22:16





  Zyprexa -  PO   Not Given





  HS [facility]   





     





     





     





     


 


Potassium Chloride  40 meq  11/16/19 17:49  12/03/19 09:32





  K-Dur -  PO   Not Given





  DAILY [facility]   





     





     





     





     


 


Pramipexole Dihydrochloride  0.25 mg  11/21/19 20:00  12/03/19 14:03





  Mirapex -  PO   Not Given





  TID [facility]   





     





     





     





     


 


Rivaroxaban  15 mg  11/14/19 18:00  12/02/19 17:24





  Xarelto  PO   Not Given





  DAILY@1800 [facility]   





     





     





     





     


 


Rosuvastatin Calcium  5 mg  11/18/19 22:00  12/02/19 22:15





  Crestor -  PO   Not Given





  HS [facility]   





     





     





     





     


 


Tamsulosin HCl  0.4 mg  11/17/19 08:30  12/03/19 08:41





  Flomax -  PO   Not Given





  DAILY@0830 [facility]   





     





     





     





     


 


Thiamine HCl  100 mg  12/03/19 12:30  12/03/19 13:26





  Vitamin B1 Injection -  IVPB  12/07/19 22:01  100 mg





  BID [facility]   Administration





     





     





     





     

















Impression


1. CKD


2. hx of hydronephrosis


3. hx of obstructive uropathy


4. CHF


5. HTN


6. AGUILA


7. a-fib


8. hypernatreamia





Plan


- will add d5w


- will need to consider tube feeds


- pt remains agitated


- sodium improved


- renal function stable


- mental status is not improved


- diuretics are on hold as he is not eating


- likely in part pre-renal aguila

## 2019-12-04 NOTE — PN
Progress Note, Physician


History of Present Illness: 





More agitated today. LE cellulitis much improved. Planning on PEG due to high 

risk of aspiration.








- Current Medication List


Current Medications: 


Active Medications





Acetaminophen (Tylenol -)  650 mg PO Q6H PRN


   PRN Reason: PAIN LEVEL 6-10


   Last Admin: 11/21/19 21:34 Dose:  650 mg


Acetaminophen (Tylenol Suppository -)  650 mg MN Q6H PRN


   PRN Reason: FEVER


   Last Admin: 11/28/19 15:08 Dose:  650 mg


Clonazepam (Klonopin -)  1 mg PO BID [facility]


   Last Admin: 12/04/19 11:01 Dose:  Not Given


Finasteride (Proscar -)  5 mg PO DAILY [facility]


   Last Admin: 12/04/19 11:02 Dose:  Not Given


Gabapentin (Neurontin -)  300 mg PO TID [facility]


   Last Admin: 12/04/19 14:53 Dose:  Not Given


Heparin Sodium (Porcine) (Heparin -)  5,000 unit SQ BID [facility]


   Last Admin: 12/04/19 11:01 Dose:  5,000 unit


Aztreonam 1 gm/ Dextrose  50 mls @ 100 mls/hr IVPB Q12H [facility]; Protocol


   Last Admin: 12/04/19 06:29 Dose:  100 mls/hr


Amino Acids (Clinimix -)  1,000 mls @ 125 mls/hr IV Q8H [facility]


   Last Admin: 12/04/19 12:54 Dose:  125 mls/hr


Fat Emulsion Intravenous (Intralipid -)  250 mls @ 20.833 mls/hr IV DAILY@2200 

[facility]


   Last Admin: 12/03/19 22:34 Dose:  20.833 mls/hr


Dextrose (D5w -)  1,000 mls @ 42 mls/hr IV Q24H [facility]


   Last Admin: 12/03/19 20:38 Dose:  42 mls/hr


Metoprolol Succinate (Toprol Xl -)  12.5 mg PO DAILY [facility]


   Last Admin: 12/04/19 11:02 Dose:  Not Given


Metoprolol Tartrate (Lopressor Injection -)  5 mg IVPB Q4H PRN


   PRN Reason: TACHYCARDIA


Nystatin (Mycostatin Cream -)  1 applic TP BID [facility]


   Last Admin: 12/04/19 11:02 Dose:  1 applic


Olanzapine (Zyprexa -)  2.5 mg PO HS [facility]


   Last Admin: 12/03/19 22:01 Dose:  Not Given


Potassium Chloride (K-Dur -)  40 meq PO DAILY [facility]


   Last Admin: 12/04/19 11:01 Dose:  Not Given


Pramipexole Dihydrochloride (Mirapex -)  0.25 mg PO TID [facility]


   Last Admin: 12/04/19 14:53 Dose:  Not Given


Rivaroxaban (Xarelto)  15 mg PO DAILY@1800 [facility]


   Last Admin: 12/03/19 17:27 Dose:  Not Given


Rosuvastatin Calcium (Crestor -)  5 mg PO [facility]


   Last Admin: 12/03/19 22:00 Dose:  Not Given


Tamsulosin HCl (Flomax -)  0.4 mg PO DAILY@0830 [facility]


   Last Admin: 12/04/19 10:59 Dose:  Not Given


Thiamine HCl (Vitamin B1 Injection -)  100 mg IVPB BID [facility]


   Stop: 12/07/19 22:01


   Last Admin: 12/04/19 11:00 Dose:  100 mg











- Objective


Vital Signs: 


 Vital Signs











Temperature  97.5 F L  12/04/19 13:33


 


Pulse Rate  69   12/04/19 13:33


 


Respiratory Rate  20   12/04/19 13:33


 


Blood Pressure  151/64   12/04/19 13:33


 


O2 Sat by Pulse Oximetry (%)  99   12/03/19 09:00











Constitutional: Yes: No Distress, Calm


Neck: Yes: Supple


Cardiovascular: Yes: Regular Rate and Rhythm


Respiratory: Yes: Regular, Diminished


Gastrointestinal: Yes: Soft, Hypoactive Bowel Sounds


Edema: No


Integumentary: Yes: Venous Stasis Changes


Labs: 


 CBC, BMP





 12/04/19 06:30 





 12/04/19 06:30 











- ....Imaging


Chest X-ray: Report Reviewed (Improved congestion)





Problem List





- Problems


(1) Allergy to multiple antibiotics


Code(s): Z88.1 - ALLERGY STATUS TO OTHER ANTIBIOTIC AGENTS STATUS   





(2) Anxiety


Code(s): F41.9 - ANXIETY DISORDER, UNSPECIFIED   





(3) Atrial fibrillation


Code(s): I48.91 - UNSPECIFIED ATRIAL FIBRILLATION   


Qualifiers: 


   Atrial fibrillation type: chronic 





(4) Cellulitis


Code(s): L03.90 - CELLULITIS, UNSPECIFIED   


Qualifiers: 


   Site of cellulitis: extremity   Site of cellulitis of extremity: lower 

extremity 





(5) Chronic anticoagulation


Code(s): Z79.01 - LONG TERM (CURRENT) USE OF ANTICOAGULANTS   





(6) Hypertension


Code(s): I10 - ESSENTIAL (PRIMARY) HYPERTENSION   


Qualifiers: 


   Hypertension type: essential hypertension   Qualified Code(s): I10 - 

Essential (primary) hypertension   





(7) Lymphedema


Code(s): I89.0 - LYMPHEDEMA, NOT ELSEWHERE CLASSIFIED   





(8) Pacemaker


Code(s): Z95.0 - PRESENCE OF CARDIAC PACEMAKER   





Assessment/Plan


ECHO 3/2018 showed normal LV systolic function, mild AS no other sig valvular 

abnl





1. Recurrent LE cellulitis with multiple allergies to PCN and carbipenem 

improving


2. Chronic venous stasis dermatitis


3. Acute on CKD with hx of hydronephrosis/obstructive uropathy likely pre-renal 

improving to baseline


4. Chronic diastolic heart failure


5. HTN cardiomyopathy


6. Afib on Xarelto


7. sss s/p PPM


8. OSAS


9. CAD h/o demand ischemia


10. Hyperlipidemia


11. Anxiety d/o


12. BPH


13. Toxic metabolic encephelopathy with high risk of aspiration





P:1. Empiric abx per C&S, wound care, 


2. Hold oral diuretics and zaroxolyn pending renal fxn recovery, Clinimix, 

monitor electrolytes, plan for PEG


3. Continue Toprol XL 12.5 qd, Xarelto 15 qd,  Crestor 5 qd, hold losartan 

pending renal fxn stabilization


4. Eventual rehab then follow up in office [phone]


5. DVT and GI prophylaxis


6. Wife requests pacemaker interrogation as it's difficult to bring him to 

office, will arrange for this week

## 2019-12-04 NOTE — PN
Progress Note, SLP





- Note


Progress Note: 





 Selected Entries











  12/03/19 12/03/19 12/03/19





  05:20 09:36 10:29


 


Breakfast  NPO 


 


Temperature 98 F  98.2 F








 Laboratory Tests











  11/30/19 12/02/19 12/03/19





  07:30 07:05 07:20


 


WBC  10.3 H  11.6 H  11.0 H





 Selected Entries











  12/04/19 12/04/19





  05:35 13:33


 


Lunch  NPO


 


Temperature 98.6 F 97.5 F L








 Laboratory Tests











  11/30/19 12/02/19 12/03/19





  07:30 07:05 07:20


 


WBC  10.3 H  11.6 H  11.0 H














  12/04/19





  06:30


 


WBC  15.2 H











NPO


CXR noted


IMP-High risk of aspiration


Poor nutrition


Discussed with pt's wife consideration of GT. She is interested to "save his 

life. I have no choice"


She spoke to Palliative care regarding options.

## 2019-12-04 NOTE — PN
Progress Note, Physician


Chief Complaint: 





Cellulitis


AGUILA


Afib


History of Present Illness: 





NAD


still confused and agitated


on IV abx


Seen by Neurology


On clinimix day 11





- Current Medication List


Current Medications: 


Active Medications





Acetaminophen (Tylenol -)  650 mg PO Q6H PRN


   PRN Reason: PAIN LEVEL 6-10


   Last Admin: 11/21/19 21:34 Dose:  650 mg


Acetaminophen (Tylenol Suppository -)  650 mg NE Q6H PRN


   PRN Reason: FEVER


   Last Admin: 11/28/19 15:08 Dose:  650 mg


Clonazepam (Klonopin -)  1 mg PO BID [facility]


   Last Admin: 12/04/19 11:01 Dose:  Not Given


Finasteride (Proscar -)  5 mg PO DAILY [facility]


   Last Admin: 12/04/19 11:02 Dose:  Not Given


Gabapentin (Neurontin -)  300 mg PO TID [facility]


   Last Admin: 12/04/19 06:29 Dose:  Not Given


Heparin Sodium (Porcine) (Heparin -)  5,000 unit SQ BID [facility]


   Last Admin: 12/04/19 11:01 Dose:  5,000 unit


Vancomycin HCl (Vancomycin (Pre-Docked))  1,000 mg in 250 mls @ 200 mls/hr IVPB 

Q24H [facility]; Protocol


   Last Admin: 12/03/19 16:55 Dose:  200 mls/hr


Aztreonam 1 gm/ Dextrose  50 mls @ 100 mls/hr IVPB Q12H [facility]; Protocol


   Last Admin: 12/04/19 06:29 Dose:  100 mls/hr


Amino Acids (Clinimix -)  1,000 mls @ 125 mls/hr IV Q8H [facility]


   Last Admin: 12/04/19 04:56 Dose:  125 mls/hr


Fat Emulsion Intravenous (Intralipid -)  250 mls @ 20.833 mls/hr IV DAILY@2200 

[facility]


   Last Admin: 12/03/19 22:34 Dose:  20.833 mls/hr


Dextrose (D5w -)  1,000 mls @ 42 mls/hr IV Q24H [facility]


   Last Admin: 12/03/19 20:38 Dose:  42 mls/hr


Metoprolol Succinate (Toprol Xl -)  12.5 mg PO DAILY [facility]


   Last Admin: 12/04/19 11:02 Dose:  Not Given


Metoprolol Tartrate (Lopressor Injection -)  5 mg IVPB Q4H PRN


   PRN Reason: TACHYCARDIA


Nystatin (Mycostatin Cream -)  1 applic TP BID [facility]


   Last Admin: 12/04/19 11:02 Dose:  1 applic


Olanzapine (Zyprexa -)  2.5 mg PO HS [facility]


   Last Admin: 12/03/19 22:01 Dose:  Not Given


Potassium Chloride (K-Dur -)  40 meq PO DAILY [facility]


   Last Admin: 12/04/19 11:01 Dose:  Not Given


Pramipexole Dihydrochloride (Mirapex -)  0.25 mg PO TID [facility]


   Last Admin: 12/04/19 06:29 Dose:  Not Given


Rivaroxaban (Xarelto)  15 mg PO DAILY@1800 [facility]


   Last Admin: 12/03/19 17:27 Dose:  Not Given


Rosuvastatin Calcium (Crestor -)  5 mg PO HS [facility]


   Last Admin: 12/03/19 22:00 Dose:  Not Given


Tamsulosin HCl (Flomax -)  0.4 mg PO DAILY@0830 [facility]


   Last Admin: 12/04/19 10:59 Dose:  Not Given


Thiamine HCl (Vitamin B1 Injection -)  100 mg IVPB BID [facility]


   Stop: 12/07/19 22:01


   Last Admin: 12/04/19 11:00 Dose:  100 mg











- Objective


Vital Signs: 


 Vital Signs











Temperature  98.6 F   12/04/19 05:35


 


Pulse Rate  68   12/04/19 05:35


 


Respiratory Rate  20   12/04/19 05:35


 


Blood Pressure  127/57 L  12/04/19 05:35


 


O2 Sat by Pulse Oximetry (%)  99   12/03/19 09:00











Constitutional: Yes: Well Nourished, No Distress, Calm, Obese


Cardiovascular: Yes: Regular Rate and Rhythm


Respiratory: Yes: Regular, On Nasal O2


Gastrointestinal: Yes: Normal Bowel Sounds, Soft, Abdomen, Obese


Genitourinary: Yes: Incontinence


Musculoskeletal: Yes: Muscle Weakness


Extremities: Yes: WNL


Edema: No


Peripheral Pulses WNL: Yes


Neurological: Yes: Confusion


Psychiatric: Yes: Agitated


Labs: 


 CBC, BMP





 12/04/19 06:30 





 12/04/19 06:30 











Assessment/Plan





(1) Metabolic encephalopathy


Assessment/Plan: 


-New RLL infiltrate


-Started on IV abx


-ID on board


-Head CT scan shows no acute evidence of intracranial hemorrhage, punctate 

chronic right base infarct, mild periventricular chronic microvascular ischemic 

changes 


-Repeat BC:


 Microbiology





11/25/19 17:00   Blood - Peripheral Venous   Blood Culture - Final


                            NO GROWTH AFTER 5 DAYS INCUBATION


11/25/19 17:00   Blood - Peripheral Venous   Blood Culture - Final


                            NO GROWTH AFTER 5 DAYS INCUBATION


11/26/19 18:00   Urine - Urine Clean Catch   Urine Culture - Final


                            NO GROWTH OBTAINED


11/26/19 18:00   Urine For Antigen Detection   Legionella Antigen - Final


11/26/19 18:00   Urine For Antigen Detection   Streptococcus pneumoniae Antigen 

(M - Final








Code(s): G93.41 - METABOLIC ENCEPHALOPATHY   





(2) AGUILA (acute kidney injury)


Assessment/Plan: 


-Renal on board


-monitor renal function


Code(s): N17.9 - ACUTE KIDNEY FAILURE, UNSPECIFIED   





(3) Anxiety


Assessment/Plan: 


-Clonazepam 2 mg po BID-on Hold


-Psychiatry consult


-Add Olanzapine 7.5 mg po HS-on Hold


Code(s): F41.9 - ANXIETY DISORDER, UNSPECIFIED   





(4) Atrial fibrillation


Assessment/Plan: 


-Xarelto and Metoprolol- on hold


Code(s): I48.91 - UNSPECIFIED ATRIAL FIBRILLATION   


Qualifiers: 


   Atrial fibrillation type: chronic 





(5) Cellulitis


Assessment/Plan: 


-ID on board


-leukocytosis


-afebrile


-wound culture positive


-contact precaution


-BC neg


-Vascular US B/L LE neg for DVT


-Seen by Vascular surgery as well


Code(s): L03.90 - CELLULITIS, UNSPECIFIED   


Qualifiers: 


   Site of cellulitis: extremity   Site of cellulitis of extremity: lower 

extremity 





(6) CHF (congestive heart failure)


Assessment/Plan: 


-daily weights


-strict I&Os


-fluid restriction


Code(s): I50.9 - HEART FAILURE, UNSPECIFIED   


Qualifiers: 


   Heart failure type: diastolic   Heart failure chronicity: acute on chronic   

Qualified Code(s): I50.33 - Acute on chronic diastolic (congestive) heart 

failure   





(7) Hypertension


Assessment/Plan: 


-monitor BP


-low Na diet


Code(s): I10 - ESSENTIAL (PRIMARY) HYPERTENSION   


Qualifiers: 


   Hypertension type: essential hypertension   Qualified Code(s): I10 - 

Essential (primary) hypertension   





(8) Urinary retention due to benign prostatic hyperplasia


Assessment/Plan: 


-Finasteride


-Bladder/Renal US both kidneys appear unremarkable, significantly unlarged 

prostate with a volume 80cc, adequately distended urinary bladder without wall 

thickening, bilateral ureteral jets


-Urology consult 


-Tamsulosin 0.4 mg po daily


Code(s): N40.1 - BENIGN PROSTATIC HYPERPLASIA WITH LOWER URINARY TRACT SYMP; 

R33.8 - OTHER RETENTION OF URINE   





 Assessment/Plan 





see problem list 


dvt ppx


GT placement under consideration to optimize patient's nutritional status


GI consult for PEG placement

## 2019-12-05 NOTE — CON.GI
Consult


Consult Specialty:: GI


Referred by:: Hospitalist Service


Reason for Consultation:: Dysphagia





- History of Present Illness


Chief Complaint: Inability to swallow


History of Present Illness: 





84M admitted 11/13 for evaluation of B/L LE edema / cellulitis and hypotension 

at home.  Called to evaluate for inablity to swallow, poor PO intake.  Seen by 

Lori Tamez Family discussed with her regarding G-tube and wants him to have one.

  Mental status has improved from previous.  He was evaluated by Dr. Cabrera 3/

19 for dysphagia.  At that time Mr. Platt was verbal and was able to describe a 

sensation of food getting stuck in his lower throat causing him to regurgitate 

it. He denied choking or coughing on clear liquids however at that time. 





- Past Medical History


Cardio/Vascular: Yes: AFIB, CHF, HTN, Hyperlipdemia, Other (pacemaker)


Pulmonary: Yes: Pneumonia, Sleep Apnea


Gastrointestinal: Yes: Diverticulosis, Other (colon polyps)


Hepatobiliary: Yes: Other


Renal/: Yes: Renal Inusuff, BPH, Cancer (h/o prostate cancer), Renal Calculi


Dermatology: Yes: Cellulitis, Other


Additional Medical History: morbid obesity.  wound left big toe grade 1-2





- Past Surgical History


Past Surgical History: Yes: Amputation (left great toe), Colonoscopy, Joint 

Replacement (bilateral knee replacements), Permanent Pacemaker





- Alcohol/Substance Use


Hx Alcohol Use: No


History of Substance Use: reports: None





- Smoking History


Smoking history: Former smoker


Have you smoked in the past 12 months: No


If you are a former smoker, when did you quit?: 1970





- Social History


Usual Living Arrangement: With Significant Other


ADL: Family Assistance


Occupation: retired [place] 


History of Recent Travel: No





Home Medications





- Allergies


Allergies/Adverse Reactions: 


 Allergies











Allergy/AdvReac Type Severity Reaction Status Date / Time


 


cephalexin monohydrate Allergy Mild Hives Verified 11/13/19 16:58





[From Keflex]     


 


amoxicillin Allergy   Verified 11/13/19 16:58


 


ertapenem Allergy   Verified 11/13/19 16:58














- Home Medications


Home Medications: 


Ambulatory Orders





Cholecalciferol (Vitamin D3) [Vitamin D3] 1,000 unit PO DAILY 08/03/19 


Finasteride 5 mg PO DAILY 08/03/19 


Furosemide [Lasix] 80 mg PO DAILY 08/03/19 


Gabapentin [Neurontin] 300 mg PO TID 08/03/19 


Losartan Potassium [Cozaar] 25 mg PO DAILY 08/03/19 


Metolazone 2.5 mg PO DAILY 08/03/19 


Metoprolol Succinate 25 mg PO DAILY 08/03/19 


Potassium Chloride 20 meq PO DAILY 08/03/19 


Ranitidine [Zantac -] 150 mg PO AC 08/03/19 


Rivaroxaban [Xarelto] 20 mg PO HS 08/03/19 


Tamsulosin HCl [Flomax] 0.8 mg PO DAILY 08/03/19 


Furosemide [Lasix] 40 mg PO HS 08/04/19 


Nystatin Powder [Nystop Powder -] 1 applic TP DAILY 08/04/19 


Acetaminophen [Tylenol .Regular Strength -] 650 mg PO Q4H PRN  tablet 08/08/19 


Finasteride [Proscar -] 5 mg PO DAILY  tablet 08/08/19 


Furosemide [Lasix -] 40 mg PO BID@0600,1400  tablet 08/08/19 


Melatonin 5 mg PO HS PRN  tab 08/08/19 


Metolazone [Zaroxolyn -] 2.5 mg PO DAILY  tablet 08/08/19 


Nystatin Ointment [Mycostatin Ointment -] 1 applic TP BID #180 applic 08/08/19 


Potassium Chloride [K-Dur -] 40 meq PO BID  tablet.er 08/08/19 


Rosuvastatin [Crestor -] 5 mg PO HS #30 tablet 08/08/19 











Review of Systems





- Review of Systems


Cardiovascular: denies: Chest Pain


Respiratory: reports: SOB


Gastrointestinal: denies: Abdominal Pain





Physical Exam-GI


Vital Signs: 


 Vital Signs











Temperature  97.5 F L  12/05/19 13:36


 


Pulse Rate  72   12/05/19 13:36


 


Respiratory Rate  20   12/05/19 13:36


 


Blood Pressure  122/71   12/05/19 13:36


 


O2 Sat by Pulse Oximetry (%)  96   12/04/19 21:00











Constitutional: Yes: Calm


Eyes: No: Sclera Icterus


Cardiovascular: Yes: Pulse Irregular, Murmur (2/6 holosystolic murmur)


Respiratory: Yes: Diminished (at bases b/l with poor insp effort)


Gastrointestinal Inspection: No: Distention, Scars


...Auscultate: Yes: Normoactive Bowel Sounds


...Palpate: Yes: Soft.  No: Hepatomegaly, Splenomegaly, Tenderness


Neurological: Yes: Alert, Other (non-verbal)


Labs: 


 CBC, BMP





 12/05/19 14:41 





 12/05/19 14:41 





 Hepatic Panel











Total Bilirubin  0.5 mg/dL (0.2-1)   12/05/19  14:41    


 


AST  38 U/L (15-37)  H  12/05/19  14:41    


 


ALT  40 U/L (13-61)   12/05/19  14:41    


 


Alkaline Phosphatase  118 U/L ()  H  12/05/19  14:41    


 


Albumin  2.0 g/dl (3.4-5.0)  L  12/05/19  14:41    














Problem List





- Problems


(1) Dysphagia


Assessment/Plan: 


Patient with poor PO intake.  Family wants G-Tube


If high risk for sedation for PEG given multiple other medical problems, 

consider IR placed gastrostomy


In the interim, consider NGT feeding with nutrition evaluation to help prevent 

refeeding syndrome








Code(s): R13.10 - DYSPHAGIA, UNSPECIFIED yes

## 2019-12-05 NOTE — PN
Progress Note, Physician


Chief Complaint: 





patient seen and examined


wbc count is slightly higher


awiatng PEG placement





- Current Medication List


Current Medications: 


Active Medications





Acetaminophen (Tylenol -)  650 mg PO Q6H PRN


   PRN Reason: PAIN LEVEL 6-10


   Last Admin: 11/21/19 21:34 Dose:  650 mg


Acetaminophen (Tylenol Suppository -)  650 mg SC Q6H PRN


   PRN Reason: FEVER


   Last Admin: 11/28/19 15:08 Dose:  650 mg


Clonazepam (Klonopin -)  1 mg PO BID [facility]


   Last Admin: 12/05/19 09:31 Dose:  Not Given


Finasteride (Proscar -)  5 mg PO DAILY [facility]


   Last Admin: 12/05/19 09:31 Dose:  Not Given


Gabapentin (Neurontin -)  300 mg PO TID [facility]


   Last Admin: 12/05/19 05:41 Dose:  Not Given


Heparin Sodium (Porcine) (Heparin -)  5,000 unit SQ BID [facility]


   Last Admin: 12/05/19 09:30 Dose:  5,000 unit


Aztreonam 1 gm/ Dextrose  50 mls @ 100 mls/hr IVPB Q12H [facility]; Protocol


   Last Admin: 12/05/19 05:40 Dose:  100 mls/hr


Amino Acids (Clinimix -)  1,000 mls @ 125 mls/hr IV Q8H [facility]


   Last Admin: 12/05/19 05:42 Dose:  125 mls/hr


Fat Emulsion Intravenous (Intralipid -)  250 mls @ 20.833 mls/hr IV DAILY@2200 

[facility]


   Last Admin: 12/04/19 21:37 Dose:  20.833 mls/hr


Sodium Phosphate 20 mm/ Sodium (Chloride)  256.6667 mls @ 62.5 mls/hr IVPB ONCE 

ONE


   Stop: 12/05/19 18:00


Dextrose (D5w -)  1,000 mls @ 20 mls/hr IV Q24H [facility]


Metoprolol Succinate (Toprol Xl -)  12.5 mg PO DAILY [facility]


   Last Admin: 12/05/19 09:31 Dose:  Not Given


Metoprolol Tartrate (Lopressor Injection -)  5 mg IVPB Q4H PRN


   PRN Reason: TACHYCARDIA


Nystatin (Mycostatin Cream -)  1 applic TP BID [facility]


   Last Admin: 12/04/19 21:38 Dose:  1 applic


Olanzapine (Zyprexa -)  2.5 mg PO [facility]


   Last Admin: 12/04/19 21:39 Dose:  Not Given


Potassium Chloride (K-Dur -)  40 meq PO DAILY [facility]


   Last Admin: 12/05/19 09:31 Dose:  Not Given


Pramipexole Dihydrochloride (Mirapex -)  0.25 mg PO TID [facility]


   Last Admin: 12/05/19 05:41 Dose:  Not Given


Rivaroxaban (Xarelto)  15 mg PO DAILY@1800 [facility]


   Last Admin: 12/04/19 19:28 Dose:  Not Given


Rosuvastatin Calcium (Crestor -)  5 mg PO HS [facility]


   Last Admin: 12/04/19 21:35 Dose:  Not Given


Tamsulosin HCl (Flomax -)  0.4 mg PO DAILY@0830 [facility]


   Last Admin: 12/05/19 08:33 Dose:  Not Given


Thiamine HCl (Vitamin B1 Injection -)  100 mg IVPB BID [facility]


   Stop: 12/07/19 22:01


   Last Admin: 12/05/19 09:30 Dose:  100 mg











- Objective


Vital Signs: 


 Vital Signs











Temperature  97.5 F L  12/05/19 13:36


 


Pulse Rate  72   12/05/19 13:36


 


Respiratory Rate  20   12/05/19 13:36


 


Blood Pressure  122/71   12/05/19 13:36


 


O2 Sat by Pulse Oximetry (%)  96   12/04/19 21:00











Constitutional: Yes: Calm


Cardiovascular: Yes: Regular Rate and Rhythm, S1, S2


Respiratory: Yes: Diminished


Gastrointestinal: Yes: Normal Bowel Sounds, Soft


Extremities: Yes: Other (chornic changes)


Edema: Yes


Neurological: Yes: Other (non verbal)


Labs: 


 CBC, BMP





 12/04/19 06:30 





 12/05/19 07:35 











Problem List





- Problems


(1) Metabolic encephalopathy


Assessment/Plan: 


NPO


iv clinimix


awaiting PEG placement 


repeat bmp today


ng tube wife doesnot want bc he will pull it out


Code(s): G93.41 - METABOLIC ENCEPHALOPATHY   





(2) Cellulitis


Assessment/Plan: 


ID on board


multiple allergies to PCN and carbipenem


on aztreonam and iv vancomycin


contact isolation for MRSA


Microbiology





11/13/19 19:48   Leg - Right Lower   Gram Stain - Final


11/13/19 19:48   Leg - Right Lower   Wound Culture - Final


                               S Aureus


                              Staphylococcus Coagulase Neg








Code(s): L03.90 - CELLULITIS, UNSPECIFIED   


Qualifiers: 


   Site of cellulitis: extremity   Site of cellulitis of extremity: lower 

extremity 





(3) AGUILA (acute kidney injury)


Assessment/Plan: 


on clinimix


Code(s): N17.9 - ACUTE KIDNEY FAILURE, UNSPECIFIED   





(4) Anxiety


Assessment/Plan: 


neurology note appreciated 





Code(s): F41.9 - ANXIETY DISORDER, UNSPECIFIED   





(5) Atrial fibrillation


Assessment/Plan: 


metoprolol  dose reduced to 12.5mg 


and xarelto


Code(s): I48.91 - UNSPECIFIED ATRIAL FIBRILLATION   


Qualifiers: 


   Atrial fibrillation type: chronic 





(6) Urinary retention due to benign prostatic hyperplasia


Assessment/Plan: 


bladder  sono noted


finastride


Code(s): N40.1 - BENIGN PROSTATIC HYPERPLASIA WITH LOWER URINARY TRACT SYMP; 

R33.8 - OTHER RETENTION OF URINE   





(7) Infiltrate of right lung present on chest x-ray


Assessment/Plan: 


iv abx


cxr noted


Code(s): R91.8 - OTHER NONSPECIFIC ABNORMAL FINDING OF LUNG FIELD

## 2019-12-05 NOTE — PN
Progress Note, Physician


History of Present Illness: 





Pt seen and examined at bedside. He remains confused. 





- Current Medication List


Current Medications: 


Active Medications





Acetaminophen (Tylenol -)  650 mg PO Q6H PRN


   PRN Reason: PAIN LEVEL 6-10


   Last Admin: 11/21/19 21:34 Dose:  650 mg


Acetaminophen (Tylenol Suppository -)  650 mg [place] Q6H PRN


   PRN Reason: FEVER


   Last Admin: 11/28/19 15:08 Dose:  650 mg


Clonazepam (Klonopin -)  1 mg PO BID [facility]


   Last Admin: 12/05/19 09:31 Dose:  Not Given


Finasteride (Proscar -)  5 mg PO DAILY [facility]


   Last Admin: 12/05/19 09:31 Dose:  Not Given


Gabapentin (Neurontin -)  300 mg PO TID [facility]


   Last Admin: 12/05/19 05:41 Dose:  Not Given


Heparin Sodium (Porcine) (Heparin -)  5,000 unit SQ BID [facility]


   Last Admin: 12/05/19 09:30 Dose:  5,000 unit


Aztreonam 1 gm/ Dextrose  50 mls @ 100 mls/hr IVPB Q12H [facility]; Protocol


   Last Admin: 12/05/19 05:40 Dose:  100 mls/hr


Amino Acids (Clinimix -)  1,000 mls @ 125 mls/hr IV Q8H [facility]


   Last Admin: 12/05/19 05:42 Dose:  125 mls/hr


Fat Emulsion Intravenous (Intralipid -)  250 mls @ 20.833 mls/hr IV DAILY@2200 

[facility]


   Last Admin: 12/04/19 21:37 Dose:  20.833 mls/hr


Dextrose (D5w -)  1,000 mls @ 42 mls/hr IV Q24H [facility]


   Last Admin: 12/04/19 21:34 Dose:  42 mls/hr


Metoprolol Succinate (Toprol Xl -)  12.5 mg PO DAILY [facility]


   Last Admin: 12/05/19 09:31 Dose:  Not Given


Metoprolol Tartrate (Lopressor Injection -)  5 mg IVPB Q4H PRN


   PRN Reason: TACHYCARDIA


Nystatin (Mycostatin Cream -)  1 applic TP BID [facility]


   Last Admin: 12/04/19 21:38 Dose:  1 applic


Olanzapine (Zyprexa -)  2.5 mg PO HS [facility]


   Last Admin: 12/04/19 21:39 Dose:  Not Given


Potassium Chloride (K-Dur -)  40 meq PO DAILY [facility]


   Last Admin: 12/05/19 09:31 Dose:  Not Given


Pramipexole Dihydrochloride (Mirapex -)  0.25 mg PO TID [facility]


   Last Admin: 12/05/19 05:41 Dose:  Not Given


Rivaroxaban (Xarelto)  15 mg PO DAILY@1800 [facility]


   Last Admin: 12/04/19 19:28 Dose:  Not Given


Rosuvastatin Calcium (Crestor -)  5 mg PO HS [facility]


   Last Admin: 12/04/19 21:35 Dose:  Not Given


Tamsulosin HCl (Flomax -)  0.4 mg PO DAILY@0830 [facility]


   Last Admin: 12/05/19 08:33 Dose:  Not Given


Thiamine HCl (Vitamin B1 Injection -)  100 mg IVPB BID [facility]


   Stop: 12/07/19 22:01


   Last Admin: 12/05/19 09:30 Dose:  100 mg











- Objective


Vital Signs: 


 Vital Signs











Temperature  97.5 F L  12/05/19 13:36


 


Pulse Rate  72   12/05/19 13:36


 


Respiratory Rate  20   12/05/19 13:36


 


Blood Pressure  122/71   12/05/19 13:36


 


O2 Sat by Pulse Oximetry (%)  96   12/04/19 21:00











Constitutional: Yes: Calm


Eyes: Yes: Conjunctiva Clear


HENT: Yes: Atraumatic


Neck: Yes: Supple


Cardiovascular: Yes: S1, S2


Respiratory: Yes: CTA Bilaterally


Gastrointestinal: Yes: Soft, Abdomen, Obese


Genitourinary: Yes: Incontinence


Edema: Yes


Edema: LLE: 1+, RLE: 1+


Neurological: Yes: Oriented


Psychiatric: Yes: Oriented


Labs: 


 CBC, BMP





 12/04/19 06:30 





 12/05/19 07:35 











Problem List





- Problems


(1) AGUILA (acute kidney injury)


Code(s): N17.9 - ACUTE KIDNEY FAILURE, UNSPECIFIED   





(2) CHF (congestive heart failure)


Code(s): I50.9 - HEART FAILURE, UNSPECIFIED   


Qualifiers: 


   Heart failure type: diastolic   Heart failure chronicity: chronic   

Qualified Code(s): I50.32 - Chronic diastolic (congestive) heart failure   





(3) Cellulitis


Code(s): L03.90 - CELLULITIS, UNSPECIFIED   


Qualifiers: 


   Site of cellulitis: extremity   Site of cellulitis of extremity: lower 

extremity 





Assessment/Plan


 Current Medications











Generic Name Dose Route Start Last Admin





  Trade Name Freq  PRN Reason Stop Dose Admin


 


Acetaminophen  650 mg  11/14/19 04:07  11/21/19 21:34





  Tylenol -  PO   650 mg





  Q6H PRN   Administration





  PAIN LEVEL 6-10   





     





     





     


 


Acetaminophen  650 mg  11/25/19 16:13  11/28/19 15:08





  Tylenol Suppository -  MN   650 mg





  Q6H PRN   Administration





  FEVER   





     





     





     


 


Clonazepam  1 mg  11/25/19 22:00  12/05/19 09:31





  Klonopin -  PO   Not Given





  BID [facility]   





     





     





     





     


 


Finasteride  5 mg  11/15/19 10:00  12/05/19 09:31





  Proscar -  PO   Not Given





  DAILY [facility]   





     





     





     





     


 


Gabapentin  300 mg  11/21/19 22:00  12/05/19 05:41





  Neurontin -  PO   Not Given





  TID [facility]   





     





     





     





     


 


Heparin Sodium (Porcine)  5,000 unit  11/29/19 22:00  12/05/19 09:30





  Heparin -  SQ   5,000 unit





  BID [facility]   Administration





     





     





     





     


 


Aztreonam 1 gm/ Dextrose  50 mls @ 100 mls/hr  11/26/19 17:00  12/05/19 05:40





  IVPB   100 mls/hr





  Q12H [facility]   Administration





     





     





  Protocol   





     


 


Amino Acids  1,000 mls @ 125 mls/hr  12/02/19 18:37  12/05/19 05:42





  Clinimix -  IV   125 mls/hr





  Q8H [facility]   Administration





     





     





     





     


 


Fat Emulsion Intravenous  250 mls @ 20.833 mls/hr  12/03/19 22:00  12/04/19 21:

37





  Intralipid -  IV   20.833 mls/hr





  DAILY@2200 [facility]   Administration





     





     





     





     


 


Dextrose  1,000 mls @ 42 mls/hr  12/03/19 17:00  12/04/19 21:34





  D5w -  IV   42 mls/hr





  Q24H [facility]   Administration





     





     





     





     


 


Metoprolol Succinate  12.5 mg  11/18/19 10:00  12/05/19 09:31





  Toprol Xl -  PO   Not Given





  DAILY [facility]   





     





     





     





     


 


Metoprolol Tartrate  5 mg  11/29/19 13:36  





  Lopressor Injection -  IVPB   





  Q4H PRN   





  TACHYCARDIA   





     





     





     


 


Nystatin  1 applic  12/02/19 22:00  12/04/19 21:38





  Mycostatin Cream -  TP   1 applic





  BID [facility]   Administration





     





     





     





     


 


Olanzapine  2.5 mg  11/21/19 22:00  12/04/19 21:39





  Zyprexa -  PO   Not Given





  HS [facility]   





     





     





     





     


 


Potassium Chloride  40 meq  11/16/19 17:49  12/05/19 09:31





  K-Dur -  PO   Not Given





  DAILY [facility]   





     





     





     





     


 


Pramipexole Dihydrochloride  0.25 mg  11/21/19 20:00  12/05/19 05:41





  Mirapex -  PO   Not Given





  TID [facility]   





     





     





     





     


 


Rivaroxaban  15 mg  11/14/19 18:00  12/04/19 19:28





  Xarelto  PO   Not Given





  DAILY@1800 [facility]   





     





     





     





     


 


Rosuvastatin Calcium  5 mg  11/18/19 22:00  12/04/19 21:35





  Crestor -  PO   Not Given





  HS [facility]   





     





     





     





     


 


Tamsulosin HCl  0.4 mg  11/17/19 08:30  12/05/19 08:33





  Flomax -  PO   Not Given





  DAILY@0830 [facility]   





     





     





     





     


 


Thiamine HCl  100 mg  12/03/19 12:30  12/05/19 09:30





  Vitamin B1 Injection -  IVPB  12/07/19 22:01  100 mg





  BID [facility]   Administration





     





     





     





     








 Laboratory Tests











  12/05/19





  07:35


 


Phosphorus  2.1 L


 


Magnesium  2.2

















Impression


1. CKD


2. hx of hydronephrosis


3. hx of obstructive uropathy


4. CHF


5. HTN


6. AGUILA


7. a-fib


8. hypernatreamia





Plan


- replace lytes


- decrease d5w


- replace phos


- mental status not improved


- renal function stable


- diuretics are on hold as he is not eating


- discussed with partner

## 2019-12-06 NOTE — PN
Progress Note, SLP





- Note


Progress Note: 





 Selected Entries











  12/06/19 12/06/19 12/06/19





  06:00 08:13 09:26


 


Breakfast   NPO


 


Temperature 97.7 F 97.7 F 








 Laboratory Tests











  12/04/19 12/05/19





  06:30 14:41


 


WBC  15.2 H  13.9 H




















Confused, verbal, speech clear today.


Case discussed with PNP. Pending review of case with Palliative care.


High risk of aspiration at present with need for nutritional support and means 

to receive medications.

## 2019-12-06 NOTE — PN
Progress Note, Physician


History of Present Illness: 





Pt seen and examined at bedside. He is more awake today but confused. 





- Current Medication List


Current Medications: 


Active Medications





Acetaminophen (Tylenol -)  650 mg PO Q6H PRN


   PRN Reason: PAIN LEVEL 6-10


   Last Admin: 11/21/19 21:34 Dose:  650 mg


Acetaminophen (Tylenol Suppository -)  650 mg [place] Q6H PRN


   PRN Reason: FEVER


   Last Admin: 11/28/19 15:08 Dose:  650 mg


Clonazepam (Klonopin -)  1 mg PO BID [facility]


   Last Admin: 12/06/19 10:18 Dose:  Not Given


Finasteride (Proscar -)  5 mg PO DAILY [facility]


   Last Admin: 12/06/19 10:19 Dose:  Not Given


Gabapentin (Neurontin -)  300 mg PO TID [facility]


   Last Admin: 12/06/19 13:10 Dose:  Not Given


Heparin Sodium (Porcine) (Heparin -)  5,000 unit SQ BID [facility]


   Last Admin: 12/06/19 10:40 Dose:  5,000 unit


Aztreonam 1 gm/ Dextrose  50 mls @ 100 mls/hr IVPB Q12H [facility]; Protocol


   Last Admin: 12/06/19 05:13 Dose:  100 mls/hr


Amino Acids (Clinimix -)  1,000 mls @ 125 mls/hr IV Q8H [facility]


   Last Admin: 12/06/19 15:18 Dose:  125 mls/hr


Fat Emulsion Intravenous (Intralipid -)  250 mls @ 20.833 mls/hr IV DAILY@2200 

[facility]


   Last Admin: 12/05/19 21:48 Dose:  20.833 mls/hr


Dextrose (D5w -)  1,000 mls @ 20 mls/hr IV Q24H [facility]


   Last Admin: 12/06/19 15:18 Dose:  20 mls/hr


Losartan Potassium (Cozaar -)  25 mg PO DAILY [facility]


Metoprolol Succinate (Toprol Xl -)  12.5 mg PO DAILY [facility]


   Last Admin: 12/06/19 10:19 Dose:  Not Given


Metoprolol Tartrate (Lopressor Injection -)  5 mg IVPB Q4H PRN


   PRN Reason: TACHYCARDIA


Nystatin (Mycostatin Cream -)  1 applic TP BID [facility]


   Last Admin: 12/06/19 10:40 Dose:  1 applic


Olanzapine (Zyprexa -)  2.5 mg PO HS [facility]


   Last Admin: 12/05/19 21:24 Dose:  Not Given


Potassium Chloride (K-Dur -)  40 meq PO DAILY [facility]


   Last Admin: 12/06/19 10:18 Dose:  Not Given


Pramipexole Dihydrochloride (Mirapex -)  0.25 mg PO TID [facility]


   Last Admin: 12/06/19 13:10 Dose:  Not Given


Rivaroxaban (Xarelto)  15 mg PO DAILY@1800 [facility]


   Last Admin: 12/05/19 18:51 Dose:  Not Given


Rosuvastatin Calcium (Crestor -)  5 mg PO [facility]


   Last Admin: 12/05/19 21:24 Dose:  Not Given


Tamsulosin HCl (Flomax -)  0.4 mg PO DAILY@0830 [facility]


   Last Admin: 12/06/19 10:18 Dose:  Not Given


Thiamine HCl (Vitamin B1 Injection -)  100 mg IVPB BID [facility]


   Stop: 12/07/19 22:01


   Last Admin: 12/06/19 10:41 Dose:  100 mg











- Objective


Vital Signs: 


 Vital Signs











Temperature  98.1 F   12/06/19 13:35


 


Pulse Rate  70   12/06/19 13:35


 


Respiratory Rate  18   12/06/19 13:35


 


Blood Pressure  157/86   12/06/19 13:35


 


O2 Sat by Pulse Oximetry (%)  96   12/06/19 09:00











Constitutional: Yes: Calm


Eyes: Yes: Conjunctiva Clear


HENT: Yes: Atraumatic


Neck: Yes: Supple


Cardiovascular: Yes: S1, S2


Respiratory: Yes: CTA Bilaterally


Gastrointestinal: Yes: Soft


Genitourinary: Yes: WNL


Musculoskeletal: Yes: WNL


Edema: Yes


Edema: RUE: 1+, LLE: 1+, RLE: 1+


Integumentary: Yes: Venous Stasis Changes


Neurological: Yes: Confusion


Labs: 


 CBC, BMP





 12/05/19 14:41 





 12/06/19 07:05 











Problem List





- Problems


(1) AGUILA (acute kidney injury)


Code(s): N17.9 - ACUTE KIDNEY FAILURE, UNSPECIFIED   





(2) CHF (congestive heart failure)


Code(s): I50.9 - HEART FAILURE, UNSPECIFIED   


Qualifiers: 


   Heart failure type: diastolic   Heart failure chronicity: chronic   

Qualified Code(s): I50.32 - Chronic diastolic (congestive) heart failure   





(3) Cellulitis


Code(s): L03.90 - CELLULITIS, UNSPECIFIED   


Qualifiers: 


   Site of cellulitis: extremity   Site of cellulitis of extremity: lower 

extremity 





Assessment/Plan


 Current Medications











Generic Name Dose Route Start Last Admin





  Trade Name Freq  PRN Reason Stop Dose Admin


 


Acetaminophen  650 mg  11/14/19 04:07  11/21/19 21:34





  Tylenol -  PO   650 mg





  Q6H PRN   Administration





  PAIN LEVEL 6-10   





     





     





     


 


Acetaminophen  650 mg  11/25/19 16:13  11/28/19 15:08





  Tylenol Suppository -  [place]   650 mg





  Q6H PRN   Administration





  FEVER   





     





     





     


 


Clonazepam  1 mg  11/25/19 22:00  12/06/19 10:18





  Klonopin -  PO   Not Given





  BID [facility]   





     





     





     





     


 


Finasteride  5 mg  11/15/19 10:00  12/06/19 10:19





  Proscar -  PO   Not Given





  DAILY [facility]   





     





     





     





     


 


Gabapentin  300 mg  11/21/19 22:00  12/06/19 13:10





  Neurontin -  PO   Not Given





  TID [facility]   





     





     





     





     


 


Heparin Sodium (Porcine)  5,000 unit  11/29/19 22:00  12/06/19 10:40





  Heparin -  SQ   5,000 unit





  BID [facility]   Administration





     





     





     





     


 


Aztreonam 1 gm/ Dextrose  50 mls @ 100 mls/hr  11/26/19 17:00  12/06/19 05:13





  IVPB   100 mls/hr





  Q12H [facility]   Administration





     





     





  Protocol   





     


 


Amino Acids  1,000 mls @ 125 mls/hr  12/02/19 18:37  12/06/19 15:18





  Clinimix -  IV   125 mls/hr





  Q8H [facility]   Administration





     





     





     





     


 


Fat Emulsion Intravenous  250 mls @ 20.833 mls/hr  12/03/19 22:00  12/05/19 21:

48





  Intralipid -  IV   20.833 mls/hr





  DAILY@2200 [facility]   Administration





     





     





     





     


 


Dextrose  1,000 mls @ 20 mls/hr  12/05/19 13:54  12/06/19 15:18





  D5w -  IV   20 mls/hr





  Q24H [facility]   Administration





     





     





     





     


 


Losartan Potassium  25 mg  12/07/19 10:00  





  Cozaar -  PO   





  DAILY [facility]   





     





     





     





     


 


Metoprolol Succinate  12.5 mg  11/18/19 10:00  12/06/19 10:19





  Toprol Xl -  PO   Not Given





  DAILY [facility]   





     





     





     





     


 


Metoprolol Tartrate  5 mg  11/29/19 13:36  





  Lopressor Injection -  IVPB   





  Q4H PRN   





  TACHYCARDIA   





     





     





     


 


Nystatin  1 applic  12/02/19 22:00  12/06/19 10:40





  Mycostatin Cream -  TP   1 applic





  BID [facility]   Administration





     





     





     





     


 


Olanzapine  2.5 mg  11/21/19 22:00  12/05/19 21:24





  Zyprexa -  PO   Not Given





  HS [facility]   





     





     





     





     


 


Potassium Chloride  40 meq  11/16/19 17:49  12/06/19 10:18





  K-Dur -  PO   Not Given





  DAILY [facility]   





     





     





     





     


 


Pramipexole Dihydrochloride  0.25 mg  11/21/19 20:00  12/06/19 13:10





  Mirapex -  PO   Not Given





  TID [facility]   





     





     





     





     


 


Rivaroxaban  15 mg  11/14/19 18:00  12/05/19 18:51





  Xarelto  PO   Not Given





  DAILY@1800 [facility]   





     





     





     





     


 


Rosuvastatin Calcium  5 mg  11/18/19 22:00  12/05/19 21:24





  Crestor -  PO   Not Given





  HS [facility]   





     





     





     





     


 


Tamsulosin HCl  0.4 mg  11/17/19 08:30  12/06/19 10:18





  Flomax -  PO   Not Given





  DAILY@0830 [facility]   





     





     





     





     


 


Thiamine HCl  100 mg  12/03/19 12:30  12/06/19 10:41





  Vitamin B1 Injection -  IVPB  12/07/19 22:01  100 mg





  BID [facility]   Administration





     





     





     





     

















Impression


1. CKD


2. hx of hydronephrosis


3. hx of obstructive uropathy


4. CHF


5. HTN


6. AGUILA


7. a-fib


8. hypernatreamia





Plan


- cont clinimix until he gets an ng tube and feeds started


- stop other fluids


- will give an amp of bicarb


- replace potassium


- replace phos


- diuretics are on hold as he is not eating


- discussed with partner

## 2019-12-06 NOTE — PN
Progress Note (short form)





- Note


Progress Note: 





D/W NP Andrea today.  Mr. Platt has a living will that relfected his wishes not 

to have feeding tube placed and this is being looked into further. Consider NGT 

feeds in the interim if this is agreed upon


Other recommendations per initial consult


Left message to discuss with patient's domestic partner


   





Problem List





- Problems


(1) Dysphagia


Code(s): R13.10 - DYSPHAGIA, UNSPECIFIED

## 2019-12-06 NOTE — PN
Progress Note, Physician


Chief Complaint: 





Cellulitis


AGUILA


Afib


History of Present Illness: 





Previous notes and events reviewed


alert and awake, knows name and , thinks the year is  and that he is 

across from the [facility]


NAD


leukocytosis, wbc show uptrend 13.9


continue with poor appetite on Clinimix pending possible PEG tube placement











- Current Medication List


Current Medications: 


Active Medications





Acetaminophen (Tylenol -)  650 mg PO Q6H PRN


   PRN Reason: PAIN LEVEL 6-10


   Last Admin: 19 21:34 Dose:  650 mg


Acetaminophen (Tylenol Suppository -)  650 mg [place] Q6H PRN


   PRN Reason: FEVER


   Last Admin: 19 15:08 Dose:  650 mg


Clonazepam (Klonopin -)  1 mg PO BID [facility]


   Last Admin: 19 10:18 Dose:  Not Given


Finasteride (Proscar -)  5 mg PO DAILY [facility]


   Last Admin: 19 10:19 Dose:  Not Given


Gabapentin (Neurontin -)  300 mg PO TID [facility]


   Last Admin: 19 05:13 Dose:  Not Given


Heparin Sodium (Porcine) (Heparin -)  5,000 unit SQ BID [facility]


   Last Admin: 19 10:40 Dose:  5,000 unit


Aztreonam 1 gm/ Dextrose  50 mls @ 100 mls/hr IVPB Q12H [facility]; Protocol


   Last Admin: 19 05:13 Dose:  100 mls/hr


Amino Acids (Clinimix -)  1,000 mls @ 125 mls/hr IV Q8H [facility]


   Last Admin: 19 02:13 Dose:  Not Given


Fat Emulsion Intravenous (Intralipid -)  250 mls @ 20.833 mls/hr IV DAILY@2200 

[facility]


   Last Admin: 19 21:48 Dose:  20.833 mls/hr


Dextrose (D5w -)  1,000 mls @ 20 mls/hr IV Q24H [facility]


   Last Admin: 19 15:02 Dose:  Not Given


Losartan Potassium (Cozaar -)  25 mg PO DAILY [facility]


Metoprolol Succinate (Toprol Xl -)  12.5 mg PO DAILY [facility]


   Last Admin: 19 10:19 Dose:  Not Given


Metoprolol Tartrate (Lopressor Injection -)  5 mg IVPB Q4H PRN


   PRN Reason: TACHYCARDIA


Nystatin (Mycostatin Cream -)  1 applic TP BID [facility]


   Last Admin: 19 10:40 Dose:  1 applic


Olanzapine (Zyprexa -)  2.5 mg PO [facility]


   Last Admin: 19 21:24 Dose:  Not Given


Potassium Chloride (K-Dur -)  40 meq PO DAILY [facility]


   Last Admin: 19 10:18 Dose:  Not Given


Pramipexole Dihydrochloride (Mirapex -)  0.25 mg PO TID [facility]


   Last Admin: 19 05:13 Dose:  Not Given


Rivaroxaban (Xarelto)  15 mg PO DAILY@1800 [facility]


   Last Admin: 19 18:51 Dose:  Not Given


Rosuvastatin Calcium (Crestor -)  5 mg PO [facility]


   Last Admin: 19 21:24 Dose:  Not Given


Tamsulosin HCl (Flomax -)  0.4 mg PO DAILY@0830 [facility]


   Last Admin: 19 10:18 Dose:  Not Given


Thiamine HCl (Vitamin B1 Injection -)  100 mg IVPB BID [facility]


   Stop: 19 22:01


   Last Admin: 19 10:41 Dose:  100 mg











- Objective


Vital Signs: 


 Vital Signs











Temperature  97.7 F   19 08:13


 


Pulse Rate  70   19 08:13


 


Respiratory Rate  15   19 08:13


 


Blood Pressure  140/67   19 08:13


 


O2 Sat by Pulse Oximetry (%)  96   19 09:00











Constitutional: Yes: No Distress, Calm


Eyes: Yes: Conjunctiva Clear


HENT: Yes: Atraumatic


Cardiovascular: Yes: Regular Rate and Rhythm


Respiratory: Yes: Regular, Diminished


Gastrointestinal: Yes: Normal Bowel Sounds, Soft, Abdomen, Obese


Genitourinary: Yes: Incontinence


Musculoskeletal: Yes: Muscle Weakness


Extremities: Yes: WNL


Edema: No


Integumentary: Yes: Venous Stasis Changes


Neurological: Yes: Alert, Confusion


Psychiatric: Yes: Alert


Labs: 


 CBC, BMP





 19 14:41 





 19 07:05 





 Microbiology





19 17:00   Blood - Peripheral Venous   Blood Culture - Final


                            NO GROWTH AFTER 5 DAYS INCUBATION


19 17:00   Blood - Peripheral Venous   Blood Culture - Final


                            NO GROWTH AFTER 5 DAYS INCUBATION


19 18:00   Urine - Urine Clean Catch   Urine Culture - Final


                            NO GROWTH OBTAINED


19 18:00   Urine For Antigen Detection   Legionella Antigen - Final


19 18:00   Urine For Antigen Detection   Streptococcus pneumoniae Antigen 

(M - Final


19 07:10   Urine - Urine Clean Catch   Urine Culture - Final


                            NO GROWTH OBTAINED


19 18:00   Blood - Peripheral Venous   Blood Culture - Final


                            NO GROWTH AFTER 5 DAYS INCUBATION


19 18:30   Blood - Peripheral Venous   Blood Culture - Final


                            NO GROWTH AFTER 5 DAYS INCUBATION


19 19:48   Leg - Right Lower   Gram Stain - Final


19 19:48   Leg - Right Lower   Wound Culture - Final


                             S Aureus


                            Staphylococcus Coagulase Neg











Problem List





- Problems


(1) Metabolic encephalopathy


Assessment/Plan: 





-Head CT scan shows no acute evidence of intracranial hemorrhage, punctate 

chronic right base infarct, mild periventricular chronic microvascular ischemic 

changes 


-neuro check q6h


-Neurology on board


-repeat Head CT scan shows moderate to marked volume loss without CT evidenceof 

acute intracranial pathology, calvarium is intact, mild chronic sinusitis


-repeat CXR on 19 shows some atelectasis/infiltrateat the right base


-Urine Legionella neg


-repeat BC neg


-Azactam 


-poor PO intake and high risk for aspiration HOLD PO MEDICATION 


Code(s): G93.41 - METABOLIC ENCEPHALOPATHY   





(2) AGUILA (acute kidney injury)


Assessment/Plan: 


-Renal on board


-BUN/Cr 38.6/0.7


-monitor renal function


Code(s): N17.9 - ACUTE KIDNEY FAILURE, UNSPECIFIED   





(3) Anxiety


Assessment/Plan: 


-Clonazepam on hold 


-Zyprexa on hold


-Psychiatry on board


Code(s): F41.9 - ANXIETY DISORDER, UNSPECIFIED   





(4) Atrial fibrillation


Assessment/Plan: 


-Xarelto and Metoprolol-on hold


-Heparin SQ and Lopressor 5mg IVPB for HR >110bpm


-cardiology on board


Code(s): I48.91 - UNSPECIFIED ATRIAL FIBRILLATION   


Qualifiers: 


   Atrial fibrillation type: chronic 





(5) Cellulitis


Assessment/Plan: 


-ID on board


-leukocytosis wbc 11.6


-afebrile


-Tygacil, Vancomycin discontued


-wound culture positive


-contact precaution


-BC neg


-Vascular US B/L LE neg for DVT


Code(s): L03.90 - CELLULITIS, UNSPECIFIED   


Qualifiers: 


   Site of cellulitis: extremity   Site of cellulitis of extremity: lower 

extremity 





(6) CHF (congestive heart failure)


Assessment/Plan: 


-daily weights


-strict I&Os


-fluid restriction


Code(s): I50.9 - HEART FAILURE, UNSPECIFIED   


Qualifiers: 


   Heart failure type: diastolic   Heart failure chronicity: acute on chronic   

Qualified Code(s): I50.33 - Acute on chronic diastolic (congestive) heart 

failure   





(7) Hypertension


Assessment/Plan: 


-monitor BP


-low Na diet





Code(s): I10 - ESSENTIAL (PRIMARY) HYPERTENSION   


Qualifiers: 


   Hypertension type: essential hypertension   Qualified Code(s): I10 - 

Essential (primary) hypertension   





(8) Urinary retention due to benign prostatic hyperplasia


Assessment/Plan: 


-Finasteride


-Bladder/Renal US both kidneys appear unremarkable, significantly unlarged 

prostate with a volume 80cc, adequately distended urinary bladder without wall 

thickening, bilateral ureteral jets


-Urology consult 


Code(s): N40.1 - BENIGN PROSTATIC HYPERPLASIA WITH LOWER URINARY TRACT SYMP; 

R33.8 - OTHER RETENTION OF URINE   





(9) Poor appetite


Assessment/Plan: 


-Clinimix


-SLP on board


-highrisk for aspiration due to metabolic encephalopathy


-palliative consult for possible PEG or NGT for nutritional intake


-NGT placement until PEG insertion


Code(s): R63.0 - ANOREXIA   





Assessment/Plan





see problem list


dvt ppx

## 2019-12-06 NOTE — PN
Progress Note, Physician


History of Present Illness: 





Remains confused. LE cellulitis much improved. Planning on PEG due to high risk 

of aspiration.








- Current Medication List


Current Medications: 


Active Medications





Acetaminophen (Tylenol -)  650 mg PO Q6H PRN


   PRN Reason: PAIN LEVEL 6-10


   Last Admin: 11/21/19 21:34 Dose:  650 mg


Acetaminophen (Tylenol Suppository -)  650 mg IL Q6H PRN


   PRN Reason: FEVER


   Last Admin: 11/28/19 15:08 Dose:  650 mg


Clonazepam (Klonopin -)  1 mg PO BID [facility]


   Last Admin: 12/06/19 10:18 Dose:  Not Given


Finasteride (Proscar -)  5 mg PO DAILY [facility]


   Last Admin: 12/06/19 10:19 Dose:  Not Given


Gabapentin (Neurontin -)  300 mg PO TID [facility]


   Last Admin: 12/06/19 05:13 Dose:  Not Given


Heparin Sodium (Porcine) (Heparin -)  5,000 unit SQ BID [facility]


   Last Admin: 12/06/19 10:40 Dose:  5,000 unit


Aztreonam 1 gm/ Dextrose  50 mls @ 100 mls/hr IVPB Q12H [facility]; Protocol


   Last Admin: 12/06/19 05:13 Dose:  100 mls/hr


Amino Acids (Clinimix -)  1,000 mls @ 125 mls/hr IV Q8H [facility]


   Last Admin: 12/06/19 02:13 Dose:  Not Given


Fat Emulsion Intravenous (Intralipid -)  250 mls @ 20.833 mls/hr IV DAILY@2200 

[facility]


   Last Admin: 12/05/19 21:48 Dose:  20.833 mls/hr


Dextrose (D5w -)  1,000 mls @ 20 mls/hr IV Q24H [facility]


   Last Admin: 12/05/19 15:02 Dose:  Not Given


Metoprolol Succinate (Toprol Xl -)  12.5 mg PO DAILY [facility]


   Last Admin: 12/06/19 10:19 Dose:  Not Given


Metoprolol Tartrate (Lopressor Injection -)  5 mg IVPB Q4H PRN


   PRN Reason: TACHYCARDIA


Nystatin (Mycostatin Cream -)  1 applic TP BID [facility]


   Last Admin: 12/06/19 10:40 Dose:  1 applic


Olanzapine (Zyprexa -)  2.5 mg PO HS [facility]


   Last Admin: 12/05/19 21:24 Dose:  Not Given


Potassium Chloride (K-Dur -)  40 meq PO DAILY [facility]


   Last Admin: 12/06/19 10:18 Dose:  Not Given


Pramipexole Dihydrochloride (Mirapex -)  0.25 mg PO TID [facility]


   Last Admin: 12/06/19 05:13 Dose:  Not Given


Rivaroxaban (Xarelto)  15 mg PO DAILY@1800 [facility]


   Last Admin: 12/05/19 18:51 Dose:  Not Given


Rosuvastatin Calcium (Crestor -)  5 mg PO HS [facility]


   Last Admin: 12/05/19 21:24 Dose:  Not Given


Tamsulosin HCl (Flomax -)  0.4 mg PO DAILY@0830 [facility]


   Last Admin: 12/06/19 10:18 Dose:  Not Given


Thiamine HCl (Vitamin B1 Injection -)  100 mg IVPB BID [facility]


   Stop: 12/07/19 22:01


   Last Admin: 12/06/19 10:41 Dose:  100 mg











- Objective


Vital Signs: 


 Vital Signs











Temperature  97.7 F   12/06/19 08:13


 


Pulse Rate  70   12/06/19 08:13


 


Respiratory Rate  15   12/06/19 08:13


 


Blood Pressure  140/67   12/06/19 08:13


 


O2 Sat by Pulse Oximetry (%)  96   12/05/19 21:00











Constitutional: Yes: No Distress, Calm


Neck: Yes: Supple


Cardiovascular: Yes: Regular Rate and Rhythm


Respiratory: Yes: Regular, CTA Bilaterally


Gastrointestinal: Yes: Soft, Hypoactive Bowel Sounds


Edema: No


Integumentary: Yes: Venous Stasis Changes


Labs: 


 CBC, BMP





 12/05/19 14:41 





 12/06/19 07:05 











Problem List





- Problems


(1) Allergy to multiple antibiotics


Code(s): Z88.1 - ALLERGY STATUS TO OTHER ANTIBIOTIC AGENTS STATUS   





(2) Anxiety


Code(s): F41.9 - ANXIETY DISORDER, UNSPECIFIED   





(3) Atrial fibrillation


Code(s): I48.91 - UNSPECIFIED ATRIAL FIBRILLATION   


Qualifiers: 


   Atrial fibrillation type: chronic 





(4) Cellulitis


Code(s): L03.90 - CELLULITIS, UNSPECIFIED   


Qualifiers: 


   Site of cellulitis: extremity   Site of cellulitis of extremity: lower 

extremity 





(5) Chronic anticoagulation


Code(s): Z79.01 - LONG TERM (CURRENT) USE OF ANTICOAGULANTS   





(6) Hypertension


Code(s): I10 - ESSENTIAL (PRIMARY) HYPERTENSION   


Qualifiers: 


   Hypertension type: essential hypertension   Qualified Code(s): I10 - 

Essential (primary) hypertension   





(7) Lymphedema


Code(s): I89.0 - LYMPHEDEMA, NOT ELSEWHERE CLASSIFIED   





(8) Pacemaker


Code(s): Z95.0 - PRESENCE OF CARDIAC PACEMAKER   





Assessment/Plan


ECHO 3/2018 showed normal LV systolic function, mild AS no other sig valvular 

abnl





1. Recurrent LE cellulitis with multiple allergies to PCN and carbipenem 

improving


2. Chronic venous stasis dermatitis


3. Acute kidney injury with hx of hydronephrosis/obstructive uropathy likely pre

-renal improving to baseline


4. Chronic diastolic heart failure


5. HTN cardiomyopathy


6. Afib on Xarelto


7. sss s/p PPM


8. OSAS


9. CAD h/o demand ischemia


10. Hyperlipidemia


11. Anxiety d/o


12. BPH


13. Toxic metabolic encephelopathy with high risk of aspiration








P:1. Empiric abx per C&S, wound care 


2. Hold oral diuretics and zaroxolyn pending oral intake, Clinimix, monitor 

electrolytes, plan for PEG


3. Continue Toprol XL 12.5 qd, Xarelto 15 qd,  Crestor 5 qd, resume losartan 25 

qd given renal fxn stabilization


4. DVT and GI prophylaxis


5. Wife requests pacemaker interrogation as it's difficult to bring him to 

office, will arrange for this week

## 2019-12-07 NOTE — PN.GI
GI Progress Note


Subjective: 





WIFE AT THE BEDSIDE 


NO NEW COMPLAINTS - NGT IN PLACE 








- Objective


Vital Signs: 


 Vital Signs











Temperature  98.8 F   12/07/19 06:08


 


Pulse Rate  75   12/07/19 06:08


 


Respiratory Rate  20   12/07/19 06:08


 


Blood Pressure  116/70   12/07/19 06:08


 


O2 Sat by Pulse Oximetry (%)  96   12/06/19 21:00











Constitutional: Well Nourished, No Distress, Calm


Eyes: Yes: WNL


HENT: Yes: WNL


Neck: Yes: WNL


Cardiovascular: Yes: WNL


Respiratory: Yes: WNL, Regular


Gastrointestinal Inspection: Yes: WNL


...Auscultate: Yes: Normoactive Bowel Sounds


Extremities: Yes: WNL


Edema: No


Labs: 


 CBC, BMP





 12/05/19 14:41 





 12/06/19 07:05 











Problem List





- Problems


(1) Dysphagia


Assessment/Plan: 


- NGT IN PLACE - CXR REVIEWED 


START FEEDS AS PER NUTRITION SERVICE FULL ASPIRATION PRECAUTION DURING FEEDING. 


DECISION TO BE MADE BY FAMILY CONCERNING PEG TUBE PLACEMENT 


WILL F/U 


Code(s): R13.10 - DYSPHAGIA, UNSPECIFIED   





(2) Poor appetite


Code(s): R63.0 - ANOREXIA

## 2019-12-07 NOTE — PN
Progress Note (short form)





- Note


Progress Note: 





RENAL


Pt is awake and alert


responds to some questions


says he hopes he is ok





 Last Vital Signs











Temp Pulse Resp BP Pulse Ox


 


 98.8 F   75   16   128/70   96 


 


 12/07/19 07:52  12/07/19 07:52  12/07/19 07:52  12/07/19 07:52  12/06/19 21:00





lying flat comfortably


lungs clear anteriorly


cvs s1s2 rr


abd soft


ext +edema, some lymphedematous changes


neuro a+o


gu has a diaper, had a [place] but seems to have fallen offImpression








 CBC, BMP





 12/07/19 07:35 





 12/07/19 07:35 





 Current Medications











Generic Name Dose Route Start Last Admin





  Trade Name Freq  PRN Reason Stop Dose Admin


 


Acetaminophen  650 mg  11/14/19 04:07  11/21/19 21:34





  Tylenol -  PO   650 mg





  Q6H PRN   Administration





  PAIN LEVEL 6-10   





     





     





     


 


Acetaminophen  650 mg  11/25/19 16:13  11/28/19 15:08





  Tylenol Suppository -  NH   650 mg





  Q6H PRN   Administration





  FEVER   





     





     





     


 


Clonazepam  1 mg  11/25/19 22:00  12/07/19 00:09





  Klonopin -  PO   Not Given





  BID [facility]   





     





     





     





     


 


Finasteride  5 mg  11/15/19 10:00  12/06/19 10:19





  Proscar -  PO   Not Given





  DAILY [facility]   





     





     





     





     


 


Gabapentin  300 mg  11/21/19 22:00  12/07/19 05:01





  Neurontin -  PO   Not Given





  TID [facility]   





     





     





     





     


 


Heparin Sodium (Porcine)  5,000 unit  11/29/19 22:00  12/07/19 10:14





  Heparin -  SQ   5,000 unit





  BID [facility]   Administration





     





     





     





     


 


Aztreonam 1 gm/ Dextrose  50 mls @ 100 mls/hr  11/26/19 17:00  12/07/19 04:43





  IVPB   100 mls/hr





  Q12H [facility]   Administration





     





     





  Protocol   





     


 


Fat Emulsion Intravenous  250 mls @ 20.833 mls/hr  12/03/19 22:00  12/07/19 02:

41





  Intralipid -  IV   20.833 mls/hr





  DAILY@2200 [facility]   Administration





     





     





     





     


 


Potassium Chloride 10 meq/  1,005 mls @ 115 mls/hr  12/06/19 15:46  12/07/19 10:

14





  Amino Acids  IVPB   Not Given





  Q8H [facility]   





     





     





     





     


 


Losartan Potassium  25 mg  12/07/19 10:00  





  Cozaar -  PO   





  DAILY [facility]   





     





     





     





     


 


Metoprolol Succinate  12.5 mg  11/18/19 10:00  12/06/19 10:19





  Toprol Xl -  PO   Not Given





  DAILY [facility]   





     





     





     





     


 


Metoprolol Tartrate  5 mg  11/29/19 13:36  





  Lopressor Injection -  IVPB   





  Q4H PRN   





  TACHYCARDIA   





     





     





     


 


Nystatin  1 applic  12/02/19 22:00  12/07/19 10:15





  Mycostatin Cream -  TP   1 applic





  BID [facility]   Administration





     





     





     





     


 


Olanzapine  2.5 mg  11/21/19 22:00  12/07/19 00:10





  Zyprexa -  PO   Not Given





  HS [facility]   





     





     





     





     


 


Potassium Chloride  40 meq  11/16/19 17:49  12/06/19 10:18





  K-Dur -  PO   Not Given





  DAILY [facility]   





     





     





     





     


 


Pramipexole Dihydrochloride  0.25 mg  11/21/19 20:00  12/07/19 05:00





  Mirapex -  PO   Not Given





  TID [facility]   





     





     





     





     


 


Rivaroxaban  15 mg  11/14/19 18:00  12/06/19 19:19





  Xarelto  PO   Not Given





  DAILY@1800 [facility]   





     





     





     





     


 


Rosuvastatin Calcium  5 mg  11/18/19 22:00  12/07/19 00:09





  Crestor -  PO   Not Given





  HS [facility]   





     





     





     





     


 


Tamsulosin HCl  0.4 mg  11/17/19 08:30  12/06/19 10:18





  Flomax -  PO   Not Given





  DAILY@0830 [facility]   





     





     





     





     


 


Thiamine HCl  100 mg  12/03/19 12:30  12/07/19 10:15





  Vitamin B1 Injection -  IVPB  12/07/19 22:01  100 mg





  BID [facility]   Administration





     





     





     





     














Impression


1. CKD


2. hx of hydronephrosis


3. hx of obstructive uropathy


4. CHF


5. HTN


6. AGUILA


7. a-fib


8. hypernatreamia





Plan


would try to feed if possible


hold diuretics


would try to reduce his medications 


high bun may be from volume depletion 


obtain urine sodium








MV

## 2019-12-07 NOTE — PN
Progress Note, Physician


Chief Complaint: 





AWAKE CONFUSED


NGT IN PLACE





- Current Medication List


Current Medications: 


Active Medications





Acetaminophen (Tylenol -)  650 mg PO Q6H PRN


   PRN Reason: PAIN LEVEL 6-10


   Last Admin: 11/21/19 21:34 Dose:  650 mg


Clonazepam (Klonopin -)  1 mg NGT BID [facility]


Gabapentin (Neurontin -)  300 mg NGT TID [facility]


Heparin Sodium (Porcine) (Heparin -)  5,000 unit SQ BID [facility]


   Last Admin: 12/07/19 10:14 Dose:  5,000 unit


Aztreonam 1 gm/ Dextrose  50 mls @ 100 mls/hr IVPB Q12H [facility]; Protocol


   Last Admin: 12/07/19 04:43 Dose:  100 mls/hr


Fat Emulsion Intravenous (Intralipid -)  250 mls @ 20.833 mls/hr IV DAILY@2200 

[facility]


   Last Admin: 12/07/19 02:41 Dose:  20.833 mls/hr


Potassium Chloride 10 meq/ (Amino Acids)  1,005 mls @ 115 mls/hr IVPB Q8H [facility]


   Last Admin: 12/07/19 10:14 Dose:  Not Given


Losartan Potassium (Cozaar -)  25 mg NGT DAILY [facility]


Metoprolol Tartrate (Lopressor Injection -)  5 mg IVPB Q4H PRN


   PRN Reason: TACHYCARDIA


Nystatin (Mycostatin Cream -)  1 applic TP BID [facility]


   Last Admin: 12/07/19 10:15 Dose:  1 applic


Olanzapine (Zyprexa -)  2.5 mg NGT HS [facility]


Potassium Chloride (K-Dur -)  40 meq PO DAILY [facility]


   Last Admin: 12/06/19 10:18 Dose:  Not Given


Pramipexole Dihydrochloride (Mirapex -)  0.25 mg NGT TID [facility]


Rivaroxaban (Xarelto)  15 mg PO DAILY@1800 [facility]


   Last Admin: 12/06/19 19:19 Dose:  Not Given


Rosuvastatin Calcium (Crestor -)  5 mg NR HS [facility]


Silver Sulfadiazine (Silvadene -)  1 applic TP DAILY [facility]


Thiamine HCl (Vitamin B1 Injection -)  100 mg IVPB BID [facility]


   Stop: 12/07/19 22:01


   Last Admin: 12/07/19 10:15 Dose:  100 mg











- Objective


Vital Signs: 


 Vital Signs











Temperature  98.8 F   12/07/19 07:52


 


Pulse Rate  75   12/07/19 07:52


 


Respiratory Rate  16   12/07/19 07:52


 


Blood Pressure  128/70   12/07/19 07:52


 


O2 Sat by Pulse Oximetry (%)  99   12/07/19 09:00











Constitutional: Yes: No Distress


Cardiovascular: Yes: Pulse Irregular


Respiratory: Yes: Diminished, On Nasal O2


Gastrointestinal: Yes: Soft, Abdomen, Obese


Genitourinary: Yes: Incontinence


Musculoskeletal: Yes: Muscle Weakness


Extremities: Yes: Erythema


Edema: Yes


Integumentary: Yes: Pressure Ulcer, Rash, Skin Tear (RIGHT ARM BLISTERS WITH 

CELLULITIS)


Wound/Incision: Yes: Open to air, Dressing Removed, Draining, Reddened, 

Excoriated, Unapproximated


Neurological: Yes: Confusion, Pre-Existing Deficit, Weakness


Psychiatric: Yes: Other


Labs: 


 CBC, BMP





 12/07/19 07:35 





 12/07/19 07:35 











Problem List





- Problems


(1) Dysphagia


Code(s): R13.10 - DYSPHAGIA, UNSPECIFIED   





(2) Infiltrate of right lung present on chest x-ray


Code(s): R91.8 - OTHER NONSPECIFIC ABNORMAL FINDING OF LUNG FIELD   





(3) Metabolic encephalopathy


Code(s): G93.41 - METABOLIC ENCEPHALOPATHY   





(4) Obesity


Code(s): E66.9 - OBESITY, UNSPECIFIED   





(5) Poor appetite


Code(s): R63.0 - ANOREXIA   





(6) Renal dysfunction


Code(s): N28.9 - DISORDER OF KIDNEY AND URETER, UNSPECIFIED   





(7) Allergy to multiple antibiotics


Code(s): Z88.1 - ALLERGY STATUS TO OTHER ANTIBIOTIC AGENTS STATUS   





(8) Anxiety


Code(s): F41.9 - ANXIETY DISORDER, UNSPECIFIED   





(9) Atrial fibrillation


Code(s): I48.91 - UNSPECIFIED ATRIAL FIBRILLATION   


Qualifiers: 


   Atrial fibrillation type: chronic 





(10) Cellulitis


Code(s): L03.90 - CELLULITIS, UNSPECIFIED   


Qualifiers: 


   Site of cellulitis: extremity   Site of cellulitis of extremity: lower 

extremity 





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


Code(s): Z22.39 - CARRIER OF OTHER SPECIFIED BACTERIAL DISEASES   





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


Code(s): Z87.09 - PERSONAL HISTORY OF OTHER DISEASES OF THE RESPIRATORY SYSTEM 

  





(13) Lumbar stenosis


Code(s): M48.061 - SPINAL STENOSIS, LUMBAR REGION WITHOUT NEUROGENIC LALA   





(14) PVD (peripheral vascular disease)


Code(s): I73.9 - PERIPHERAL VASCULAR DISEASE, UNSPECIFIED   





(15) Pressure ulcer


Code(s): L89.90 - PRESSURE ULCER OF UNSPECIFIED SITE, UNSPECIFIED STAGE   





(16) Lymphedema


Code(s): I89.0 - LYMPHEDEMA, NOT ELSEWHERE CLASSIFIED   





(17) Morbid obesity


Code(s): E66.01 - MORBID (SEVERE) OBESITY DUE TO EXCESS CALORIES   





(18) Osteoarthritis


Code(s): M19.90 - UNSPECIFIED OSTEOARTHRITIS, UNSPECIFIED SITE   





(19) Pacemaker


Code(s): Z95.0 - PRESENCE OF CARDIAC PACEMAKER   





Assessment/Plan





NGT IN PLACE START JEVITY 1.5 AT 20CC/HR


ADVANCE TOMORROW


PEG MONDAY WITH I.R.


RIGHT ARM RASH/BLISTERS


ADDING SILVADENE CREAM WITH KURLEX WRAP


LEFT HAND RESTRAINT TO AVOID PULLING LINES.


ADVANCED DIRECTIVE WAS REVIEWED BY STAFF AND FAMILY


AND WANTS A PEG PLACED.

## 2019-12-08 NOTE — PN
Progress Note (short form)





- Note


Progress Note: 





RENAL


Pt is asleep, arousable


responds to some questions


says he hopes he is ok





 Last Vital Signs











Temp Pulse Resp BP Pulse Ox


 


 99.0 F   70   18   104/55 L  99 


 


 12/08/19 06:25  12/08/19 06:25  12/08/19 06:25  12/08/19 06:25  12/07/19 09:00























lying flat comfortably


lungs clear anteriorly


cvs s1s2 rr


abd soft


ext +edema, some lymphedematous changes


neuro a+o


gu has a diaper,


skin has bullae on right hand








 CBC, BMP





 12/07/19 07:35 





 12/07/19 07:35 











 Current Medications











Generic Name Dose Route Start Last Admin





  Trade Name Freq  PRN Reason Stop Dose Admin


 


Acetaminophen  650 mg  11/14/19 04:07  11/21/19 21:34





  Tylenol -  PO   650 mg





  Q6H PRN   Administration





  PAIN LEVEL 6-10   





     





     





     


 


Clonazepam  1 mg  12/07/19 22:00  12/08/19 11:29





  Klonopin -  NGT   Not Given





  BID [facility]   





     





     





     





     


 


Doxazosin Mesylate  1 mg  12/08/19 10:00  12/08/19 11:29





  Cardura -  NGT   Not Given





  DAILY [facility]   





     





     





     





     


 


Gabapentin  300 mg  12/07/19 14:00  12/08/19 06:33





  Neurontin -  NGT   300 mg





  TID [facility]   Administration





     





     





     





     


 


Heparin Sodium (Porcine)  5,000 unit  11/29/19 22:00  12/07/19 21:37





  Heparin -  SQ   5,000 unit





  BID [facility]   Administration





     





     





     





     


 


Aztreonam 1 gm/ Dextrose  50 mls @ 100 mls/hr  11/26/19 17:00  12/08/19 06:00





  IVPB   100 mls/hr





  Q12H [facility]   Administration





     





     





  Protocol   





     


 


Fat Emulsion Intravenous  250 mls @ 20.833 mls/hr  12/03/19 22:00  12/07/19 21:

37





  Intralipid -  IV   20.833 mls/hr





  DAILY@2200 [facility]   Administration





     





     





     





     


 


Potassium Chloride 10 meq/  1,005 mls @ 115 mls/hr  12/06/19 15:46  12/08/19 07:

46





  Amino Acids  IVPB   Not Given





  Q8H [facility]   





     





     





     





     


 


Losartan Potassium  25 mg  12/07/19 11:30  12/08/19 11:29





  Cozaar -  NGT   Not Given





  DAILY [facility]   





     





     





     





     


 


Metoprolol Tartrate  5 mg  11/29/19 13:36  





  Lopressor Injection -  IVPB   





  Q4H PRN   





  TACHYCARDIA   





     





     





     


 


Metoprolol Tartrate  12.5 mg  12/07/19 22:00  12/08/19 11:29





  Lopressor -  NGT   Not Given





  BID [facility]   





     





     





     





     


 


Nystatin  1 applic  12/02/19 22:00  12/07/19 21:38





  Mycostatin Cream -  TP   1 applic





  BID [facility]   Administration





     





     





     





     


 


Olanzapine  2.5 mg  12/07/19 22:00  12/07/19 21:39





  Zyprexa -  NGT   2.5 mg





  HS [facility]   Administration





     





     





     





     


 


Potassium Chloride  40 meq  11/16/19 17:49  12/08/19 11:29





  K-Dur -  PO   Not Given





  DAILY [facility]   





     





     





     





     


 


Pramipexole Dihydrochloride  0.25 mg  12/07/19 14:00  12/08/19 06:33





  Mirapex -  NGT   0.25 mg





  TID [facility]   Administration





     





     





     





     


 


Rivaroxaban  15 mg  11/14/19 18:00  12/07/19 17:16





  Xarelto  PO   15 mg





  DAILY@1800 [facility]   Administration





     





     





     





     


 


Rosuvastatin Calcium  5 mg  12/07/19 22:00  12/07/19 21:37





  Crestor -  NR   5 mg





  HS [facility]   Administration





     





     





     





     


 


Silver Sulfadiazine  1 applic  12/07/19 11:30  12/07/19 12:30





  Silvadene -  TP   1 applic





  DAILY [facility]   Administration





     





     





     





     

















Impression


1. CKD


2. hx of hydronephrosis


3. hx of obstructive uropathy


4. CHF


5. HTN


6. AGUILA


7. a-fib


8. hypernatreamia








Plan


can restart diuretics given edema, particularly his bullae








MV

## 2019-12-08 NOTE — PN.GI
GI Progress Note


Subjective: 





PATIENT PULLED OUT NGT -- WIFE AT THE BEDSIDE STATES SHE WOULD LIKE IT PUT BACK 

IN SO HE CAN RECEIVE NUTRITION. ALSO SHE HAS NOT BEEN ABLE TO DECIDE ON PEG 

TUBE PLACEMENT AND WOULD LIKE TIME TO CONSIDER AND SPEAK TO HER FAMILY. 


NO OTHER CONCERNS 





- Objective


Vital Signs: 


 Vital Signs











Temperature  99.0 F   12/08/19 06:25


 


Pulse Rate  70   12/08/19 06:25


 


Respiratory Rate  18   12/08/19 06:25


 


Blood Pressure  104/55 L  12/08/19 06:25


 


O2 Sat by Pulse Oximetry (%)  99   12/07/19 09:00











Constitutional: Well Nourished, No Distress, Calm


Eyes: Yes: WNL


HENT: Yes: WNL


Neck: Yes: WNL


Cardiovascular: Yes: WNL, Regular Rate and Rhythm


Respiratory: Yes: WNL, Regular, CTA Bilaterally


Gastrointestinal Inspection: Yes: WNL


...Auscultate: Yes: Normoactive Bowel Sounds


Extremities: Yes: WNL


Edema: No


Labs: 


 CBC, BMP





 12/07/19 07:35 





 12/07/19 07:35 











Problem List





- Problems


(1) Dysphagia


Assessment/Plan: 


- NURSE TO PLACE NGT - CXR ORDERED TO CONFIRM PLACEMENT 


START FEEDS AS PER NUTRITION SERVICE FULL ASPIRATION PRECAUTION DURING FEEDING. 


DECISION TO BE MADE BY FAMILY CONCERNING PEG TUBE PLACEMENT 


WILL F/U 


Code(s): R13.10 - DYSPHAGIA, UNSPECIFIED   





(2) Poor appetite


Code(s): R63.0 - ANOREXIA

## 2019-12-08 NOTE — PN
Progress Note, Physician


Chief Complaint: 





AWAKE CONFUSED


PULLING NGT





- Current Medication List


Current Medications: 


Active Medications





Acetaminophen (Tylenol -)  650 mg PO Q6H PRN


   PRN Reason: PAIN LEVEL 6-10


   Last Admin: 11/21/19 21:34 Dose:  650 mg


Clonazepam (Klonopin -)  1 mg NGT BID [facility]


   Last Admin: 12/08/19 11:29 Dose:  Not Given


Doxazosin Mesylate (Cardura -)  1 mg NGT DAILY [facility]


   Last Admin: 12/08/19 11:29 Dose:  Not Given


Furosemide (Lasix Injection -)  20 mg IVPB DAILY [facility]


Gabapentin (Neurontin -)  300 mg NGT TID [facility]


   Last Admin: 12/08/19 06:33 Dose:  300 mg


Heparin Sodium (Porcine) (Heparin -)  5,000 unit SQ BID [facility]


   Last Admin: 12/07/19 21:37 Dose:  5,000 unit


Aztreonam 1 gm/ Dextrose  50 mls @ 100 mls/hr IVPB Q12H [facility]; Protocol


   Last Admin: 12/08/19 06:00 Dose:  100 mls/hr


Fat Emulsion Intravenous (Intralipid -)  250 mls @ 20.833 mls/hr IV DAILY@2200 

[facility]


   Last Admin: 12/07/19 21:37 Dose:  20.833 mls/hr


Potassium Chloride 10 meq/ (Amino Acids)  1,005 mls @ 115 mls/hr IVPB Q8H [facility]


   Last Admin: 12/08/19 07:46 Dose:  Not Given


Losartan Potassium (Cozaar -)  25 mg NGT DAILY [facility]


   Last Admin: 12/08/19 11:29 Dose:  Not Given


Metoprolol Tartrate (Lopressor Injection -)  5 mg IVPB Q4H PRN


   PRN Reason: TACHYCARDIA


Metoprolol Tartrate (Lopressor -)  12.5 mg NGT BID [facility]


   Last Admin: 12/08/19 11:29 Dose:  Not Given


Nystatin (Mycostatin Cream -)  1 applic TP BID [facility]


   Last Admin: 12/07/19 21:38 Dose:  1 applic


Olanzapine (Zyprexa -)  2.5 mg NGT HS [facility]


   Last Admin: 12/07/19 21:39 Dose:  2.5 mg


Potassium Chloride (K-Dur -)  40 meq PO DAILY [facility]


   Last Admin: 12/08/19 11:29 Dose:  Not Given


Pramipexole Dihydrochloride (Mirapex -)  0.25 mg NGT TID [facility]


   Last Admin: 12/08/19 06:33 Dose:  0.25 mg


Rivaroxaban (Xarelto)  15 mg PO DAILY@1800 [facility]


   Last Admin: 12/07/19 17:16 Dose:  15 mg


Rosuvastatin Calcium (Crestor -)  5 mg NR HS [facility]


   Last Admin: 12/07/19 21:37 Dose:  5 mg


Silver Sulfadiazine (Silvadene -)  1 applic TP DAILY [facility]


   Last Admin: 12/07/19 12:30 Dose:  1 applic











- Objective


Vital Signs: 


 Vital Signs











Temperature  99.0 F   12/08/19 06:25


 


Pulse Rate  70   12/08/19 06:25


 


Respiratory Rate  18   12/08/19 06:25


 


Blood Pressure  104/55 L  12/08/19 06:25


 


O2 Sat by Pulse Oximetry (%)  99   12/07/19 09:00











Constitutional: Yes: Mild Distress


Cardiovascular: Yes: Pulse Irregular


Respiratory: Yes: Cough, Diminished


Gastrointestinal: Yes: Soft, Abdomen, Obese


Genitourinary: Yes: Incontinence


Musculoskeletal: Yes: Muscle Weakness


Edema: Yes


Integumentary: Yes: Pressure Ulcer, Rash, Venous Stasis Changes


Wound/Incision: Yes: Open to air, Excoriated


Neurological: Yes: Confusion, Pre-Existing Deficit


Labs: 


 CBC, BMP





 12/07/19 07:35 





 12/07/19 07:35 











Problem List





- Problems


(1) Dysphagia


Code(s): R13.10 - DYSPHAGIA, UNSPECIFIED   





(2) Infiltrate of right lung present on chest x-ray


Code(s): R91.8 - OTHER NONSPECIFIC ABNORMAL FINDING OF LUNG FIELD   





(3) Metabolic encephalopathy


Code(s): G93.41 - METABOLIC ENCEPHALOPATHY   





(4) Obesity


Code(s): E66.9 - OBESITY, UNSPECIFIED   





(5) Poor appetite


Code(s): R63.0 - ANOREXIA   





(6) Renal dysfunction


Code(s): N28.9 - DISORDER OF KIDNEY AND URETER, UNSPECIFIED   





(7) Allergy to multiple antibiotics


Code(s): Z88.1 - ALLERGY STATUS TO OTHER ANTIBIOTIC AGENTS STATUS   





(8) Anxiety


Code(s): F41.9 - ANXIETY DISORDER, UNSPECIFIED   





(9) Atrial fibrillation


Code(s): I48.91 - UNSPECIFIED ATRIAL FIBRILLATION   


Qualifiers: 


   Atrial fibrillation type: chronic 





(10) Cellulitis


Code(s): L03.90 - CELLULITIS, UNSPECIFIED   


Qualifiers: 


   Site of cellulitis: extremity   Site of cellulitis of extremity: lower 

extremity 





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


Code(s): Z22.39 - CARRIER OF OTHER SPECIFIED BACTERIAL DISEASES   





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


Code(s): Z87.09 - PERSONAL HISTORY OF OTHER DISEASES OF THE RESPIRATORY SYSTEM 

  





(13) Lumbar stenosis


Code(s): M48.061 - SPINAL STENOSIS, LUMBAR REGION WITHOUT NEUROGENIC LALA   





(14) PVD (peripheral vascular disease)


Code(s): I73.9 - PERIPHERAL VASCULAR DISEASE, UNSPECIFIED   





(15) Pressure ulcer


Code(s): L89.90 - PRESSURE ULCER OF UNSPECIFIED SITE, UNSPECIFIED STAGE   





(16) Lymphedema


Code(s): I89.0 - LYMPHEDEMA, NOT ELSEWHERE CLASSIFIED   





(17) Morbid obesity


Code(s): E66.01 - MORBID (SEVERE) OBESITY DUE TO EXCESS CALORIES   





(18) Osteoarthritis


Code(s): M19.90 - UNSPECIFIED OSTEOARTHRITIS, UNSPECIFIED SITE   





(19) Pacemaker


Code(s): Z95.0 - PRESENCE OF CARDIAC PACEMAKER   





Assessment/Plan





NGT TO BE REINSERTED


I HAVE RESERVATIONS ABOUT PEG PLACEMENT


WITH PATIENT'S DELERIUM/DEMENTIA/METABOLIC ENCEPHALOPATHY


I DO NOT RECOMMEND HE HAS A PEG B/C OF THE RISK HE WILL PULL IT OUT.


RIGHT ARM RASH/BLISTERS


ADDING SILVADENE CREAM WITH KURLEX WRAP


LEFT HAND RESTRAINT TO AVOID PULLING LINES.


ADVANCED DIRECTIVE WAS REVIEWED BY STAFF AND FAMILY


AND WANTS A PEG PLACED??

## 2019-12-09 NOTE — PN
Progress Note, Physician


History of Present Illness: 





Remains confused. LE cellulitis much improved. Enteral feeds started via NGT, 

planning on PEG due to high risk of aspiration.








- Current Medication List


Current Medications: 


Active Medications





Acetaminophen (Tylenol -)  650 mg PO Q6H PRN


   PRN Reason: PAIN LEVEL 6-10


   Last Admin: 11/21/19 21:34 Dose:  650 mg


Clonazepam (Klonopin -)  1 mg NGT BID [facility] of [facility]


   Last Admin: 12/09/19 10:40 Dose:  1 mg


Doxazosin Mesylate (Cardura -)  1 mg NGT DAILY [facility] of [facility]


   Last Admin: 12/09/19 10:38 Dose:  1 mg


Furosemide (Lasix Injection -)  20 mg IVPUSH DAILY [facility] of [facility]


   Last Admin: 12/09/19 10:38 Dose:  20 mg


Gabapentin (Neurontin -)  300 mg NGT TID [facility] of [facility]


   Last Admin: 12/09/19 06:20 Dose:  300 mg


Heparin Sodium (Porcine) (Heparin -)  5,000 unit SQ BID [facility] of [facility]


   Last Admin: 12/09/19 10:38 Dose:  5,000 unit


Aztreonam 1 gm/ Dextrose  50 mls @ 100 mls/hr IVPB Q12H On license of [facility]; Protocol


   Last Admin: 12/09/19 05:20 Dose:  100 mls/hr


Fat Emulsion Intravenous (Intralipid -)  250 mls @ 20.833 mls/hr IV DAILY@2200 

[facility]


   Last Admin: 12/08/19 23:37 Dose:  20.833 mls/hr


Potassium Chloride 10 meq/ (Amino Acids)  1,005 mls @ 115 mls/hr IVPB Q8H On license of [facility]


   Last Admin: 12/09/19 08:17 Dose:  Not Given


Losartan Potassium (Cozaar -)  25 mg NGT DAILY [facility] of [facility]


   Last Admin: 12/09/19 10:39 Dose:  25 mg


Metoprolol Tartrate (Lopressor Injection -)  5 mg IVPB Q4H PRN


   PRN Reason: TACHYCARDIA


Metoprolol Tartrate (Lopressor -)  12.5 mg NGT BID [facility] of [facility]


   Last Admin: 12/09/19 10:39 Dose:  12.5 mg


Nystatin (Mycostatin Cream -)  1 applic TP BID [facility] of [facility]


   Last Admin: 12/09/19 10:40 Dose:  1 applic


Olanzapine (Zyprexa -)  2.5 mg NGT HS [facility] of [facility]


   Last Admin: 12/08/19 23:39 Dose:  2.5 mg


Potassium Chloride (K-Dur -)  40 meq PO DAILY On [facility] of [facility]


   Last Admin: 12/09/19 10:40 Dose:  Not Given


Pramipexole Dihydrochloride (Mirapex -)  0.25 mg NGT TID On [facility] of [facility]


   Last Admin: 12/09/19 06:20 Dose:  0.25 mg


Rivaroxaban (Xarelto)  15 mg PO DAILY@1800 On license of [facility]


   Last Admin: 12/08/19 18:38 Dose:  Not Given


Rosuvastatin Calcium (Crestor -)  5 mg NR HS [facility] of [facility]


   Last Admin: 12/08/19 23:36 Dose:  5 mg


Silver Sulfadiazine (Silvadene -)  1 applic TP DAILY On [facility] of [facility]


   Last Admin: 12/09/19 10:40 Dose:  1 applic











- Objective


Vital Signs: 


 Vital Signs











Temperature  98.8 F   12/09/19 06:59


 


Pulse Rate  72   12/09/19 06:59


 


Respiratory Rate  20   12/09/19 06:59


 


Blood Pressure  146/87   12/09/19 06:59


 


O2 Sat by Pulse Oximetry (%)  100   12/08/19 09:00











Constitutional: Yes: No Distress, Calm


Neck: Yes: Supple


Cardiovascular: Yes: Pulse Irregular


Respiratory: Yes: Regular, Diminished, On Nasal O2


Gastrointestinal: Yes: Normal Bowel Sounds, Soft


Edema: No


Integumentary: Yes: Venous Stasis Changes


Labs: 


 CBC, BMP





 12/09/19 06:45 





 12/09/19 06:45 











- ....Imaging


Chest X-ray: Pending





Problem List





- Problems


(1) Allergy to multiple antibiotics


Code(s): Z88.1 - ALLERGY STATUS TO OTHER ANTIBIOTIC AGENTS STATUS   





(2) Anxiety


Code(s): F41.9 - ANXIETY DISORDER, UNSPECIFIED   





(3) Atrial fibrillation


Code(s): I48.91 - UNSPECIFIED ATRIAL FIBRILLATION   


Qualifiers: 


   Atrial fibrillation type: chronic 





(4) Cellulitis


Code(s): L03.90 - CELLULITIS, UNSPECIFIED   


Qualifiers: 


   Site of cellulitis: extremity   Site of cellulitis of extremity: lower 

extremity 





(5) Chronic anticoagulation


Code(s): Z79.01 - LONG TERM (CURRENT) USE OF ANTICOAGULANTS   





(6) Hypertension


Code(s): I10 - ESSENTIAL (PRIMARY) HYPERTENSION   


Qualifiers: 


   Hypertension type: essential hypertension   Qualified Code(s): I10 - 

Essential (primary) hypertension   





(7) Lymphedema


Code(s): I89.0 - LYMPHEDEMA, NOT ELSEWHERE CLASSIFIED   





(8) Pacemaker


Code(s): Z95.0 - PRESENCE OF CARDIAC PACEMAKER   





Assessment/Plan


ECHO 3/2018 showed normal LV systolic function, mild AS no other sig valvular 

abnl





1. Recurrent LE cellulitis with multiple allergies to PCN and carbipenem 

improving


2. Chronic venous stasis dermatitis


3. Acute kidney injury with hx of hydronephrosis/obstructive uropathy likely pre

-renal improving to baseline


4. Chronic diastolic heart failure


5. HTN cardiomyopathy


6. Afib on Xarelto


7. sss s/p PPM


8. OSAS


9. CAD h/o demand ischemia


10. Hyperlipidemia


11. Anxiety d/o


12. BPH


13. Toxic metabolic encephelopathy with high risk of aspiration








P:1. Empiric abx per C&S, wound care 


2. Diuretics resumed with f/u renal fxn and electrolytes, continue Clinimix and 

enteral feeds, plan for PEG once wife agrees


3. Continue Lopressor 12.5 bid, Xarelto 15 qd,  Crestor 5 qd, losartan 25 qd 

given renal fxn stabilization


4. DVT and GI prophylaxis


5. Wife requests pacemaker interrogation as it's difficult to bring him to 

office, will arrange for this week

## 2019-12-09 NOTE — PN
Progress Note, Physician


Chief Complaint: 





patient sleeping in bed hands in mittens ng tube in place no family at bedside








- Current Medication List


Current Medications: 


Active Medications





Acetaminophen (Tylenol -)  650 mg PO Q6H PRN


   PRN Reason: PAIN LEVEL 6-10


   Last Admin: 11/21/19 21:34 Dose:  650 mg


Clonazepam (Klonopin -)  1 mg NGT BID [facility]


   Last Admin: 12/09/19 10:40 Dose:  1 mg


Doxazosin Mesylate (Cardura -)  1 mg NGT DAILY [facility]


   Last Admin: 12/09/19 10:38 Dose:  1 mg


Furosemide (Lasix Injection -)  20 mg IVPUSH DAILY [facility]


   Last Admin: 12/09/19 10:38 Dose:  20 mg


Gabapentin (Neurontin -)  300 mg NGT TID [facility]


   Last Admin: 12/09/19 06:20 Dose:  300 mg


Heparin Sodium (Porcine) (Heparin -)  5,000 unit SQ BID [facility]


   Last Admin: 12/09/19 10:38 Dose:  5,000 unit


Aztreonam 1 gm/ Dextrose  50 mls @ 100 mls/hr IVPB Q12H [facility]; Protocol


   Last Admin: 12/09/19 05:20 Dose:  100 mls/hr


Fat Emulsion Intravenous (Intralipid -)  250 mls @ 20.833 mls/hr IV DAILY@2200 

[facility]


   Last Admin: 12/08/19 23:37 Dose:  20.833 mls/hr


Potassium Chloride 10 meq/ (Amino Acids)  1,005 mls @ 115 mls/hr IVPB Q8H [facility]


   Last Admin: 12/09/19 08:17 Dose:  Not Given


Losartan Potassium (Cozaar -)  25 mg NGT DAILY [facility]


   Last Admin: 12/09/19 10:39 Dose:  25 mg


Metoprolol Tartrate (Lopressor Injection -)  5 mg IVPB Q4H PRN


   PRN Reason: TACHYCARDIA


Metoprolol Tartrate (Lopressor -)  12.5 mg NGT BID [facility]


   Last Admin: 12/09/19 10:39 Dose:  12.5 mg


Nystatin (Mycostatin Cream -)  1 applic TP BID [facility]


   Last Admin: 12/09/19 10:40 Dose:  1 applic


Olanzapine (Zyprexa -)  2.5 mg NGT HS [facility]


   Last Admin: 12/08/19 23:39 Dose:  2.5 mg


Potassium Chloride (K-Dur -)  40 meq PO DAILY [facility]


   Last Admin: 12/09/19 10:40 Dose:  Not Given


Pramipexole Dihydrochloride (Mirapex -)  0.25 mg NGT TID [facility]


   Last Admin: 12/09/19 06:20 Dose:  0.25 mg


Rivaroxaban (Xarelto)  15 mg PO DAILY@1800 [facility]


   Last Admin: 12/08/19 18:38 Dose:  Not Given


Rosuvastatin Calcium (Crestor -)  5 mg NR HS [facility]


   Last Admin: 12/08/19 23:36 Dose:  5 mg


Silver Sulfadiazine (Silvadene -)  1 applic TP DAILY [facility]


   Last Admin: 12/09/19 10:40 Dose:  1 applic











- Objective


Vital Signs: 


 Vital Signs











Temperature  98.8 F   12/09/19 06:59


 


Pulse Rate  72   12/09/19 06:59


 


Respiratory Rate  19   12/09/19 09:00


 


Blood Pressure  146/87   12/09/19 06:59


 


O2 Sat by Pulse Oximetry (%)  99   12/09/19 09:00











Constitutional: Yes: Calm


Cardiovascular: Yes: Regular Rate and Rhythm, S1, S2


Respiratory: Yes: Diminished


Gastrointestinal: Yes: Normal Bowel Sounds, Soft


Extremities: Yes: Other (chronic venous changes)


Edema: Yes


Labs: 


 CBC, BMP





 12/09/19 06:45 





 12/09/19 06:45 











Problem List





- Problems


(1) Metabolic encephalopathy


Assessment/Plan: 


NPO


patient pulled out ng tube a coule of times on weekend 


currently hands in mits and ng tube in place


family meetign with palliative care to discuss the GOC 


given he is pulling ng tube need to discuss the plan with wife and family


tired paging palliative care a bunch of times today no response


called wife rbien twice on the numbers listen no answer


Code(s): G93.41 - METABOLIC ENCEPHALOPATHY   





(2) Cellulitis


Assessment/Plan: 


ID on board


multiple allergies to PCN and carbipenem


on aztreonam and iv vancomycin


contact isolation for MRSA


Microbiology





11/13/19 19:48   Leg - Right Lower   Gram Stain - Final


11/13/19 19:48   Leg - Right Lower   Wound Culture - Final


                               S Aureus


                              Staphylococcus Coagulase Neg








Code(s): L03.90 - CELLULITIS, UNSPECIFIED   


Qualifiers: 


   Site of cellulitis: extremity   Site of cellulitis of extremity: lower 

extremity 





(3) AGUILA (acute kidney injury)


Assessment/Plan: 


on clinimix


Code(s): N17.9 - ACUTE KIDNEY FAILURE, UNSPECIFIED   





(4) Anxiety


Assessment/Plan: 


neurology note appreciated 





Code(s): F41.9 - ANXIETY DISORDER, UNSPECIFIED   





(5) Atrial fibrillation


Assessment/Plan: 


metoprolol  dose reduced to 12.5mg 


and xarelto


Code(s): I48.91 - UNSPECIFIED ATRIAL FIBRILLATION   


Qualifiers: 


   Atrial fibrillation type: chronic 





(6) Urinary retention due to benign prostatic hyperplasia


Assessment/Plan: 


bladder  sono noted


finastride


Code(s): N40.1 - BENIGN PROSTATIC HYPERPLASIA WITH LOWER URINARY TRACT SYMP; 

R33.8 - OTHER RETENTION OF URINE   





(7) Infiltrate of right lung present on chest x-ray


Assessment/Plan: 


iv abx


cxr noted


Code(s): R91.8 - OTHER NONSPECIFIC ABNORMAL FINDING OF LUNG FIELD

## 2019-12-09 NOTE — PN
Progress Note, Physician


History of Present Illness: 





Pt seen and examined at bedside. He remains lethargic. He has an NG tube and is 

tolerating feeds. 





- Current Medication List


Current Medications: 


Active Medications





Acetaminophen (Tylenol -)  650 mg PO Q6H PRN


   PRN Reason: PAIN LEVEL 6-10


   Last Admin: 11/21/19 21:34 Dose:  650 mg


Clonazepam (Klonopin -)  1 mg NGT BID [facility]


   Last Admin: 12/09/19 10:40 Dose:  1 mg


Doxazosin Mesylate (Cardura -)  1 mg NGT DAILY [facility]


   Last Admin: 12/09/19 10:38 Dose:  1 mg


Furosemide (Lasix Injection -)  20 mg IVPUSH DAILY [facility]


   Last Admin: 12/09/19 10:38 Dose:  20 mg


Gabapentin (Neurontin -)  300 mg NGT TID [facility]


   Last Admin: 12/09/19 06:20 Dose:  300 mg


Heparin Sodium (Porcine) (Heparin -)  5,000 unit SQ BID [facility]


   Last Admin: 12/09/19 10:38 Dose:  5,000 unit


Aztreonam 1 gm/ Dextrose  50 mls @ 100 mls/hr IVPB Q12H [facility]; Protocol


   Last Admin: 12/09/19 05:20 Dose:  100 mls/hr


Fat Emulsion Intravenous (Intralipid -)  250 mls @ 20.833 mls/hr IV DAILY@2200 

[facility]


   Last Admin: 12/08/19 23:37 Dose:  20.833 mls/hr


Potassium Chloride 10 meq/ (Amino Acids)  1,005 mls @ 115 mls/hr IVPB Q8H [facility]


   Last Admin: 12/09/19 12:23 Dose:  115 mls/hr


Losartan Potassium (Cozaar -)  25 mg NGT DAILY [facility]


   Last Admin: 12/09/19 10:39 Dose:  25 mg


Metoprolol Tartrate (Lopressor Injection -)  5 mg IVPB Q4H PRN


   PRN Reason: TACHYCARDIA


Metoprolol Tartrate (Lopressor -)  12.5 mg NGT BID [facility]


   Last Admin: 12/09/19 10:39 Dose:  12.5 mg


Nystatin (Mycostatin Cream -)  1 applic TP BID [facility]


   Last Admin: 12/09/19 10:40 Dose:  1 applic


Olanzapine (Zyprexa -)  2.5 mg NGT HS [facility]


   Last Admin: 12/08/19 23:39 Dose:  2.5 mg


Potassium Chloride (K-Dur -)  40 meq PO DAILY [facility]


   Last Admin: 12/09/19 10:40 Dose:  Not Given


Pramipexole Dihydrochloride (Mirapex -)  0.25 mg NGT TID [facility]


   Last Admin: 12/09/19 06:20 Dose:  0.25 mg


Rivaroxaban (Xarelto)  15 mg PO DAILY@1800 [facility]


   Last Admin: 12/08/19 18:38 Dose:  Not Given


Rosuvastatin Calcium (Crestor -)  5 mg NR HS [facility]


   Last Admin: 12/08/19 23:36 Dose:  5 mg


Silver Sulfadiazine (Silvadene -)  1 applic TP DAILY [facility]


   Last Admin: 12/09/19 10:40 Dose:  1 applic











- Objective


Vital Signs: 


 Vital Signs











Temperature  98.8 F   12/09/19 06:59


 


Pulse Rate  72   12/09/19 06:59


 


Respiratory Rate  19   12/09/19 09:00


 


Blood Pressure  146/87   12/09/19 06:59


 


O2 Sat by Pulse Oximetry (%)  99   12/09/19 09:00











Constitutional: Yes: Calm


Eyes: Yes: Conjunctiva Clear


HENT: Yes: Atraumatic


Cardiovascular: Yes: S1, S2


Respiratory: Yes: On Nasal O2


Gastrointestinal: Yes: Soft, Abdomen, Obese


Genitourinary: Yes: Incontinence


Musculoskeletal: Yes: Muscle Weakness


Edema: Yes


Edema: LLE: 1+, RLE: 1+


Neurological: Yes: Confusion


Labs: 


 CBC, BMP





 12/09/19 06:45 





 12/09/19 06:45 











Problem List





- Problems


(1) AGUILA (acute kidney injury)


Code(s): N17.9 - ACUTE KIDNEY FAILURE, UNSPECIFIED   





(2) CHF (congestive heart failure)


Code(s): I50.9 - HEART FAILURE, UNSPECIFIED   


Qualifiers: 


   Heart failure type: diastolic   Heart failure chronicity: chronic   

Qualified Code(s): I50.32 - Chronic diastolic (congestive) heart failure   





(3) Cellulitis


Code(s): L03.90 - CELLULITIS, UNSPECIFIED   


Qualifiers: 


   Site of cellulitis: extremity   Site of cellulitis of extremity: lower 

extremity 





Assessment/Plan


 Current Medications











Generic Name Dose Route Start Last Admin





  Trade Name Freq  PRN Reason Stop Dose Admin


 


Acetaminophen  650 mg  11/14/19 04:07  11/21/19 21:34





  Tylenol -  PO   650 mg





  Q6H PRN   Administration





  PAIN LEVEL 6-10   





     





     





     


 


Clonazepam  1 mg  12/07/19 22:00  12/09/19 10:40





  Klonopin -  NGT   1 mg





  BID [facility]   Administration





     





     





     





     


 


Doxazosin Mesylate  1 mg  12/08/19 10:00  12/09/19 10:38





  Cardura -  NGT   1 mg





  DAILY [facility]   Administration





     





     





     





     


 


Furosemide  20 mg  12/09/19 10:00  12/09/19 10:38





  Lasix Injection -  IVPUSH   20 mg





  DAILY [facility]   Administration





     





     





     





     


 


Gabapentin  300 mg  12/07/19 14:00  12/09/19 06:20





  Neurontin -  NGT   300 mg





  TID [facility]   Administration





     





     





     





     


 


Heparin Sodium (Porcine)  5,000 unit  11/29/19 22:00  12/09/19 10:38





  Heparin -  SQ   5,000 unit





  BID [facility]   Administration





     





     





     





     


 


Aztreonam 1 gm/ Dextrose  50 mls @ 100 mls/hr  11/26/19 17:00  12/09/19 05:20





  IVPB   100 mls/hr





  Q12H [facility]   Administration





     





     





  Protocol   





     


 


Fat Emulsion Intravenous  250 mls @ 20.833 mls/hr  12/03/19 22:00  12/08/19 23:

37





  Intralipid -  IV   20.833 mls/hr





  DAILY@2200 [facility]   Administration





     





     





     





     


 


Potassium Chloride 10 meq/  1,005 mls @ 115 mls/hr  12/06/19 15:46  12/09/19 12:

23





  Amino Acids  IVPB   115 mls/hr





  Q8H [facility]   Administration





     





     





     





     


 


Losartan Potassium  25 mg  12/07/19 11:30  12/09/19 10:39





  Cozaar -  NGT   25 mg





  DAILY [facility]EL[facility]IN   Administration





     





     





     





     


 


Metoprolol Tartrate  5 mg  11/29/19 13:36  





  Lopressor Injection -  IVPB   





  Q4H PRN   





  TACHYCARDIA   





     





     





     


 


Metoprolol Tartrate  12.5 mg  12/07/19 22:00  12/09/19 10:39





  Lopressor -  NGT   12.5 mg





  BID [facility]   Administration





     





     





     





     


 


Nystatin  1 applic  12/02/19 22:00  12/09/19 10:40





  Mycostatin Cream -  TP   1 applic





  BID [facility]EL[facility]   Administration





     





     





     





     


 


Olanzapine  2.5 mg  12/07/19 22:00  12/08/19 23:39





  Zyprexa -  NGT   2.5 mg





  HS [facility]   Administration





     





     





     





     


 


Potassium Chloride  40 meq  11/16/19 17:49  12/09/19 10:40





  K-Dur -  PO   Not Given





  DAILY KELVIN   





     





     





     





     


 


Pramipexole Dihydrochloride  0.25 mg  12/07/19 14:00  12/09/19 06:20





  Mirapex -  NGT   0.25 mg





  TID [facility]EL[facility]IN   Administration





     





     





     





     


 


Rivaroxaban  15 mg  11/14/19 18:00  12/08/19 18:38





  Xarelto  PO   Not Given





  DAILY@1800 KELVIN   





     





     





     





     


 


Rosuvastatin Calcium  5 mg  12/07/19 22:00  12/08/19 23:36





  Crestor -  NR   5 mg





  HS KELVIN   Administration





     





     





     





     


 


Silver Sulfadiazine  1 applic  12/07/19 11:30  12/09/19 10:40





  Silvadene -  TP   1 applic





  DAILY KELVIN   Administration





     





     





     





     














Impression


1. CKD


2. hx of hydronephrosis


3. hx of obstructive uropathy


4. CHF


5. HTN


6. AGUILA


7. a-fib


8. hypernatreamia





Plan


- pt started on feeds


- can stop clinimix for now


- stop lipids


- monitor residuals


- will give one dose of lasix


- repeat labs in am

## 2019-12-10 NOTE — PN
Progress Note, Physician


Chief Complaint: 





Events noted


Generalized weakness


Lethargic


History of Present Illness: 





Patient was seen and examined.  Chart was reviewed


Needs suctioning





- Current Medication List


Current Medications: 


Active Medications





Acetaminophen (Tylenol -)  650 mg PO Q6H PRN


   PRN Reason: PAIN LEVEL 6-10


   Last Admin: 11/21/19 21:34 Dose:  650 mg


Clonazepam (Klonopin -)  1 mg NGT BID [facility]


   Last Admin: 12/10/19 12:10 Dose:  Not Given


Doxazosin Mesylate (Cardura -)  1 mg NGT DAILY [facility]


   Last Admin: 12/10/19 12:07 Dose:  Not Given


Furosemide (Lasix Injection -)  20 mg IVPUSH DAILY [facility]


   Last Admin: 12/10/19 12:12 Dose:  Not Given


Gabapentin (Neurontin -)  300 mg NGT TID [facility]


   Last Admin: 12/10/19 06:30 Dose:  300 mg


Heparin Sodium (Porcine) (Heparin -)  5,000 unit SQ BID [facility]


   Last Admin: 12/10/19 12:09 Dose:  Not Given


Aztreonam 1 gm/ Dextrose  50 mls @ 100 mls/hr IVPB Q12H [facility]; Protocol


   Last Admin: 12/10/19 06:30 Dose:  100 mls/hr


Losartan Potassium (Cozaar -)  25 mg NGT DAILY [facility]


   Last Admin: 12/10/19 12:08 Dose:  Not Given


Metoprolol Tartrate (Lopressor Injection -)  5 mg IVPB Q4H PRN


   PRN Reason: TACHYCARDIA


Metoprolol Tartrate (Lopressor -)  12.5 mg NGT BID [facility]


   Last Admin: 12/10/19 12:11 Dose:  Not Given


Nystatin (Mycostatin Cream -)  1 applic TP BID [facility]


   Last Admin: 12/10/19 12:12 Dose:  1 applic


Olanzapine (Zyprexa -)  2.5 mg NGT HS [facility]


   Last Admin: 12/09/19 21:50 Dose:  2.5 mg


Potassium Chloride (K-Dur -)  40 meq PO DAILY [facility]


   Last Admin: 12/10/19 12:09 Dose:  Not Given


Pramipexole Dihydrochloride (Mirapex -)  0.25 mg NGT TID [facility]


   Last Admin: 12/10/19 06:30 Dose:  0.25 mg


Rivaroxaban (Xarelto)  15 mg PO DAILY@1800 [facility]


   Last Admin: 12/09/19 18:53 Dose:  15 mg


Rosuvastatin Calcium (Crestor -)  5 mg NR HS [facility]


   Last Admin: 12/09/19 21:49 Dose:  5 mg


Silver Sulfadiazine (Silvadene -)  1 applic TP DAILY [facility]


   Last Admin: 12/10/19 12:12 Dose:  1 applic











- Objective


Vital Signs: 


 Vital Signs











Temperature  98.0 F   12/10/19 09:01


 


Pulse Rate  70   12/10/19 11:12


 


Respiratory Rate  36 H  12/10/19 11:12


 


Blood Pressure  112/40 L  12/10/19 11:12


 


O2 Sat by Pulse Oximetry (%)  98   12/09/19 21:00











Cardiovascular: Yes: Regular Rate and Rhythm, S1, S2


Respiratory: Yes: Diminished, Rhonchi


Gastrointestinal: Yes: Normal Bowel Sounds, Soft, Abdomen, Obese.  No: 

Tenderness


Edema: Yes


Labs: 


 CBC, BMP





 12/09/19 06:45 





 12/09/19 06:45 











Problem List





- Problems


(1) Metabolic encephalopathy


Code(s): G93.41 - METABOLIC ENCEPHALOPATHY   





(2) Renal dysfunction


Code(s): N28.9 - DISORDER OF KIDNEY AND URETER, UNSPECIFIED   





(3) Anxiety


Code(s): F41.9 - ANXIETY DISORDER, UNSPECIFIED   





(4) Atrial fibrillation


Code(s): I48.91 - UNSPECIFIED ATRIAL FIBRILLATION   


Qualifiers: 


   Atrial fibrillation type: chronic 





(5) CHF (congestive heart failure)


Code(s): I50.9 - HEART FAILURE, UNSPECIFIED   


Qualifiers: 


   Heart failure type: diastolic   Heart failure chronicity: chronic   

Qualified Code(s): I50.32 - Chronic diastolic (congestive) heart failure   





(6) Cellulitis


Code(s): L03.90 - CELLULITIS, UNSPECIFIED   


Qualifiers: 


   Site of cellulitis: extremity   Site of cellulitis of extremity: lower 

extremity 





(7) Demand ischemia


Code(s): I24.8 - OTHER FORMS OF ACUTE ISCHEMIC HEART DISEASE   





(8) Hypertension


Code(s): I10 - ESSENTIAL (PRIMARY) HYPERTENSION   


Qualifiers: 


   Hypertension type: essential hypertension   Qualified Code(s): I10 - 

Essential (primary) hypertension   





(9) Lymphedema


Code(s): I89.0 - LYMPHEDEMA, NOT ELSEWHERE CLASSIFIED   





(10) Pacemaker


Code(s): Z95.0 - PRESENCE OF CARDIAC PACEMAKER   





Assessment/Plan





1. Recurrent LE cellulitis with multiple allergies to PCN and carbipenem


2. Chronic venous stasis dermatitis


3. Acute on CKD with hx of hydronephrosis/obstructive uropathy likely pre-renal 


4. Chronic diastolic heart failure


5. HTN


6. AF on DOAC/Xarelto


7. Sick sinus syndrome s/p PPM


8. OSAS


9. CAD h/o demand ischemia


10. Hyperlipidemia


11. Anxiety


12. BPH


13. Toxic metabolic encephelopathy with high risk of aspiration





PLAN:


1. Empiric antibiotic and wound care 


2. IV diuretics with monitoring renal function and electrolytes


3. Continue Toprol XL 12.5 mg QD, Losartan 25 mg QD, Xarelto 15 mg QD and 

Crestor 5 mg QD. 


3. DVT and GI prophylaxis


4. Supportive care. Suction oral cavity





DNR/DNI


Panfilo Foote MD

## 2019-12-10 NOTE — PN
Progress Note, Physician


History of Present Illness: 





Pt seen and examined at bedside. Family have decided for comfort measures. 





- Current Medication List


Current Medications: 


Active Medications





Acetaminophen (Tylenol -)  650 mg PO Q6H PRN


   PRN Reason: PAIN LEVEL 6-10


   Last Admin: 11/21/19 21:34 Dose:  650 mg


Clonazepam (Klonopin -)  1 mg NGT BID [facility]


   Last Admin: 12/10/19 12:10 Dose:  Not Given


Doxazosin Mesylate (Cardura -)  1 mg NGT DAILY [facility]


   Last Admin: 12/10/19 12:07 Dose:  Not Given


Furosemide (Lasix Injection -)  20 mg IVPUSH DAILY [facility]


   Last Admin: 12/10/19 12:12 Dose:  Not Given


Gabapentin (Neurontin -)  300 mg NGT TID [facility]


   Last Admin: 12/10/19 13:39 Dose:  Not Given


Heparin Sodium (Porcine) (Heparin -)  5,000 unit SQ BID [facility]


   Last Admin: 12/10/19 12:09 Dose:  Not Given


Aztreonam 1 gm/ Dextrose  50 mls @ 100 mls/hr IVPB Q12H [facility]; Protocol


   Last Admin: 12/10/19 06:30 Dose:  100 mls/hr


Losartan Potassium (Cozaar -)  25 mg NGT DAILY [facility]


   Last Admin: 12/10/19 12:08 Dose:  Not Given


Metoprolol Tartrate (Lopressor Injection -)  5 mg IVPB Q4H PRN


   PRN Reason: TACHYCARDIA


Metoprolol Tartrate (Lopressor -)  12.5 mg NGT BID [facility]


   Last Admin: 12/10/19 12:11 Dose:  Not Given


Morphine Sulfate (Morphine Sulfate)  2 mg IVPUSH Q4H PRN


   PRN Reason: comfort


Nystatin (Mycostatin Cream -)  1 applic TP BID [facility]


   Last Admin: 12/10/19 12:12 Dose:  1 applic


Olanzapine (Zyprexa -)  2.5 mg NGT HS [facility]


   Last Admin: 12/09/19 21:50 Dose:  2.5 mg


Potassium Chloride (K-Dur -)  40 meq PO DAILY [facility]


   Last Admin: 12/10/19 12:09 Dose:  Not Given


Pramipexole Dihydrochloride (Mirapex -)  0.25 mg NGT TID [facility]


   Last Admin: 12/10/19 13:39 Dose:  Not Given


Rivaroxaban (Xarelto)  15 mg PO DAILY@1800 [facility]


   Last Admin: 12/09/19 18:53 Dose:  15 mg


Rosuvastatin Calcium (Crestor -)  5 mg NR HS [facility]


   Last Admin: 12/09/19 21:49 Dose:  5 mg


Silver Sulfadiazine (Silvadene -)  1 applic TP DAILY [facility]


   Last Admin: 12/10/19 12:12 Dose:  1 applic











- Objective


Vital Signs: 


 Vital Signs











Temperature  98.0 F   12/10/19 09:01


 


Pulse Rate  70   12/10/19 11:12


 


Respiratory Rate  36 H  12/10/19 11:12


 


Blood Pressure  112/40 L  12/10/19 11:12


 


O2 Sat by Pulse Oximetry (%)  98   12/09/19 21:00











Constitutional: Yes: Calm


Eyes: Yes: Conjunctiva Clear


HENT: Yes: Atraumatic


Neck: Yes: Supple


Cardiovascular: Yes: S1, S2


Respiratory: Yes: On Nasal O2


Gastrointestinal: Yes: Soft


Genitourinary: Yes: Incontinence


Musculoskeletal: Yes: Muscle Weakness


Edema: Yes


Edema: LLE: 1+, RLE: 1+


Neurological: Yes: Lethargy


Labs: 


 CBC, BMP





 12/09/19 06:45 





 12/09/19 06:45 











Problem List





- Problems


(1) AGUILA (acute kidney injury)


Code(s): N17.9 - ACUTE KIDNEY FAILURE, UNSPECIFIED   





(2) CHF (congestive heart failure)


Code(s): I50.9 - HEART FAILURE, UNSPECIFIED   


Qualifiers: 


   Heart failure type: diastolic   Heart failure chronicity: chronic   

Qualified Code(s): I50.32 - Chronic diastolic (congestive) heart failure   





(3) Cellulitis


Code(s): L03.90 - CELLULITIS, UNSPECIFIED   


Qualifiers: 


   Site of cellulitis: extremity   Site of cellulitis of extremity: lower 

extremity 





Assessment/Plan


 Current Medications











Generic Name Dose Route Start Last Admin





  Trade Name Freq  PRN Reason Stop Dose Admin


 


Acetaminophen  650 mg  11/14/19 04:07  11/21/19 21:34





  Tylenol -  PO   650 mg





  Q6H PRN   Administration





  PAIN LEVEL 6-10   





     





     





     


 


Clonazepam  1 mg  12/07/19 22:00  12/10/19 12:10





  Klonopin -  NGT   Not Given





  BID [facility]   





     





     





     





     


 


Doxazosin Mesylate  1 mg  12/08/19 10:00  12/10/19 12:07





  Cardura -  NGT   Not Given





  DAILY [facility]   





     





     





     





     


 


Furosemide  20 mg  12/09/19 10:00  12/10/19 12:12





  Lasix Injection -  IVPUSH   Not Given





  DAILY [facility]   





     





     





     





     


 


Gabapentin  300 mg  12/07/19 14:00  12/10/19 13:39





  Neurontin -  NGT   Not Given





  TID [facility]   





     





     





     





     


 


Heparin Sodium (Porcine)  5,000 unit  11/29/19 22:00  12/10/19 12:09





  Heparin -  SQ   Not Given





  BID [facility]   





     





     





     





     


 


Aztreonam 1 gm/ Dextrose  50 mls @ 100 mls/hr  11/26/19 17:00  12/10/19 06:30





  IVPB   100 mls/hr





  Q12H [facility]   Administration





     





     





  Protocol   





     


 


Losartan Potassium  25 mg  12/07/19 11:30  12/10/19 12:08





  Cozaar -  NGT   Not Given





  DAILY [facility]   





     





     





     





     


 


Metoprolol Tartrate  5 mg  11/29/19 13:36  





  Lopressor Injection -  IVPB   





  Q4H PRN   





  TACHYCARDIA   





     





     





     


 


Metoprolol Tartrate  12.5 mg  12/07/19 22:00  12/10/19 12:11





  Lopressor -  NGT   Not Given





  BID [facility]   





     





     





     





     


 


Morphine Sulfate  2 mg  12/10/19 14:00  





  Morphine Sulfate  IVPUSH   





  Q4H PRN   





  comfort   





     





     





     


 


Nystatin  1 applic  12/02/19 22:00  12/10/19 12:12





  Mycostatin Cream -  TP   1 applic





  BID [facility]   Administration





     





     





     





     


 


Olanzapine  2.5 mg  12/07/19 22:00  12/09/19 21:50





  Zyprexa -  NGT   2.5 mg





  HS [facility]   Administration





     





     





     





     


 


Potassium Chloride  40 meq  11/16/19 17:49  12/10/19 12:09





  K-Dur -  PO   Not Given





  DAILY [facility]   





     





     





     





     


 


Pramipexole Dihydrochloride  0.25 mg  12/07/19 14:00  12/10/19 13:39





  Mirapex -  NGT   Not Given





  TID [facility]   





     





     





     





     


 


Rivaroxaban  15 mg  11/14/19 18:00  12/09/19 18:53





  Xarelto  PO   15 mg





  DAILY@1800 [facility]   Administration





     





     





     





     


 


Rosuvastatin Calcium  5 mg  12/07/19 22:00  12/09/19 21:49





  Crestor -  NR   5 mg





  HS [facility]   Administration





     





     





     





     


 


Silver Sulfadiazine  1 applic  12/07/19 11:30  12/10/19 12:12





  Silvadene -  TP   1 applic





  DAILY [facility]   Administration





     





     





     





     














Impression


1. CKD


2. hx of hydronephrosis


3. hx of obstructive uropathy


4. CHF


5. HTN


6. AGUILA


7. a-fib


8. hypernatreamia





Plan


- pt on comfort measures


- palliative care eval in progress


- rest per primary team


- discussed with partner at length

## 2019-12-10 NOTE — PN
Progress Note, SLP





- Note


Progress Note: 


 Selected Entries











  12/09/19 12/09/19 12/09/19





  06:59 10:33 21:54


 


Supper   NPO


 


Temperature 98.8 F 98.1 F 98 F


 


Blood Pressure   














  12/10/19 12/10/19





  06:32 09:01


 


Supper  


 


Temperature 98.5 F 98.0 F


 


Blood Pressure 112/53 L 127/57 L








 Laboratory Tests











  12/07/19 12/09/19





  07:35 06:45


 


WBC  12.0 H  12.4 H











Case reviewed with Dr. Palacios on 12/9.


 Pending team meeting regarding pt's wishes. NGT in place, receiving meds and 

nutrition. Pt lethargic, sounds congested.


PO reassessment/trials deferred at this time.





f/u by Palliative care

## 2019-12-10 NOTE — PN
Progress Note, Physician





- Current Medication List


Current Medications: 


Active Medications





Acetaminophen (Tylenol -)  650 mg PO Q6H PRN


   PRN Reason: PAIN LEVEL 6-10


   Last Admin: 11/21/19 21:34 Dose:  650 mg


Clonazepam (Klonopin -)  1 mg NGT BID [facility]


   Last Admin: 12/09/19 21:49 Dose:  1 mg


Doxazosin Mesylate (Cardura -)  1 mg NGT DAILY [facility]


   Last Admin: 12/09/19 10:38 Dose:  1 mg


Furosemide (Lasix Injection -)  20 mg IVPUSH DAILY [facility]


   Last Admin: 12/09/19 10:38 Dose:  20 mg


Gabapentin (Neurontin -)  300 mg NGT TID [facility]


   Last Admin: 12/10/19 06:30 Dose:  300 mg


Heparin Sodium (Porcine) (Heparin -)  5,000 unit SQ BID [facility]


   Last Admin: 12/09/19 21:49 Dose:  5,000 unit


Aztreonam 1 gm/ Dextrose  50 mls @ 100 mls/hr IVPB Q12H [facility]; Protocol


   Last Admin: 12/10/19 06:30 Dose:  100 mls/hr


Losartan Potassium (Cozaar -)  25 mg NGT DAILY [facility]


   Last Admin: 12/09/19 10:39 Dose:  25 mg


Metoprolol Tartrate (Lopressor Injection -)  5 mg IVPB Q4H PRN


   PRN Reason: TACHYCARDIA


Metoprolol Tartrate (Lopressor -)  12.5 mg NGT BID [facility]


   Last Admin: 12/09/19 21:49 Dose:  12.5 mg


Nystatin (Mycostatin Cream -)  1 applic TP BID [facility]


   Last Admin: 12/09/19 21:49 Dose:  1 applic


Olanzapine (Zyprexa -)  2.5 mg NGT HS [facility]


   Last Admin: 12/09/19 21:50 Dose:  2.5 mg


Potassium Chloride (K-Dur -)  40 meq PO DAILY [facility]


   Last Admin: 12/09/19 10:40 Dose:  Not Given


Pramipexole Dihydrochloride (Mirapex -)  0.25 mg NGT TID [facility]


   Last Admin: 12/10/19 06:30 Dose:  0.25 mg


Rivaroxaban (Xarelto)  15 mg PO DAILY@1800 [facility]


   Last Admin: 12/09/19 18:53 Dose:  15 mg


Rosuvastatin Calcium (Crestor -)  5 mg NR HS [facility]


   Last Admin: 12/09/19 21:49 Dose:  5 mg


Silver Sulfadiazine (Silvadene -)  1 applic TP DAILY [facility]


   Last Admin: 12/09/19 10:40 Dose:  1 applic











- Objective


Vital Signs: 


 Vital Signs











Temperature  98.0 F   12/10/19 09:01


 


Pulse Rate  71   12/10/19 09:01


 


Respiratory Rate  22 H  12/10/19 09:01


 


Blood Pressure  127/57 L  12/10/19 09:01


 


O2 Sat by Pulse Oximetry (%)  98   12/09/19 21:00











Cardiovascular: Yes: S1, S2


Respiratory: Yes: Diminished, Rhonchi


Gastrointestinal: Yes: Normal Bowel Sounds, Soft


Labs: 


 CBC, BMP





 12/09/19 06:45 





 12/09/19 06:45 











Assessment/Plan





- Problems


(1) Metabolic encephalopathy


Assessment/Plan: 


NPO


patient pulled out ng tube a couple of times on weekend 


currently hands in mits and ng tube in place


family meeting with palliative care to discuss the GOC 


given he is pulling ng tube need to discuss the plan with wife and family


Too cotact family


Code(s): G93.41 - METABOLIC ENCEPHALOPATHY   





(2) Cellulitis


Assessment/Plan: 


ID on board


multiple allergies to PCN and carbipenem


on aztreonam and iv vancomycin


contact isolation for MRSA


Microbiology





11/13/19 19:48   Leg - Right Lower   Gram Stain - Final


11/13/19 19:48   Leg - Right Lower   Wound Culture - Final


                              Mr S Aureus


                              Staphylococcus Coagulase Neg








Code(s): L03.90 - CELLULITIS, UNSPECIFIED   


Qualifiers: 


   Site of cellulitis: extremity   Site of cellulitis of extremity: lower 

extremity 





(3) AGUILA (acute kidney injury)


Assessment/Plan: 


on clinimix


Code(s): N17.9 - ACUTE KIDNEY FAILURE, UNSPECIFIED   





(4) Anxiety


Assessment/Plan: 


neurology note appreciated 





Code(s): F41.9 - ANXIETY DISORDER, UNSPECIFIED   





(5) Atrial fibrillation


Assessment/Plan: 


metoprolol  dose reduced to 12.5mg 


and xarelto


Code(s): I48.91 - UNSPECIFIED ATRIAL FIBRILLATION   


Qualifiers: 


   Atrial fibrillation type: chronic 





(6) Urinary retention due to benign prostatic hyperplasia


Assessment/Plan: 


bladder  sono noted


finastride


Code(s): N40.1 - BENIGN PROSTATIC HYPERPLASIA WITH LOWER URINARY TRACT SYMP; 

R33.8 - OTHER RETENTION OF URINE   





(7) Infiltrate of right lung present on chest x-ray


Assessment/Plan: 


iv abx


cxr noted


Code(s): R91.8 - OTHER NONSPECIFIC ABNORMAL FINDING OF LUNG FIELD

## 2019-12-11 NOTE — PN
Progress Note, Physician


History of Present Illness: 





Pt seen and examined. He appears comfortable. He is on comfort care. 





- Current Medication List


Current Medications: 


Active Medications





Fentanyl (Duragesic 25mcg Patch -)  1 patch TD Q72H [facility]


   Stop: 12/18/19 10:45


   Last Admin: 12/11/19 11:10 Dose:  1 patch


Morphine Sulfate (Morphine 100mg/100ml-0.9% Nacl)  100 mg in 100 mls @ 1 mls/hr 

IVPB TITR [facility]; Protocol


   Last Admin: 12/11/19 13:42 Dose:  1 mg/hr, 1 mls/hr


Miscellaneous (Duragesic Patch Waste)  1 each TD PRN PRN


   PRN Reason: PAIN


Scopolamine HBr (Transderm-Scop -)  1 patch TD Q72H [facility]


   Last Admin: 12/11/19 11:12 Dose:  1 patch











- Objective


Vital Signs: 


 Vital Signs











Temperature  100.5 F H  12/11/19 05:45


 


Pulse Rate  71   12/11/19 05:45


 


Respiratory Rate  20   12/11/19 05:45


 


Blood Pressure  130/67   12/11/19 05:45


 


O2 Sat by Pulse Oximetry (%)  96   12/11/19 09:00











Constitutional: Yes: Calm


Eyes: Yes: Conjunctiva Clear


Cardiovascular: Yes: S1, S2


Respiratory: Yes: On Nasal O2


Gastrointestinal: Yes: Soft, Abdomen, Obese


Genitourinary: Yes: Incontinence


Edema: Yes


Labs: 


 CBC, BMP





 12/09/19 06:45 





 12/09/19 06:45 











Problem List





- Problems


(1) AGUILA (acute kidney injury)


Code(s): N17.9 - ACUTE KIDNEY FAILURE, UNSPECIFIED   





(2) CHF (congestive heart failure)


Code(s): I50.9 - HEART FAILURE, UNSPECIFIED   


Qualifiers: 


   Heart failure type: diastolic   Heart failure chronicity: chronic   

Qualified Code(s): I50.32 - Chronic diastolic (congestive) heart failure   





(3) Cellulitis


Code(s): L03.90 - CELLULITIS, UNSPECIFIED   


Qualifiers: 


   Site of cellulitis: extremity   Site of cellulitis of extremity: lower 

extremity 





Assessment/Plan


 Current Medications











Generic Name Dose Route Start Last Admin





  Trade Name Freq  PRN Reason Stop Dose Admin


 


Fentanyl  1 patch  12/11/19 10:45  12/11/19 11:10





  Duragesic 25mcg Patch -  TD  12/18/19 10:45  1 patch





  Q72H [facility]   Administration





     





     





     





     


 


Morphine Sulfate  100 mg in 100 mls @ 1 mls/hr  12/11/19 10:45  12/11/19 13:42





  Morphine 100mg/100ml-0.9% Nacl  IVPB   1 mg/hr





  TITR [facility]   1 mls/hr





     Administration





     





  Protocol   





  1 MG/HR   


 


Miscellaneous  1 each  12/11/19 10:45  





  Duragesic Patch Waste  TD   





  PRN PRN   





  PAIN   





     





     





     


 


Scopolamine HBr  1 patch  12/11/19 10:45  12/11/19 11:12





  Transderm-Scop -  TD   1 patch





  Q72H [facility]   Administration





     





     





     





     

















Impression


1. CKD


2. hx of hydronephrosis


3. hx of obstructive uropathy


4. CHF


5. HTN


6. AGUILA


7. a-fib


8. hypernatreamia





Plan


- pt is comfort care


- cont care per primary


- will follow as needed

## 2019-12-11 NOTE — PN
Progress Note, Physician


History of Present Illness: 





Chronic aspiration placed on inpatient hospice.





- Current Medication List


Current Medications: 


Active Medications





Fentanyl (Duragesic 25mcg Patch -)  1 patch TD Q72H [facility]


   Stop: 12/18/19 10:45


   Last Admin: 12/11/19 11:10 Dose:  1 patch


Morphine Sulfate (Morphine 100mg/100ml-0.9% Nacl)  100 mg in 100 mls @ 1 mls/hr 

IVPB TITR [facility]; Protocol


   Last Admin: 12/11/19 13:42 Dose:  1 mg/hr, 1 mls/hr


Miscellaneous (Duragesic Patch Waste)  1 each TD PRN PRN


   PRN Reason: PAIN


Scopolamine HBr (Transderm-Scop -)  1 patch TD Q72H [facility]


   Last Admin: 12/11/19 11:12 Dose:  1 patch











- Objective


Vital Signs: 


 Vital Signs











Temperature  100.5 F H  12/11/19 05:45


 


Pulse Rate  71   12/11/19 05:45


 


Respiratory Rate  20   12/11/19 05:45


 


Blood Pressure  130/67   12/11/19 05:45


 


O2 Sat by Pulse Oximetry (%)  96   12/11/19 09:00











Constitutional: Yes: No Distress, Calm


Neck: Yes: Supple


Cardiovascular: Yes: Pulse Irregular


Respiratory: Yes: Regular, Diminished, Rhonchi


Gastrointestinal: Yes: Soft, Hypoactive Bowel Sounds


Edema: No


Integumentary: Yes: Venous Stasis Changes


Labs: 


 CBC, BMP





 12/09/19 06:45 





 12/09/19 06:45 











Problem List





- Problems


(1) Allergy to multiple antibiotics


Code(s): Z88.1 - ALLERGY STATUS TO OTHER ANTIBIOTIC AGENTS STATUS   





(2) Anxiety


Code(s): F41.9 - ANXIETY DISORDER, UNSPECIFIED   





(3) Atrial fibrillation


Code(s): I48.91 - UNSPECIFIED ATRIAL FIBRILLATION   


Qualifiers: 


   Atrial fibrillation type: chronic 





(4) Cellulitis


Code(s): L03.90 - CELLULITIS, UNSPECIFIED   


Qualifiers: 


   Site of cellulitis: extremity   Site of cellulitis of extremity: lower 

extremity 





(5) Chronic anticoagulation


Code(s): Z79.01 - LONG TERM (CURRENT) USE OF ANTICOAGULANTS   





(6) Hypertension


Code(s): I10 - ESSENTIAL (PRIMARY) HYPERTENSION   


Qualifiers: 


   Hypertension type: essential hypertension   Qualified Code(s): I10 - 

Essential (primary) hypertension   





(7) Lymphedema


Code(s): I89.0 - LYMPHEDEMA, NOT ELSEWHERE CLASSIFIED   





(8) Pacemaker


Code(s): Z95.0 - PRESENCE OF CARDIAC PACEMAKER   





Assessment/Plan


ECHO 3/2018 showed normal LV systolic function, mild AS no other sig valvular 

abnl





1. Recurrent LE cellulitis with multiple allergies to PCN and carbipenem


2. Chronic venous stasis dermatitis


3. Acute on CKD with hx of hydronephrosis/obstructive uropathy likely pre-renal 


4. Chronic diastolic heart failure


5. HTN


6. AF on DOAC/Xarelto


7. Sick sinus syndrome s/p PPM


8. OSAS


9. CAD h/o demand ischemia


10. Hyperlipidemia


11. Anxiety


12. BPH


13. Toxic metabolic encephelopathy with high risk of aspiration





PLAN:


1. Patient is comfort/hospice care


2. Will sign off, call with questions

## 2019-12-11 NOTE — PN
Progress Note (short form)





- Note


Progress Note: 





discussed with lonnie the patient's wife and she has agreed to in hospital 

hospice care. I also called his Prabhu who is in [facility] and left 

him a message to call me to discuss.


In Hospital Hospice with pain meds/comfort meds


no heroic procedures or medications


duragesic patch 25mcg


morphine drip


ivf


suction oral passage way prn


02 support


scopalamine patch





Problem List





- Problems


(1) Dysphagia


Code(s): R13.10 - DYSPHAGIA, UNSPECIFIED   





(2) Infiltrate of right lung present on chest x-ray


Code(s): R91.8 - OTHER NONSPECIFIC ABNORMAL FINDING OF LUNG FIELD   





(3) Metabolic encephalopathy


Code(s): G93.41 - METABOLIC ENCEPHALOPATHY   





(4) Obesity


Code(s): E66.9 - OBESITY, UNSPECIFIED   





(5) Poor appetite


Code(s): R63.0 - ANOREXIA   





(6) Renal dysfunction


Code(s): N28.9 - DISORDER OF KIDNEY AND URETER, UNSPECIFIED   





(7) Allergy to multiple antibiotics


Code(s): Z88.1 - ALLERGY STATUS TO OTHER ANTIBIOTIC AGENTS STATUS   





(8) Anxiety


Code(s): F41.9 - ANXIETY DISORDER, UNSPECIFIED   





(9) Atrial fibrillation


Code(s): I48.91 - UNSPECIFIED ATRIAL FIBRILLATION   


Qualifiers: 


   Atrial fibrillation type: chronic 





(10) Cellulitis


Code(s): L03.90 - CELLULITIS, UNSPECIFIED   


Qualifiers: 


   Site of cellulitis: extremity   Site of cellulitis of extremity: lower 

extremity 





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


Code(s): Z22.39 - CARRIER OF OTHER SPECIFIED BACTERIAL DISEASES   





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


Code(s): Z87.09 - PERSONAL HISTORY OF OTHER DISEASES OF THE RESPIRATORY SYSTEM 

  





(13) Lumbar stenosis


Code(s): M48.061 - SPINAL STENOSIS, LUMBAR REGION WITHOUT NEUROGENIC LALA   





(14) PVD (peripheral vascular disease)


Code(s): I73.9 - PERIPHERAL VASCULAR DISEASE, UNSPECIFIED   





(15) Pressure ulcer


Code(s): L89.90 - PRESSURE ULCER OF UNSPECIFIED SITE, UNSPECIFIED STAGE   





(16) Lymphedema


Code(s): I89.0 - LYMPHEDEMA, NOT ELSEWHERE CLASSIFIED   





(17) Morbid obesity


Code(s): E66.01 - MORBID (SEVERE) OBESITY DUE TO EXCESS CALORIES   





(18) Osteoarthritis


Code(s): M19.90 - UNSPECIFIED OSTEOARTHRITIS, UNSPECIFIED SITE   





(19) Pacemaker


Code(s): Z95.0 - PRESENCE OF CARDIAC PACEMAKER

## 2019-12-11 NOTE — PN
Progress Note (short form)





- Note


Progress Note: 





Brief GI note





Pts family/NOK have decided to pursue hospice care. No plan for PEG or other 

invasive procedures. 


Please recall GI if further questions.

## 2019-12-12 NOTE — DS
Physical Examination


Vital Signs: 


 Vital Signs











Temperature  100.5 F H  19 05:45


 


Pulse Rate  71   19 05:45


 


Respiratory Rate  20   19 05:45


 


Blood Pressure  130/67   19 05:45


 


O2 Sat by Pulse Oximetry (%)  96   19 09:00











Findings/Remarks: 





Called by RN for patient being found unresponsive this morning.  On assessment 

patient noted to have no palpable carotid pulse, no cardiac rhythm auscultated, 

no air entry.  Patient  and pronounced at 08:42am.  


Labs: 


 CBC, BMP





 19 06:45 





 19 06:45 











Discharge Summary


Problems reviewed: Yes


Reason For Visit: ACUTE KIDNEY INJURY/UTI/CELLULITIS/ALTERED MENTAL


Current Active Problems





Dysphagia (Acute)


Infiltrate of right lung present on chest x-ray (Acute)


Metabolic encephalopathy (Acute)


Obesity (Acute)


Poor appetite (Acute)


Poor appetite (Acute)


Renal dysfunction (Acute)








Hospital Course: 


This is a 85 y/o man with a PMHx of CAD, CHF, Afib (on Xarelto), recurrent 

Cellulitis. Who presents to the ED with his wife for increased erythema and 

edmea to bilateral lower extremities. Per ED record patient's home health nurse 

noted patient to be hypotensive and having increased erythema and swelling to b/

l LE.  While in patient treated with IV antibiotics with no improvement and 

developed poor appetite.  Patient placed on in-patient hospice and started on 

Morphine drip.  Called by RN for patient being found unresponsive this morning.

  On assessment patient noted to have no palpable carotid pulse, no cardiac 

rhythm auscultated, no air entry.  Patient  and pronounced at 08:42am. 


Condition: Stable





- Instructions


Referrals: 


Genaro Fabian MD [Primary Care Provider] - 


Disposition: 





- Home Medications


Comprehensive Discharge Medication List: 


Ambulatory Orders





Cholecalciferol (Vitamin D3) [Vitamin D3] 1,000 unit PO DAILY 19 


Finasteride 5 mg PO DAILY 19 


Furosemide [Lasix] 80 mg PO DAILY 19 


Gabapentin [Neurontin] 300 mg PO TID 19 


Losartan Potassium [Cozaar] 25 mg PO DAILY 19 


Metolazone 2.5 mg PO DAILY 19 


Metoprolol Succinate 25 mg PO DAILY 19 


Potassium Chloride 20 meq PO DAILY 19 


Ranitidine [Zantac -] 150 mg PO AC 19 


Rivaroxaban [Xarelto] 20 mg PO HS 19 


Tamsulosin HCl [Flomax] 0.8 mg PO DAILY 19 


Furosemide [Lasix] 40 mg PO HS 19 


Nystatin Powder [Nystop Powder -] 1 applic TP DAILY 19 


Acetaminophen [Tylenol .Regular Strength -] 650 mg PO Q4H PRN  tablet 19 


Finasteride [Proscar -] 5 mg PO DAILY  tablet 19 


Furosemide [Lasix -] 40 mg PO BID@0600,1400  tablet 19 


Melatonin 5 mg PO HS PRN  tab 19 


Metolazone [Zaroxolyn -] 2.5 mg PO DAILY  tablet 19 


Nystatin Ointment [Mycostatin Ointment -] 1 applic TP BID #180 applic 19 


Potassium Chloride [K-Dur -] 40 meq PO BID  tablet.er 19 


Rosuvastatin [Crestor -] 5 mg PO HS #30 tablet 19

## 2021-04-30 NOTE — PN
April 30, 2021        Sugey Reynoso  [address] [facility] [address]        Josse Sugey,    Thank you for taking the time to talk with me today.  During our conversation, we talked about tools and resources to lower your health risks and reach your personal goals for healthy living.       I have enclosed the following resources for you to review:     Heart Failure Action Plan  Living Well with Heart Failure    I would like to call you and follow up on the resources I am sending you.  I can also be contacted at [phone] and am available Monday thru Friday.    We want to partner with you to help you get the right care, at the right time, in the right place.  This is a free, confidential service to help you and your family better handle your healthcare needs.     Congratulations on taking the next steps in the journey to improve your health!     Sincerely,    Farzana Jimenez, RN  [facility]:     Heart Failure Action Plan [id number]  Living Well with Heart Failure [id number]  Farzana Jimenez Program Flyer   Progress Note, Physician


History of Present Illness: 





Pt seen and examined at bedside. He remains confused. 





- Current Medication List


Current Medications: 


Active Medications





Acetaminophen (Tylenol -)  650 mg PO Q6H PRN


   PRN Reason: PAIN LEVEL 6-10


   Last Admin: 11/21/19 21:34 Dose:  650 mg


Acetaminophen (Tylenol Suppository -)  650 mg NC Q6H PRN


   PRN Reason: FEVER


   Last Admin: 11/28/19 15:08 Dose:  650 mg


Clonazepam (Klonopin -)  1 mg PO BID [facility]


   Last Admin: 12/04/19 11:01 Dose:  Not Given


Finasteride (Proscar -)  5 mg PO DAILY [facility]


   Last Admin: 12/04/19 11:02 Dose:  Not Given


Gabapentin (Neurontin -)  300 mg PO TID [facility]


   Last Admin: 12/04/19 14:53 Dose:  Not Given


Heparin Sodium (Porcine) (Heparin -)  5,000 unit SQ BID [facility]


   Last Admin: 12/04/19 11:01 Dose:  5,000 unit


Aztreonam 1 gm/ Dextrose  50 mls @ 100 mls/hr IVPB Q12H [facility]; Protocol


   Last Admin: 12/04/19 17:04 Dose:  100 mls/hr


Amino Acids (Clinimix -)  1,000 mls @ 125 mls/hr IV Q8H [facility]


   Last Admin: 12/04/19 12:54 Dose:  125 mls/hr


Fat Emulsion Intravenous (Intralipid -)  250 mls @ 20.833 mls/hr IV DAILY@2200 

KELVIN


   Last Admin: 12/03/19 22:34 Dose:  20.833 mls/hr


Dextrose (D5w -)  1,000 mls @ 42 mls/hr IV Q24H [facility]


   Last Admin: 12/03/19 20:38 Dose:  42 mls/hr


Metoprolol Succinate (Toprol Xl -)  12.5 mg PO DAILY [facility]


   Last Admin: 12/04/19 11:02 Dose:  Not Given


Metoprolol Tartrate (Lopressor Injection -)  5 mg IVPB Q4H PRN


   PRN Reason: TACHYCARDIA


Nystatin (Mycostatin Cream -)  1 applic TP BID [facility]


   Last Admin: 12/04/19 11:02 Dose:  1 applic


Olanzapine (Zyprexa -)  2.5 mg PO HS [facility]


   Last Admin: 12/03/19 22:01 Dose:  Not Given


Potassium Chloride (K-Dur -)  40 meq PO DAILY [facility]


   Last Admin: 12/04/19 11:01 Dose:  Not Given


Pramipexole Dihydrochloride (Mirapex -)  0.25 mg PO TID [facility]


   Last Admin: 12/04/19 14:53 Dose:  Not Given


Rivaroxaban (Xarelto)  15 mg PO DAILY@1800 [facility]


   Last Admin: 12/03/19 17:27 Dose:  Not Given


Rosuvastatin Calcium (Crestor -)  5 mg PO HS [facility]


   Last Admin: 12/03/19 22:00 Dose:  Not Given


Tamsulosin HCl (Flomax -)  0.4 mg PO DAILY@0830 [facility] hospital


   Last Admin: 12/04/19 10:59 Dose:  Not Given


Thiamine HCl (Vitamin B1 Injection -)  100 mg IVPB BID [facility]


   Stop: 12/07/19 22:01


   Last Admin: 12/04/19 11:00 Dose:  100 mg











- Objective


Vital Signs: 


 Vital Signs











Temperature  97.5 F L  12/04/19 13:33


 


Pulse Rate  69   12/04/19 13:33


 


Respiratory Rate  20   12/04/19 13:33


 


Blood Pressure  151/64   12/04/19 13:33


 


O2 Sat by Pulse Oximetry (%)  99   12/03/19 09:00











Constitutional: Yes: Mild Distress


Eyes: Yes: Conjunctiva Clear


HENT: Yes: Atraumatic


Cardiovascular: Yes: S1, S2


Respiratory: Yes: CTA Bilaterally, On Nasal O2


Gastrointestinal: Yes: Soft, Abdomen, Obese


Genitourinary: Yes: Incontinence


Musculoskeletal: Yes: Muscle Weakness


Edema: Yes


Edema: LLE: 1+, RLE: 1+


Integumentary: Yes: Venous Stasis Changes


Neurological: Yes: Confusion


Labs: 


 CBC, BMP





 12/04/19 06:30 





 12/04/19 06:30 











Problem List





- Problems


(1) AGUILA (acute kidney injury)


Code(s): N17.9 - ACUTE KIDNEY FAILURE, UNSPECIFIED   





(2) CHF (congestive heart failure)


Code(s): I50.9 - HEART FAILURE, UNSPECIFIED   


Qualifiers: 


   Heart failure type: diastolic   Heart failure chronicity: chronic   

Qualified Code(s): I50.32 - Chronic diastolic (congestive) heart failure   





(3) Cellulitis


Code(s): L03.90 - CELLULITIS, UNSPECIFIED   


Qualifiers: 


   Site of cellulitis: extremity   Site of cellulitis of extremity: lower 

extremity 





Assessment/Plan


 Current Medications











Generic Name Dose Route Start Last Admin





  Trade Name Freq  PRN Reason Stop Dose Admin


 


Acetaminophen  650 mg  11/14/19 04:07  11/21/19 21:34





  Tylenol -  PO   650 mg





  Q6H PRN   Administration





  PAIN LEVEL 6-10   





     





     





     


 


Acetaminophen  650 mg  11/25/19 16:13  11/28/19 15:08





  Tylenol Suppository -  NC   650 mg





  Q6H PRN   Administration





  FEVER   





     





     





     


 


Clonazepam  1 mg  11/25/19 22:00  12/04/19 11:01





  Klonopin -  PO   Not Given





  BID [facility] hospital   





     





     





     





     


 


Finasteride  5 mg  11/15/19 10:00  12/04/19 11:02





  Proscar -  PO   Not Given





  DAILY [facility] hospital   





     





     





     





     


 


Gabapentin  300 mg  11/21/19 22:00  12/04/19 14:53





  Neurontin -  PO   Not Given





  TID [facility]   





     





     





     





     


 


Heparin Sodium (Porcine)  5,000 unit  11/29/19 22:00  12/04/19 11:01





  Heparin -  SQ   5,000 unit





  BID KELVIN   Administration





     





     





     





     


 


Aztreonam 1 gm/ Dextrose  50 mls @ 100 mls/hr  11/26/19 17:00  12/04/19 17:04





  IVPB   100 mls/hr





  Q12H KELVIN   Administration





     





     





  Protocol   





     


 


Amino Acids  1,000 mls @ 125 mls/hr  12/02/19 18:37  12/04/19 12:54





  Clinimix -  IV   125 mls/hr





  Q8H KELVIN   Administration





     





     





     





     


 


Fat Emulsion Intravenous  250 mls @ 20.833 mls/hr  12/03/19 22:00  12/03/19 22:

34





  Intralipid -  IV   20.833 mls/hr





  DAILY@2200 KELVIN   Administration





     





     





     





     


 


Dextrose  1,000 mls @ 42 mls/hr  12/03/19 17:00  12/03/19 20:38





  D5w -  IV   42 mls/hr





  Q24H KELVIN   Administration





     





     





     





     


 


Metoprolol Succinate  12.5 mg  11/18/19 10:00  12/04/19 11:02





  Toprol Xl -  PO   Not Given





  DAILY [facility]   





     





     





     





     


 


Metoprolol Tartrate  5 mg  11/29/19 13:36  





  Lopressor Injection -  IVPB   





  Q4H PRN   





  TACHYCARDIA   





     





     





     


 


Nystatin  1 applic  12/02/19 22:00  12/04/19 11:02





  Mycostatin Cream -  TP   1 applic





  BID KELVIN   Administration





     





     





     





     


 


Olanzapine  2.5 mg  11/21/19 22:00  12/03/19 22:01





  Zyprexa -  PO   Not Given





  [facility]   





     





     





     





     


 


Potassium Chloride  40 meq  11/16/19 17:49  12/04/19 11:01





  K-Dur -  PO   Not Given





  DAILY [facility] hospital   





     





     





     





     


 


Pramipexole Dihydrochloride  0.25 mg  11/21/19 20:00  12/04/19 14:53





  Mirapex -  PO   Not Given





  TID [facility] hospital   





     





     





     





     


 


Rivaroxaban  15 mg  11/14/19 18:00  12/03/19 17:27





  Xarelto  PO   Not Given





  DAILY@1800 [facility] access hospital   





     





     





     





     


 


Rosuvastatin Calcium  5 mg  11/18/19 22:00  12/03/19 22:00





  Crestor -  PO   Not Given





  HS [facility] access hospital   





     





     





     





     


 


Tamsulosin HCl  0.4 mg  11/17/19 08:30  12/04/19 10:59





  Flomax -  PO   Not Given





  DAILY@0830 [facility] access hospital   





     





     





     





     


 


Thiamine HCl  100 mg  12/03/19 12:30  12/04/19 11:00





  Vitamin B1 Injection -  IVPB  12/07/19 22:01  100 mg





  BID KELVIN   Administration





     





     





     





     

















Impression


1. CKD


2. hx of hydronephrosis


3. hx of obstructive uropathy


4. CHF


5. HTN


6. AGUILA


7. a-fib


8. hypernatreamia





Plan


- cont fluids


- cont lipids


- GOC being discussed today


- mental status not improved


- renal function stable


- diuretics are on hold as he is not eating


- likely in part pre-renal aguila